# Patient Record
Sex: MALE | Race: WHITE | NOT HISPANIC OR LATINO | Employment: FULL TIME | URBAN - NONMETROPOLITAN AREA
[De-identification: names, ages, dates, MRNs, and addresses within clinical notes are randomized per-mention and may not be internally consistent; named-entity substitution may affect disease eponyms.]

---

## 2017-01-10 ENCOUNTER — OFFICE VISIT (OUTPATIENT)
Dept: FAMILY MEDICINE CLINIC | Facility: CLINIC | Age: 57
End: 2017-01-10

## 2017-01-10 VITALS
WEIGHT: 242.1 LBS | BODY MASS INDEX: 35.86 KG/M2 | HEART RATE: 93 BPM | OXYGEN SATURATION: 96 % | HEIGHT: 69 IN | SYSTOLIC BLOOD PRESSURE: 128 MMHG | DIASTOLIC BLOOD PRESSURE: 78 MMHG

## 2017-01-10 DIAGNOSIS — I10 ESSENTIAL HYPERTENSION: ICD-10-CM

## 2017-01-10 DIAGNOSIS — N18.30 CKD (CHRONIC KIDNEY DISEASE) STAGE 3, GFR 30-59 ML/MIN (HCC): ICD-10-CM

## 2017-01-10 DIAGNOSIS — K21.9 GASTROESOPHAGEAL REFLUX DISEASE, ESOPHAGITIS PRESENCE NOT SPECIFIED: Primary | ICD-10-CM

## 2017-01-10 DIAGNOSIS — E11.8 CONTROLLED TYPE 2 DIABETES MELLITUS WITH COMPLICATION, WITHOUT LONG-TERM CURRENT USE OF INSULIN (HCC): ICD-10-CM

## 2017-01-10 DIAGNOSIS — R11.2 NAUSEA AND VOMITING, INTRACTABILITY OF VOMITING NOT SPECIFIED, UNSPECIFIED VOMITING TYPE: ICD-10-CM

## 2017-01-10 PROCEDURE — 99213 OFFICE O/P EST LOW 20 MIN: CPT | Performed by: FAMILY MEDICINE

## 2017-01-10 RX ORDER — UBIDECARENONE 200 MG
1 CAPSULE ORAL NIGHTLY
Qty: 90 EACH | Refills: 2 | Status: SHIPPED | OUTPATIENT
Start: 2017-01-10 | End: 2019-04-18 | Stop reason: SDUPTHER

## 2017-01-10 RX ORDER — VALSARTAN AND HYDROCHLOROTHIAZIDE 80; 12.5 MG/1; MG/1
1 TABLET, FILM COATED ORAL DAILY
Qty: 90 TABLET | Refills: 2 | Status: SHIPPED | OUTPATIENT
Start: 2017-01-10 | End: 2017-10-17 | Stop reason: SDUPTHER

## 2017-01-10 RX ORDER — PAROXETINE HYDROCHLORIDE HEMIHYDRATE 25 MG/1
25 TABLET, FILM COATED, EXTENDED RELEASE ORAL EVERY MORNING
Qty: 90 TABLET | Refills: 2 | Status: SHIPPED | OUTPATIENT
Start: 2017-01-10 | End: 2017-10-17 | Stop reason: SDUPTHER

## 2017-01-10 RX ORDER — ESOMEPRAZOLE MAGNESIUM 20 MG/1
20 FOR SUSPENSION ORAL
Qty: 90 PACKET | Refills: 2 | Status: SHIPPED | OUTPATIENT
Start: 2017-01-10 | End: 2017-02-07 | Stop reason: ALTCHOICE

## 2017-01-10 RX ORDER — ONDANSETRON 4 MG/1
4 TABLET, FILM COATED ORAL EVERY 8 HOURS PRN
Qty: 30 TABLET | Refills: 0 | Status: SHIPPED | OUTPATIENT
Start: 2017-01-10 | End: 2017-05-15 | Stop reason: SDUPTHER

## 2017-01-10 RX ORDER — ATORVASTATIN CALCIUM 40 MG/1
40 TABLET, FILM COATED ORAL DAILY
Qty: 90 TABLET | Refills: 2 | Status: SHIPPED | OUTPATIENT
Start: 2017-01-10 | End: 2017-10-17 | Stop reason: SDUPTHER

## 2017-01-10 RX ORDER — DOXAZOSIN 8 MG/1
8 TABLET ORAL NIGHTLY
Qty: 90 TABLET | Refills: 2 | Status: SHIPPED | OUTPATIENT
Start: 2017-01-10 | End: 2017-10-17 | Stop reason: SDUPTHER

## 2017-01-12 DIAGNOSIS — K21.9 GASTROESOPHAGEAL REFLUX DISEASE, ESOPHAGITIS PRESENCE NOT SPECIFIED: Primary | ICD-10-CM

## 2017-01-12 LAB
ALBUMIN SERPL-MCNC: 4.2 GM/DL (ref 3.4–4.8)
ALP SERPL-CCNC: 52 U/L (ref 38–126)
ALT SERPL-CCNC: 42 U/L (ref 21–72)
ANION GAP SERPL CALCULATED.3IONS-SCNC: 16 MMOL/L (ref 5–15)
AST SERPL-CCNC: 43 U/L (ref 17–59)
BILIRUB SERPL-MCNC: 0.7 MG/DL (ref 0.2–1.3)
BUN SERPL-MCNC: 14 MG/DL (ref 7–21)
CALCIUM SERPL-MCNC: 8.8 MG/DL (ref 8.4–10.2)
CHLORIDE SERPL-SCNC: 100 MMOL/L (ref 95–110)
CO2 SERPL-SCNC: 29 MMOL/L (ref 22–31)
CREAT SERPL-MCNC: 1.1 MG/DL (ref 0.7–1.3)
GLUCOSE SERPL-MCNC: 124 MG/DL (ref 60–100)
POTASSIUM SERPL-SCNC: 3.7 MMOL/L (ref 3.5–5.1)
PROT SERPL-MCNC: 7.3 GM/DL (ref 6.3–8.6)
SODIUM SERPL-SCNC: 145 MMOL/L (ref 137–145)
TSH SERPL-ACNC: 0.68 UIU/ML (ref 0.46–4.68)

## 2017-01-13 RX ORDER — METOCLOPRAMIDE 5 MG/1
5 TABLET ORAL 2 TIMES DAILY PRN
Qty: 40 TABLET | Refills: 0 | Status: SHIPPED | OUTPATIENT
Start: 2017-01-13 | End: 2017-10-17

## 2017-01-13 RX ORDER — GLYBURIDE 2.5 MG/1
2.5 TABLET ORAL
Qty: 30 TABLET | Refills: 0 | Status: SHIPPED | OUTPATIENT
Start: 2017-01-13 | End: 2017-10-17 | Stop reason: SDUPTHER

## 2017-01-16 ENCOUNTER — OFFICE VISIT (OUTPATIENT)
Dept: PAIN MEDICINE | Facility: CLINIC | Age: 57
End: 2017-01-16

## 2017-01-16 VITALS
DIASTOLIC BLOOD PRESSURE: 70 MMHG | HEIGHT: 69 IN | WEIGHT: 244.1 LBS | SYSTOLIC BLOOD PRESSURE: 120 MMHG | BODY MASS INDEX: 36.15 KG/M2

## 2017-01-16 DIAGNOSIS — Z79.891 LONG TERM (CURRENT) USE OF OPIATE ANALGESIC: ICD-10-CM

## 2017-01-16 DIAGNOSIS — M12.9 ARTHRITIS, MULTIPLE JOINT INVOLVEMENT: ICD-10-CM

## 2017-01-16 DIAGNOSIS — M47.817 LUMBOSACRAL SPONDYLOSIS WITHOUT MYELOPATHY: Primary | ICD-10-CM

## 2017-01-16 PROCEDURE — 99213 OFFICE O/P EST LOW 20 MIN: CPT | Performed by: NURSE PRACTITIONER

## 2017-01-16 NOTE — PROGRESS NOTES
Zaheer Baron is a 56 y.o. male.   1960    HPI:   Location: lower back and Joints  Quality: stabbing and aching  Severity: 5/10  Timing: constant  Alleviating: pain medication, ice, heating pad and injection  Aggravating: sitting      Opiate medications providing enough relief for daily activities and work issues. No SE. 12/5 PROCEDURE: Lumbosacral RFTC (radiofrequency thermal coagulation) still providing enough relief at this point. Will obtain UDS next visit and schedule RFTC in may.  Patient states he has had chronic history of lower back problems and osteoarthritis involving multiple joints with stabbing aching pain on and off.      The following portions of the patient's history were reviewed by me and updated as appropriate: allergies, current medications, past family history, past medical history, past social history, past surgical history and problem list.    Past Medical History   Diagnosis Date   • Acute sinusitis    • Allergic rhinitis    • Arthropathy      of lumbar facet joint   • Artificial lens present      position - right   • Backache      chronic back problems   • Benign prostatic hyperplasia    • Bronchitis      perisistent   • Cataract    • Chronic obstructive lung disease    • Diabetes mellitus      no retinopathy   • Disorder of kidney      Disorder of kidney and/or ureter - Congenital solitary right kidney      • Dizziness      History of Meniere's like condition, left ear      • Drug therapy      long-term   • Dysfunction of eustachian tube    • Dyslipidemia    • Gout    • Headache      History of possible migraine/cluster      • Hearing loss    • Hypertension    • Hypokalemia    • Impacted cerumen    • Infestation by Sarcoptes scabiei alta hominis    • Inguinal hernia      History of, repaired      • Knee pain    • Osteoarthritis    • Pneumonia      in the past   • Posterior subcapsular polar senile cataract    • Sjogren's syndrome    • Type 2 diabetes mellitus        Social  History     Social History   • Marital status:      Spouse name: N/A   • Number of children: N/A   • Years of education: N/A     Occupational History   • Not on file.     Social History Main Topics   • Smoking status: Never Smoker   • Smokeless tobacco: Not on file   • Alcohol use Yes      Comment: SELDOM   • Drug use: No   • Sexual activity: Defer     Other Topics Concern   • Not on file     Social History Narrative       Family History   Problem Relation Age of Onset   • Cancer Other    • Diabetes Other    • Lung disease Other          Current Outpatient Prescriptions:   •  atorvastatin (LIPITOR) 40 MG tablet, Take 1 tablet by mouth Daily., Disp: 90 tablet, Rfl: 2  •  Coenzyme Q-10 200 MG capsule, Take 1 tablet by mouth Every Night., Disp: 90 each, Rfl: 2  •  doxazosin (CARDURA) 8 MG tablet, Take 1 tablet by mouth Every Night., Disp: 90 tablet, Rfl: 2  •  Ergocalciferol 2000 UNITS tablet, Take  by mouth., Disp: , Rfl:   •  esomeprazole (NEXIUM) 20 MG packet, Take 20 mg by mouth Every Morning Before Breakfast., Disp: 90 packet, Rfl: 2  •  FLUTICASONE FUROATE IN, Inhale., Disp: , Rfl:   •  glucose blood test strip, 1 each by Other route as needed. Use as instructed, Disp: , Rfl:   •  glyBURIDE (DIABETA) 2.5 MG tablet, Take 1 tablet by mouth Daily With Breakfast., Disp: 30 tablet, Rfl: 0  •  HYDROcodone-acetaminophen (NORCO)  MG per tablet, , Disp: , Rfl:   •  metoclopramide (REGLAN) 5 MG tablet, Take 1 tablet by mouth 2 (Two) Times a Day As Needed (as needed)., Disp: 40 tablet, Rfl: 0  •  ondansetron (ZOFRAN) 4 MG tablet, Take 1 tablet by mouth Every 8 (Eight) Hours As Needed for nausea or vomiting., Disp: 30 tablet, Rfl: 0  •  ONETOUCH DELICA LANCETS 33G misc, , Disp: , Rfl:   •  PARoxetine CR (PAXIL-CR) 25 MG 24 hr tablet, Take 1 tablet by mouth Every Morning., Disp: 90 tablet, Rfl: 2  •  SITagliptin (JANUVIA) 100 MG tablet, Take 1 tablet by mouth Daily., Disp: 90 tablet, Rfl: 2  •  tadalafil (CIALIS)  5 MG tablet, Take 5 mg by mouth daily as needed for erectile dysfunction., Disp: , Rfl:   •  urea (COLT LO 40) 40 % cream, Apply  topically., Disp: , Rfl:   •  valsartan-hydrochlorothiazide (DIOVAN-HCT) 80-12.5 MG per tablet, Take 1 tablet by mouth Daily., Disp: 90 tablet, Rfl: 2    Allergies   Allergen Reactions   • Nsaids      Solitary kidney; renal failure with NSAID use.         Review of Systems   Musculoskeletal: Positive for back pain.        Multiple joint pain       10 system review of systems was reviewed and negative except for above.    Physical Exam   Constitutional: He is oriented to person, place, and time. He appears well-developed and well-nourished. No distress.   Cardiovascular: Normal rate and regular rhythm.    Pulmonary/Chest: Effort normal.   Musculoskeletal:        Lumbar back: He exhibits decreased range of motion (flex 60 deg and ext 15 deg with min facet loading. ) and tenderness.   Neurological: He is alert and oriented to person, place, and time. Gait normal.   Reflex Scores:       Tricep reflexes are 2+ on the right side and 2+ on the left side.       Bicep reflexes are 2+ on the right side and 2+ on the left side.       Brachioradialis reflexes are 2+ on the right side and 2+ on the left side.       Patellar reflexes are 2+ on the right side and 2+ on the left side.       Achilles reflexes are 2+ on the right side and 2+ on the left side.  Psychiatric: He has a normal mood and affect. His behavior is normal. Judgment normal.       Zaheer was seen today for back pain and pain.    Diagnoses and all orders for this visit:    Lumbosacral spondylosis without myelopathy    Arthritis, multiple joint involvement    Long term (current) use of opiate analgesic        Medication: Patient reports no negative side effects, Patient reports appropriate usage and storage habits, Patient's opioid provides enough reflief to be more active and perform activities of daily living with less discomfort. and  Refill opioid medication as above. Discussed pt case with Dr. Shen, Opiate medication refilled x 2 hand written RX.     Interventional: 12/5 PROCEDURE: Lumbosacral RFTC (radiofrequency thermal coagulation) still providing enough relief at this point.      Rehab: Works 5 days a week.     Behavioral: No aberrant behavior noted. [facility] Report # [id number]  was reviewed and is consistent with stated history    Urine drug screen None at this time. Will get UDS next visit.           This document has been electronically signed by WILLA Lockett on January 16, 2017 4:42 PM

## 2017-01-19 NOTE — PROGRESS NOTES
Addendum:  I have reviewed this patient with WILLA Lewis.  I agree with the above findings and plan.  I have personally spoken with the patient today, and confirmed key findings.

## 2017-02-07 RX ORDER — OMEPRAZOLE 40 MG/1
40 CAPSULE, DELAYED RELEASE ORAL DAILY
Qty: 30 CAPSULE | Refills: 3 | Status: SHIPPED | OUTPATIENT
Start: 2017-02-07 | End: 2017-10-17 | Stop reason: SDUPTHER

## 2017-02-09 NOTE — PROGRESS NOTES
I have reviewed the notes, assessments, and/or procedures performed. I concur with her/his documentation of Zaheer Baron.     Baldemar Cotto, DO

## 2017-03-16 ENCOUNTER — HOSPITAL ENCOUNTER (OUTPATIENT)
Facility: HOSPITAL | Age: 57
Setting detail: HOSPITAL OUTPATIENT SURGERY
Discharge: HOME | End: 2017-03-16
Attending: INTERNAL MEDICINE | Admitting: INTERNAL MEDICINE

## 2017-03-16 ENCOUNTER — ANESTHESIA (OUTPATIENT)
Dept: GASTROENTEROLOGY | Facility: HOSPITAL | Age: 57
End: 2017-03-16

## 2017-03-16 ENCOUNTER — ANESTHESIA EVENT (OUTPATIENT)
Dept: GASTROENTEROLOGY | Facility: HOSPITAL | Age: 57
End: 2017-03-16

## 2017-03-16 ENCOUNTER — OFFICE VISIT (OUTPATIENT)
Dept: PAIN MEDICINE | Facility: CLINIC | Age: 57
End: 2017-03-16

## 2017-03-16 VITALS
HEIGHT: 69 IN | BODY MASS INDEX: 37.59 KG/M2 | DIASTOLIC BLOOD PRESSURE: 70 MMHG | WEIGHT: 253.8 LBS | SYSTOLIC BLOOD PRESSURE: 122 MMHG

## 2017-03-16 VITALS
DIASTOLIC BLOOD PRESSURE: 81 MMHG | HEART RATE: 67 BPM | BODY MASS INDEX: 37.16 KG/M2 | WEIGHT: 250.88 LBS | RESPIRATION RATE: 18 BRPM | OXYGEN SATURATION: 97 % | SYSTOLIC BLOOD PRESSURE: 122 MMHG | HEIGHT: 69 IN | TEMPERATURE: 97.9 F

## 2017-03-16 DIAGNOSIS — K21.9 ACID REFLUX DISEASE WITH ULCER: ICD-10-CM

## 2017-03-16 DIAGNOSIS — M47.817 LUMBOSACRAL SPONDYLOSIS WITHOUT MYELOPATHY: Primary | ICD-10-CM

## 2017-03-16 DIAGNOSIS — Z79.899 HIGH RISK MEDICATIONS (NOT ANTICOAGULANTS) LONG-TERM USE: ICD-10-CM

## 2017-03-16 DIAGNOSIS — M25.9 MULTIPLE JOINT COMPLAINTS: ICD-10-CM

## 2017-03-16 LAB — GLUCOSE BLDC GLUCOMTR-MCNC: 118 MG/DL (ref 70–130)

## 2017-03-16 PROCEDURE — 88305 TISSUE EXAM BY PATHOLOGIST: CPT | Performed by: PATHOLOGY

## 2017-03-16 PROCEDURE — 99214 OFFICE O/P EST MOD 30 MIN: CPT | Performed by: PAIN MEDICINE

## 2017-03-16 PROCEDURE — 82962 GLUCOSE BLOOD TEST: CPT

## 2017-03-16 PROCEDURE — G0481 DRUG TEST DEF 8-14 CLASSES: HCPCS | Performed by: PAIN MEDICINE

## 2017-03-16 PROCEDURE — 88342 IMHCHEM/IMCYTCHM 1ST ANTB: CPT | Performed by: INTERNAL MEDICINE

## 2017-03-16 PROCEDURE — 25010000002 PROPOFOL 10 MG/ML EMULSION: Performed by: NURSE ANESTHETIST, CERTIFIED REGISTERED

## 2017-03-16 PROCEDURE — 88342 IMHCHEM/IMCYTCHM 1ST ANTB: CPT | Performed by: PATHOLOGY

## 2017-03-16 PROCEDURE — 80307 DRUG TEST PRSMV CHEM ANLYZR: CPT | Performed by: PAIN MEDICINE

## 2017-03-16 PROCEDURE — 88305 TISSUE EXAM BY PATHOLOGIST: CPT | Performed by: INTERNAL MEDICINE

## 2017-03-16 RX ORDER — HYDROCODONE BITARTRATE AND ACETAMINOPHEN 10; 325 MG/1; MG/1
1 TABLET ORAL
Qty: 150 TABLET | Refills: 0 | Status: SHIPPED | OUTPATIENT
Start: 2017-03-16 | End: 2017-04-15

## 2017-03-16 RX ORDER — DEXTROSE AND SODIUM CHLORIDE 5; .45 G/100ML; G/100ML
30 INJECTION, SOLUTION INTRAVENOUS CONTINUOUS
Status: DISCONTINUED | OUTPATIENT
Start: 2017-03-16 | End: 2017-03-16 | Stop reason: HOSPADM

## 2017-03-16 RX ORDER — PROPOFOL 10 MG/ML
VIAL (ML) INTRAVENOUS AS NEEDED
Status: DISCONTINUED | OUTPATIENT
Start: 2017-03-16 | End: 2017-03-16 | Stop reason: SURG

## 2017-03-16 RX ADMIN — PROPOFOL 80 MG: 10 INJECTION, EMULSION INTRAVENOUS at 10:15

## 2017-03-16 RX ADMIN — PROPOFOL 100 MG: 10 INJECTION, EMULSION INTRAVENOUS at 10:14

## 2017-03-16 RX ADMIN — DEXTROSE AND SODIUM CHLORIDE 30 ML/HR: 5; 450 INJECTION, SOLUTION INTRAVENOUS at 10:06

## 2017-03-16 NOTE — H&P
Saji Reilly DO,[facility]  Gastroenterology  Hepatology  Endoscopy  Board Certified in Internal Medicine and gastroenterology  [address], [address]  [place]. [address]  - [phone]   F - [phone]     GASTROENTEROLOGY HISTORY AND PHYSICAL  NOTE   SAJI REILLY DO.         SUBJECTIVE:   3/16/2017    Name: Zaheer Baron  DOD: 1960        Chief Complaint:       Subjective : Acid reflux, belching of sour material    Patient is 56 y.o. male presents with desire for elective EGD due to persistent acid reflux for over 20 years.  Exclude Bolton's esophagus.      ROS/HISTORY/ CURRENT MEDICATIONS/OBJECTIVE/VS/PE:   Review of Systems:   Review of Systems    History:     Past Medical History   Diagnosis Date   • Acute sinusitis    • Allergic rhinitis    • Arthropathy      of lumbar facet joint   • Artificial lens present      position - right   • Backache      chronic back problems   • Benign prostatic hyperplasia    • Bronchitis      perisistent   • Cataract    • Chronic obstructive lung disease    • Diabetes mellitus      no retinopathy   • Disorder of kidney      Disorder of kidney and/or ureter - Congenital solitary right kidney      • Dizziness      History of Meniere's like condition, left ear      • Drug therapy      long-term   • Dysfunction of eustachian tube    • Dyslipidemia    • Gout    • Headache      History of possible migraine/cluster      • Hearing loss    • Hypertension    • Hypokalemia    • Impacted cerumen    • Infestation by Sarcoptes scabiei alta hominis    • Inguinal hernia      History of, repaired      • Knee pain    • Osteoarthritis    • Pneumonia      in the past   • Posterior subcapsular polar senile cataract    • Sjogren's syndrome    • Type 2 diabetes mellitus      Past Surgical History   Procedure Laterality Date   • Back surgery       1/1/02   • Carpal tunnel release       (Carpal tunnel release on the left)   09/10/2010 , Carpal tunnel release on the  right)   12/03/2010    • Inguinal hernia repair       (Left inguinal hernioplasty with mesh.)   06/07/2007    • Injection of medication       Injection for nerve block (Lumbar medial branch block.)   03/31/2016    • Knee surgery       (Lateral release, removal of loose bodies, excision of suprapatellar plica.)   04/29/1982    • Other surgical history       Lumbar surgery (Lumbosacral radio frequency thermal coagulation. Lumbosacral spondylosis.)   06/30/2016 ,Lumbar surgery (Lumbosacral radio frequency thermal coagulation.)   12/21/2015     • Other surgical history       Remove cataract, insert lens (Right eye.)   05/07/2015    • Other surgical history       Remove hip/pelvis lesion (Melanoma, right hip.)   2005    • Injection of medication  03/19/2015     solu-medrol    • Eye surgery Bilateral      cataract removed   • Hernia repair       Family History   Problem Relation Age of Onset   • Cancer Other    • Diabetes Other    • Lung disease Other      Social History   Substance Use Topics   • Smoking status: Never Smoker   • Smokeless tobacco: None   • Alcohol use Yes      Comment: SELDOM     Prescriptions Prior to Admission   Medication Sig Dispense Refill Last Dose   • atorvastatin (LIPITOR) 40 MG tablet Take 1 tablet by mouth Daily. 90 tablet 2 3/15/2017 at Unknown time   • Coenzyme Q-10 200 MG capsule Take 1 tablet by mouth Every Night. 90 each 2 3/15/2017 at Unknown time   • doxazosin (CARDURA) 8 MG tablet Take 1 tablet by mouth Every Night. 90 tablet 2 3/15/2017 at Unknown time   • Ergocalciferol 2000 UNITS tablet Take  by mouth.   3/15/2017 at Unknown time   • FLUTICASONE FUROATE IN Inhale.   3/15/2017 at Unknown time   • glucose blood test strip 1 each by Other route as needed. Use as instructed   3/15/2017 at Unknown time   • glyBURIDE (DIABETA) 2.5 MG tablet Take 1 tablet by mouth Daily With Breakfast. 30 tablet 0 3/15/2017 at Unknown time   • HYDROcodone-acetaminophen (NORCO)  MG per tablet Takes 5  tablets daily   3/16/2017 at Unknown time   • HYDROcodone-acetaminophen (NORCO)  MG per tablet Take 1 tablet by mouth 5 (Five) Times a Day for 30 days. 150 tablet 0 3/15/2017 at Unknown time   • HYDROcodone-acetaminophen (NORCO)  MG per tablet Take 1 tablet by mouth 5 (Five) Times a Day for 30 days. 150 tablet 0 3/15/2017 at Unknown time   • metoclopramide (REGLAN) 5 MG tablet Take 1 tablet by mouth 2 (Two) Times a Day As Needed (as needed). 40 tablet 0 3/15/2017 at Unknown time   • omeprazole (priLOSEC) 40 MG capsule Take 1 capsule by mouth Daily. 30 capsule 3 3/15/2017 at Unknown time   • ondansetron (ZOFRAN) 4 MG tablet Take 1 tablet by mouth Every 8 (Eight) Hours As Needed for nausea or vomiting. 30 tablet 0 3/15/2017 at Unknown time   • ONETOUCH DELICA LANCETS 33G misc    3/15/2017 at Unknown time   • PARoxetine CR (PAXIL-CR) 25 MG 24 hr tablet Take 1 tablet by mouth Every Morning. 90 tablet 2 3/15/2017 at Unknown time   • SITagliptin (JANUVIA) 100 MG tablet Take 1 tablet by mouth Daily. 90 tablet 2 3/14/2017   • tadalafil (CIALIS) 5 MG tablet Take 5 mg by mouth daily as needed for erectile dysfunction.   3/15/2017   • urea (COLT LO 40) 40 % cream Apply  topically.   3/15/2017   • valsartan-hydrochlorothiazide (DIOVAN-HCT) 80-12.5 MG per tablet Take 1 tablet by mouth Daily. 90 tablet 2 3/15/2017     Allergies:  Nsaids    I have reviewed the patients medical history, surgical history and family history in the available medical record system.     Current Medications:     Current Facility-Administered Medications   Medication Dose Route Frequency Provider Last Rate Last Dose   • dextrose 5 % and sodium chloride 0.45 % infusion  30 mL/hr Intravenous Continuous Alli Shook DO 30 mL/hr at 03/16/17 1006 30 mL/hr at 03/16/17 1006       Objective     Physical Exam:   Temp:  [98.2 °F (36.8 °C)] 98.2 °F (36.8 °C)  Heart Rate:  [76] 76  Resp:  [18] 18  BP: (122-149)/(70-93) 149/93    Physical  Exam:  General Appearance:    Alert, cooperative, in no acute distress   Head:    Normocephalic, without obvious abnormality, atraumatic   Eyes:            Lids and lashes normal, conjunctivae and sclerae normal, no   icterus, no pallor, corneas clear, PERRLA   Ears:    Ears appear intact with no abnormalities noted   Throat:   No oral lesions, no thrush, oral mucosa moist   Neck:   No adenopathy, supple, trachea midline, no thyromegaly, no     carotid bruit, no JVD   Back:     No kyphosis present, no scoliosis present, no skin lesions,       erythema or scars, no tenderness to percussion or                   palpation,   range of motion normal   Lungs:     Clear to auscultation,respirations regular, even and                   unlabored    Heart:    Regular rhythm and normal rate, normal S1 and S2, no            murmur, no gallop, no rub, no click   Breast Exam:    Deferred   Abdomen:     Normal bowel sounds, no masses, no organomegaly, soft        non-tender, non-distended, no guarding, no rebound                 tenderness   Genitalia:    Deferred   Extremities:   Moves all extremities well, no edema, no cyanosis, no              redness   Pulses:   Pulses palpable and equal bilaterally   Skin:   No bleeding, bruising or rash   Lymph nodes:   No palpable adenopathy   Neurologic:   Cranial nerves 2 - 12 grossly intact, sensation intact, DTR        present and equal bilaterally      Results Review:     Lab Results   Component Value Date    WBC 6.1 01/12/2017    WBC 5.5 01/28/2016    WBC 5.0 07/09/2015    HGB 12.9 (L) 01/12/2017    HGB 12.8 (L) 01/28/2016    HGB 13.3 (L) 07/09/2015    HCT 37.9 (L) 01/12/2017    HCT 37.9 (L) 01/28/2016    HCT 39.8 07/09/2015     01/12/2017     01/28/2016     07/09/2015             No results found for: LIPASE  No results found for: INR       Radiology Review:  Imaging Results (last 72 hours)     ** No results found for the last 72 hours. **           I reviewed  the patient's new clinical results.  I reviewed the patient's new imaging results and agree with the interpretation.     ASSESSMENT/PLAN:   ASSESSMENT:   1.  Acid reflux, exclude Bolton's esophagus or complications    PLAN:   1.  Esophagogastroduodenoscopy with biopsies    Risk and benefits associated with the procedure are reviewed with the patient.  He wishes to proceed      Alli Shook DO  03/16/17  10:19 AM

## 2017-03-16 NOTE — PLAN OF CARE
Problem: Patient Care Overview (Adult)  Goal: Plan of Care Review  Outcome: Ongoing (interventions implemented as appropriate)    03/16/17 1029   Coping/Psychosocial Response Interventions   Plan Of Care Reviewed With patient   Patient Care Overview   Progress no change   Outcome Evaluation   Outcome Summary/Follow up Plan vss         Problem: GI Endoscopy (Adult)  Goal: Signs and Symptoms of Listed Potential Problems Will be Absent or Manageable (GI Endoscopy)  Outcome: Ongoing (interventions implemented as appropriate)    03/16/17 1029   GI Endoscopy   Problems Assessed (GI Endoscopy) all   Problems Present (GI Endoscopy) none

## 2017-03-16 NOTE — ANESTHESIA PREPROCEDURE EVALUATION
Anesthesia Evaluation     NPO Status: > 8 hours   Airway   Mallampati: III  TM distance: >3 FB  possible difficult intubation  Dental - normal exam     Pulmonary - normal exam   Cardiovascular - negative cardio ROS and normal exam        Neuro/Psych  GI/Hepatic/Renal/Endo    (+)  chronic renal disease, diabetes mellitus type 1 well controlled,     Musculoskeletal     Abdominal  - normal exam   Substance History      OB/GYN          Other   (+) arthritis                                 Anesthesia Plan    ASA 3     MAC     Anesthetic plan and risks discussed with patient.

## 2017-03-16 NOTE — ANESTHESIA POSTPROCEDURE EVALUATION
Patient: Zaheer Baron    Procedure Summary     Date Anesthesia Start Anesthesia Stop Room / Location    03/16/17 1012 1028 [facility] ENDOSCOPY 2 / [facility] ENDOSCOPY       Procedure Diagnosis Surgeon Provider    ESOPHAGOGASTRODUODENOSCOPY (N/A Esophagus) Acid reflux disease with ulcer  (ACID REFLUX) Alli Shook, DO Alli Hernandez CRNA          Anesthesia Type: MAC  Last vitals  /93 (03/16/17 0959)    Temp 98.2 °F (36.8 °C) (03/16/17 0959)    Pulse 76 (03/16/17 0959)   Resp 18 (03/16/17 0959)    SpO2 95 % (03/16/17 0959)      Post Anesthesia Care and Evaluation    Patient location during evaluation: PACU  Patient participation: complete - patient participated  Level of consciousness: awake and alert  Pain management: adequate  Airway patency: patent  Anesthetic complications: No anesthetic complications    Cardiovascular status: acceptable  Respiratory status: acceptable  Hydration status: acceptable

## 2017-03-16 NOTE — PLAN OF CARE
Problem: Patient Care Overview (Adult)  Goal: Plan of Care Review  Outcome: Outcome(s) achieved Date Met:  03/16/17 03/16/17 1042   Coping/Psychosocial Response Interventions   Plan Of Care Reviewed With patient;spouse   Patient Care Overview   Progress no change   Outcome Evaluation   Outcome Summary/Follow up Plan vss, tolerating fluids         Problem: GI Endoscopy (Adult)  Goal: Signs and Symptoms of Listed Potential Problems Will be Absent or Manageable (GI Endoscopy)  Outcome: Outcome(s) achieved Date Met:  03/16/17 03/16/17 1042   GI Endoscopy   Problems Assessed (GI Endoscopy) all   Problems Present (GI Endoscopy) none

## 2017-03-16 NOTE — PROGRESS NOTES
Zaheer Baron is a 56 y.o. male.   1960    HPI:   Location: lower back and Joints  Quality: stabbing and aching  Severity: 4-5/10  Timing: constant  Alleviating: pain medication, ice, heating pad and injection  Aggravating: sitting     Back still improved after rftc in November.  Patient reports that the opioid medication still provides him good relief and allows increased activity than he would have without the opioid medication. He denies side effects.      The following portions of the patient's history were reviewed by me and updated as appropriate: allergies, current medications, past family history, past medical history, past social history, past surgical history and problem list.    Past Medical History   Diagnosis Date   • Acute sinusitis    • Allergic rhinitis    • Arthropathy      of lumbar facet joint   • Artificial lens present      position - right   • Backache      chronic back problems   • Benign prostatic hyperplasia    • Bronchitis      perisistent   • Cataract    • Chronic obstructive lung disease    • Diabetes mellitus      no retinopathy   • Disorder of kidney      Disorder of kidney and/or ureter - Congenital solitary right kidney      • Dizziness      History of Meniere's like condition, left ear      • Drug therapy      long-term   • Dysfunction of eustachian tube    • Dyslipidemia    • Gout    • Headache      History of possible migraine/cluster      • Hearing loss    • Hypertension    • Hypokalemia    • Impacted cerumen    • Infestation by Sarcoptes scabiei alta hominis    • Inguinal hernia      History of, repaired      • Knee pain    • Osteoarthritis    • Pneumonia      in the past   • Posterior subcapsular polar senile cataract    • Sjogren's syndrome    • Type 2 diabetes mellitus        Social History     Social History   • Marital status:      Spouse name: N/A   • Number of children: N/A   • Years of education: N/A     Occupational History   • Not on file.     Social  History Main Topics   • Smoking status: Never Smoker   • Smokeless tobacco: Not on file   • Alcohol use Yes      Comment: SELDOM   • Drug use: No   • Sexual activity: Defer     Other Topics Concern   • Not on file     Social History Narrative       Family History   Problem Relation Age of Onset   • Cancer Other    • Diabetes Other    • Lung disease Other          Current Outpatient Prescriptions:   •  atorvastatin (LIPITOR) 40 MG tablet, Take 1 tablet by mouth Daily., Disp: 90 tablet, Rfl: 2  •  Coenzyme Q-10 200 MG capsule, Take 1 tablet by mouth Every Night., Disp: 90 each, Rfl: 2  •  doxazosin (CARDURA) 8 MG tablet, Take 1 tablet by mouth Every Night., Disp: 90 tablet, Rfl: 2  •  Ergocalciferol 2000 UNITS tablet, Take  by mouth., Disp: , Rfl:   •  FLUTICASONE FUROATE IN, Inhale., Disp: , Rfl:   •  glucose blood test strip, 1 each by Other route as needed. Use as instructed, Disp: , Rfl:   •  glyBURIDE (DIABETA) 2.5 MG tablet, Take 1 tablet by mouth Daily With Breakfast., Disp: 30 tablet, Rfl: 0  •  HYDROcodone-acetaminophen (NORCO)  MG per tablet, Takes 5 tablets daily, Disp: , Rfl:   •  metoclopramide (REGLAN) 5 MG tablet, Take 1 tablet by mouth 2 (Two) Times a Day As Needed (as needed)., Disp: 40 tablet, Rfl: 0  •  omeprazole (priLOSEC) 40 MG capsule, Take 1 capsule by mouth Daily., Disp: 30 capsule, Rfl: 3  •  ondansetron (ZOFRAN) 4 MG tablet, Take 1 tablet by mouth Every 8 (Eight) Hours As Needed for nausea or vomiting., Disp: 30 tablet, Rfl: 0  •  ONETOUCH DELICA LANCETS 33G misc, , Disp: , Rfl:   •  PARoxetine CR (PAXIL-CR) 25 MG 24 hr tablet, Take 1 tablet by mouth Every Morning., Disp: 90 tablet, Rfl: 2  •  SITagliptin (JANUVIA) 100 MG tablet, Take 1 tablet by mouth Daily., Disp: 90 tablet, Rfl: 2  •  tadalafil (CIALIS) 5 MG tablet, Take 5 mg by mouth daily as needed for erectile dysfunction., Disp: , Rfl:   •  urea (COLT LO 40) 40 % cream, Apply  topically., Disp: , Rfl:   •   valsartan-hydrochlorothiazide (DIOVAN-HCT) 80-12.5 MG per tablet, Take 1 tablet by mouth Daily., Disp: 90 tablet, Rfl: 2  •  HYDROcodone-acetaminophen (NORCO)  MG per tablet, Take 1 tablet by mouth 5 (Five) Times a Day for 30 days., Disp: 150 tablet, Rfl: 0  •  HYDROcodone-acetaminophen (NORCO)  MG per tablet, Take 1 tablet by mouth 5 (Five) Times a Day for 30 days., Disp: 150 tablet, Rfl: 0    Allergies   Allergen Reactions   • Nsaids      Solitary kidney; renal failure with NSAID use.         Review of Systems   Musculoskeletal: Positive for back pain (lower).        And joint pain     10 system review of systems was reviewed and negative except for above.    Physical Exam   Constitutional: He appears well-developed and well-nourished. No distress.   Musculoskeletal:        Lumbar back: He exhibits decreased range of motion (ext 10- 15 deg with min facet loading.).   Painful ambulation b knees.   Neurological: He is alert.   Psychiatric: He has a normal mood and affect. His behavior is normal. Judgment normal.       Zaheer was seen today for back pain and joint pain.    Diagnoses and all orders for this visit:    Lumbosacral spondylosis without myelopathy  -     ToxASSURE Select 13 (MW)    Multiple joint complaints  -     ToxASSURE Select 13 (MW)    High risk medications (not anticoagulants) long-term use  -     ToxASSURE Select 13 (MW)    Other orders  -     HYDROcodone-acetaminophen (NORCO)  MG per tablet; Take 1 tablet by mouth 5 (Five) Times a Day for 30 days.  -     HYDROcodone-acetaminophen (NORCO)  MG per tablet; Take 1 tablet by mouth 5 (Five) Times a Day for 30 days.        Medication: Patient reports no negative side effects, Patient reports appropriate usage and storage habits, Patient's opioid provides enough reflief to be more active and perform activities of daily living with less discomfort. and Refill opioid medication as above    Interventional: none at this time.  Good  results from rftc.  May repeat in future.  Continue opioid for multiple joint pains.    Rehab: none at this time    Behavioral: No aberrant behavior noted. [facility] Report #[id number]  was reviewed and is consistent with stated history    Urine drug screen Ordered today to test for drugs of abuse and prescribed medications          This document has been electronically signed by Everton Shen MD on March 16, 2017 8:04 AM

## 2017-03-17 LAB
LAB AP CASE REPORT: NORMAL
LAB AP DIAGNOSIS COMMENT: NORMAL
Lab: NORMAL
PATH REPORT.FINAL DX SPEC: NORMAL
PATH REPORT.GROSS SPEC: NORMAL

## 2017-03-23 LAB
CONV REPORT SUMMARY: NORMAL
Lab: NORMAL

## 2017-05-03 ENCOUNTER — OFFICE VISIT (OUTPATIENT)
Dept: PAIN MEDICINE | Facility: CLINIC | Age: 57
End: 2017-05-03

## 2017-05-03 VITALS
SYSTOLIC BLOOD PRESSURE: 142 MMHG | BODY MASS INDEX: 37.61 KG/M2 | DIASTOLIC BLOOD PRESSURE: 96 MMHG | WEIGHT: 253.9 LBS | HEIGHT: 69 IN

## 2017-05-03 DIAGNOSIS — Z79.899 HIGH RISK MEDICATIONS (NOT ANTICOAGULANTS) LONG-TERM USE: ICD-10-CM

## 2017-05-03 DIAGNOSIS — M25.9 MULTIPLE JOINT COMPLAINTS: ICD-10-CM

## 2017-05-03 DIAGNOSIS — M47.817 LUMBOSACRAL SPONDYLOSIS WITHOUT MYELOPATHY: Primary | ICD-10-CM

## 2017-05-03 PROCEDURE — 99214 OFFICE O/P EST MOD 30 MIN: CPT | Performed by: PAIN MEDICINE

## 2017-05-03 RX ORDER — HYDROCODONE BITARTRATE AND ACETAMINOPHEN 10; 325 MG/1; MG/1
1 TABLET ORAL
Qty: 150 TABLET | Refills: 0 | Status: SHIPPED | OUTPATIENT
Start: 2017-05-03 | End: 2017-06-02

## 2017-05-15 ENCOUNTER — TELEPHONE (OUTPATIENT)
Dept: FAMILY MEDICINE CLINIC | Facility: CLINIC | Age: 57
End: 2017-05-15

## 2017-05-15 DIAGNOSIS — R10.11 RUQ PAIN: Primary | ICD-10-CM

## 2017-05-15 DIAGNOSIS — R11.2 NAUSEA AND VOMITING, INTRACTABILITY OF VOMITING NOT SPECIFIED, UNSPECIFIED VOMITING TYPE: ICD-10-CM

## 2017-05-15 DIAGNOSIS — M47.817 LUMBOSACRAL SPONDYLOSIS WITHOUT MYELOPATHY: Primary | ICD-10-CM

## 2017-05-15 RX ORDER — ONDANSETRON 4 MG/1
4 TABLET, FILM COATED ORAL EVERY 8 HOURS PRN
Qty: 30 TABLET | Refills: 0 | Status: SHIPPED | OUTPATIENT
Start: 2017-05-15 | End: 2017-10-17 | Stop reason: SDUPTHER

## 2017-05-18 ENCOUNTER — HOSPITAL ENCOUNTER (OUTPATIENT)
Dept: ULTRASOUND IMAGING | Facility: HOSPITAL | Age: 57
Discharge: HOME | End: 2017-05-18
Attending: FAMILY MEDICINE | Admitting: FAMILY MEDICINE

## 2017-05-18 DIAGNOSIS — R10.11 RUQ PAIN: ICD-10-CM

## 2017-05-18 PROCEDURE — 76705 ECHO EXAM OF ABDOMEN: CPT

## 2017-05-25 ENCOUNTER — HOSPITAL ENCOUNTER (OUTPATIENT)
Dept: INTERVENTIONAL RADIOLOGY/VASCULAR | Facility: HOSPITAL | Age: 57
Discharge: HOME | End: 2017-05-25
Attending: PAIN MEDICINE | Admitting: PAIN MEDICINE

## 2017-05-25 ENCOUNTER — HOSPITAL ENCOUNTER (OUTPATIENT)
Dept: INTERVENTIONAL RADIOLOGY/VASCULAR | Facility: HOSPITAL | Age: 57
Discharge: HOME | End: 2017-05-25
Attending: PAIN MEDICINE

## 2017-05-25 VITALS
HEART RATE: 82 BPM | DIASTOLIC BLOOD PRESSURE: 86 MMHG | SYSTOLIC BLOOD PRESSURE: 151 MMHG | RESPIRATION RATE: 20 BRPM | OXYGEN SATURATION: 96 %

## 2017-05-25 DIAGNOSIS — M47.817 LUMBOSACRAL SPONDYLOSIS WITHOUT MYELOPATHY: ICD-10-CM

## 2017-05-25 PROCEDURE — 25010000002 METHYLPREDNISOLONE PER 40 MG: Performed by: PAIN MEDICINE

## 2017-05-25 PROCEDURE — 64635 DESTROY LUMB/SAC FACET JNT: CPT | Performed by: PAIN MEDICINE

## 2017-05-25 PROCEDURE — 64636 DESTROY L/S FACET JNT ADDL: CPT | Performed by: PAIN MEDICINE

## 2017-05-25 RX ORDER — METHYLPREDNISOLONE ACETATE 40 MG/ML
INJECTION, SUSPENSION INTRA-ARTICULAR; INTRALESIONAL; INTRAMUSCULAR; SOFT TISSUE
Status: COMPLETED | OUTPATIENT
Start: 2017-05-25 | End: 2017-05-25

## 2017-05-25 RX ORDER — LIDOCAINE HYDROCHLORIDE 10 MG/ML
INJECTION, SOLUTION EPIDURAL; INFILTRATION; INTRACAUDAL; PERINEURAL
Status: COMPLETED | OUTPATIENT
Start: 2017-05-25 | End: 2017-05-25

## 2017-05-25 RX ORDER — BUPIVACAINE HYDROCHLORIDE 5 MG/ML
INJECTION, SOLUTION EPIDURAL; INTRACAUDAL
Status: COMPLETED | OUTPATIENT
Start: 2017-05-25 | End: 2017-05-25

## 2017-05-25 RX ADMIN — METHYLPREDNISOLONE ACETATE 12 MG: 40 INJECTION, SUSPENSION INTRA-ARTICULAR; INTRALESIONAL; INTRAMUSCULAR; SOFT TISSUE at 14:37

## 2017-05-25 RX ADMIN — LIDOCAINE HYDROCHLORIDE 6 ML: 10 INJECTION, SOLUTION EPIDURAL; INFILTRATION; INTRACAUDAL; PERINEURAL at 14:37

## 2017-05-25 RX ADMIN — BUPIVACAINE HYDROCHLORIDE 3 ML: 5 INJECTION, SOLUTION EPIDURAL; INTRACAUDAL; PERINEURAL at 14:37

## 2017-07-12 ENCOUNTER — OFFICE VISIT (OUTPATIENT)
Dept: PAIN MEDICINE | Facility: CLINIC | Age: 57
End: 2017-07-12

## 2017-07-12 VITALS
SYSTOLIC BLOOD PRESSURE: 134 MMHG | HEIGHT: 69 IN | DIASTOLIC BLOOD PRESSURE: 78 MMHG | BODY MASS INDEX: 38.18 KG/M2 | WEIGHT: 257.8 LBS

## 2017-07-12 DIAGNOSIS — M25.9 MULTIPLE JOINT COMPLAINTS: ICD-10-CM

## 2017-07-12 DIAGNOSIS — M47.817 LUMBOSACRAL SPONDYLOSIS WITHOUT MYELOPATHY: Primary | ICD-10-CM

## 2017-07-12 DIAGNOSIS — Z79.899 HIGH RISK MEDICATIONS (NOT ANTICOAGULANTS) LONG-TERM USE: ICD-10-CM

## 2017-07-12 PROCEDURE — 99213 OFFICE O/P EST LOW 20 MIN: CPT | Performed by: PAIN MEDICINE

## 2017-07-12 RX ORDER — HYDROCODONE BITARTRATE AND ACETAMINOPHEN 10; 325 MG/1; MG/1
1 TABLET ORAL
Qty: 150 TABLET | Refills: 0 | Status: SHIPPED | OUTPATIENT
Start: 2017-07-12 | End: 2017-08-11

## 2017-07-12 NOTE — PROGRESS NOTES
Zaheer Baron is a 56 y.o. male.   1960    HPI:   Location: lower back and Joints  Quality: stabbing and aching  Severity: 5/10  Timing: constant  Alleviating: pain medication,ice,heating pad injections  Aggravating: sitting     Low back still doing well after rftc on left on 5/25/17.  Patient denies side effects, he reports that his opioid medication still provides significant relief and allows him to be more active.      The following portions of the patient's history were reviewed by me and updated as appropriate: allergies, current medications, past family history, past medical history, past social history, past surgical history and problem list.    Past Medical History:   Diagnosis Date   • Acute sinusitis    • Allergic rhinitis    • Arthropathy     of lumbar facet joint   • Artificial lens present     position - right   • Backache     chronic back problems   • Benign prostatic hyperplasia    • Bronchitis     perisistent   • Cataract    • Chronic obstructive lung disease    • Diabetes mellitus     no retinopathy   • Disorder of kidney     Disorder of kidney and/or ureter - Congenital solitary right kidney      • Dizziness     History of Meniere's like condition, left ear      • Drug therapy     long-term   • Dysfunction of eustachian tube    • Dyslipidemia    • Gout    • Headache     History of possible migraine/cluster      • Hearing loss    • Hypertension    • Hypokalemia    • Impacted cerumen    • Infestation by Sarcoptes scabiei alta hominis    • Inguinal hernia     History of, repaired      • Knee pain    • Osteoarthritis    • Pneumonia     in the past   • Posterior subcapsular polar senile cataract    • Sjogren's syndrome    • Type 2 diabetes mellitus        Social History     Social History   • Marital status:      Spouse name: N/A   • Number of children: N/A   • Years of education: N/A     Occupational History   • Not on file.     Social History Main Topics   • Smoking status: Never  Smoker   • Smokeless tobacco: Never Used   • Alcohol use Yes      Comment: SELDOM   • Drug use: No   • Sexual activity: Defer     Other Topics Concern   • Not on file     Social History Narrative       Family History   Problem Relation Age of Onset   • Cancer Other    • Diabetes Other    • Lung disease Other          Current Outpatient Prescriptions:   •  atorvastatin (LIPITOR) 40 MG tablet, Take 1 tablet by mouth Daily., Disp: 90 tablet, Rfl: 2  •  Coenzyme Q-10 200 MG capsule, Take 1 tablet by mouth Every Night., Disp: 90 each, Rfl: 2  •  doxazosin (CARDURA) 8 MG tablet, Take 1 tablet by mouth Every Night., Disp: 90 tablet, Rfl: 2  •  Ergocalciferol 2000 UNITS tablet, Take  by mouth., Disp: , Rfl:   •  FLUTICASONE FUROATE IN, Inhale., Disp: , Rfl:   •  glucose blood test strip, 1 each by Other route as needed. Use as instructed, Disp: , Rfl:   •  glyBURIDE (DIABETA) 2.5 MG tablet, Take 1 tablet by mouth Daily With Breakfast., Disp: 30 tablet, Rfl: 0  •  HYDROcodone-acetaminophen (NORCO)  MG per tablet, Takes 5 tablets daily, Disp: , Rfl:   •  metoclopramide (REGLAN) 5 MG tablet, Take 1 tablet by mouth 2 (Two) Times a Day As Needed (as needed)., Disp: 40 tablet, Rfl: 0  •  omeprazole (priLOSEC) 40 MG capsule, Take 1 capsule by mouth Daily., Disp: 30 capsule, Rfl: 3  •  ondansetron (ZOFRAN) 4 MG tablet, Take 1 tablet by mouth Every 8 (Eight) Hours As Needed for Nausea or Vomiting., Disp: 30 tablet, Rfl: 0  •  ONETOUCH DELICA LANCETS 33G misc, , Disp: , Rfl:   •  PARoxetine CR (PAXIL-CR) 25 MG 24 hr tablet, Take 1 tablet by mouth Every Morning., Disp: 90 tablet, Rfl: 2  •  SITagliptin (JANUVIA) 100 MG tablet, Take 1 tablet by mouth Daily., Disp: 90 tablet, Rfl: 2  •  tadalafil (CIALIS) 5 MG tablet, Take 5 mg by mouth daily as needed for erectile dysfunction., Disp: , Rfl:   •  urea (COLT LO 40) 40 % cream, Apply  topically., Disp: , Rfl:   •  valsartan-hydrochlorothiazide (DIOVAN-HCT) 80-12.5 MG per tablet, Take  1 tablet by mouth Daily., Disp: 90 tablet, Rfl: 2  •  HYDROcodone-acetaminophen (NORCO)  MG per tablet, Take 1 tablet by mouth 5 (Five) Times a Day for 30 days., Disp: 150 tablet, Rfl: 0  •  HYDROcodone-acetaminophen (NORCO)  MG per tablet, Take 1 tablet by mouth 5 (Five) Times a Day for 30 days., Disp: 150 tablet, Rfl: 0    Allergies   Allergen Reactions   • Nsaids      Solitary kidney; renal failure with NSAID use.       Review of Systems   Musculoskeletal: Positive for back pain.        Joints       All systems reviewed and negative except for above.    Physical Exam   Constitutional: He appears well-developed and well-nourished. No distress.   Musculoskeletal:        Lumbar back: He exhibits decreased range of motion (min facet loading with ext on left).   Painful ambulation b knees.   Neurological: He is alert.   Psychiatric: He has a normal mood and affect. His behavior is normal. Judgment normal.       Zaheer was seen today for back pain and pain.    Diagnoses and all orders for this visit:    Lumbosacral spondylosis without myelopathy    Multiple joint complaints    High risk medications (not anticoagulants) long-term use    Other orders  -     HYDROcodone-acetaminophen (NORCO)  MG per tablet; Take 1 tablet by mouth 5 (Five) Times a Day for 30 days.  -     HYDROcodone-acetaminophen (NORCO)  MG per tablet; Take 1 tablet by mouth 5 (Five) Times a Day for 30 days.        Medication: Patient reports no negative side effects, Patient reports appropriate usage and storage habits, Patient's opioid provides enough reflief to be more active and perform activities of daily living with less discomfort. and Refill opioid medication as above.    Interventional: none at this time    Rehab: none at this time    Behavioral: No aberrant behavior noted. [facility] Report #[id number]  was reviewed and is consistent with stated history    Urine drug screen None at this time          This document has been  electronically signed by Everton Shen MD on July 12, 2017 12:29 PM

## 2017-09-07 ENCOUNTER — OFFICE VISIT (OUTPATIENT)
Dept: PAIN MEDICINE | Facility: CLINIC | Age: 57
End: 2017-09-07

## 2017-09-07 VITALS
HEIGHT: 69 IN | SYSTOLIC BLOOD PRESSURE: 130 MMHG | WEIGHT: 252.4 LBS | DIASTOLIC BLOOD PRESSURE: 78 MMHG | BODY MASS INDEX: 37.38 KG/M2

## 2017-09-07 DIAGNOSIS — M47.817 LUMBOSACRAL SPONDYLOSIS WITHOUT MYELOPATHY: Primary | ICD-10-CM

## 2017-09-07 DIAGNOSIS — Z79.899 HIGH RISK MEDICATIONS (NOT ANTICOAGULANTS) LONG-TERM USE: ICD-10-CM

## 2017-09-07 DIAGNOSIS — M12.9 ARTHROPATHY: ICD-10-CM

## 2017-09-07 DIAGNOSIS — M25.9 MULTIPLE JOINT COMPLAINTS: ICD-10-CM

## 2017-09-07 PROCEDURE — 99214 OFFICE O/P EST MOD 30 MIN: CPT | Performed by: NURSE PRACTITIONER

## 2017-09-07 NOTE — PROGRESS NOTES
"Zaheer Baron is a 56 y.o. male.   1960    HPI:   Location: lower back and Joints  Quality: stabbing and aching  Severity: 5/10  Timing: constant  Alleviating: pain medication, ice, heating pad and injection  Aggravating: sitting     Pt remains \"effective on pain control left side lumbar\". Pt reports previous right sided RFTC was 80% effective for 5 months, and would like to repeat this procedure. This will supplement opiate medications pain efficacy. Opiate medications provides enough relief for daily activity and ambulation. NO SE. Pt happy with pain management visit and regimen.         The following portions of the patient's history were reviewed by me and updated as appropriate: allergies, current medications, past family history, past medical history, past social history, past surgical history and problem list.    Past Medical History:   Diagnosis Date   • Acute sinusitis    • Allergic rhinitis    • Arthropathy     of lumbar facet joint   • Artificial lens present     position - right   • Backache     chronic back problems   • Benign prostatic hyperplasia    • Bronchitis     perisistent   • Cataract    • Chronic obstructive lung disease    • Diabetes mellitus     no retinopathy   • Disorder of kidney     Disorder of kidney and/or ureter - Congenital solitary right kidney      • Dizziness     History of Meniere's like condition, left ear      • Drug therapy     long-term   • Dysfunction of eustachian tube    • Dyslipidemia    • Gout    • Headache     History of possible migraine/cluster      • Hearing loss    • Hypertension    • Hypokalemia    • Impacted cerumen    • Infestation by Sarcoptes scabiei alta hominis    • Inguinal hernia     History of, repaired      • Knee pain    • Osteoarthritis    • Pneumonia     in the past   • Posterior subcapsular polar senile cataract    • Sjogren's syndrome    • Type 2 diabetes mellitus        Social History     Social History   • Marital status:      " Spouse name: N/A   • Number of children: N/A   • Years of education: N/A     Occupational History   • Not on file.     Social History Main Topics   • Smoking status: Never Smoker   • Smokeless tobacco: Never Used   • Alcohol use Yes      Comment: SELDOM   • Drug use: No   • Sexual activity: Defer     Other Topics Concern   • Not on file     Social History Narrative       Family History   Problem Relation Age of Onset   • Cancer Other    • Diabetes Other    • Lung disease Other          Current Outpatient Prescriptions:   •  atorvastatin (LIPITOR) 40 MG tablet, Take 1 tablet by mouth Daily., Disp: 90 tablet, Rfl: 2  •  Coenzyme Q-10 200 MG capsule, Take 1 tablet by mouth Every Night., Disp: 90 each, Rfl: 2  •  doxazosin (CARDURA) 8 MG tablet, Take 1 tablet by mouth Every Night., Disp: 90 tablet, Rfl: 2  •  Ergocalciferol 2000 UNITS tablet, Take  by mouth., Disp: , Rfl:   •  FLUTICASONE FUROATE IN, Inhale., Disp: , Rfl:   •  glucose blood test strip, 1 each by Other route as needed. Use as instructed, Disp: , Rfl:   •  glyBURIDE (DIABETA) 2.5 MG tablet, Take 1 tablet by mouth Daily With Breakfast., Disp: 30 tablet, Rfl: 0  •  HYDROcodone-acetaminophen (NORCO)  MG per tablet, Takes 5 tablets daily, Disp: , Rfl:   •  metoclopramide (REGLAN) 5 MG tablet, Take 1 tablet by mouth 2 (Two) Times a Day As Needed (as needed)., Disp: 40 tablet, Rfl: 0  •  omeprazole (priLOSEC) 40 MG capsule, Take 1 capsule by mouth Daily., Disp: 30 capsule, Rfl: 3  •  ondansetron (ZOFRAN) 4 MG tablet, Take 1 tablet by mouth Every 8 (Eight) Hours As Needed for Nausea or Vomiting., Disp: 30 tablet, Rfl: 0  •  ONETOUCH DELICA LANCETS 33G misc, , Disp: , Rfl:   •  PARoxetine CR (PAXIL-CR) 25 MG 24 hr tablet, Take 1 tablet by mouth Every Morning., Disp: 90 tablet, Rfl: 2  •  SITagliptin (JANUVIA) 100 MG tablet, Take 1 tablet by mouth Daily., Disp: 90 tablet, Rfl: 2  •  tadalafil (CIALIS) 5 MG tablet, Take 5 mg by mouth daily as needed for  erectile dysfunction., Disp: , Rfl:   •  urea (COLT LO 40) 40 % cream, Apply  topically., Disp: , Rfl:   •  valsartan-hydrochlorothiazide (DIOVAN-HCT) 80-12.5 MG per tablet, Take 1 tablet by mouth Daily., Disp: 90 tablet, Rfl: 2    Allergies   Allergen Reactions   • Nsaids      Solitary kidney; renal failure with NSAID use.       Review of Systems   Musculoskeletal: Positive for back pain (lower) and joint swelling (all over joint pain).   All other systems reviewed and are negative.    All systems reviewed and negative except for above.    Physical Exam   Constitutional: He appears well-developed and well-nourished. No distress.   Cardiovascular: Normal rate and regular rhythm.    Pulmonary/Chest: Effort normal. No respiratory distress.   Musculoskeletal:        Lumbar back: He exhibits decreased range of motion (flex <60 deg and ext 15 deg with min erma facet loading R>L) and tenderness ( midline TTP R>L).   Neurological: He is alert. He displays no tremor. He displays no seizure activity. Coordination and gait normal.   Reflex Scores:       Tricep reflexes are 2+ on the right side and 2+ on the left side.       Bicep reflexes are 2+ on the right side and 2+ on the left side.       Brachioradialis reflexes are 2+ on the right side and 2+ on the left side.       Patellar reflexes are 2+ on the right side and 2+ on the left side.       Achilles reflexes are 2+ on the right side and 2+ on the left side.  Skin: Skin is warm and dry. No rash noted. No pallor.   Psychiatric: He has a normal mood and affect. His behavior is normal. Judgment normal. He expresses no homicidal and no suicidal ideation. He expresses no suicidal plans and no homicidal plans.   Nursing note and vitals reviewed.      Zaheer was seen today for back pain and joint pain.    Diagnoses and all orders for this visit:    Lumbosacral spondylosis without myelopathy    Arthropathy    Multiple joint complaints    High risk medications (not anticoagulants)  long-term use        Medication: Patient reports no negative side effects, Patient reports appropriate usage and storage habits and Patient's opioid provides enough reflief to be more active and perform activities of daily living with less discomfort. Norco 10mg 5 x day. Discussed case with Matt Shen, opiate medication refilled ×2 hand written scripts.      Interventional: I have discussed and ordered right side RFTC.Right L4-L5, L5-S1   I have discussed the risks and benefits of the procedure with the patient.  - previous RFTC right sided 80% effective for 5 months. FERMÍN and any neurological impairments discussed with patient that may need of emergency evaluation.    Rehab: Works 5 days week.     Behavioral: No aberrant behavior noted. [facility] Report # [id number]  was reviewed and is consistent with stated history    Urine drug screen None at this time. Next time UDS.           This document has been electronically signed by WILLA Lockett on September 7, 2017 8:40 AM

## 2017-09-21 ENCOUNTER — APPOINTMENT (OUTPATIENT)
Dept: INTERVENTIONAL RADIOLOGY/VASCULAR | Facility: HOSPITAL | Age: 57
End: 2017-09-21

## 2017-09-21 ENCOUNTER — HOSPITAL ENCOUNTER (OUTPATIENT)
Dept: INTERVENTIONAL RADIOLOGY/VASCULAR | Facility: HOSPITAL | Age: 57
Discharge: HOME | End: 2017-09-21
Admitting: NURSE PRACTITIONER

## 2017-09-21 VITALS — HEART RATE: 110 BPM | DIASTOLIC BLOOD PRESSURE: 76 MMHG | SYSTOLIC BLOOD PRESSURE: 147 MMHG | OXYGEN SATURATION: 97 %

## 2017-09-21 DIAGNOSIS — M47.817 LUMBOSACRAL SPONDYLOSIS WITHOUT MYELOPATHY: ICD-10-CM

## 2017-09-21 PROCEDURE — 64635 DESTROY LUMB/SAC FACET JNT: CPT | Performed by: PAIN MEDICINE

## 2017-09-21 PROCEDURE — 25010000002 METHYLPREDNISOLONE PER 40 MG: Performed by: PAIN MEDICINE

## 2017-09-21 PROCEDURE — 64636 DESTROY L/S FACET JNT ADDL: CPT | Performed by: PAIN MEDICINE

## 2017-09-21 RX ORDER — LIDOCAINE HYDROCHLORIDE 10 MG/ML
INJECTION, SOLUTION EPIDURAL; INFILTRATION; INTRACAUDAL; PERINEURAL
Status: COMPLETED | OUTPATIENT
Start: 2017-09-21 | End: 2017-09-21

## 2017-09-21 RX ORDER — METHYLPREDNISOLONE ACETATE 40 MG/ML
INJECTION, SUSPENSION INTRA-ARTICULAR; INTRALESIONAL; INTRAMUSCULAR; SOFT TISSUE
Status: COMPLETED | OUTPATIENT
Start: 2017-09-21 | End: 2017-09-21

## 2017-09-21 RX ORDER — BUPIVACAINE HYDROCHLORIDE 5 MG/ML
INJECTION, SOLUTION EPIDURAL; INTRACAUDAL
Status: COMPLETED | OUTPATIENT
Start: 2017-09-21 | End: 2017-09-21

## 2017-09-21 RX ADMIN — LIDOCAINE HYDROCHLORIDE 6 ML: 10 INJECTION, SOLUTION EPIDURAL; INFILTRATION; INTRACAUDAL; PERINEURAL at 13:06

## 2017-09-21 RX ADMIN — BUPIVACAINE HYDROCHLORIDE 3 ML: 5 INJECTION, SOLUTION EPIDURAL; INTRACAUDAL; PERINEURAL at 13:05

## 2017-09-21 RX ADMIN — METHYLPREDNISOLONE ACETATE 12 MG: 40 INJECTION, SUSPENSION INTRA-ARTICULAR; INTRALESIONAL; INTRAMUSCULAR; SOFT TISSUE at 13:06

## 2017-10-17 ENCOUNTER — APPOINTMENT (OUTPATIENT)
Dept: LAB | Facility: HOSPITAL | Age: 57
End: 2017-10-17

## 2017-10-17 ENCOUNTER — OFFICE VISIT (OUTPATIENT)
Dept: FAMILY MEDICINE CLINIC | Facility: CLINIC | Age: 57
End: 2017-10-17

## 2017-10-17 VITALS
HEART RATE: 90 BPM | SYSTOLIC BLOOD PRESSURE: 135 MMHG | OXYGEN SATURATION: 96 % | WEIGHT: 260.6 LBS | BODY MASS INDEX: 38.6 KG/M2 | HEIGHT: 69 IN | DIASTOLIC BLOOD PRESSURE: 80 MMHG

## 2017-10-17 DIAGNOSIS — Z79.899 HIGH RISK MEDICATION USE: ICD-10-CM

## 2017-10-17 DIAGNOSIS — Z11.59 NEED FOR HEPATITIS C SCREENING TEST: ICD-10-CM

## 2017-10-17 DIAGNOSIS — M25.50 CHRONIC JOINT PAIN: ICD-10-CM

## 2017-10-17 DIAGNOSIS — E11.8 CONTROLLED TYPE 2 DIABETES MELLITUS WITH COMPLICATION, WITHOUT LONG-TERM CURRENT USE OF INSULIN (HCC): Primary | ICD-10-CM

## 2017-10-17 DIAGNOSIS — I10 ESSENTIAL HYPERTENSION: ICD-10-CM

## 2017-10-17 DIAGNOSIS — Z23 NEED FOR IMMUNIZATION AGAINST INFLUENZA: ICD-10-CM

## 2017-10-17 DIAGNOSIS — R11.2 NAUSEA AND VOMITING, INTRACTABILITY OF VOMITING NOT SPECIFIED, UNSPECIFIED VOMITING TYPE: ICD-10-CM

## 2017-10-17 DIAGNOSIS — G89.29 CHRONIC JOINT PAIN: ICD-10-CM

## 2017-10-17 LAB
ALBUMIN SERPL-MCNC: 4.6 G/DL (ref 3.4–4.8)
ALBUMIN UR-MCNC: 3.5 MG/L
ALBUMIN/GLOB SERPL: 1.4 G/DL (ref 1.1–1.8)
ALP SERPL-CCNC: 51 U/L (ref 38–126)
ALT SERPL W P-5'-P-CCNC: 54 U/L (ref 21–72)
ANION GAP SERPL CALCULATED.3IONS-SCNC: 12 MMOL/L (ref 5–15)
ARTICHOKE IGE QN: 69 MG/DL (ref 1–129)
AST SERPL-CCNC: 55 U/L (ref 17–59)
BILIRUB SERPL-MCNC: 0.7 MG/DL (ref 0.2–1.3)
BUN BLD-MCNC: 15 MG/DL (ref 7–21)
BUN/CREAT SERPL: 13.8 (ref 7–25)
CALCIUM SPEC-SCNC: 9.3 MG/DL (ref 8.4–10.2)
CHLORIDE SERPL-SCNC: 98 MMOL/L (ref 95–110)
CHOLEST SERPL-MCNC: 142 MG/DL (ref 0–199)
CO2 SERPL-SCNC: 28 MMOL/L (ref 22–31)
CREAT BLD-MCNC: 1.09 MG/DL (ref 0.7–1.3)
CRP SERPL-MCNC: 0.8 MG/DL (ref 0–1)
DEPRECATED RDW RBC AUTO: 43.2 FL (ref 35.1–43.9)
ERYTHROCYTE [DISTWIDTH] IN BLOOD BY AUTOMATED COUNT: 13.5 % (ref 11.5–14.5)
ERYTHROCYTE [SEDIMENTATION RATE] IN BLOOD: 19 MM/HR (ref 0–15)
GFR SERPL CREATININE-BSD FRML MDRD: 70 ML/MIN/1.73 (ref 56–130)
GLOBULIN UR ELPH-MCNC: 3.3 GM/DL (ref 2.3–3.5)
GLUCOSE BLD-MCNC: 149 MG/DL (ref 60–100)
HBA1C MFR BLD: 8.1 % (ref 4–5.6)
HCT VFR BLD AUTO: 38.4 % (ref 39–49)
HCV AB SER DONR QL: NEGATIVE
HDLC SERPL-MCNC: 31 MG/DL (ref 60–200)
HGB BLD-MCNC: 13 G/DL (ref 13.7–17.3)
LDLC/HDLC SERPL: 1.95 {RATIO} (ref 0–3.55)
MCH RBC QN AUTO: 29.5 PG (ref 26.5–34)
MCHC RBC AUTO-ENTMCNC: 33.9 G/DL (ref 31.5–36.3)
MCV RBC AUTO: 87.1 FL (ref 80–98)
PLATELET # BLD AUTO: 225 10*3/MM3 (ref 150–450)
PMV BLD AUTO: 11 FL (ref 8–12)
POTASSIUM BLD-SCNC: 3.7 MMOL/L (ref 3.5–5.1)
PROT SERPL-MCNC: 7.9 G/DL (ref 6.3–8.6)
RBC # BLD AUTO: 4.41 10*6/MM3 (ref 4.37–5.74)
SODIUM BLD-SCNC: 138 MMOL/L (ref 137–145)
TRIGL SERPL-MCNC: 252 MG/DL (ref 20–199)
WBC NRBC COR # BLD: 5.37 10*3/MM3 (ref 3.2–9.8)

## 2017-10-17 PROCEDURE — 86235 NUCLEAR ANTIGEN ANTIBODY: CPT | Performed by: FAMILY MEDICINE

## 2017-10-17 PROCEDURE — 83036 HEMOGLOBIN GLYCOSYLATED A1C: CPT | Performed by: FAMILY MEDICINE

## 2017-10-17 PROCEDURE — 80053 COMPREHEN METABOLIC PANEL: CPT | Performed by: FAMILY MEDICINE

## 2017-10-17 PROCEDURE — 85651 RBC SED RATE NONAUTOMATED: CPT | Performed by: FAMILY MEDICINE

## 2017-10-17 PROCEDURE — 86803 HEPATITIS C AB TEST: CPT | Performed by: FAMILY MEDICINE

## 2017-10-17 PROCEDURE — 86140 C-REACTIVE PROTEIN: CPT | Performed by: FAMILY MEDICINE

## 2017-10-17 PROCEDURE — 86225 DNA ANTIBODY NATIVE: CPT | Performed by: FAMILY MEDICINE

## 2017-10-17 PROCEDURE — 86038 ANTINUCLEAR ANTIBODIES: CPT | Performed by: FAMILY MEDICINE

## 2017-10-17 PROCEDURE — 80061 LIPID PANEL: CPT | Performed by: FAMILY MEDICINE

## 2017-10-17 PROCEDURE — 90686 IIV4 VACC NO PRSV 0.5 ML IM: CPT | Performed by: FAMILY MEDICINE

## 2017-10-17 PROCEDURE — 85027 COMPLETE CBC AUTOMATED: CPT | Performed by: FAMILY MEDICINE

## 2017-10-17 PROCEDURE — 90471 IMMUNIZATION ADMIN: CPT | Performed by: FAMILY MEDICINE

## 2017-10-17 PROCEDURE — 99214 OFFICE O/P EST MOD 30 MIN: CPT | Performed by: FAMILY MEDICINE

## 2017-10-17 PROCEDURE — 36415 COLL VENOUS BLD VENIPUNCTURE: CPT | Performed by: FAMILY MEDICINE

## 2017-10-17 PROCEDURE — 82043 UR ALBUMIN QUANTITATIVE: CPT | Performed by: FAMILY MEDICINE

## 2017-10-17 PROCEDURE — 86431 RHEUMATOID FACTOR QUANT: CPT | Performed by: FAMILY MEDICINE

## 2017-10-17 RX ORDER — VALSARTAN AND HYDROCHLOROTHIAZIDE 80; 12.5 MG/1; MG/1
1 TABLET, FILM COATED ORAL DAILY
Qty: 90 TABLET | Refills: 2 | Status: SHIPPED | OUTPATIENT
Start: 2017-10-17 | End: 2018-08-09 | Stop reason: RX

## 2017-10-17 RX ORDER — ATORVASTATIN CALCIUM 40 MG/1
40 TABLET, FILM COATED ORAL DAILY
Qty: 90 TABLET | Refills: 2 | Status: SHIPPED | OUTPATIENT
Start: 2017-10-17 | End: 2017-10-17 | Stop reason: SDUPTHER

## 2017-10-17 RX ORDER — ONDANSETRON 4 MG/1
4 TABLET, FILM COATED ORAL EVERY 8 HOURS PRN
Qty: 30 TABLET | Refills: 0 | Status: SHIPPED | OUTPATIENT
Start: 2017-10-17 | End: 2018-08-09 | Stop reason: SDUPTHER

## 2017-10-17 RX ORDER — ATORVASTATIN CALCIUM 40 MG/1
40 TABLET, FILM COATED ORAL DAILY
Qty: 90 TABLET | Refills: 2 | Status: SHIPPED | OUTPATIENT
Start: 2017-10-17 | End: 2017-10-19 | Stop reason: SDUPTHER

## 2017-10-17 RX ORDER — TADALAFIL 5 MG/1
5 TABLET ORAL DAILY
Qty: 90 TABLET | Refills: 2 | Status: SHIPPED | OUTPATIENT
Start: 2017-10-17 | End: 2017-10-17 | Stop reason: SDUPTHER

## 2017-10-17 RX ORDER — GLYBURIDE 2.5 MG/1
2.5 TABLET ORAL
Qty: 90 TABLET | Refills: 2 | Status: SHIPPED | OUTPATIENT
Start: 2017-10-17 | End: 2017-10-19 | Stop reason: DRUGHIGH

## 2017-10-17 RX ORDER — ONDANSETRON 4 MG/1
4 TABLET, FILM COATED ORAL EVERY 8 HOURS PRN
Qty: 30 TABLET | Refills: 0 | Status: SHIPPED | OUTPATIENT
Start: 2017-10-17 | End: 2017-10-17 | Stop reason: SDUPTHER

## 2017-10-17 RX ORDER — OMEPRAZOLE 40 MG/1
40 CAPSULE, DELAYED RELEASE ORAL DAILY
Qty: 90 CAPSULE | Refills: 2 | Status: SHIPPED | OUTPATIENT
Start: 2017-10-17 | End: 2018-08-09 | Stop reason: SDUPTHER

## 2017-10-17 RX ORDER — VALSARTAN AND HYDROCHLOROTHIAZIDE 80; 12.5 MG/1; MG/1
1 TABLET, FILM COATED ORAL DAILY
Qty: 90 TABLET | Refills: 2 | Status: SHIPPED | OUTPATIENT
Start: 2017-10-17 | End: 2017-10-17 | Stop reason: SDUPTHER

## 2017-10-17 RX ORDER — DOXAZOSIN 8 MG/1
8 TABLET ORAL NIGHTLY
Qty: 90 TABLET | Refills: 2 | Status: SHIPPED | OUTPATIENT
Start: 2017-10-17 | End: 2017-10-17 | Stop reason: SDUPTHER

## 2017-10-17 RX ORDER — PAROXETINE HYDROCHLORIDE HEMIHYDRATE 25 MG/1
25 TABLET, FILM COATED, EXTENDED RELEASE ORAL EVERY MORNING
Qty: 90 TABLET | Refills: 2 | Status: SHIPPED | OUTPATIENT
Start: 2017-10-17 | End: 2017-10-17 | Stop reason: SDUPTHER

## 2017-10-17 RX ORDER — GLYBURIDE 2.5 MG/1
2.5 TABLET ORAL
Qty: 90 TABLET | Refills: 2 | Status: SHIPPED | OUTPATIENT
Start: 2017-10-17 | End: 2017-10-17 | Stop reason: SDUPTHER

## 2017-10-17 RX ORDER — DOXAZOSIN 8 MG/1
8 TABLET ORAL NIGHTLY
Qty: 90 TABLET | Refills: 2 | Status: SHIPPED | OUTPATIENT
Start: 2017-10-17 | End: 2018-08-09 | Stop reason: SDUPTHER

## 2017-10-17 RX ORDER — PAROXETINE HYDROCHLORIDE HEMIHYDRATE 25 MG/1
25 TABLET, FILM COATED, EXTENDED RELEASE ORAL EVERY MORNING
Qty: 90 TABLET | Refills: 2 | Status: SHIPPED | OUTPATIENT
Start: 2017-10-17 | End: 2018-08-09 | Stop reason: SDUPTHER

## 2017-10-17 RX ORDER — OMEPRAZOLE 40 MG/1
40 CAPSULE, DELAYED RELEASE ORAL DAILY
Qty: 90 CAPSULE | Refills: 2 | Status: SHIPPED | OUTPATIENT
Start: 2017-10-17 | End: 2017-10-17 | Stop reason: SDUPTHER

## 2017-10-17 RX ORDER — METOCLOPRAMIDE 5 MG/1
5 TABLET ORAL 2 TIMES DAILY PRN
Qty: 40 TABLET | Refills: 0 | Status: SHIPPED | OUTPATIENT
Start: 2017-10-17 | End: 2017-12-14 | Stop reason: SDUPTHER

## 2017-10-17 RX ORDER — TADALAFIL 5 MG/1
5 TABLET ORAL DAILY
Qty: 90 TABLET | Refills: 2 | Status: SHIPPED | OUTPATIENT
Start: 2017-10-17 | End: 2018-01-18 | Stop reason: SDUPTHER

## 2017-10-17 NOTE — PROGRESS NOTES
Subjective   Zaheer Baron is a 56 y.o. male.     Diabetes   He presents for his follow-up diabetic visit. He has type 2 diabetes mellitus. His disease course has been stable. Hypoglycemia symptoms include headaches. Pertinent negatives for hypoglycemia include no dizziness, nervousness/anxiousness, pallor or sweats. Associated symptoms include fatigue. Pertinent negatives for diabetes include no blurred vision, no chest pain, no visual change and no weakness. There are no hypoglycemic complications. Symptoms are stable. Diabetic complications include nephropathy. Pertinent negatives for diabetic complications include no CVA, PVD or retinopathy. Risk factors for coronary artery disease include diabetes mellitus, hypertension, male sex, obesity, sedentary lifestyle and dyslipidemia. Current diabetic treatment includes oral agent (dual therapy). He is compliant with treatment all of the time. His weight is increasing steadily. He is following a generally healthy diet. When asked about meal planning, he reported none. He has not had a previous visit with a dietitian. He rarely participates in exercise. Home blood sugar record trend: His glucometer broke. An ACE inhibitor/angiotensin II receptor blocker is being taken. He does not see a podiatrist.Eye exam is not current (Will call for appointment).   Hypertension   This is a chronic problem. The current episode started more than 1 year ago. The problem is unchanged. The problem is controlled. Associated symptoms include headaches. Pertinent negatives include no anxiety, blurred vision, chest pain, malaise/fatigue, neck pain, orthopnea, palpitations, peripheral edema, PND, shortness of breath or sweats. There are no associated agents to hypertension. Past treatments include angiotensin blockers and diuretics. The current treatment provides moderate improvement. There are no compliance problems.  Hypertensive end-organ damage includes kidney disease. There is no  history of angina, CAD/MI, CVA, heart failure, left ventricular hypertrophy, PVD or retinopathy.   Nausea   This is a new problem. The current episode started more than 1 month ago. The problem occurs intermittently. The problem has been unchanged. Associated symptoms include arthralgias, fatigue, headaches, joint swelling, myalgias, nausea and vomiting. Pertinent negatives include no abdominal pain, anorexia, change in bowel habit, chest pain, chills, congestion, coughing, diaphoresis, fever, neck pain, numbness, rash, sore throat, swollen glands, urinary symptoms, vertigo, visual change or weakness. Nothing aggravates the symptoms. He has tried nothing for the symptoms. The treatment provided no relief.   He has had chronic issues with joint pain. He had been on NSAIDS in the past and can't take those anymore.  He has been taking pain medication, but still hurting.  He has a lot of stiffness after rest.  Swelling in his hands and other joints also. Says that he was screened for autoimmune disorders in the past, but that was several years ago.  Was negative.  Has a strong autoimmune FH.      Current Outpatient Prescriptions:   •  atorvastatin (LIPITOR) 40 MG tablet, Take 1 tablet by mouth Daily., Disp: 90 tablet, Rfl: 2  •  Coenzyme Q-10 200 MG capsule, Take 1 tablet by mouth Every Night., Disp: 90 each, Rfl: 2  •  doxazosin (CARDURA) 8 MG tablet, Take 1 tablet by mouth Every Night., Disp: 90 tablet, Rfl: 2  •  Ergocalciferol 2000 UNITS tablet, Take  by mouth., Disp: , Rfl:   •  FLUTICASONE FUROATE IN, Inhale., Disp: , Rfl:   •  glucose blood test strip, 1 each by Other route as needed. Use as instructed, Disp: , Rfl:   •  glyBURIDE (DIABETA) 2.5 MG tablet, Take 1 tablet by mouth Daily With Breakfast., Disp: 30 tablet, Rfl: 0  •  HYDROcodone-acetaminophen (NORCO)  MG per tablet, Takes 5 tablets daily, Disp: , Rfl:   •  omeprazole (priLOSEC) 40 MG capsule, Take 1 capsule by mouth Daily., Disp: 30 capsule, Rfl:  3  •  ondansetron (ZOFRAN) 4 MG tablet, Take 1 tablet by mouth Every 8 (Eight) Hours As Needed for Nausea or Vomiting., Disp: 30 tablet, Rfl: 0  •  ONETOUCH DELICA LANCETS 33G misc, , Disp: , Rfl:   •  PARoxetine CR (PAXIL-CR) 25 MG 24 hr tablet, Take 1 tablet by mouth Every Morning., Disp: 90 tablet, Rfl: 2  •  SITagliptin (JANUVIA) 100 MG tablet, Take 1 tablet by mouth Daily., Disp: 90 tablet, Rfl: 2  •  tadalafil (CIALIS) 5 MG tablet, Take 5 mg by mouth daily as needed for erectile dysfunction., Disp: , Rfl:   •  urea (COLT LO 40) 40 % cream, Apply  topically., Disp: , Rfl:   •  valsartan-hydrochlorothiazide (DIOVAN-HCT) 80-12.5 MG per tablet, Take 1 tablet by mouth Daily., Disp: 90 tablet, Rfl: 2    The following portions of the patient's history were reviewed and updated as appropriate: allergies, current medications, past family history, past medical history, past social history, past surgical history and problem list.    Review of Systems   Constitutional: Positive for fatigue. Negative for activity change, appetite change, chills, diaphoresis, fever, malaise/fatigue and unexpected weight change.   HENT: Negative for congestion and sore throat.    Eyes: Negative for blurred vision and visual disturbance.   Respiratory: Negative for cough and shortness of breath.    Cardiovascular: Negative for chest pain, palpitations, orthopnea and PND.   Gastrointestinal: Positive for nausea and vomiting. Negative for abdominal distention, abdominal pain, anorexia, blood in stool, change in bowel habit, constipation and diarrhea.   Musculoskeletal: Positive for arthralgias, back pain, joint swelling and myalgias. Negative for neck pain and neck stiffness.   Skin: Negative for pallor, rash and wound.   Neurological: Positive for headaches. Negative for dizziness, vertigo, weakness and numbness.   Psychiatric/Behavioral: Negative for agitation, decreased concentration, dysphoric mood and sleep disturbance. The patient is not  "nervous/anxious.        Objective    Vitals:    10/17/17 1450   BP: 135/80   Pulse: 90   SpO2: 96%   Weight: 260 lb 9.6 oz (118 kg)   Height: 69\" (175.3 cm)       Physical Exam   Constitutional: He is oriented to person, place, and time. He appears well-developed and well-nourished. No distress.   HENT:   Head: Normocephalic and atraumatic.   Neck: Normal range of motion. Neck supple.   Cardiovascular: Normal rate and regular rhythm.    No murmur heard.  Pulmonary/Chest: Effort normal and breath sounds normal. No respiratory distress. He has no wheezes.   Abdominal: Soft. Bowel sounds are normal. He exhibits no distension. There is no tenderness.    Zaheer had a diabetic foot exam performed today.   During the foot exam he had a monofilament test performed.    Neurological Sensory Findings - Unaltered hot/cold right ankle/foot discrimination and unaltered hot/cold left ankle/foot discrimination. Unaltered sharp/dull right ankle/foot discrimination and unaltered sharp/dull left ankle/foot discrimination. No right ankle/foot altered proprioception and no left ankle/foot altered proprioception    Vascular Status -  His exam exhibits right foot vasculature normal. His exam exhibits no right foot edema. His exam exhibits left foot vasculature normal. His exam exhibits no left foot edema.   Skin Integrity  -  His right foot skin is intact.   He has no right foot onychomycosis, no right foot ulcer, non-callous right foot,  no ingrown toenail on right foot, right heel is not dry and cracked and no right foot warmth.    Zaheer 's left foot skin is intact.  He has no left foot onychomycosis, no left foot ulcer, non-callous left foot, no left ingrown toenail, no left heel dry and cracked and no left foot warmth..  Neurological: He is alert and oriented to person, place, and time.   Skin: Skin is warm and dry. No rash noted. He is not diaphoretic.   Psychiatric: He has a normal mood and affect. His behavior is normal. Judgment " and thought content normal.   Nursing note and vitals reviewed.      Assessment/Plan   Problems Addressed this Visit        Cardiovascular and Mediastinum    Hypertension    Relevant Medications    valsartan-hydrochlorothiazide (DIOVAN-HCT) 80-12.5 MG per tablet    doxazosin (CARDURA) 8 MG tablet    Other Relevant Orders    Comprehensive Metabolic Panel (Completed)    Lipid Panel (Completed)       Endocrine    Diabetes type 2, controlled - Primary    Relevant Medications    SITagliptin (JANUVIA) 100 MG tablet    glyBURIDE (DIABETA) 2.5 MG tablet    Other Relevant Orders    MicroAlbumin, Urine, Random - Urine, Clean Catch (Completed)      Other Visit Diagnoses     Chronic joint pain        Relevant Orders    Antinuclear Antibody With Reflex Chemult    Sedimentation Rate (Completed)    C-reactive Protein (Completed)    Rheumatoid factor    Nausea and vomiting, intractability of vomiting not specified, unspecified vomiting type        Relevant Medications    ondansetron (ZOFRAN) 4 MG tablet    Need for immunization against influenza        Relevant Orders    Flu Vaccine Quad PF >18YR (Completed)    High risk medication use        Relevant Orders    Comprehensive Metabolic Panel (Completed)    Hemoglobin A1c (Completed)    CBC (No Diff) (Completed)    Lipid Panel (Completed)    Need for hepatitis C screening test        Relevant Orders    Hepatitis C antibody (Completed)        1.) DM-  Will recheck labs. He has been on januvia and glyburide.  Takes those all the time.  He will make an appointment for his annual eye exam. Typically has that in October.  Foot exam done today and that was normal.  Will check microalbumin and CMP as well.    He says that he didn't think that he was a diabetic and that he was borderline. Explained that he is a diabetic.  Discussed risks associated with that not being controlled.  2.) HTN-  Well controlled refilled his medication today.  Recheck CMP and lipids.  3.) Chronic joint pain-  Will  recheck for autoimmune d/o. Continue to see pain management. Needs to continue to avoid NSAIDS on regular basis with renal disease.  4.) Nausea- I think that this is mixture of taking his medications at night on empty stomach and gastritis.  Will try to eat a little snack and/or a glass of milk with medications and see if that helps.  May need a HIDA scan if that doesn't help.  5.) Hep C screening today.  6.) Flu vaccine given today.  RTC in 3 months or sooner PRN

## 2017-10-18 LAB — RHEUMATOID FACT SERPL-ACNC: NEGATIVE [IU]/ML

## 2017-10-19 ENCOUNTER — TELEPHONE (OUTPATIENT)
Dept: FAMILY MEDICINE CLINIC | Facility: CLINIC | Age: 57
End: 2017-10-19

## 2017-10-19 LAB
ANA SER QL: POSITIVE
CHROMATIN AB SERPL-ACNC: <0.2 AI (ref 0–0.9)
DSDNA AB SER-ACNC: <1 IU/ML (ref 0–9)
ENA RNP AB SER-ACNC: 0.6 AI (ref 0–0.9)
ENA SM AB SER-ACNC: <0.2 AI (ref 0–0.9)
Lab: ABNORMAL
RIBOSOMAL P AB SER-ACNC: <0.2 AI (ref 0–0.9)
SJOGREN'S ANTI-SS-A: 1.8 AI (ref 0–0.9)

## 2017-10-19 RX ORDER — GLYBURIDE 5 MG/1
5 TABLET ORAL 2 TIMES DAILY WITH MEALS
Qty: 60 TABLET | Refills: 2 | Status: SHIPPED | OUTPATIENT
Start: 2017-10-19 | End: 2018-01-23 | Stop reason: SDUPTHER

## 2017-10-19 RX ORDER — ATORVASTATIN CALCIUM 40 MG/1
40 TABLET, FILM COATED ORAL DAILY
Qty: 90 TABLET | Refills: 2 | Status: SHIPPED | OUTPATIENT
Start: 2017-10-19 | End: 2018-08-09 | Stop reason: SINTOL

## 2017-11-02 ENCOUNTER — APPOINTMENT (OUTPATIENT)
Dept: LAB | Facility: HOSPITAL | Age: 57
End: 2017-11-02

## 2017-11-02 ENCOUNTER — OFFICE VISIT (OUTPATIENT)
Dept: PAIN MEDICINE | Facility: CLINIC | Age: 57
End: 2017-11-02

## 2017-11-02 VITALS
WEIGHT: 258.1 LBS | BODY MASS INDEX: 38.23 KG/M2 | HEIGHT: 69 IN | DIASTOLIC BLOOD PRESSURE: 80 MMHG | SYSTOLIC BLOOD PRESSURE: 140 MMHG

## 2017-11-02 DIAGNOSIS — M47.817 LUMBOSACRAL SPONDYLOSIS WITHOUT MYELOPATHY: Primary | ICD-10-CM

## 2017-11-02 DIAGNOSIS — M25.9 MULTIPLE JOINT COMPLAINTS: ICD-10-CM

## 2017-11-02 DIAGNOSIS — G62.9 NEUROPATHY: ICD-10-CM

## 2017-11-02 DIAGNOSIS — Z79.891 LONG TERM (CURRENT) USE OF OPIATE ANALGESIC: ICD-10-CM

## 2017-11-02 DIAGNOSIS — Z79.899 HIGH RISK MEDICATIONS (NOT ANTICOAGULANTS) LONG-TERM USE: ICD-10-CM

## 2017-11-02 PROCEDURE — G0481 DRUG TEST DEF 8-14 CLASSES: HCPCS | Performed by: NURSE PRACTITIONER

## 2017-11-02 PROCEDURE — 99214 OFFICE O/P EST MOD 30 MIN: CPT | Performed by: NURSE PRACTITIONER

## 2017-11-02 PROCEDURE — 80307 DRUG TEST PRSMV CHEM ANLYZR: CPT | Performed by: NURSE PRACTITIONER

## 2017-11-02 NOTE — PROGRESS NOTES
Zaheer Baron is a 56 y.o. male.   1960    HPI:   Location: lower back and Joints  Quality: stabbing and aching  Severity: 5/10  Timing: constant  Alleviating: pain medication, ice, heating pad and injection  Aggravating: sitting     Pt with 80% per effectiveness with recently RFTC- Pt with chronic lower back and joints. Opiate medications providing enough relief for daily activities and ambulation. NO SE noted. Pt happy with pain management regimen and treatment.         The following portions of the patient's history were reviewed by me and updated as appropriate: allergies, current medications, past family history, past medical history, past social history, past surgical history and problem list.    Past Medical History:   Diagnosis Date   • Acute sinusitis    • Allergic rhinitis    • Arthropathy     of lumbar facet joint   • Artificial lens present     position - right   • Backache     chronic back problems   • Benign prostatic hyperplasia    • Bronchitis     perisistent   • Cataract    • Chronic obstructive lung disease    • Diabetes mellitus     no retinopathy   • Disorder of kidney     Disorder of kidney and/or ureter - Congenital solitary right kidney      • Dizziness     History of Meniere's like condition, left ear      • Drug therapy     long-term   • Dysfunction of eustachian tube    • Dyslipidemia    • Gout    • Headache     History of possible migraine/cluster      • Hearing loss    • Hypertension    • Hypokalemia    • Impacted cerumen    • Infestation by Sarcoptes scabiei alta hominis    • Inguinal hernia     History of, repaired      • Knee pain    • Osteoarthritis    • Pneumonia     in the past   • Posterior subcapsular polar senile cataract    • Sjogren's syndrome    • Type 2 diabetes mellitus        Social History     Social History   • Marital status:      Spouse name: N/A   • Number of children: N/A   • Years of education: N/A     Occupational History   • Not on file.      Social History Main Topics   • Smoking status: Never Smoker   • Smokeless tobacco: Never Used   • Alcohol use No   • Drug use: No   • Sexual activity: Defer     Other Topics Concern   • Not on file     Social History Narrative       Family History   Problem Relation Age of Onset   • Lung disease Mother      autoimmune   • Heart failure Father    • Stomach cancer Sister 43     autoimmune   • Diabetes Paternal Grandmother          Current Outpatient Prescriptions:   •  atorvastatin (LIPITOR) 40 MG tablet, Take 1 tablet by mouth Daily., Disp: 90 tablet, Rfl: 2  •  Coenzyme Q-10 200 MG capsule, Take 1 tablet by mouth Every Night., Disp: 90 each, Rfl: 2  •  doxazosin (CARDURA) 8 MG tablet, Take 1 tablet by mouth Every Night., Disp: 90 tablet, Rfl: 2  •  Ergocalciferol 2000 UNITS tablet, Take  by mouth., Disp: , Rfl:   •  FLUTICASONE FUROATE IN, Inhale., Disp: , Rfl:   •  glucose blood test strip, 1 each by Other route as needed. Use as instructed, Disp: , Rfl:   •  glyBURIDE (DIAbeta) 5 MG tablet, Take 1 tablet by mouth 2 (Two) Times a Day With Meals., Disp: 60 tablet, Rfl: 2  •  HYDROcodone-acetaminophen (NORCO)  MG per tablet, Takes 5 tablets daily, Disp: , Rfl:   •  metoclopramide (REGLAN) 5 MG tablet, Take 1 tablet by mouth 2 (Two) Times a Day As Needed (as needed)., Disp: 40 tablet, Rfl: 0  •  omeprazole (priLOSEC) 40 MG capsule, Take 1 capsule by mouth Daily., Disp: 90 capsule, Rfl: 2  •  ondansetron (ZOFRAN) 4 MG tablet, Take 1 tablet by mouth Every 8 (Eight) Hours As Needed for Nausea or Vomiting., Disp: 30 tablet, Rfl: 0  •  ONETOUCH DELICA LANCETS 33G misc, , Disp: , Rfl:   •  PARoxetine CR (PAXIL-CR) 25 MG 24 hr tablet, Take 1 tablet by mouth Every Morning., Disp: 90 tablet, Rfl: 2  •  SITagliptin (JANUVIA) 100 MG tablet, Take 1 tablet by mouth Daily., Disp: 90 tablet, Rfl: 2  •  tadalafil (CIALIS) 5 MG tablet, Take 1 tablet by mouth Daily., Disp: 90 tablet, Rfl: 2  •  urea (COLT LO 40) 40 % cream,  Apply  topically., Disp: , Rfl:   •  valsartan-hydrochlorothiazide (DIOVAN-HCT) 80-12.5 MG per tablet, Take 1 tablet by mouth Daily., Disp: 90 tablet, Rfl: 2    Allergies   Allergen Reactions   • Nsaids      Solitary kidney; renal failure with NSAID use.       Review of Systems   Musculoskeletal: Positive for back pain.        Joint pain     All other systems reviewed and are negative.    All systems reviewed and negative except for above.    Physical Exam   Constitutional: He appears well-developed and well-nourished. No distress.   Cardiovascular: Normal rate and regular rhythm.    Pulmonary/Chest: Effort normal. No respiratory distress.   Musculoskeletal:        Lumbar back: He exhibits decreased range of motion (flex <75 deg and ext 15 deg with min erma facet loading) and tenderness ( midline TTP ).   Neurological: He is alert. He displays no tremor. He displays no seizure activity. Coordination and gait normal.   Reflex Scores:       Tricep reflexes are 2+ on the right side and 2+ on the left side.       Bicep reflexes are 2+ on the right side and 2+ on the left side.       Brachioradialis reflexes are 2+ on the right side and 2+ on the left side.       Patellar reflexes are 2+ on the right side and 2+ on the left side.       Achilles reflexes are 2+ on the right side and 2+ on the left side.  Skin: Skin is warm and dry. No rash noted. No pallor.   Psychiatric: He has a normal mood and affect. His behavior is normal. Judgment normal. He expresses no homicidal and no suicidal ideation. He expresses no suicidal plans and no homicidal plans.   Nursing note and vitals reviewed.      Zaheer was seen today for back pain and pain.    Diagnoses and all orders for this visit:    Lumbosacral spondylosis without myelopathy  -     IR RFA lumbar sacral 1st level left; Future  -     IR RFA lumbar sacral 2nd level left; Future    Neuropathy    Long term (current) use of opiate analgesic    Multiple joint complaints    High risk  medications (not anticoagulants) long-term use  -     ToxASSURE Select 13 (MW) - Urine, Clean Catch        Medication: Patient reports no negative side effects, Patient reports appropriate usage and storage habits and Patient's opioid provides enough relief to be more active and perform activities of daily living with less discomfort. Norco 10mg 5 x day. Discussed case with Matt Shen, opiate medication refilled ×2 hand written scripts.      Interventional: Left lumbar rftc at 4-5 and 5-1.  Discussed risks and benefits of this procedure.  This has provided very good relief in the past and he would like a repeat.  Recent right sided procedure helped significantly.     Rehab: Works 5 days a week.     Behavioral: No aberrant behavior noted. [facility] Report # [id number]  was reviewed and is consistent with stated history    Urine drug screen Ordered today to test for drugs of abuse and prescribed medications          This document has been electronically signed by Everton Shen MD on November 2, 2017 8:34 AM          This document has been electronically signed by Everton Shen MD on November 2, 2017 8:34 AM

## 2017-11-09 LAB — CONV REPORT SUMMARY: NORMAL

## 2017-11-16 ENCOUNTER — APPOINTMENT (OUTPATIENT)
Dept: INTERVENTIONAL RADIOLOGY/VASCULAR | Facility: HOSPITAL | Age: 57
End: 2017-11-16
Attending: PAIN MEDICINE

## 2017-12-07 ENCOUNTER — APPOINTMENT (OUTPATIENT)
Dept: INTERVENTIONAL RADIOLOGY/VASCULAR | Facility: HOSPITAL | Age: 57
End: 2017-12-07
Attending: PAIN MEDICINE

## 2017-12-07 ENCOUNTER — HOSPITAL ENCOUNTER (OUTPATIENT)
Dept: INTERVENTIONAL RADIOLOGY/VASCULAR | Facility: HOSPITAL | Age: 57
Discharge: HOME | End: 2017-12-07
Attending: PAIN MEDICINE | Admitting: PAIN MEDICINE

## 2017-12-07 VITALS
RESPIRATION RATE: 18 BRPM | HEART RATE: 104 BPM | SYSTOLIC BLOOD PRESSURE: 127 MMHG | OXYGEN SATURATION: 96 % | DIASTOLIC BLOOD PRESSURE: 82 MMHG

## 2017-12-07 DIAGNOSIS — M47.817 LUMBOSACRAL SPONDYLOSIS WITHOUT MYELOPATHY: ICD-10-CM

## 2017-12-07 PROCEDURE — 25010000002 METHYLPREDNISOLONE PER 40 MG: Performed by: PAIN MEDICINE

## 2017-12-07 PROCEDURE — 64636 DESTROY L/S FACET JNT ADDL: CPT | Performed by: PAIN MEDICINE

## 2017-12-07 PROCEDURE — 64635 DESTROY LUMB/SAC FACET JNT: CPT | Performed by: PAIN MEDICINE

## 2017-12-07 RX ORDER — BUPIVACAINE HYDROCHLORIDE 5 MG/ML
INJECTION, SOLUTION EPIDURAL; INTRACAUDAL
Status: COMPLETED | OUTPATIENT
Start: 2017-12-07 | End: 2017-12-07

## 2017-12-07 RX ORDER — LIDOCAINE HYDROCHLORIDE 10 MG/ML
INJECTION, SOLUTION EPIDURAL; INFILTRATION; INTRACAUDAL; PERINEURAL
Status: COMPLETED | OUTPATIENT
Start: 2017-12-07 | End: 2017-12-07

## 2017-12-07 RX ORDER — METHYLPREDNISOLONE ACETATE 40 MG/ML
INJECTION, SUSPENSION INTRA-ARTICULAR; INTRALESIONAL; INTRAMUSCULAR; SOFT TISSUE
Status: COMPLETED | OUTPATIENT
Start: 2017-12-07 | End: 2017-12-07

## 2017-12-07 RX ADMIN — LIDOCAINE HYDROCHLORIDE 6 ML: 10 INJECTION, SOLUTION EPIDURAL; INFILTRATION; INTRACAUDAL; PERINEURAL at 13:10

## 2017-12-07 RX ADMIN — BUPIVACAINE HYDROCHLORIDE 3 ML: 5 INJECTION, SOLUTION EPIDURAL; INTRACAUDAL; PERINEURAL at 13:11

## 2017-12-07 RX ADMIN — METHYLPREDNISOLONE ACETATE 40 MG: 40 INJECTION, SUSPENSION INTRA-ARTICULAR; INTRALESIONAL; INTRAMUSCULAR; SOFT TISSUE at 13:11

## 2017-12-14 ENCOUNTER — OFFICE VISIT (OUTPATIENT)
Dept: PAIN MEDICINE | Facility: CLINIC | Age: 57
End: 2017-12-14

## 2017-12-14 VITALS
SYSTOLIC BLOOD PRESSURE: 126 MMHG | HEIGHT: 69 IN | WEIGHT: 261.6 LBS | BODY MASS INDEX: 38.75 KG/M2 | DIASTOLIC BLOOD PRESSURE: 82 MMHG

## 2017-12-14 DIAGNOSIS — M47.817 LUMBOSACRAL SPONDYLOSIS WITHOUT MYELOPATHY: Primary | ICD-10-CM

## 2017-12-14 DIAGNOSIS — M25.9 MULTIPLE JOINT COMPLAINTS: ICD-10-CM

## 2017-12-14 DIAGNOSIS — Z79.899 HIGH RISK MEDICATIONS (NOT ANTICOAGULANTS) LONG-TERM USE: ICD-10-CM

## 2017-12-14 DIAGNOSIS — M79.2 NEUROPATHIC PAIN: ICD-10-CM

## 2017-12-14 PROCEDURE — 99024 POSTOP FOLLOW-UP VISIT: CPT | Performed by: NURSE PRACTITIONER

## 2017-12-14 RX ORDER — HYDROCODONE BITARTRATE AND ACETAMINOPHEN 10; 325 MG/1; MG/1
1 TABLET ORAL
Qty: 150 TABLET | Refills: 0 | Status: SHIPPED | OUTPATIENT
Start: 2017-12-14 | End: 2018-01-13

## 2017-12-14 RX ORDER — METOCLOPRAMIDE 5 MG/1
TABLET ORAL
Qty: 40 TABLET | Refills: 0 | Status: SHIPPED | OUTPATIENT
Start: 2017-12-14 | End: 2018-11-08

## 2017-12-14 NOTE — PROGRESS NOTES
Zaheer Baron is a 57 y.o. male.   1960    HPI:   Location: lower back and Joints  Quality: stabbing and aching  Severity: 5/10  Timing: constant  Alleviating: pain medication, ice, heating pad and injection  Aggravating: sitting      Patient reports that the opioid medication still provides him good relief and allows more activity than he would have without the opioid medication. He denies side effects.  Had rftc of left lumbar 7 days ago.  Doing well with this.  Is much better already.       The following portions of the patient's history were reviewed by me and updated as appropriate: allergies, current medications, past family history, past medical history, past social history, past surgical history and problem list.    Past Medical History:   Diagnosis Date   • Acute sinusitis    • Allergic rhinitis    • Arthropathy     of lumbar facet joint   • Artificial lens present     position - right   • Backache     chronic back problems   • Benign prostatic hyperplasia    • Bronchitis     perisistent   • Cataract    • Chronic obstructive lung disease    • Diabetes mellitus     no retinopathy   • Disorder of kidney     Disorder of kidney and/or ureter - Congenital solitary right kidney      • Dizziness     History of Meniere's like condition, left ear      • Drug therapy     long-term   • Dysfunction of eustachian tube    • Dyslipidemia    • Gout    • Headache     History of possible migraine/cluster      • Hearing loss    • Hypertension    • Hypokalemia    • Impacted cerumen    • Infestation by Sarcoptes scabiei alta hominis    • Inguinal hernia     History of, repaired      • Knee pain    • Osteoarthritis    • Pneumonia     in the past   • Posterior subcapsular polar senile cataract    • Sjogren's syndrome    • Type 2 diabetes mellitus        Social History     Social History   • Marital status:      Spouse name: N/A   • Number of children: N/A   • Years of education: N/A     Occupational History   •  Not on file.     Social History Main Topics   • Smoking status: Never Smoker   • Smokeless tobacco: Never Used   • Alcohol use No   • Drug use: No   • Sexual activity: Defer     Other Topics Concern   • Not on file     Social History Narrative       Family History   Problem Relation Age of Onset   • Lung disease Mother      autoimmune   • Heart failure Father    • Stomach cancer Sister 43     autoimmune   • Diabetes Paternal Grandmother          Current Outpatient Prescriptions:   •  atorvastatin (LIPITOR) 40 MG tablet, Take 1 tablet by mouth Daily., Disp: 90 tablet, Rfl: 2  •  Coenzyme Q-10 200 MG capsule, Take 1 tablet by mouth Every Night., Disp: 90 each, Rfl: 2  •  doxazosin (CARDURA) 8 MG tablet, Take 1 tablet by mouth Every Night., Disp: 90 tablet, Rfl: 2  •  Ergocalciferol 2000 UNITS tablet, Take  by mouth., Disp: , Rfl:   •  FLUTICASONE FUROATE IN, Inhale., Disp: , Rfl:   •  glucose blood test strip, 1 each by Other route as needed. Use as instructed, Disp: , Rfl:   •  glyBURIDE (DIAbeta) 5 MG tablet, Take 1 tablet by mouth 2 (Two) Times a Day With Meals., Disp: 60 tablet, Rfl: 2  •  HYDROcodone-acetaminophen (NORCO)  MG per tablet, Takes 5 tablets daily, Disp: , Rfl:   •  metoclopramide (REGLAN) 5 MG tablet, Take 1 tablet by mouth 2 (Two) Times a Day As Needed (as needed)., Disp: 40 tablet, Rfl: 0  •  omeprazole (priLOSEC) 40 MG capsule, Take 1 capsule by mouth Daily., Disp: 90 capsule, Rfl: 2  •  ondansetron (ZOFRAN) 4 MG tablet, Take 1 tablet by mouth Every 8 (Eight) Hours As Needed for Nausea or Vomiting., Disp: 30 tablet, Rfl: 0  •  ONETOUCH DELICA LANCETS 33G misc, , Disp: , Rfl:   •  PARoxetine CR (PAXIL-CR) 25 MG 24 hr tablet, Take 1 tablet by mouth Every Morning., Disp: 90 tablet, Rfl: 2  •  SITagliptin (JANUVIA) 100 MG tablet, Take 1 tablet by mouth Daily., Disp: 90 tablet, Rfl: 2  •  tadalafil (CIALIS) 5 MG tablet, Take 1 tablet by mouth Daily., Disp: 90 tablet, Rfl: 2  •  urea (COLT LO 40)  40 % cream, Apply  topically., Disp: , Rfl:   •  valsartan-hydrochlorothiazide (DIOVAN-HCT) 80-12.5 MG per tablet, Take 1 tablet by mouth Daily., Disp: 90 tablet, Rfl: 2  •  HYDROcodone-acetaminophen (NORCO)  MG per tablet, Take 1 tablet by mouth 5 (Five) Times a Day for 30 days., Disp: 150 tablet, Rfl: 0  •  HYDROcodone-acetaminophen (NORCO)  MG per tablet, Take 1 tablet by mouth 5 (Five) Times a Day for 30 days., Disp: 150 tablet, Rfl: 0    Allergies   Allergen Reactions   • Nsaids      Solitary kidney; renal failure with NSAID use.       Review of Systems   Musculoskeletal: Positive for back pain (lower) and joint swelling (alll over joint pain).   All other systems reviewed and are negative.    All systems reviewed and negative except for above.    Physical Exam   Constitutional: He appears well-developed and well-nourished. No distress.   Musculoskeletal:        Lumbar back: He exhibits decreased range of motion (ext to 15 deg with min facet loading.  flexion limited to aboiut 45 deg).   Painful ambulation b knees.    Well healed puncture sites.    Neurological: He is alert.   Psychiatric: He has a normal mood and affect. His behavior is normal. Judgment normal.       Zaheer was seen today for back pain and joint pain.    Diagnoses and all orders for this visit:    Lumbosacral spondylosis without myelopathy    Neuropathic pain    Multiple joint complaints    High risk medications (not anticoagulants) long-term use    Other orders  -     HYDROcodone-acetaminophen (NORCO)  MG per tablet; Take 1 tablet by mouth 5 (Five) Times a Day for 30 days.  -     HYDROcodone-acetaminophen (NORCO)  MG per tablet; Take 1 tablet by mouth 5 (Five) Times a Day for 30 days.        Medication: Patient reports no negative side effects, Patient reports appropriate usage and storage habits, Patient's opioid provides enough relief to be more active and perform activities of daily living with less discomfort. and  Refill opioid medication as above.   I explained that the pain clinic will be closing after the first of the year.  I let the pt know where I will be located.     Interventional: none at this time.  Doing well with periodic rftc's.  May be repeated in near future.     Rehab: none at this time    Behavioral: No aberrant behavior noted. [facility] Report #[id number] was reviewed and is consistent with stated history.    Urine drug screen None at this time          This document has been electronically signed by Everton Shen MD on December 14, 2017 8:36 AM

## 2017-12-19 ENCOUNTER — PRIOR AUTHORIZATION (OUTPATIENT)
Dept: FAMILY MEDICINE CLINIC | Facility: CLINIC | Age: 57
End: 2017-12-19

## 2018-01-18 ENCOUNTER — OFFICE VISIT (OUTPATIENT)
Dept: FAMILY MEDICINE CLINIC | Facility: CLINIC | Age: 58
End: 2018-01-18

## 2018-01-18 ENCOUNTER — APPOINTMENT (OUTPATIENT)
Dept: LAB | Facility: HOSPITAL | Age: 58
End: 2018-01-18

## 2018-01-18 VITALS
DIASTOLIC BLOOD PRESSURE: 90 MMHG | SYSTOLIC BLOOD PRESSURE: 144 MMHG | OXYGEN SATURATION: 97 % | BODY MASS INDEX: 38.63 KG/M2 | WEIGHT: 260.8 LBS | HEIGHT: 69 IN | HEART RATE: 97 BPM

## 2018-01-18 DIAGNOSIS — IMO0002 UNCONTROLLED TYPE 2 DIABETES MELLITUS WITH COMPLICATION, WITHOUT LONG-TERM CURRENT USE OF INSULIN: Primary | ICD-10-CM

## 2018-01-18 DIAGNOSIS — N40.1 BENIGN PROSTATIC HYPERPLASIA WITH URINARY FREQUENCY: ICD-10-CM

## 2018-01-18 DIAGNOSIS — N18.30 STAGE 3 CHRONIC KIDNEY DISEASE (HCC): ICD-10-CM

## 2018-01-18 DIAGNOSIS — M12.9 ARTHROPATHY: ICD-10-CM

## 2018-01-18 DIAGNOSIS — R35.0 BENIGN PROSTATIC HYPERPLASIA WITH URINARY FREQUENCY: ICD-10-CM

## 2018-01-18 LAB
ANION GAP SERPL CALCULATED.3IONS-SCNC: 15 MMOL/L (ref 5–15)
BUN BLD-MCNC: 18 MG/DL (ref 7–21)
BUN/CREAT SERPL: 14.6 (ref 7–25)
CALCIUM SPEC-SCNC: 9.8 MG/DL (ref 8.4–10.2)
CHLORIDE SERPL-SCNC: 97 MMOL/L (ref 95–110)
CO2 SERPL-SCNC: 28 MMOL/L (ref 22–31)
CREAT BLD-MCNC: 1.23 MG/DL (ref 0.7–1.3)
GFR SERPL CREATININE-BSD FRML MDRD: 60 ML/MIN/1.73 (ref 56–130)
GLUCOSE BLD-MCNC: 172 MG/DL (ref 60–100)
HBA1C MFR BLD: 7.9 % (ref 4–5.6)
POTASSIUM BLD-SCNC: 3.5 MMOL/L (ref 3.5–5.1)
SODIUM BLD-SCNC: 140 MMOL/L (ref 137–145)

## 2018-01-18 PROCEDURE — 80048 BASIC METABOLIC PNL TOTAL CA: CPT | Performed by: FAMILY MEDICINE

## 2018-01-18 PROCEDURE — 36415 COLL VENOUS BLD VENIPUNCTURE: CPT | Performed by: FAMILY MEDICINE

## 2018-01-18 PROCEDURE — 99214 OFFICE O/P EST MOD 30 MIN: CPT | Performed by: FAMILY MEDICINE

## 2018-01-18 PROCEDURE — 83036 HEMOGLOBIN GLYCOSYLATED A1C: CPT | Performed by: FAMILY MEDICINE

## 2018-01-18 RX ORDER — TADALAFIL 5 MG/1
5 TABLET ORAL DAILY
Qty: 90 TABLET | Refills: 2 | Status: SHIPPED | OUTPATIENT
Start: 2018-01-18 | End: 2018-08-02

## 2018-01-18 RX ORDER — MELOXICAM 7.5 MG/1
7.5 TABLET ORAL DAILY
Qty: 30 TABLET | Refills: 2 | Status: SHIPPED | OUTPATIENT
Start: 2018-01-18 | End: 2018-01-18 | Stop reason: SINTOL

## 2018-01-18 NOTE — PROGRESS NOTES
Subjective   Zaheer Baron is a 57 y.o. male.     Diabetes   He presents for his follow-up diabetic visit. He has type 2 diabetes mellitus. His disease course has been improving. There are no hypoglycemic associated symptoms. Pertinent negatives for hypoglycemia include no dizziness, nervousness/anxiousness or pallor. There are no diabetic associated symptoms. Pertinent negatives for diabetes include no chest pain and no weakness. There are no hypoglycemic complications. Symptoms are stable. Diabetic complications include nephropathy. Risk factors for coronary artery disease include diabetes mellitus, hypertension, dyslipidemia, male sex and obesity. Current diabetic treatment includes oral agent (dual therapy). He is compliant with treatment all of the time. His weight is decreasing steadily. He is following a generally healthy and diabetic diet. When asked about meal planning, he reported none. He participates in exercise intermittently. His home blood glucose trend is decreasing steadily. An ACE inhibitor/angiotensin II receptor blocker is being taken. He does not see a podiatrist.Eye exam is current.     He has had chronic joint pain. He has seen rheumatology in the past.  He had a positive DOMINGUEZ when he was here last time.  He had been on NSAIDS and his PCP told him that he needed to stop those medications. It was working well for him. He had seen Dr. Harris in [place].  That was about three years ago.  He has been seeing Pain Management and takes pain medication for that.    He had been on celebrex. He has one kidney and there was concern that he shouldn't take the NSAIDS.  He had seen renal and he recommended that he doesn't take NSAIDS as well because he has one kidney.      He has had issues with BPH in the past and was taking cialis daily, but insurance didn't cover medication anymore and he has been having issues with urination at night.      Current Outpatient Prescriptions:   •  atorvastatin  (LIPITOR) 40 MG tablet, Take 1 tablet by mouth Daily., Disp: 90 tablet, Rfl: 2  •  Coenzyme Q-10 200 MG capsule, Take 1 tablet by mouth Every Night., Disp: 90 each, Rfl: 2  •  doxazosin (CARDURA) 8 MG tablet, Take 1 tablet by mouth Every Night., Disp: 90 tablet, Rfl: 2  •  Ergocalciferol 2000 UNITS tablet, Take  by mouth., Disp: , Rfl:   •  FLUTICASONE FUROATE IN, Inhale., Disp: , Rfl:   •  glucose blood test strip, 1 each by Other route as needed. Use as instructed, Disp: , Rfl:   •  glyBURIDE (DIAbeta) 5 MG tablet, Take 1 tablet by mouth 2 (Two) Times a Day With Meals., Disp: 60 tablet, Rfl: 2  •  HYDROcodone-acetaminophen (NORCO)  MG per tablet, Takes 5 tablets daily, Disp: , Rfl:   •  metoclopramide (REGLAN) 5 MG tablet, TAKE 1 TABLET BY MOUTH TWICE DAILY AS NEEDED, Disp: 40 tablet, Rfl: 0  •  omeprazole (priLOSEC) 40 MG capsule, Take 1 capsule by mouth Daily., Disp: 90 capsule, Rfl: 2  •  ondansetron (ZOFRAN) 4 MG tablet, Take 1 tablet by mouth Every 8 (Eight) Hours As Needed for Nausea or Vomiting., Disp: 30 tablet, Rfl: 0  •  ONETOUCH DELICA LANCETS 33G misc, , Disp: , Rfl:   •  PARoxetine CR (PAXIL-CR) 25 MG 24 hr tablet, Take 1 tablet by mouth Every Morning., Disp: 90 tablet, Rfl: 2  •  SITagliptin (JANUVIA) 100 MG tablet, Take 1 tablet by mouth Daily., Disp: 90 tablet, Rfl: 2  •  tadalafil (CIALIS) 5 MG tablet, Take 1 tablet by mouth Daily., Disp: 90 tablet, Rfl: 2  •  urea (COLT LO 40) 40 % cream, Apply  topically., Disp: , Rfl:   •  valsartan-hydrochlorothiazide (DIOVAN-HCT) 80-12.5 MG per tablet, Take 1 tablet by mouth Daily., Disp: 90 tablet, Rfl: 2    The following portions of the patient's history were reviewed and updated as appropriate: allergies, current medications, past family history, past medical history, past social history, past surgical history and problem list.    Review of Systems   Constitutional: Negative for activity change, appetite change, chills, diaphoresis, fever and  "unexpected weight change.   HENT: Negative for congestion and sore throat.    Eyes: Negative for visual disturbance.   Respiratory: Negative for cough and shortness of breath.    Cardiovascular: Negative for chest pain and palpitations.   Gastrointestinal: Negative for abdominal distention, abdominal pain, blood in stool, constipation, diarrhea, nausea and vomiting.   Musculoskeletal: Positive for arthralgias, back pain, joint swelling and myalgias. Negative for neck pain and neck stiffness.   Skin: Negative for pallor, rash and wound.   Neurological: Negative for dizziness, weakness and numbness.   Psychiatric/Behavioral: Negative for agitation, decreased concentration, dysphoric mood and sleep disturbance. The patient is not nervous/anxious.        Objective    Vitals:    01/18/18 0803   BP: 144/90   BP Location: Left arm   Patient Position: Sitting   Cuff Size: Large Adult   Pulse: 97   SpO2: 97%   Weight: 118 kg (260 lb 12.8 oz)   Height: 175.3 cm (69\")       Physical Exam   Constitutional: He is oriented to person, place, and time. He appears well-developed and well-nourished. No distress.   Cardiovascular: Normal rate, regular rhythm and normal heart sounds.    No murmur heard.  No LE edema.   Pulmonary/Chest: Effort normal and breath sounds normal. No respiratory distress.   Abdominal: Soft. Bowel sounds are normal. He exhibits no distension. There is no tenderness.   Neurological: He is alert and oriented to person, place, and time.   Psychiatric: He has a normal mood and affect. His behavior is normal. Judgment and thought content normal.   Nursing note and vitals reviewed.      Assessment/Plan   Problems Addressed this Visit        Musculoskeletal and Integument    Arthropathy       Genitourinary    Benign prostatic hyperplasia    Relevant Medications    tadalafil (CIALIS) 5 MG tablet      Other Visit Diagnoses     Uncontrolled type 2 diabetes mellitus with complication, without long-term current use of " insulin    -  Primary    Relevant Orders    Basic metabolic panel (Completed)    Hemoglobin A1c (Completed)    Stage 3 chronic kidney disease        Relevant Orders    Ambulatory Referral to Nephrology        1.) DM-  Sugars were elevated last time that he was here. Says that he is compliant with his medications. Will recheck A1C today and adjust medication if needed. He has had eye and foot exams this year.   2.) Chronic joint pain-  Hasn't been able to take NSAIDS because his last PCP was concerned about the decrease in renal function. He was only born with one kidney. Had been taking Celebrex before and that did effect his renal function. Renal function looks better. Will try topical NSAID for now and then after he sees renal may start on NSAID.    3.) Renal disease-  Seems to be improved. Stressed to him today that his BP and diabetes needs to be controlled to protect his kidneys. Will get him back in with renal.  4.) BPH-  Will call in Cialis and try to get approved for BPH.  RTC in 3 months or sooner PRN

## 2018-01-23 RX ORDER — GLYBURIDE 5 MG/1
TABLET ORAL
Qty: 180 TABLET | Refills: 0 | Status: SHIPPED | OUTPATIENT
Start: 2018-01-23 | End: 2018-08-09 | Stop reason: SDUPTHER

## 2018-04-17 RX ORDER — MELOXICAM 7.5 MG/1
7.5 TABLET ORAL DAILY
Qty: 30 TABLET | Refills: 0 | OUTPATIENT
Start: 2018-04-17

## 2018-05-23 RX ORDER — SITAGLIPTIN AND METFORMIN HYDROCHLORIDE 500; 50 MG/1; MG/1
TABLET, FILM COATED ORAL
Qty: 60 TABLET | Refills: 0 | Status: SHIPPED | OUTPATIENT
Start: 2018-05-23 | End: 2018-06-25 | Stop reason: SDUPTHER

## 2018-06-25 ENCOUNTER — APPOINTMENT (OUTPATIENT)
Dept: LAB | Facility: HOSPITAL | Age: 58
End: 2018-06-25

## 2018-06-25 ENCOUNTER — OFFICE VISIT (OUTPATIENT)
Dept: FAMILY MEDICINE CLINIC | Facility: CLINIC | Age: 58
End: 2018-06-25

## 2018-06-25 VITALS
HEIGHT: 69 IN | DIASTOLIC BLOOD PRESSURE: 92 MMHG | WEIGHT: 262.5 LBS | BODY MASS INDEX: 38.88 KG/M2 | SYSTOLIC BLOOD PRESSURE: 134 MMHG

## 2018-06-25 DIAGNOSIS — R06.02 SHORTNESS OF BREATH: ICD-10-CM

## 2018-06-25 DIAGNOSIS — I10 ESSENTIAL HYPERTENSION: ICD-10-CM

## 2018-06-25 DIAGNOSIS — IMO0002 UNCONTROLLED TYPE 2 DIABETES MELLITUS WITH DIABETIC NEPHROPATHY, WITHOUT LONG-TERM CURRENT USE OF INSULIN: Primary | ICD-10-CM

## 2018-06-25 DIAGNOSIS — Z82.49 FAMILY HISTORY OF HEART DISEASE: ICD-10-CM

## 2018-06-25 LAB
ALBUMIN SERPL-MCNC: 4.5 G/DL (ref 3.4–4.8)
ALBUMIN/GLOB SERPL: 1.3 G/DL (ref 1.1–1.8)
ALP SERPL-CCNC: 41 U/L (ref 38–126)
ALT SERPL W P-5'-P-CCNC: 55 U/L (ref 21–72)
ANION GAP SERPL CALCULATED.3IONS-SCNC: 10 MMOL/L (ref 5–15)
AST SERPL-CCNC: 51 U/L (ref 17–59)
BILIRUB SERPL-MCNC: 0.6 MG/DL (ref 0.2–1.3)
BUN BLD-MCNC: 22 MG/DL (ref 7–21)
BUN/CREAT SERPL: 17.2 (ref 7–25)
CALCIUM SPEC-SCNC: 9.4 MG/DL (ref 8.4–10.2)
CHLORIDE SERPL-SCNC: 99 MMOL/L (ref 95–110)
CO2 SERPL-SCNC: 31 MMOL/L (ref 22–31)
CREAT BLD-MCNC: 1.28 MG/DL (ref 0.7–1.3)
DEPRECATED RDW RBC AUTO: 45.4 FL (ref 35.1–43.9)
ERYTHROCYTE [DISTWIDTH] IN BLOOD BY AUTOMATED COUNT: 14.2 % (ref 11.5–14.5)
GFR SERPL CREATININE-BSD FRML MDRD: 58 ML/MIN/1.73 (ref 56–130)
GLOBULIN UR ELPH-MCNC: 3.4 GM/DL (ref 2.3–3.5)
GLUCOSE BLD-MCNC: 101 MG/DL (ref 60–100)
HBA1C MFR BLD: 7.4 % (ref 4–5.6)
HCT VFR BLD AUTO: 40.1 % (ref 39–49)
HGB BLD-MCNC: 13.4 G/DL (ref 13.7–17.3)
MCH RBC QN AUTO: 29.4 PG (ref 26.5–34)
MCHC RBC AUTO-ENTMCNC: 33.4 G/DL (ref 31.5–36.3)
MCV RBC AUTO: 87.9 FL (ref 80–98)
PLATELET # BLD AUTO: 271 10*3/MM3 (ref 150–450)
PMV BLD AUTO: 10.2 FL (ref 8–12)
POTASSIUM BLD-SCNC: 4.2 MMOL/L (ref 3.5–5.1)
PROT SERPL-MCNC: 7.9 G/DL (ref 6.3–8.6)
RBC # BLD AUTO: 4.56 10*6/MM3 (ref 4.37–5.74)
SODIUM BLD-SCNC: 140 MMOL/L (ref 137–145)
WBC NRBC COR # BLD: 8.65 10*3/MM3 (ref 3.2–9.8)

## 2018-06-25 PROCEDURE — 99214 OFFICE O/P EST MOD 30 MIN: CPT | Performed by: FAMILY MEDICINE

## 2018-06-25 PROCEDURE — 83036 HEMOGLOBIN GLYCOSYLATED A1C: CPT | Performed by: FAMILY MEDICINE

## 2018-06-25 PROCEDURE — 85027 COMPLETE CBC AUTOMATED: CPT | Performed by: FAMILY MEDICINE

## 2018-06-25 PROCEDURE — 80053 COMPREHEN METABOLIC PANEL: CPT | Performed by: FAMILY MEDICINE

## 2018-06-25 PROCEDURE — 36415 COLL VENOUS BLD VENIPUNCTURE: CPT | Performed by: FAMILY MEDICINE

## 2018-06-25 RX ORDER — SITAGLIPTIN AND METFORMIN HYDROCHLORIDE 500; 50 MG/1; MG/1
TABLET, FILM COATED ORAL
Qty: 60 TABLET | Refills: 0 | Status: SHIPPED | OUTPATIENT
Start: 2018-06-25 | End: 2018-07-02 | Stop reason: DRUGHIGH

## 2018-06-25 NOTE — PROGRESS NOTES
Subjective   Zaheer Baron is a 57 y.o. male.     Hypertension   This is a chronic problem. The current episode started more than 1 year ago. The problem is unchanged. The problem is controlled. Associated symptoms include sweats. Pertinent negatives include no anxiety, blurred vision, chest pain, headaches, malaise/fatigue, neck pain, orthopnea, palpitations, peripheral edema, PND or shortness of breath. There are no associated agents to hypertension. Current antihypertension treatment includes alpha 1 blockers, angiotensin blockers and diuretics. The current treatment provides moderate improvement. There are no compliance problems.  Hypertensive end-organ damage includes kidney disease. There is no history of angina, CAD/MI, CVA, heart failure, left ventricular hypertrophy, PVD or retinopathy.   Diabetes   He presents for his follow-up diabetic visit. He has type 2 diabetes mellitus. His disease course has been stable. Hypoglycemia symptoms include sweats. Pertinent negatives for hypoglycemia include no dizziness, headaches, nervousness/anxiousness or pallor. Associated symptoms include fatigue and weakness. Pertinent negatives for diabetes include no blurred vision and no chest pain. There are no hypoglycemic complications. Symptoms are stable. Diabetic complications include peripheral neuropathy. Pertinent negatives for diabetic complications include no CVA, PVD or retinopathy. Risk factors for coronary artery disease include diabetes mellitus, dyslipidemia, family history, male sex, obesity and hypertension. Current diabetic treatment includes oral agent (triple therapy). He is compliant with treatment all of the time. His weight is stable. He is following a generally healthy and diabetic diet. When asked about meal planning, he reported none. He has not had a previous visit with a dietitian. He rarely participates in exercise. There is no change in his home blood glucose trend. An ACE  inhibitor/angiotensin II receptor blocker is being taken. He does not see a podiatrist.Eye exam is current.   He is concerned that he had a couple episodes that he felt lethargic and sweaty.  He hasn't had anything like that in the past. He didn't check his sugars when that happened. He says that he didn't have any CP, dizziness, or SOA.  He tried to drink juice and that didn't help at all.  Lasted about 10-15 minutes.  Resolved on its own.  He was resting during that time of these spells.  He hasn't had spells like that before.        Current Outpatient Prescriptions:   •  atorvastatin (LIPITOR) 40 MG tablet, Take 1 tablet by mouth Daily., Disp: 90 tablet, Rfl: 2  •  Coenzyme Q-10 200 MG capsule, Take 1 tablet by mouth Every Night., Disp: 90 each, Rfl: 2  •  doxazosin (CARDURA) 8 MG tablet, Take 1 tablet by mouth Every Night., Disp: 90 tablet, Rfl: 2  •  Ergocalciferol 2000 UNITS tablet, Take  by mouth., Disp: , Rfl:   •  FLUTICASONE FUROATE IN, Inhale., Disp: , Rfl:   •  glucose blood test strip, 1 each by Other route as needed. Use as instructed, Disp: , Rfl:   •  glyBURIDE (DIAbeta) 5 MG tablet, TAKE 1 TABLET BY MOUTH TWICE DAILY WITH MEALS, Disp: 180 tablet, Rfl: 0  •  JANUMET  MG per tablet, TAKE 1 TABLET BY MOUTH TWICE DAILY WITH MEALS, Disp: 60 tablet, Rfl: 0  •  metoclopramide (REGLAN) 5 MG tablet, TAKE 1 TABLET BY MOUTH TWICE DAILY AS NEEDED, Disp: 40 tablet, Rfl: 0  •  omeprazole (priLOSEC) 40 MG capsule, Take 1 capsule by mouth Daily., Disp: 90 capsule, Rfl: 2  •  ondansetron (ZOFRAN) 4 MG tablet, Take 1 tablet by mouth Every 8 (Eight) Hours As Needed for Nausea or Vomiting., Disp: 30 tablet, Rfl: 0  •  ONETOUCH DELICA LANCETS 33G misc, , Disp: , Rfl:   •  oxyCODONE-acetaminophen (PERCOCET) 7.5-325 MG per tablet, Take 1 tablet by mouth Every 4 (Four) Hours As Needed., Disp: , Rfl:   •  PARoxetine CR (PAXIL-CR) 25 MG 24 hr tablet, Take 1 tablet by mouth Every Morning., Disp: 90 tablet, Rfl: 2  •   "tadalafil (CIALIS) 5 MG tablet, Take 1 tablet by mouth Daily., Disp: 90 tablet, Rfl: 2  •  urea (COLT LO 40) 40 % cream, Apply  topically., Disp: , Rfl:   •  valsartan-hydrochlorothiazide (DIOVAN-HCT) 80-12.5 MG per tablet, Take 1 tablet by mouth Daily., Disp: 90 tablet, Rfl: 2    The following portions of the patient's history were reviewed and updated as appropriate: allergies, current medications, past family history, past medical history, past social history, past surgical history and problem list.    Review of Systems   Constitutional: Positive for fatigue. Negative for activity change, appetite change, chills, diaphoresis, fever, malaise/fatigue and unexpected weight change.   HENT: Negative for congestion and sore throat.    Eyes: Negative for blurred vision and visual disturbance.   Respiratory: Negative for cough, shortness of breath and wheezing.    Cardiovascular: Negative for chest pain, palpitations, orthopnea, leg swelling and PND.   Gastrointestinal: Negative for abdominal distention, abdominal pain, blood in stool, constipation, diarrhea, nausea and vomiting.   Musculoskeletal: Positive for arthralgias, back pain, joint swelling and myalgias. Negative for neck pain and neck stiffness.   Skin: Negative for pallor, rash and wound.   Neurological: Positive for weakness. Negative for dizziness, numbness and headaches.   Psychiatric/Behavioral: Negative for agitation, decreased concentration, dysphoric mood and sleep disturbance. The patient is not nervous/anxious.        Objective    Vitals:    06/25/18 0806   BP: 134/92   Weight: 119 kg (262 lb 8 oz)   Height: 175.3 cm (69\")       Physical Exam   Constitutional: He is oriented to person, place, and time. He appears well-developed and well-nourished. No distress.   Cardiovascular: Normal rate, regular rhythm and normal heart sounds.    No murmur heard.  No LE edema.   Pulmonary/Chest: Effort normal and breath sounds normal. No respiratory distress. "   Abdominal: Soft. Bowel sounds are normal. He exhibits no distension. There is no tenderness.   Neurological: He is alert and oriented to person, place, and time.   Psychiatric: He has a normal mood and affect. His behavior is normal. Judgment and thought content normal.   Nursing note and vitals reviewed.      Assessment/Plan   Problems Addressed this Visit        Cardiovascular and Mediastinum    Hypertension      Other Visit Diagnoses     Uncontrolled type 2 diabetes mellitus with diabetic nephropathy, without long-term current use of insulin    -  Primary    Relevant Orders    CBC (No Diff) (Completed)    Comprehensive Metabolic Panel (Completed)    Hemoglobin A1c (Completed)    Shortness of breath        Relevant Orders    Ambulatory Referral to Cardiology    Family history of heart disease          1.) DM-  Readings from home sounds good.  He is compliant with his medications. I am thinking that his sweating and fatigue episode are hypoglycemic events.  He didn't check his sugars. Stressed that he needs to.  He has had eye and foot exams this year. He sees renal.  Will recheck A1C and adjust medication PRN.  2.)HTN-  Will continue current medications for now. Avoid NSAIDS per renal.  3.) Shortness of breath/sweating episode- I think that this is likely diabetic issues.  He has significant FH of heart disease and his father  at 39. He has had a stress test in the past at [facility], but I couldn't see that in our records.  Will refer to Cardiology for further evaluation since he has high CV risk.  RTC in 3 months or sooner PRN

## 2018-07-02 ENCOUNTER — TELEPHONE (OUTPATIENT)
Dept: FAMILY MEDICINE CLINIC | Facility: CLINIC | Age: 58
End: 2018-07-02

## 2018-07-02 NOTE — TELEPHONE ENCOUNTER
-Pr Dr. ANASTASIA Monge, Mr. Baron has been called with his recent lab results & recommendations.  Continue his current medications and follow-up as planned or sooner if any problems.    Patient Response:  Yes, he said if you feel he needs to be on the additional Metformin then call it in.  I told him it may help him reach his goal number of 7 on his A1c.  Just send to his local Pharmacy please    ---- Message from Ester Monge MD sent at 6/29/2018 12:59 PM CDT -----  Please let him know that his sugars are looking a little better.  He still isn't at goal of 7 A1C.  Would he be okay with taking an additional metformin with his current medications?  I can call that in for him if he would like?  Could take at the same time with the Janumet.

## 2018-08-09 ENCOUNTER — OFFICE VISIT (OUTPATIENT)
Dept: FAMILY MEDICINE CLINIC | Facility: CLINIC | Age: 58
End: 2018-08-09

## 2018-08-09 VITALS
HEART RATE: 103 BPM | BODY MASS INDEX: 38.44 KG/M2 | WEIGHT: 259.5 LBS | DIASTOLIC BLOOD PRESSURE: 72 MMHG | SYSTOLIC BLOOD PRESSURE: 124 MMHG | OXYGEN SATURATION: 96 % | HEIGHT: 69 IN

## 2018-08-09 DIAGNOSIS — R11.2 NAUSEA AND VOMITING, INTRACTABILITY OF VOMITING NOT SPECIFIED, UNSPECIFIED VOMITING TYPE: ICD-10-CM

## 2018-08-09 DIAGNOSIS — I10 ESSENTIAL HYPERTENSION: Primary | ICD-10-CM

## 2018-08-09 DIAGNOSIS — E78.2 MIXED HYPERLIPIDEMIA: ICD-10-CM

## 2018-08-09 DIAGNOSIS — E11.8 CONTROLLED TYPE 2 DIABETES MELLITUS WITH COMPLICATION, WITHOUT LONG-TERM CURRENT USE OF INSULIN (HCC): ICD-10-CM

## 2018-08-09 PROCEDURE — 99214 OFFICE O/P EST MOD 30 MIN: CPT | Performed by: FAMILY MEDICINE

## 2018-08-09 RX ORDER — ONDANSETRON 4 MG/1
4 TABLET, FILM COATED ORAL EVERY 8 HOURS PRN
Qty: 30 TABLET | Refills: 0 | Status: SHIPPED | OUTPATIENT
Start: 2018-08-09 | End: 2020-06-29

## 2018-08-09 RX ORDER — DOXAZOSIN 8 MG/1
8 TABLET ORAL NIGHTLY
Qty: 90 TABLET | Refills: 2 | Status: SHIPPED | OUTPATIENT
Start: 2018-08-09 | End: 2018-11-08 | Stop reason: DRUGHIGH

## 2018-08-09 RX ORDER — OMEPRAZOLE 40 MG/1
40 CAPSULE, DELAYED RELEASE ORAL DAILY
Qty: 90 CAPSULE | Refills: 2 | Status: SHIPPED | OUTPATIENT
Start: 2018-08-09 | End: 2019-04-18 | Stop reason: SDUPTHER

## 2018-08-09 RX ORDER — LISINOPRIL AND HYDROCHLOROTHIAZIDE 12.5; 1 MG/1; MG/1
1 TABLET ORAL DAILY
Qty: 90 TABLET | Refills: 1 | Status: SHIPPED | OUTPATIENT
Start: 2018-08-09 | End: 2018-09-24 | Stop reason: ALTCHOICE

## 2018-08-09 RX ORDER — ROSUVASTATIN CALCIUM 5 MG/1
5 TABLET, COATED ORAL DAILY
Qty: 30 TABLET | Refills: 5 | Status: SHIPPED | OUTPATIENT
Start: 2018-08-09 | End: 2019-01-27 | Stop reason: SDUPTHER

## 2018-08-09 RX ORDER — GLYBURIDE 5 MG/1
5 TABLET ORAL 2 TIMES DAILY WITH MEALS
Qty: 180 TABLET | Refills: 2 | Status: SHIPPED | OUTPATIENT
Start: 2018-08-09 | End: 2018-09-18 | Stop reason: HOSPADM

## 2018-08-09 RX ORDER — PAROXETINE HYDROCHLORIDE HEMIHYDRATE 25 MG/1
25 TABLET, FILM COATED, EXTENDED RELEASE ORAL EVERY MORNING
Qty: 90 TABLET | Refills: 2 | Status: SHIPPED | OUTPATIENT
Start: 2018-08-09 | End: 2019-04-18 | Stop reason: SDUPTHER

## 2018-08-09 NOTE — PROGRESS NOTES
Subjective   Zaheer Baron is a 57 y.o. male.    History of Present Illness The patient comes in for check of their chronic medical issues which include Hypertension,Diabetes Mellitis and Hypercholesterolemia.He is doing well. He needs refills. He has stopped taking his Atorvastatin due to muscle cramps.  .       The following portions of the patient's history were reviewed and updated as appropriate: allergies, current medications, past family history, past medical history, past social history, past surgical history and problem list.    Review of Systems   Constitutional: Negative for fatigue and fever.   Respiratory: Negative for cough, chest tightness and stridor.    Cardiovascular: Negative for chest pain, palpitations and leg swelling.       Objective   Physical Exam   Constitutional: He appears well-developed and well-nourished.   HENT:   Head: Normocephalic and atraumatic.   Right Ear: External ear normal.   Left Ear: External ear normal.   Nose: Nose normal.   Mouth/Throat: Oropharynx is clear and moist.   Eyes: Pupils are equal, round, and reactive to light.   Neck: Normal range of motion.   Cardiovascular: Normal rate, regular rhythm and normal heart sounds.  Exam reveals no gallop and no friction rub.    No murmur heard.  Pulmonary/Chest: Effort normal and breath sounds normal. No respiratory distress. He has no wheezes. He has no rales.   Abdominal: Soft. Bowel sounds are normal. He exhibits no distension. There is no tenderness. There is no guarding.   Skin: Skin is warm and dry.   Vitals reviewed.        Assessment/Plan   Zaheer was seen today for establish care and diabetes.    Diagnoses and all orders for this visit:    Essential hypertension    Nausea and vomiting, intractability of vomiting not specified, unspecified vomiting type  -     ondansetron (ZOFRAN) 4 MG tablet; Take 1 tablet by mouth Every 8 (Eight) Hours As Needed for Nausea or Vomiting.    Mixed hyperlipidemia    Controlled type 2  diabetes mellitus with complication, without long-term current use of insulin (CMS/[facility])    Other orders  -     doxazosin (CARDURA) 8 MG tablet; Take 1 tablet by mouth Every Night.  -     glyBURIDE (DIAbeta) 5 MG tablet; Take 1 tablet by mouth 2 (Two) Times a Day With Meals.  -     omeprazole (priLOSEC) 40 MG capsule; Take 1 capsule by mouth Daily.  -     PARoxetine CR (PAXIL-CR) 25 MG 24 hr tablet; Take 1 tablet by mouth Every Morning.  -     rosuvastatin (CRESTOR) 5 MG tablet; Take 1 tablet by mouth Daily.  -     lisinopril-hydrochlorothiazide (ZESTORETIC) 10-12.5 MG per tablet; Take 1 tablet by mouth Daily.          Return to the clinic in 3 months.

## 2018-08-21 RX ORDER — SITAGLIPTIN AND METFORMIN HYDROCHLORIDE 500; 50 MG/1; MG/1
TABLET, FILM COATED ORAL
Qty: 60 TABLET | Refills: 3 | Status: SHIPPED | OUTPATIENT
Start: 2018-08-21 | End: 2019-01-04 | Stop reason: SDUPTHER

## 2018-09-17 ENCOUNTER — APPOINTMENT (OUTPATIENT)
Dept: GENERAL RADIOLOGY | Facility: HOSPITAL | Age: 58
End: 2018-09-17

## 2018-09-17 ENCOUNTER — HOSPITAL ENCOUNTER (OUTPATIENT)
Facility: HOSPITAL | Age: 58
Setting detail: OBSERVATION
Discharge: HOME | End: 2018-09-18
Attending: EMERGENCY MEDICINE | Admitting: EMERGENCY MEDICINE

## 2018-09-17 DIAGNOSIS — R55 POSTURAL DIZZINESS WITH NEAR SYNCOPE: ICD-10-CM

## 2018-09-17 DIAGNOSIS — R42 POSTURAL DIZZINESS WITH NEAR SYNCOPE: ICD-10-CM

## 2018-09-17 DIAGNOSIS — E16.2 HYPOGLYCEMIA: Primary | ICD-10-CM

## 2018-09-17 LAB
ALBUMIN SERPL-MCNC: 4.1 G/DL (ref 3.4–4.8)
ALBUMIN/GLOB SERPL: 1.2 G/DL (ref 1.1–1.8)
ALP SERPL-CCNC: 41 U/L (ref 38–126)
ALT SERPL W P-5'-P-CCNC: 41 U/L (ref 21–72)
ANION GAP SERPL CALCULATED.3IONS-SCNC: 11 MMOL/L (ref 5–15)
AST SERPL-CCNC: 36 U/L (ref 17–59)
BASOPHILS # BLD AUTO: 0.05 10*3/MM3 (ref 0–0.2)
BASOPHILS NFR BLD AUTO: 0.6 % (ref 0–2)
BILIRUB SERPL-MCNC: 0.5 MG/DL (ref 0.2–1.3)
BILIRUB UR QL STRIP: NEGATIVE
BUN BLD-MCNC: 14 MG/DL (ref 7–21)
BUN/CREAT SERPL: 11.9 (ref 7–25)
CALCIUM SPEC-SCNC: 8.9 MG/DL (ref 8.4–10.2)
CHLORIDE SERPL-SCNC: 101 MMOL/L (ref 95–110)
CLARITY UR: CLEAR
CO2 SERPL-SCNC: 27 MMOL/L (ref 22–31)
COLOR UR: YELLOW
CREAT BLD-MCNC: 1.18 MG/DL (ref 0.7–1.3)
DEPRECATED RDW RBC AUTO: 44.3 FL (ref 35.1–43.9)
EOSINOPHIL # BLD AUTO: 0.15 10*3/MM3 (ref 0–0.7)
EOSINOPHIL NFR BLD AUTO: 1.9 % (ref 0–7)
ERYTHROCYTE [DISTWIDTH] IN BLOOD BY AUTOMATED COUNT: 13.6 % (ref 11.5–14.5)
GFR SERPL CREATININE-BSD FRML MDRD: 63 ML/MIN/1.73 (ref 56–130)
GLOBULIN UR ELPH-MCNC: 3.4 GM/DL (ref 2.3–3.5)
GLUCOSE BLD-MCNC: 130 MG/DL (ref 60–100)
GLUCOSE BLDC GLUCOMTR-MCNC: 127 MG/DL (ref 70–130)
GLUCOSE BLDC GLUCOMTR-MCNC: 138 MG/DL (ref 70–130)
GLUCOSE BLDC GLUCOMTR-MCNC: 76 MG/DL (ref 70–130)
GLUCOSE BLDC GLUCOMTR-MCNC: 83 MG/DL (ref 70–130)
GLUCOSE UR STRIP-MCNC: NEGATIVE MG/DL
HCT VFR BLD AUTO: 37.2 % (ref 39–49)
HGB BLD-MCNC: 12.6 G/DL (ref 13.7–17.3)
HGB UR QL STRIP.AUTO: NEGATIVE
HOLD SPECIMEN: NORMAL
HOLD SPECIMEN: NORMAL
IMM GRANULOCYTES # BLD: 0.03 10*3/MM3 (ref 0–0.02)
IMM GRANULOCYTES NFR BLD: 0.4 % (ref 0–0.5)
INR PPP: 1.04 (ref 0.8–1.2)
KETONES UR QL STRIP: NEGATIVE
LEUKOCYTE ESTERASE UR QL STRIP.AUTO: NEGATIVE
LYMPHOCYTES # BLD AUTO: 1.92 10*3/MM3 (ref 0.6–4.2)
LYMPHOCYTES NFR BLD AUTO: 24.2 % (ref 10–50)
MCH RBC QN AUTO: 30.4 PG (ref 26.5–34)
MCHC RBC AUTO-ENTMCNC: 33.9 G/DL (ref 31.5–36.3)
MCV RBC AUTO: 89.6 FL (ref 80–98)
MONOCYTES # BLD AUTO: 0.33 10*3/MM3 (ref 0–0.9)
MONOCYTES NFR BLD AUTO: 4.2 % (ref 0–12)
NEUTROPHILS # BLD AUTO: 5.46 10*3/MM3 (ref 2–8.6)
NEUTROPHILS NFR BLD AUTO: 68.7 % (ref 37–80)
NITRITE UR QL STRIP: NEGATIVE
NT-PROBNP SERPL-MCNC: 522 PG/ML (ref 0–900)
PH UR STRIP.AUTO: 5.5 [PH] (ref 5–9)
PLATELET # BLD AUTO: 221 10*3/MM3 (ref 150–450)
PMV BLD AUTO: 10.2 FL (ref 8–12)
POTASSIUM BLD-SCNC: 3.7 MMOL/L (ref 3.5–5.1)
PROT SERPL-MCNC: 7.5 G/DL (ref 6.3–8.6)
PROT UR QL STRIP: ABNORMAL
PROTHROMBIN TIME: 13.4 SECONDS (ref 11.1–15.3)
RBC # BLD AUTO: 4.15 10*6/MM3 (ref 4.37–5.74)
SODIUM BLD-SCNC: 139 MMOL/L (ref 137–145)
SP GR UR STRIP: 1.02 (ref 1–1.03)
TROPONIN I SERPL-MCNC: <0.012 NG/ML
TROPONIN I SERPL-MCNC: <0.012 NG/ML
UROBILINOGEN UR QL STRIP: ABNORMAL
WBC NRBC COR # BLD: 7.94 10*3/MM3 (ref 3.2–9.8)
WHOLE BLOOD HOLD SPECIMEN: NORMAL
WHOLE BLOOD HOLD SPECIMEN: NORMAL

## 2018-09-17 PROCEDURE — 85610 PROTHROMBIN TIME: CPT | Performed by: EMERGENCY MEDICINE

## 2018-09-17 PROCEDURE — 80053 COMPREHEN METABOLIC PANEL: CPT | Performed by: EMERGENCY MEDICINE

## 2018-09-17 PROCEDURE — 82962 GLUCOSE BLOOD TEST: CPT

## 2018-09-17 PROCEDURE — G0378 HOSPITAL OBSERVATION PER HR: HCPCS

## 2018-09-17 PROCEDURE — 71045 X-RAY EXAM CHEST 1 VIEW: CPT

## 2018-09-17 PROCEDURE — 99285 EMERGENCY DEPT VISIT HI MDM: CPT

## 2018-09-17 PROCEDURE — 36415 COLL VENOUS BLD VENIPUNCTURE: CPT | Performed by: EMERGENCY MEDICINE

## 2018-09-17 PROCEDURE — 93005 ELECTROCARDIOGRAM TRACING: CPT | Performed by: EMERGENCY MEDICINE

## 2018-09-17 PROCEDURE — 84484 ASSAY OF TROPONIN QUANT: CPT | Performed by: EMERGENCY MEDICINE

## 2018-09-17 PROCEDURE — 81003 URINALYSIS AUTO W/O SCOPE: CPT | Performed by: EMERGENCY MEDICINE

## 2018-09-17 PROCEDURE — 83880 ASSAY OF NATRIURETIC PEPTIDE: CPT | Performed by: EMERGENCY MEDICINE

## 2018-09-17 PROCEDURE — 93010 ELECTROCARDIOGRAM REPORT: CPT | Performed by: INTERNAL MEDICINE

## 2018-09-17 PROCEDURE — 85025 COMPLETE CBC W/AUTO DIFF WBC: CPT | Performed by: EMERGENCY MEDICINE

## 2018-09-17 RX ORDER — DEXTROSE MONOHYDRATE 25 G/50ML
25 INJECTION, SOLUTION INTRAVENOUS
Status: DISCONTINUED | OUTPATIENT
Start: 2018-09-17 | End: 2018-09-18 | Stop reason: HOSPADM

## 2018-09-17 RX ORDER — PANTOPRAZOLE SODIUM 40 MG/1
40 TABLET, DELAYED RELEASE ORAL EVERY MORNING
Status: DISCONTINUED | OUTPATIENT
Start: 2018-09-18 | End: 2018-09-18 | Stop reason: HOSPADM

## 2018-09-17 RX ORDER — DEXTROSE MONOHYDRATE 25 G/50ML
50 INJECTION, SOLUTION INTRAVENOUS
Status: DISCONTINUED | OUTPATIENT
Start: 2018-09-17 | End: 2018-09-18 | Stop reason: HOSPADM

## 2018-09-17 RX ORDER — TERAZOSIN 5 MG/1
10 CAPSULE ORAL NIGHTLY
Status: DISCONTINUED | OUTPATIENT
Start: 2018-09-17 | End: 2018-09-18 | Stop reason: HOSPADM

## 2018-09-17 RX ORDER — SODIUM CHLORIDE 0.9 % (FLUSH) 0.9 %
1-10 SYRINGE (ML) INJECTION AS NEEDED
Status: DISCONTINUED | OUTPATIENT
Start: 2018-09-17 | End: 2018-09-18 | Stop reason: HOSPADM

## 2018-09-17 RX ORDER — METOCLOPRAMIDE 5 MG/1
5 TABLET ORAL
Status: DISCONTINUED | OUTPATIENT
Start: 2018-09-17 | End: 2018-09-18 | Stop reason: HOSPADM

## 2018-09-17 RX ORDER — ROSUVASTATIN CALCIUM 5 MG/1
5 TABLET, COATED ORAL DAILY
Status: DISCONTINUED | OUTPATIENT
Start: 2018-09-18 | End: 2018-09-18 | Stop reason: HOSPADM

## 2018-09-17 RX ORDER — DEXTROSE AND SODIUM CHLORIDE 5; .45 G/100ML; G/100ML
50 INJECTION, SOLUTION INTRAVENOUS CONTINUOUS
Status: DISCONTINUED | OUTPATIENT
Start: 2018-09-17 | End: 2018-09-18

## 2018-09-17 RX ORDER — MELATONIN
2000 DAILY
Status: DISCONTINUED | OUTPATIENT
Start: 2018-09-18 | End: 2018-09-18 | Stop reason: HOSPADM

## 2018-09-17 RX ORDER — ONDANSETRON 4 MG/1
4 TABLET, FILM COATED ORAL EVERY 8 HOURS PRN
Status: DISCONTINUED | OUTPATIENT
Start: 2018-09-17 | End: 2018-09-18 | Stop reason: HOSPADM

## 2018-09-17 RX ORDER — NICOTINE POLACRILEX 4 MG
15 LOZENGE BUCCAL
Status: DISCONTINUED | OUTPATIENT
Start: 2018-09-17 | End: 2018-09-18 | Stop reason: HOSPADM

## 2018-09-17 RX ORDER — OXYCODONE AND ACETAMINOPHEN 7.5; 325 MG/1; MG/1
1 TABLET ORAL EVERY 4 HOURS PRN
Status: DISCONTINUED | OUTPATIENT
Start: 2018-09-17 | End: 2018-09-18 | Stop reason: HOSPADM

## 2018-09-17 RX ORDER — PAROXETINE HYDROCHLORIDE HEMIHYDRATE 25 MG/1
25 TABLET, FILM COATED, EXTENDED RELEASE ORAL EVERY MORNING
Status: DISCONTINUED | OUTPATIENT
Start: 2018-09-18 | End: 2018-09-18 | Stop reason: HOSPADM

## 2018-09-17 RX ORDER — SODIUM CHLORIDE 0.9 % (FLUSH) 0.9 %
10 SYRINGE (ML) INJECTION AS NEEDED
Status: DISCONTINUED | OUTPATIENT
Start: 2018-09-17 | End: 2018-09-18 | Stop reason: HOSPADM

## 2018-09-17 RX ADMIN — METOCLOPRAMIDE 5 MG: 5 TABLET ORAL at 20:14

## 2018-09-17 RX ADMIN — Medication 10 ML: at 09:31

## 2018-09-17 RX ADMIN — Medication 10 ML: at 15:29

## 2018-09-17 RX ADMIN — OXYCODONE HYDROCHLORIDE AND ACETAMINOPHEN 1 TABLET: 7.5; 325 TABLET ORAL at 20:14

## 2018-09-17 RX ADMIN — TERAZOSIN HYDROCHLORIDE ANHYDROUS 10 MG: 5 CAPSULE ORAL at 20:14

## 2018-09-17 RX ADMIN — DEXTROSE AND SODIUM CHLORIDE 50 ML/HR: 5; 450 INJECTION, SOLUTION INTRAVENOUS at 15:27

## 2018-09-17 RX ADMIN — METOCLOPRAMIDE 5 MG: 5 TABLET ORAL at 16:59

## 2018-09-17 RX ADMIN — OXYCODONE HYDROCHLORIDE AND ACETAMINOPHEN 1 TABLET: 7.5; 325 TABLET ORAL at 15:35

## 2018-09-17 NOTE — ED NOTES
"Called to room, pt c/o dizziness, diaphoresis, states \"I'm having one of those spells\", Blood glucose 53mg/dL , pt is diaphoretic, arms, beads of sweat on head, VSS.  Orange juice x2 given.       Smita Merida, RN  09/17/18 1274    "

## 2018-09-17 NOTE — ED NOTES
Pt states already feeling better, drank 2 organge juice boxes.       Smita Merida, RN  09/17/18 7917

## 2018-09-17 NOTE — H&P
[facility] Medicine Admission      Date of Admission: 9/17/2018      Primary Care Physician: Nasir Garcia MD      Chief Complaint:  Dizziness and lightheadedness    HPI:  Patient states that he got hypoglycemic at home today.  He states that over the past month or so he has been getting hypoglycemic more frequently.  He states that initially it was once a week, and now it is 2-3 times a day.  He states that if he catches it and is able to eat something, these episodes only last about 15 minutes.  Approximately 3 months ago he was started on glyburide.  At the time of my exam he was feeling much better.  He states that when he gets hypoglycemic he is lightheaded, dizzy, diaphoretic, and begins to lose his vision.  He also complains of significant nausea.  He otherwise complains of chronic back and joint pain for which he is being treated by Dr. Monge of pain management.    Concurrent Medical History:  has a past medical history of Acute sinusitis; Allergic rhinitis; Arthropathy; Artificial lens present; Backache; Benign prostatic hyperplasia; Bronchitis; Cataract; Chronic obstructive lung disease (CMS/HCC); Diabetes mellitus (CMS/HCC); Disorder of kidney; Dizziness; Drug therapy; Dysfunction of eustachian tube; Dyslipidemia; Gout; Headache; Hearing loss; Hypertension; Hypokalemia; Impacted cerumen; Infestation by Sarcoptes scabiei alta hominis; Inguinal hernia; Knee pain; Osteoarthritis; Pneumonia; Posterior subcapsular polar senile cataract; Sjogren's syndrome (CMS/HCC); and Type 2 diabetes mellitus (CMS/HCC).    Past Surgical History:  has a past surgical history that includes Back surgery; Carpal tunnel release; Inguinal Hernia Repair; Injection of Medication; Knee surgery; Other surgical history; Other surgical history; Other surgical history; Injection of Medication (03/19/2015); Eye surgery (Bilateral); Hernia repair; Esophagogastroduodenoscopy (N/A,  3/16/2017); Cataract extraction, bilateral; and Vasectomy.    Family History: family history includes Diabetes in his paternal grandmother; Heart failure (age of onset: 35) in his father; Lung disease in his mother; Stomach cancer (age of onset: 43) in his sister.     Social History:  reports that he has never smoked. He has never used smokeless tobacco. He reports that he does not drink alcohol or use drugs.    Allergies:   Allergies   Allergen Reactions   • Nsaids Other (See Comments)     Solitary kidney; renal failure with NSAID use.  Solitary kidney; renal failure with NSAID use.       Medications:   Prescriptions Prior to Admission   Medication Sig Dispense Refill Last Dose   • Coenzyme Q-10 200 MG capsule Take 1 tablet by mouth Every Night. 90 each 2 9/16/2018 at Unknown time   • doxazosin (CARDURA) 8 MG tablet Take 1 tablet by mouth Every Night. 90 tablet 2 9/16/2018 at Unknown time   • Ergocalciferol 2000 UNITS tablet Take  by mouth.   9/17/2018 at Unknown time   • FLUTICASONE FUROATE IN Inhale.   9/17/2018 at Unknown time   • glyBURIDE (DIAbeta) 5 MG tablet Take 1 tablet by mouth 2 (Two) Times a Day With Meals. 180 tablet 2 9/17/2018 at Unknown time   • JANUMET  MG per tablet TAKE 1 TABLET BY MOUTH TWICE DAILY WITH MEALS 60 tablet 3 9/17/2018 at Unknown time   • lisinopril-hydrochlorothiazide (ZESTORETIC) 10-12.5 MG per tablet Take 1 tablet by mouth Daily. 90 tablet 1 9/17/2018 at Unknown time   • omeprazole (priLOSEC) 40 MG capsule Take 1 capsule by mouth Daily. 90 capsule 2 9/17/2018 at Unknown time   • oxyCODONE-acetaminophen (PERCOCET) 7.5-325 MG per tablet Take 1 tablet by mouth Every 4 (Four) Hours As Needed.   9/17/2018 at Unknown time   • PARoxetine CR (PAXIL-CR) 25 MG 24 hr tablet Take 1 tablet by mouth Every Morning. 90 tablet 2 9/17/2018 at Unknown time   • rosuvastatin (CRESTOR) 5 MG tablet Take 1 tablet by mouth Daily. 30 tablet 5 9/17/2018 at Unknown time   • sitaGLIPtin-metFORMIN  (JANUMET)  MG per tablet Take 1 tablet by mouth 2 (Two) Times a Day With Meals. 60 tablet 5 9/17/2018 at Unknown time   • glucose blood test strip 1 each by Other route as needed. Use as instructed   Taking   • metoclopramide (REGLAN) 5 MG tablet TAKE 1 TABLET BY MOUTH TWICE DAILY AS NEEDED 40 tablet 0 Taking   • ondansetron (ZOFRAN) 4 MG tablet Take 1 tablet by mouth Every 8 (Eight) Hours As Needed for Nausea or Vomiting. 30 tablet 0    • ONETOUCH DELICA LANCETS 33G misc    Taking   • urea (COLT LO 40) 40 % cream Apply  topically.   Taking       Review of Systems:  Review of Systems   Constitutional: Negative for appetite change, chills, fatigue, fever and unexpected weight change.   HENT: Negative for congestion, facial swelling, hearing loss, nosebleeds, rhinorrhea, sneezing, trouble swallowing and voice change.    Eyes: Positive for photophobia and visual disturbance.   Respiratory: Negative for apnea, cough, choking, chest tightness, shortness of breath, wheezing and stridor.    Cardiovascular: Negative for chest pain, palpitations and leg swelling.   Gastrointestinal: Positive for nausea. Negative for abdominal pain, blood in stool, constipation, diarrhea and vomiting.   Endocrine: Negative for cold intolerance, heat intolerance, polydipsia, polyphagia and polyuria.        Hypoglycemia   Genitourinary: Negative for dysuria, flank pain and hematuria.   Musculoskeletal: Negative for arthralgias, back pain, myalgias and neck pain.   Skin: Negative for rash and wound.   Allergic/Immunologic: Negative for immunocompromised state.   Neurological: Positive for dizziness and light-headedness. Negative for seizures, syncope, speech difficulty, weakness, numbness and headaches.   Hematological: Does not bruise/bleed easily.   Psychiatric/Behavioral: Negative for agitation, behavioral problems, confusion, decreased concentration, hallucinations, self-injury and suicidal ideas. The patient is not nervous/anxious.        Otherwise complete ROS is negative except as mentioned above.    Physical Exam:   Temp:  [98.3 °F (36.8 °C)] 98.3 °F (36.8 °C)  Heart Rate:  [59-83] 76  Resp:  [18-20] 18  BP: (136-147)/(84-96) 136/96     Physical Exam   Constitutional: He is oriented to person, place, and time. He appears well-developed and well-nourished.   HENT:   Head: Normocephalic and atraumatic.   Nose: Nose normal.   Mouth/Throat: Oropharynx is clear and moist.   Eyes: Pupils are equal, round, and reactive to light. Conjunctivae, EOM and lids are normal. No scleral icterus.   Neck: Normal range of motion. Neck supple. No JVD present. No tracheal tenderness and no spinous process tenderness present. No neck rigidity. No tracheal deviation, no edema and normal range of motion present.   Cardiovascular: Normal rate, regular rhythm, S1 normal, S2 normal, normal heart sounds and normal pulses.  Exam reveals no gallop and no friction rub.    No murmur heard.  Pulmonary/Chest: Effort normal and breath sounds normal. No accessory muscle usage. No respiratory distress. He has no decreased breath sounds. He has no wheezes. He has no rales. He exhibits no tenderness.   Abdominal: Soft. Bowel sounds are normal. He exhibits no distension and no mass. There is no tenderness. There is no rebound and no guarding.   Musculoskeletal: He exhibits no edema or tenderness.        Right shoulder: He exhibits normal range of motion, no tenderness and no pain.   Neurological: He is alert and oriented to person, place, and time. He has normal reflexes. He displays no atrophy and normal reflexes. No cranial nerve deficit or sensory deficit. He exhibits normal muscle tone. He displays no seizure activity. Coordination normal.   Skin: Skin is warm. No rash noted. No pallor.   Psychiatric: He has a normal mood and affect. His behavior is normal. Judgment and thought content normal.         Results Reviewed:  I have personally reviewed current lab, radiology, and  data and agree with results.  Lab Results (last 24 hours)     Procedure Component Value Units Date/Time    Troponin [[id number]]  (Normal) Collected:  09/17/18 1250    Specimen:  Blood Updated:  09/17/18 1328     Troponin I <0.012 ng/mL     POC Glucose Once [[id number]]  (Normal) Collected:  09/17/18 1202    Specimen:  Blood Updated:  09/17/18 1309     Glucose 83 mg/dL      Comment: : [id number] DIANELYS BELINDAMeter ID: [id number]       [place] [facility] [[id number]] Collected:  09/17/18 0928    Specimen:  Blood Updated:  09/17/18 1030    Narrative:       The following orders were created for panel order [place] Draw.  Procedure                               Abnormality         Status                     ---------                               -----------         ------                     Light Blue Top[[id number]]                                   Final result               Green Top (Gel)[[id number]]                                  Final result               Lavender Top[[id number]]                                     Final result               Gold Top - SST[[id number]]                                   Final result                 Please view results for these tests on the individual orders.    Light Blue Top [[id number]] Collected:  09/17/18 0928    Specimen:  Blood Updated:  09/17/18 1030     Extra Tube hold for add-on     Comment: Auto resulted       Green Top (Gel) [[id number]] Collected:  09/17/18 0928    Specimen:  Blood Updated:  09/17/18 1030     Extra Tube Hold for add-ons.     Comment: Auto resulted.       Lavender Top [[id number]] Collected:  09/17/18 0928    Specimen:  Blood Updated:  09/17/18 1030     Extra Tube hold for add-on     Comment: Auto resulted       Gold Top - SST [[id number]] Collected:  09/17/18 0928    Specimen:  Blood Updated:  09/17/18 1030     Extra Tube Hold for add-ons.     Comment: Auto resulted.       Urinalysis With Microscopic If Indicated (No Culture) - Urine, Clean Catch  [[id number]]  (Abnormal) Collected:  09/17/18 1013    Specimen:  Urine from Urine, Clean Catch Updated:  09/17/18 1026     Color, UA Yellow     Appearance, UA Clear     pH, UA 5.5     Specific Gravity, UA 1.020     Glucose, UA Negative     Ketones, UA Negative     Bilirubin, UA Negative     Blood, UA Negative     Protein, UA Trace (A)     Leuk Esterase, UA Negative     Nitrite, UA Negative     Urobilinogen, UA 1.0 E.U./dL    Narrative:       Urine microscopic not indicated.    BNP [[id number]]  (Normal) Collected:  09/17/18 0928    Specimen:  Blood Updated:  09/17/18 1006     proBNP 522.0 pg/mL     Troponin [[id number]]  (Normal) Collected:  09/17/18 0928    Specimen:  Blood Updated:  09/17/18 1006     Troponin I <0.012 ng/mL     Protime-INR [[id number]]  (Normal) Collected:  09/17/18 0928    Specimen:  Blood Updated:  09/17/18 1001     Protime 13.4 Seconds      INR 1.04    Narrative:       Therapeutic range for most indications is 2.0-3.0 INR,  or 2.5-3.5 for mechanical heart valves.    Comprehensive Metabolic Panel [[id number]]  (Abnormal) Collected:  09/17/18 0928    Specimen:  Blood Updated:  09/17/18 0955     Glucose 130 (H) mg/dL      BUN 14 mg/dL      Creatinine 1.18 mg/dL      Sodium 139 mmol/L      Potassium 3.7 mmol/L      Chloride 101 mmol/L      CO2 27.0 mmol/L      Calcium 8.9 mg/dL      Total Protein 7.5 g/dL      Albumin 4.10 g/dL      ALT (SGPT) 41 U/L      AST (SGOT) 36 U/L      Alkaline Phosphatase 41 U/L      Total Bilirubin 0.5 mg/dL      eGFR Non African Amer 63 mL/min/1.73      Globulin 3.4 gm/dL      A/G Ratio 1.2 g/dL      BUN/Creatinine Ratio 11.9     Anion Gap 11.0 mmol/L     CBC & Differential [[id number]] Collected:  09/17/18 0928    Specimen:  Blood Updated:  09/17/18 0942    Narrative:       The following orders were created for panel order CBC & Differential.  Procedure                               Abnormality         Status                     ---------                                -----------         ------                     CBC Auto Differential[[id number]]        Abnormal            Final result                 Please view results for these tests on the individual orders.    CBC Auto Differential [[id number]]  (Abnormal) Collected:  09/17/18 0928    Specimen:  Blood Updated:  09/17/18 0942     WBC 7.94 10*3/mm3      RBC 4.15 (L) 10*6/mm3      Hemoglobin 12.6 (L) g/dL      Hematocrit 37.2 (L) %      MCV 89.6 fL      MCH 30.4 pg      MCHC 33.9 g/dL      RDW 13.6 %      RDW-SD 44.3 (H) fl      MPV 10.2 fL      Platelets 221 10*3/mm3      Neutrophil % 68.7 %      Lymphocyte % 24.2 %      Monocyte % 4.2 %      Eosinophil % 1.9 %      Basophil % 0.6 %      Immature Grans % 0.4 %      Neutrophils, Absolute 5.46 10*3/mm3      Lymphocytes, Absolute 1.92 10*3/mm3      Monocytes, Absolute 0.33 10*3/mm3      Eosinophils, Absolute 0.15 10*3/mm3      Basophils, Absolute 0.05 10*3/mm3      Immature Grans, Absolute 0.03 (H) 10*3/mm3     POC Glucose Once [[id number]]  (Abnormal) Collected:  09/17/18 0911    Specimen:  Blood Updated:  09/17/18 0926     Glucose 138 (H) mg/dL      Comment: RN NotifiedOperator: [id number] [place] HEATHERMeter ID: [id number]           Imaging Results (last 24 hours)     Procedure Component Value Units Date/Time    XR Chest 1 View [[id number]] Collected:  09/17/18 0947     Updated:  09/17/18 1002    Narrative:       Chest single view     CLINICAL INDICATION: Chest pain protocol.        COMPARISON: Chest October 19, 2013.    FINDINGS: Cardiac silhouette is normal in size. Pulmonary  vascularity is unremarkable.     No focal infiltrate or consolidation.  No pleural effusion.  No  pneumothorax.      Impression:       CONCLUSION: No evidence of active disease. Normal chest.       Electronically signed by:  Thang Riley MD  9/17/2018 10:01 AM CDT  Workstation: [id number]            Assessment:    Hospital Problem List     Hypoglycemia                Plan:  1.  Hypoglycemia: I'll surely due  to glyburide.  Glyburide has been held.  Patient has been started on a D5 half normal saline drip.  We will do every 4 hours fingerstick blood sugars.  He will get D50 as he needs it.  At this point and Janumet will also be held.  If he becomes hyperglycemic, he will get sliding scale insulin.    2.  Chronic back and joint pain: We'll continue patient on his home dose of Percocet.  He follows with Dr. Monge, pain management, as an outpatient.  3.  Diabetes  4.  DVT prophylaxis: SCDs.      I discussed the patient's findings and my recommendations with: patient  EKasper:  [id number]   Cristobal consistent with reported usage.  Follows with Dr. Monge          This document has been electronically signed by Mu Martinez MD on September 17, 2018 2:58 PM

## 2018-09-17 NOTE — ED PROVIDER NOTES
Subjective   Patient presents with history of dizziness and hypoglycemic episodes over the past month.  Patient hasn't been getting more frequent in nature.  Patient notes over the last 3 days he's had several episodes probably 5 or 6.  Patient notes that his blood sugars in these episodes are usually about the 60s, patient also notes blood sugar 40 today when he was having similar symptoms.  Patient does note the symptoms improve after about 15 minutes with eating.  Patient notes that he gets dizzy and cloudy headed.  Patient does have nausea with this.  Has sweating with this.  Patient does note that he gets some sensitivity to light and some vision changes were things become blurry as well.  There's been no chest pain shortness of breath no dysuria no recent medication changes.            Review of Systems   Constitutional: Negative for appetite change, chills and fever.   HENT: Negative.  Negative for congestion.    Eyes: Negative.  Negative for photophobia and visual disturbance.   Respiratory: Negative.  Negative for cough, chest tightness and shortness of breath.    Cardiovascular: Negative.  Negative for chest pain and palpitations.   Gastrointestinal: Negative.  Negative for abdominal pain, constipation, diarrhea, nausea and vomiting.   Endocrine: Negative.    Genitourinary: Negative.  Negative for decreased urine volume, dysuria, flank pain and hematuria.   Musculoskeletal: Negative.  Negative for arthralgias, back pain, myalgias, neck pain and neck stiffness.   Skin: Negative.  Negative for pallor.   Neurological: Positive for dizziness, weakness and light-headedness. Negative for syncope, numbness and headaches.   Psychiatric/Behavioral: Negative.  Negative for confusion and suicidal ideas. The patient is not nervous/anxious.    All other systems reviewed and are negative.      Past Medical History:   Diagnosis Date   • Acute sinusitis    • Allergic rhinitis    • Arthropathy     of lumbar facet joint   •  Artificial lens present     position - right   • Backache     chronic back problems   • Benign prostatic hyperplasia    • Bronchitis     perisistent   • Cataract    • Chronic obstructive lung disease (CMS/HCC)    • Diabetes mellitus (CMS/HCC)     no retinopathy   • Disorder of kidney     Disorder of kidney and/or ureter - Congenital solitary right kidney      • Dizziness     History of Meniere's like condition, left ear      • Drug therapy     long-term   • Dysfunction of eustachian tube    • Dyslipidemia    • Gout    • Headache     History of possible migraine/cluster      • Hearing loss    • Hypertension    • Hypokalemia    • Impacted cerumen    • Infestation by Sarcoptes scabiei alta hominis    • Inguinal hernia     History of, repaired      • Knee pain    • Osteoarthritis    • Pneumonia     in the past   • Posterior subcapsular polar senile cataract    • Sjogren's syndrome (CMS/HCC)    • Type 2 diabetes mellitus (CMS/HCC)        Allergies   Allergen Reactions   • Nsaids Other (See Comments)     Solitary kidney; renal failure with NSAID use.  Solitary kidney; renal failure with NSAID use.       Past Surgical History:   Procedure Laterality Date   • BACK SURGERY      1/1/02   • CARPAL TUNNEL RELEASE      (Carpal tunnel release on the left)   09/10/2010 , Carpal tunnel release on the right)   12/03/2010    • CATARACT EXTRACTION, BILATERAL     • ENDOSCOPY N/A 3/16/2017    Procedure: ESOPHAGOGASTRODUODENOSCOPY;  Surgeon: Alli Shoko DO;  Location[facility] ENDOSCOPY;  Service:    • EYE SURGERY Bilateral     cataract removed   • HERNIA REPAIR     • INGUINAL HERNIA REPAIR      (Left inguinal hernioplasty with mesh.)   06/07/2007    • INJECTION OF MEDICATION      Injection for nerve block (Lumbar medial branch block.)   03/31/2016    • INJECTION OF MEDICATION  03/19/2015    solu-medrol    • KNEE SURGERY      (Lateral release, removal of loose bodies, excision of suprapatellar plica.)   04/29/1982    • OTHER SURGICAL  HISTORY      Lumbar surgery (Lumbosacral radio frequency thermal coagulation. Lumbosacral spondylosis.)   06/30/2016 ,Lumbar surgery (Lumbosacral radio frequency thermal coagulation.)   12/21/2015     • OTHER SURGICAL HISTORY      Remove cataract, insert lens (Right eye.)   05/07/2015    • OTHER SURGICAL HISTORY      Remove hip/pelvis lesion (Melanoma, right hip.)   2005    • VASECTOMY         Family History   Problem Relation Age of Onset   • Lung disease Mother         autoimmune   • Heart failure Father 35   • Stomach cancer Sister 43        autoimmune   • Diabetes Paternal Grandmother        Social History     Social History   • Marital status:      Social History Main Topics   • Smoking status: Never Smoker   • Smokeless tobacco: Never Used   • Alcohol use No   • Drug use: No   • Sexual activity: Defer     Other Topics Concern   • Not on file           Objective   Physical Exam   Constitutional: He is oriented to person, place, and time. He appears well-developed and well-nourished. No distress.   HENT:   Head: Normocephalic and atraumatic.   Eyes: Conjunctivae and EOM are normal.   Neck: Normal range of motion. Neck supple. No JVD present.   Cardiovascular: Normal rate, regular rhythm, normal heart sounds and intact distal pulses.  Exam reveals no gallop and no friction rub.    No murmur heard.  Pulmonary/Chest: Effort normal. No respiratory distress. He has no wheezes. He has no rales. He exhibits no tenderness.   Abdominal: Soft. Bowel sounds are normal. He exhibits no distension and no mass. There is no tenderness. There is no rebound and no guarding.   Musculoskeletal: Normal range of motion.   Neurological: He is alert and oriented to person, place, and time.   Skin: Skin is warm and dry. Capillary refill takes less than 2 seconds.   Psychiatric: He has a normal mood and affect. His behavior is normal. Judgment and thought content normal.   Nursing note and vitals reviewed.      ECG 12  Lead    Date/Time: 9/17/2018 9:08 AM  Performed by: VENUS MARIE  Authorized by: VENUS MARIE   Interpreted by physician  Rhythm: sinus rhythm  Rate: normal  QRS axis: normal  Conduction: conduction normal  ST Segments: ST segments normal  T Waves: T waves normal  Clinical impression: normal ECG                 ED Course      Labs Reviewed   COMPREHENSIVE METABOLIC PANEL - Abnormal; Notable for the following:        Result Value    Glucose 130 (*)     All other components within normal limits   URINALYSIS W/ MICROSCOPIC IF INDICATED (NO CULTURE) - Abnormal; Notable for the following:     Protein, UA Trace (*)     All other components within normal limits    Narrative:     Urine microscopic not indicated.   CBC WITH AUTO DIFFERENTIAL - Abnormal; Notable for the following:     RBC 4.15 (*)     Hemoglobin 12.6 (*)     Hematocrit 37.2 (*)     RDW-SD 44.3 (*)     Immature Grans, Absolute 0.03 (*)     All other components within normal limits   POCT GLUCOSE FINGERSTICK - Abnormal; Notable for the following:     Glucose 138 (*)     All other components within normal limits   TROPONIN (IN-HOUSE) - Normal   BNP (IN-HOUSE) - Normal   PROTIME-INR - Normal    Narrative:     Therapeutic range for most indications is 2.0-3.0 INR,  or 2.5-3.5 for mechanical heart valves.   RAINBOW DRAW    Narrative:     The following orders were created for panel order [place] Draw.  Procedure                               Abnormality         Status                     ---------                               -----------         ------                     Light Blue Top[[id number]]                                   Final result               Green Top (Gel)[[id number]]                                  Final result               Lavender Top[[id number]]                                     Final result               Gold Top - [facility][[id number]]                                   Final result                 Please view results for these tests on the  individual orders.   TROPONIN (IN-HOUSE)   POCT GLUCOSE FINGERSTICK   CBC AND DIFFERENTIAL    Narrative:     The following orders were created for panel order CBC & Differential.  Procedure                               Abnormality         Status                     ---------                               -----------         ------                     CBC Auto Differential[[id number]]        Abnormal            Final result                 Please view results for these tests on the individual orders.   LIGHT BLUE TOP   GREEN TOP   LAVENDER TOP   GOLD TOP - SST       XR Chest 1 View   Final Result   CONCLUSION: No evidence of active disease. Normal chest.          Electronically signed by:  Thang Riley MD  9/17/2018 10:01 AM CDT   Workstation: [id number]        Patient with recurrent hypoglycemia on a long-term antihypoglycemic.  Glyburide.  Patient symptomatic in the ED with blood sugar dropping below 60.  Patient will be admitted for serial glucose checks and medication titration.            MDM      Final diagnoses:   Hypoglycemia   Postural dizziness with near syncope            Deo Rebolledo MD  09/17/18 [id number]

## 2018-09-17 NOTE — ED NOTES
Blood sugar 63 and trending up, pt is eating peanut butter and summer crackers at this time stating he can really feel it coming up.      Smita Merida, DIOR  09/17/18 2231

## 2018-09-18 VITALS
SYSTOLIC BLOOD PRESSURE: 140 MMHG | BODY MASS INDEX: 39.4 KG/M2 | OXYGEN SATURATION: 96 % | WEIGHT: 266 LBS | HEART RATE: 62 BPM | DIASTOLIC BLOOD PRESSURE: 87 MMHG | HEIGHT: 69 IN | RESPIRATION RATE: 18 BRPM | TEMPERATURE: 98 F

## 2018-09-18 LAB
ANION GAP SERPL CALCULATED.3IONS-SCNC: 9 MMOL/L (ref 5–15)
BASOPHILS # BLD AUTO: 0.04 10*3/MM3 (ref 0–0.2)
BASOPHILS NFR BLD AUTO: 0.5 % (ref 0–2)
BUN BLD-MCNC: 14 MG/DL (ref 7–21)
BUN/CREAT SERPL: 12 (ref 7–25)
CALCIUM SPEC-SCNC: 8.9 MG/DL (ref 8.4–10.2)
CHLORIDE SERPL-SCNC: 100 MMOL/L (ref 95–110)
CO2 SERPL-SCNC: 31 MMOL/L (ref 22–31)
CREAT BLD-MCNC: 1.17 MG/DL (ref 0.7–1.3)
DEPRECATED RDW RBC AUTO: 44.7 FL (ref 35.1–43.9)
EOSINOPHIL # BLD AUTO: 0.17 10*3/MM3 (ref 0–0.7)
EOSINOPHIL NFR BLD AUTO: 2.2 % (ref 0–7)
ERYTHROCYTE [DISTWIDTH] IN BLOOD BY AUTOMATED COUNT: 13.7 % (ref 11.5–14.5)
GFR SERPL CREATININE-BSD FRML MDRD: 64 ML/MIN/1.73 (ref 56–130)
GLUCOSE BLD-MCNC: 121 MG/DL (ref 60–100)
GLUCOSE BLDC GLUCOMTR-MCNC: 107 MG/DL (ref 70–130)
GLUCOSE BLDC GLUCOMTR-MCNC: 122 MG/DL (ref 70–130)
GLUCOSE BLDC GLUCOMTR-MCNC: 79 MG/DL (ref 70–130)
GLUCOSE BLDC GLUCOMTR-MCNC: 96 MG/DL (ref 70–130)
HCT VFR BLD AUTO: 37.4 % (ref 39–49)
HGB BLD-MCNC: 12.7 G/DL (ref 13.7–17.3)
IMM GRANULOCYTES # BLD: 0.03 10*3/MM3 (ref 0–0.02)
IMM GRANULOCYTES NFR BLD: 0.4 % (ref 0–0.5)
LYMPHOCYTES # BLD AUTO: 2.5 10*3/MM3 (ref 0.6–4.2)
LYMPHOCYTES NFR BLD AUTO: 32.2 % (ref 10–50)
MCH RBC QN AUTO: 30.5 PG (ref 26.5–34)
MCHC RBC AUTO-ENTMCNC: 34 G/DL (ref 31.5–36.3)
MCV RBC AUTO: 89.9 FL (ref 80–98)
MONOCYTES # BLD AUTO: 0.5 10*3/MM3 (ref 0–0.9)
MONOCYTES NFR BLD AUTO: 6.4 % (ref 0–12)
NEUTROPHILS # BLD AUTO: 4.53 10*3/MM3 (ref 2–8.6)
NEUTROPHILS NFR BLD AUTO: 58.3 % (ref 37–80)
PLATELET # BLD AUTO: 231 10*3/MM3 (ref 150–450)
PMV BLD AUTO: 10 FL (ref 8–12)
POTASSIUM BLD-SCNC: 3.8 MMOL/L (ref 3.5–5.1)
RBC # BLD AUTO: 4.16 10*6/MM3 (ref 4.37–5.74)
SODIUM BLD-SCNC: 140 MMOL/L (ref 137–145)
WBC NRBC COR # BLD: 7.77 10*3/MM3 (ref 3.2–9.8)

## 2018-09-18 PROCEDURE — G0378 HOSPITAL OBSERVATION PER HR: HCPCS

## 2018-09-18 PROCEDURE — 85025 COMPLETE CBC W/AUTO DIFF WBC: CPT | Performed by: HOSPITALIST

## 2018-09-18 PROCEDURE — 82962 GLUCOSE BLOOD TEST: CPT

## 2018-09-18 PROCEDURE — 80048 BASIC METABOLIC PNL TOTAL CA: CPT | Performed by: HOSPITALIST

## 2018-09-18 RX ADMIN — METOCLOPRAMIDE 5 MG: 5 TABLET ORAL at 10:46

## 2018-09-18 RX ADMIN — OXYCODONE HYDROCHLORIDE AND ACETAMINOPHEN 1 TABLET: 7.5; 325 TABLET ORAL at 12:29

## 2018-09-18 RX ADMIN — PANTOPRAZOLE SODIUM 40 MG: 40 TABLET, DELAYED RELEASE ORAL at 06:04

## 2018-09-18 RX ADMIN — OXYCODONE HYDROCHLORIDE AND ACETAMINOPHEN 1 TABLET: 7.5; 325 TABLET ORAL at 02:47

## 2018-09-18 RX ADMIN — OXYCODONE HYDROCHLORIDE AND ACETAMINOPHEN 1 TABLET: 7.5; 325 TABLET ORAL at 07:07

## 2018-09-18 RX ADMIN — METOCLOPRAMIDE 5 MG: 5 TABLET ORAL at 09:31

## 2018-09-18 RX ADMIN — ROSUVASTATIN CALCIUM 5 MG: 5 TABLET, FILM COATED ORAL at 09:30

## 2018-09-18 RX ADMIN — HYDROCHLOROTHIAZIDE: 25 TABLET ORAL at 09:31

## 2018-09-18 RX ADMIN — VITAMIN D, TAB 1000IU (100/BT) 2000 UNITS: 25 TAB at 09:30

## 2018-09-18 RX ADMIN — PAROXETINE HYDROCHLORIDE 25 MG: 25 TABLET, FILM COATED, EXTENDED RELEASE ORAL at 06:04

## 2018-09-18 NOTE — PROGRESS NOTES
MEDICINE DAILY PROGRESS NOTE  NAME: Zaheer Baron  : 1960  MRN: [id number]     LOS: 0 days     PROVIDER OF SERVICE: Renaldo Hendrickson MD    Chief Complaint: Hypoglycemia    Subjective:   HPI: Patient admitted with dizziness secondary to hypoglycemia.    Interval History:  History taken from: patient chart RN  . Patient at baseline today.     Review of Systems:   Review of Systems   Constitutional: Negative for activity change, appetite change, fatigue and fever.   HENT: Negative for ear pain and sore throat.    Eyes: Negative for pain and visual disturbance.   Respiratory: Negative for cough and shortness of breath.    Cardiovascular: Negative for chest pain and palpitations.   Gastrointestinal: Negative for abdominal pain and nausea.   Endocrine: Negative for cold intolerance and heat intolerance.   Genitourinary: Negative for difficulty urinating and dysuria.   Musculoskeletal: Negative for arthralgias and gait problem.   Skin: Negative for color change and rash.   Neurological: Negative for dizziness, weakness and headaches.   Hematological: Negative for adenopathy. Does not bruise/bleed easily.   Psychiatric/Behavioral: Negative for agitation, confusion and sleep disturbance.       Objective:     Vital Signs  Vitals:    18 2359 18 0325 18 0804 18 1128   BP: 144/78 150/97 155/85 140/87   BP Location: Left arm Left arm  Left arm   Patient Position: Lying Lying  Lying   Pulse: 57 52 61 62   Resp:    Temp: 97.8 °F (36.6 °C) 96.5 °F (35.8 °C) 98 °F (36.7 °C) 98 °F (36.7 °C)   TempSrc: Oral Oral  Oral   SpO2: 97% 97% 96% 96%   Weight:       Height:           Physical Exam  Physical Exam   Constitutional: He is oriented to person, place, and time. He appears well-developed and well-nourished. No distress.   HENT:   Head: Normocephalic and atraumatic.   Right Ear: External ear normal.   Left Ear: External ear normal.   Nose: Nose normal.   Eyes: Pupils are equal, round,  and reactive to light. Conjunctivae and EOM are normal.   Neck: Normal range of motion. Neck supple.   Cardiovascular: Normal rate, regular rhythm, normal heart sounds and intact distal pulses.    Pulmonary/Chest: Effort normal and breath sounds normal. No respiratory distress. He has no wheezes. He has no rales. He exhibits no tenderness.   Abdominal: Soft. Bowel sounds are normal. He exhibits no distension and no mass. There is no tenderness. There is no rebound and no guarding.   Musculoskeletal: Normal range of motion. He exhibits no edema.   Neurological: He is alert and oriented to person, place, and time.   Skin: Skin is warm and dry. No rash noted. He is not diaphoretic. No erythema. No pallor.   Psychiatric: He has a normal mood and affect. His behavior is normal.   Nursing note and vitals reviewed.      Medication Review    Current Facility-Administered Medications:   •  cholecalciferol (VITAMIN D3) tablet 2,000 Units, 2,000 Units, Oral, Daily, Mu Martinez MD, 2,000 Units at 09/18/18 0930  •  dextrose (D50W) 25 g/ 50mL Intravenous Solution 25 g, 25 g, Intravenous, Q15 Min PRN, Mu Martinez MD  •  dextrose (D50W) 25 g/ 50mL Intravenous Solution 50 mL, 50 mL, Intravenous, Q1H PRN, Deo Rebolledo MD  •  dextrose (GLUTOSE) oral gel 15 g, 15 g, Oral, Q15 Min PRN, Mu Martinez MD  •  glucagon (human recombinant) (GLUCAGEN DIAGNOSTIC) injection 1 mg, 1 mg, Subcutaneous, PRN, Mu Martinez MD  •  lisinopril (PRINIVIL,ZESTRIL) 10 mg, hydrochlorothiazide (HYDRODIURIL) 12.5 mg for ZESTORTIC 10-12.5, , Oral, Q24H, Mu Martinez MD  •  metoclopramide (REGLAN) tablet 5 mg, 5 mg, Oral, 4x Daily AC & at Bedtime, Mu Martinez MD, 5 mg at 09/18/18 1046  •  ondansetron (ZOFRAN) tablet 4 mg, 4 mg, Oral, Q8H PRN, Mu Martinez MD  •  oxyCODONE-acetaminophen (PERCOCET) 7.5-325 MG per tablet 1 tablet, 1 tablet, Oral, Q4H PRN, Mu Martinez MD, 1 tablet at 09/18/18 0707  •   pantoprazole (PROTONIX) EC tablet 40 mg, 40 mg, Oral, Juan TRIPLETT Kevin L, MD, 40 mg at 09/18/18 0604  •  PARoxetine CR (PAXIL-CR) 24 hr tablet 25 mg, 25 mg, Oral, Juan TRIPLETT Kevin L, MD, 25 mg at 09/18/18 0604  •  rosuvastatin (CRESTOR) tablet 5 mg, 5 mg, Oral, Daily, Mu Martinez MD, 5 mg at 09/18/18 0930  •  sodium chloride 0.9 % flush 1-10 mL, 1-10 mL, Intravenous, PRN, Mu Martinez MD, 10 mL at 09/17/18 1529  •  sodium chloride 0.9 % flush 10 mL, 10 mL, Intravenous, PRN, Deo Rebolledo MD, 10 mL at 09/17/18 0931  •  terazosin (HYTRIN) capsule 10 mg, 10 mg, Oral, Nightly, Mu Martinez MD, 10 mg at 09/17/18 2014     Diagnostic Data    Lab Results (last 24 hours)     Procedure Component Value Units Date/Time    POC Glucose Once [[id number]]  (Normal) Collected:  09/18/18 0811    Specimen:  Blood Updated:  09/18/18 0834     Glucose 107 mg/dL      Comment: RN NotifiedOperator: [id number] TALIA SOSAYMeter ID: [id number]       Basic Metabolic Panel [[id number]]  (Abnormal) Collected:  09/18/18 0600    Specimen:  Blood Updated:  09/18/18 0646     Glucose 121 (H) mg/dL      BUN 14 mg/dL      Creatinine 1.17 mg/dL      Sodium 140 mmol/L      Potassium 3.8 mmol/L      Chloride 100 mmol/L      CO2 31.0 mmol/L      Calcium 8.9 mg/dL      eGFR Non African Amer 64 mL/min/1.73      BUN/Creatinine Ratio 12.0     Anion Gap 9.0 mmol/L     CBC & Differential [[id number]] Collected:  09/18/18 0600    Specimen:  Blood Updated:  09/18/18 0639    Narrative:       The following orders were created for panel order CBC & Differential.  Procedure                               Abnormality         Status                     ---------                               -----------         ------                     CBC Auto Differential[[id number]]        Abnormal            Final result                 Please view results for these tests on the individual orders.    CBC Auto Differential [[id number]]  (Abnormal)  Collected:  09/18/18 0600    Specimen:  Blood Updated:  09/18/18 0639     WBC 7.77 10*3/mm3      RBC 4.16 (L) 10*6/mm3      Hemoglobin 12.7 (L) g/dL      Hematocrit 37.4 (L) %      MCV 89.9 fL      MCH 30.5 pg      MCHC 34.0 g/dL      RDW 13.7 %      RDW-SD 44.7 (H) fl      MPV 10.0 fL      Platelets 231 10*3/mm3      Neutrophil % 58.3 %      Lymphocyte % 32.2 %      Monocyte % 6.4 %      Eosinophil % 2.2 %      Basophil % 0.5 %      Immature Grans % 0.4 %      Neutrophils, Absolute 4.53 10*3/mm3      Lymphocytes, Absolute 2.50 10*3/mm3      Monocytes, Absolute 0.50 10*3/mm3      Eosinophils, Absolute 0.17 10*3/mm3      Basophils, Absolute 0.04 10*3/mm3      Immature Grans, Absolute 0.03 (H) 10*3/mm3     POC Glucose Once [[id number]]  (Normal) Collected:  09/18/18 0326    Specimen:  Blood Updated:  09/18/18 0605     Glucose 79 mg/dL      Comment: : [id number] [employer] HEATHERMeter ID: [id number]       POC Glucose Once [[id number]]  (Normal) Collected:  09/18/18 0001    Specimen:  Blood Updated:  09/18/18 0022     Glucose 96 mg/dL      Comment: : [id number] [employer] HEATHERMeter ID: [id number]       POC Glucose Once [[id number]]  (Normal) Collected:  09/17/18 2054    Specimen:  Blood Updated:  09/17/18 2107     Glucose 76 mg/dL      Comment: : [id number] [employer] HEATHERMeter ID: [id number]       POC Glucose Once [[id number]]  (Normal) Collected:  09/17/18 1606    Specimen:  Blood Updated:  09/17/18 1638     Glucose 127 mg/dL      Comment: : [id number] [facility] ANGELITAMeter ID: [id number]       Troponin [[id number]]  (Normal) Collected:  09/17/18 1250    Specimen:  Blood Updated:  09/17/18 1328     Troponin I <0.012 ng/mL     POC Glucose Once [[id number]]  (Normal) Collected:  09/17/18 1202    Specimen:  Blood Updated:  09/17/18 1309     Glucose 83 mg/dL      Comment: : [phone] DIANELYS TROTTERSoutheastern [facility] Behavioral Health Servicester ID: [id number]             I reviewed the patient's new clinical  results.    Assessment/Plan:     Active Hospital Problems    Diagnosis Date Noted   • **Hypoglycemia [E16.2] 09/17/2018   • Diabetes type 2, controlled (CMS/[facility]) [E11.9] 09/29/2016   • CKD (chronic kidney disease) stage 3, GFR 30-59 ml/min [N18.3] 09/29/2016   • Hypertension [I10]        #. Dizziness. Resolved. Secondary to recurrent hypoglycemia. Sugar at goal today. Glyburide discontinued. Close PCP follow up.  #. Hypoglycemia. Secondary to glyburide. A1c's reviewed. Last 3 very near of below 8. Glyburide discontinued.  #. HTN. Lisinopril/HCTZ.  #. HLD. Statin  #. Depression. Paxil.    Will monitor patient's hospital course and adjust treatment as hospital course dictates.    DVT prophylaxis: SCDs  Code status is   Code Status and Medical Interventions:   Ordered at: 09/17/18 1456     Code Status:    CPR     Medical Interventions (Level of Support Prior to Arrest):    Full       Plan for disposition:Where: home and When:  0-1 days      Time:           This document has been electronically signed by Renaldo Hendrickson MD on September 18, 2018 11:29 AM

## 2018-09-18 NOTE — PLAN OF CARE
Problem: Patient Care Overview  Goal: Plan of Care Review  Outcome: Ongoing (interventions implemented as appropriate)   09/18/18 8522   Coping/Psychosocial   Plan of Care Reviewed With patient   Plan of Care Review   Progress improving   OTHER   Outcome Summary vss. blood sugar within normal range. pain controlled with PO percocet. continue to monitor.       Problem: Pain, Acute (Adult)  Goal: Identify Related Risk Factors and Signs and Symptoms  Outcome: Ongoing (interventions implemented as appropriate)    Goal: Acceptable Pain Control/Comfort Level  Outcome: Ongoing (interventions implemented as appropriate)      Problem: Diabetes, Type 2 (Adult)  Goal: Signs and Symptoms of Listed Potential Problems Will be Absent, Minimized or Managed (Diabetes, Type 2)  Outcome: Ongoing (interventions implemented as appropriate)

## 2018-09-19 LAB — GLUCOSE BLDC GLUCOMTR-MCNC: 64 MG/DL (ref 70–130)

## 2018-09-24 ENCOUNTER — OFFICE VISIT (OUTPATIENT)
Dept: FAMILY MEDICINE CLINIC | Facility: CLINIC | Age: 58
End: 2018-09-24

## 2018-09-24 VITALS
WEIGHT: 262 LBS | BODY MASS INDEX: 41.12 KG/M2 | OXYGEN SATURATION: 97 % | HEIGHT: 67 IN | DIASTOLIC BLOOD PRESSURE: 80 MMHG | SYSTOLIC BLOOD PRESSURE: 144 MMHG | HEART RATE: 104 BPM

## 2018-09-24 DIAGNOSIS — E11.8 CONTROLLED TYPE 2 DIABETES MELLITUS WITH COMPLICATION, WITHOUT LONG-TERM CURRENT USE OF INSULIN (HCC): Primary | ICD-10-CM

## 2018-09-24 DIAGNOSIS — K21.9 GASTROESOPHAGEAL REFLUX DISEASE WITHOUT ESOPHAGITIS: ICD-10-CM

## 2018-09-24 PROCEDURE — 99214 OFFICE O/P EST MOD 30 MIN: CPT | Performed by: FAMILY MEDICINE

## 2018-09-24 RX ORDER — SUCRALFATE 1 G/1
TABLET ORAL
Qty: 60 TABLET | Refills: 2 | Status: SHIPPED | OUTPATIENT
Start: 2018-09-24 | End: 2018-12-20 | Stop reason: SDUPTHER

## 2018-09-24 RX ORDER — LISINOPRIL 10 MG/1
10 TABLET ORAL DAILY
Qty: 30 TABLET | Refills: 2 | Status: SHIPPED | OUTPATIENT
Start: 2018-09-24 | End: 2018-11-08 | Stop reason: DRUGHIGH

## 2018-09-24 NOTE — PROGRESS NOTES
Subjective   Zaheer Baron is a 57 y.o. male.   Cc:follow up  History of Present Illness The patient comes in for Hospital Follow Up. He was having Hypoglycemia.His Glyburide was discontinued and he was started on Janumet.He says that he just feels off. He is nauseated.    The following portions of the patient's history were reviewed and updated as appropriate: allergies, current medications, past family history, past medical history, past social history, past surgical history and problem list.    Review of Systems   Constitutional: Positive for fatigue. Negative for fever.   Respiratory: Negative for cough, chest tightness and stridor.    Cardiovascular: Negative for chest pain, palpitations and leg swelling.   Gastrointestinal: Positive for nausea. Negative for abdominal pain, diarrhea and vomiting.       Objective   Physical Exam   Constitutional: He is oriented to person, place, and time. He appears well-developed and well-nourished.   HENT:   Head: Normocephalic and atraumatic.   Right Ear: External ear normal.   Left Ear: External ear normal.   Nose: Nose normal.   Mouth/Throat: Oropharynx is clear and moist.   Eyes: Pupils are equal, round, and reactive to light.   Neck: Normal range of motion.   Cardiovascular: Normal rate, regular rhythm and normal heart sounds.  Exam reveals no gallop and no friction rub.    No murmur heard.  Pulmonary/Chest: Effort normal and breath sounds normal. No respiratory distress. He has no wheezes. He has no rales.   Abdominal: Soft. Bowel sounds are normal.   Neurological: He is alert and oriented to person, place, and time.   Rhomberg is negative.   Vitals reviewed.      Assessment/Plan   Zaheer was seen today for follow-up and hospital follow up.    Diagnoses and all orders for this visit:    Controlled type 2 diabetes mellitus with complication, without long-term current use of insulin (CMS/[facility])    Gastroesophageal reflux disease without esophagitis    Other orders  -      lisinopril (PRINIVIL,ZESTRIL) 10 MG tablet; Take 1 tablet by mouth Daily.  -     sucralfate (CARAFATE) 1 g tablet; One tab dissolved in three ounces of water taken in the AM 30 minnutes after his AM meds and at noon.      Return in 2 weeks.

## 2018-11-08 ENCOUNTER — OFFICE VISIT (OUTPATIENT)
Dept: FAMILY MEDICINE CLINIC | Facility: CLINIC | Age: 58
End: 2018-11-08

## 2018-11-08 ENCOUNTER — HOSPITAL ENCOUNTER (OUTPATIENT)
Facility: HOSPITAL | Age: 58
Setting detail: OBSERVATION
Discharge: HOME | End: 2018-11-09
Attending: INTERNAL MEDICINE | Admitting: INTERNAL MEDICINE

## 2018-11-08 ENCOUNTER — APPOINTMENT (OUTPATIENT)
Dept: CT IMAGING | Facility: HOSPITAL | Age: 58
End: 2018-11-08

## 2018-11-08 VITALS
SYSTOLIC BLOOD PRESSURE: 132 MMHG | HEART RATE: 115 BPM | BODY MASS INDEX: 39.91 KG/M2 | OXYGEN SATURATION: 97 % | DIASTOLIC BLOOD PRESSURE: 64 MMHG | HEIGHT: 67 IN | WEIGHT: 254.25 LBS

## 2018-11-08 DIAGNOSIS — R51.9 ACUTE NONINTRACTABLE HEADACHE, UNSPECIFIED HEADACHE TYPE: Primary | ICD-10-CM

## 2018-11-08 DIAGNOSIS — E11.8 TYPE 2 DIABETES MELLITUS WITH COMPLICATION, UNSPECIFIED WHETHER LONG TERM INSULIN USE: ICD-10-CM

## 2018-11-08 DIAGNOSIS — R29.90 ABNORMAL NEUROLOGICAL EXAM: ICD-10-CM

## 2018-11-08 LAB
ALBUMIN SERPL-MCNC: 4.3 G/DL (ref 3.4–4.8)
ALBUMIN/GLOB SERPL: 1.3 G/DL (ref 1.1–1.8)
ALP SERPL-CCNC: 43 U/L (ref 38–126)
ALT SERPL W P-5'-P-CCNC: 35 U/L (ref 21–72)
ANION GAP SERPL CALCULATED.3IONS-SCNC: 13 MMOL/L (ref 5–15)
AST SERPL-CCNC: 31 U/L (ref 17–59)
BASOPHILS # BLD AUTO: 0.03 10*3/MM3 (ref 0–0.2)
BASOPHILS NFR BLD AUTO: 0.4 % (ref 0–2)
BILIRUB SERPL-MCNC: 0.4 MG/DL (ref 0.2–1.3)
BUN BLD-MCNC: 18 MG/DL (ref 7–21)
BUN/CREAT SERPL: 15.7 (ref 7–25)
CALCIUM SPEC-SCNC: 9.4 MG/DL (ref 8.4–10.2)
CHLORIDE SERPL-SCNC: 98 MMOL/L (ref 95–110)
CO2 SERPL-SCNC: 27 MMOL/L (ref 22–31)
CREAT BLD-MCNC: 1.15 MG/DL (ref 0.7–1.3)
DEPRECATED RDW RBC AUTO: 42.5 FL (ref 35.1–43.9)
EOSINOPHIL # BLD AUTO: 0.13 10*3/MM3 (ref 0–0.7)
EOSINOPHIL NFR BLD AUTO: 1.6 % (ref 0–7)
ERYTHROCYTE [DISTWIDTH] IN BLOOD BY AUTOMATED COUNT: 13.3 % (ref 11.5–14.5)
GFR SERPL CREATININE-BSD FRML MDRD: 65 ML/MIN/1.73 (ref 56–130)
GLOBULIN UR ELPH-MCNC: 3.4 GM/DL (ref 2.3–3.5)
GLUCOSE BLD-MCNC: 103 MG/DL (ref 60–100)
GLUCOSE BLDC GLUCOMTR-MCNC: 102 MG/DL (ref 70–130)
GLUCOSE BLDC GLUCOMTR-MCNC: 123 MG/DL (ref 70–130)
GLUCOSE BLDC GLUCOMTR-MCNC: 160 MG/DL (ref 70–130)
GLUCOSE BLDC GLUCOMTR-MCNC: 224 MG/DL (ref 70–130)
HCT VFR BLD AUTO: 39.5 % (ref 39–49)
HGB BLD-MCNC: 13.7 G/DL (ref 13.7–17.3)
IMM GRANULOCYTES # BLD: 0.02 10*3/MM3 (ref 0–0.02)
IMM GRANULOCYTES NFR BLD: 0.2 % (ref 0–0.5)
LYMPHOCYTES # BLD AUTO: 1.99 10*3/MM3 (ref 0.6–4.2)
LYMPHOCYTES NFR BLD AUTO: 23.9 % (ref 10–50)
MCH RBC QN AUTO: 30.2 PG (ref 26.5–34)
MCHC RBC AUTO-ENTMCNC: 34.7 G/DL (ref 31.5–36.3)
MCV RBC AUTO: 87 FL (ref 80–98)
MONOCYTES # BLD AUTO: 0.59 10*3/MM3 (ref 0–0.9)
MONOCYTES NFR BLD AUTO: 7.1 % (ref 0–12)
NEUTROPHILS # BLD AUTO: 5.57 10*3/MM3 (ref 2–8.6)
NEUTROPHILS NFR BLD AUTO: 66.8 % (ref 37–80)
PLATELET # BLD AUTO: 244 10*3/MM3 (ref 150–450)
PMV BLD AUTO: 10.4 FL (ref 8–12)
POTASSIUM BLD-SCNC: 3.9 MMOL/L (ref 3.5–5.1)
PROT SERPL-MCNC: 7.7 G/DL (ref 6.3–8.6)
RBC # BLD AUTO: 4.54 10*6/MM3 (ref 4.37–5.74)
SODIUM BLD-SCNC: 138 MMOL/L (ref 137–145)
T4 FREE SERPL-MCNC: 1.41 NG/DL (ref 0.78–2.19)
TSH SERPL DL<=0.05 MIU/L-ACNC: 0.34 MIU/ML (ref 0.46–4.68)
WBC NRBC COR # BLD: 8.33 10*3/MM3 (ref 3.2–9.8)

## 2018-11-08 PROCEDURE — G0378 HOSPITAL OBSERVATION PER HR: HCPCS

## 2018-11-08 PROCEDURE — 84443 ASSAY THYROID STIM HORMONE: CPT | Performed by: INTERNAL MEDICINE

## 2018-11-08 PROCEDURE — 82962 GLUCOSE BLOOD TEST: CPT | Performed by: FAMILY MEDICINE

## 2018-11-08 PROCEDURE — 85025 COMPLETE CBC W/AUTO DIFF WBC: CPT | Performed by: INTERNAL MEDICINE

## 2018-11-08 PROCEDURE — 25010000002 METOCLOPRAMIDE PER 10 MG: Performed by: INTERNAL MEDICINE

## 2018-11-08 PROCEDURE — 80053 COMPREHEN METABOLIC PANEL: CPT | Performed by: INTERNAL MEDICINE

## 2018-11-08 PROCEDURE — 84439 ASSAY OF FREE THYROXINE: CPT | Performed by: INTERNAL MEDICINE

## 2018-11-08 PROCEDURE — 96375 TX/PRO/DX INJ NEW DRUG ADDON: CPT

## 2018-11-08 PROCEDURE — 82962 GLUCOSE BLOOD TEST: CPT

## 2018-11-08 PROCEDURE — G0379 DIRECT REFER HOSPITAL OBSERV: HCPCS

## 2018-11-08 PROCEDURE — 25010000002 DIHYDROERGOTAMINE MESYLATE PER 1 MG: Performed by: INTERNAL MEDICINE

## 2018-11-08 PROCEDURE — 96374 THER/PROPH/DIAG INJ IV PUSH: CPT

## 2018-11-08 PROCEDURE — 99214 OFFICE O/P EST MOD 30 MIN: CPT | Performed by: FAMILY MEDICINE

## 2018-11-08 PROCEDURE — 70450 CT HEAD/BRAIN W/O DYE: CPT

## 2018-11-08 RX ORDER — SODIUM CHLORIDE 0.9 % (FLUSH) 0.9 %
3-10 SYRINGE (ML) INJECTION AS NEEDED
Status: DISCONTINUED | OUTPATIENT
Start: 2018-11-08 | End: 2018-11-09 | Stop reason: HOSPADM

## 2018-11-08 RX ORDER — NALOXONE HCL 0.4 MG/ML
0.4 VIAL (ML) INJECTION
Status: DISCONTINUED | OUTPATIENT
Start: 2018-11-08 | End: 2018-11-09 | Stop reason: HOSPADM

## 2018-11-08 RX ORDER — TRAMADOL HYDROCHLORIDE 50 MG/1
50 TABLET ORAL EVERY 6 HOURS PRN
Status: DISCONTINUED | OUTPATIENT
Start: 2018-11-08 | End: 2018-11-09 | Stop reason: HOSPADM

## 2018-11-08 RX ORDER — NICOTINE POLACRILEX 4 MG
15 LOZENGE BUCCAL
Status: DISCONTINUED | OUTPATIENT
Start: 2018-11-08 | End: 2018-11-09 | Stop reason: HOSPADM

## 2018-11-08 RX ORDER — SUCRALFATE 1 G/1
1 TABLET ORAL
Status: DISCONTINUED | OUTPATIENT
Start: 2018-11-08 | End: 2018-11-09 | Stop reason: HOSPADM

## 2018-11-08 RX ORDER — TERAZOSIN 5 MG/1
5 CAPSULE ORAL EVERY 12 HOURS SCHEDULED
Status: DISCONTINUED | OUTPATIENT
Start: 2018-11-08 | End: 2018-11-09 | Stop reason: HOSPADM

## 2018-11-08 RX ORDER — MORPHINE SULFATE 2 MG/ML
2 INJECTION, SOLUTION INTRAMUSCULAR; INTRAVENOUS EVERY 4 HOURS PRN
Status: DISCONTINUED | OUTPATIENT
Start: 2018-11-08 | End: 2018-11-09 | Stop reason: HOSPADM

## 2018-11-08 RX ORDER — DIHYDROERGOTAMINE MESYLATE 1 MG/ML
0.3 INJECTION, SOLUTION INTRAMUSCULAR; INTRAVENOUS; SUBCUTANEOUS EVERY 8 HOURS
Status: DISCONTINUED | OUTPATIENT
Start: 2018-11-08 | End: 2018-11-09 | Stop reason: HOSPADM

## 2018-11-08 RX ORDER — PAROXETINE HYDROCHLORIDE HEMIHYDRATE 25 MG/1
25 TABLET, FILM COATED, EXTENDED RELEASE ORAL EVERY MORNING
Status: DISCONTINUED | OUTPATIENT
Start: 2018-11-09 | End: 2018-11-09 | Stop reason: HOSPADM

## 2018-11-08 RX ORDER — BUTALBITAL, ACETAMINOPHEN AND CAFFEINE 50; 325; 40 MG/1; MG/1; MG/1
1 TABLET ORAL EVERY 4 HOURS PRN
Status: DISCONTINUED | OUTPATIENT
Start: 2018-11-08 | End: 2018-11-09 | Stop reason: HOSPADM

## 2018-11-08 RX ORDER — SODIUM CHLORIDE 0.9 % (FLUSH) 0.9 %
3 SYRINGE (ML) INJECTION EVERY 12 HOURS SCHEDULED
Status: DISCONTINUED | OUTPATIENT
Start: 2018-11-08 | End: 2018-11-09 | Stop reason: HOSPADM

## 2018-11-08 RX ORDER — DOXAZOSIN MESYLATE 4 MG/1
1 TABLET ORAL DAILY
COMMUNITY
Start: 2018-10-10 | End: 2019-04-18 | Stop reason: SDUPTHER

## 2018-11-08 RX ORDER — OXYCODONE AND ACETAMINOPHEN 7.5; 325 MG/1; MG/1
1 TABLET ORAL EVERY 4 HOURS PRN
Status: DISCONTINUED | OUTPATIENT
Start: 2018-11-08 | End: 2018-11-09 | Stop reason: HOSPADM

## 2018-11-08 RX ORDER — DEXTROSE MONOHYDRATE 25 G/50ML
25 INJECTION, SOLUTION INTRAVENOUS
Status: DISCONTINUED | OUTPATIENT
Start: 2018-11-08 | End: 2018-11-09 | Stop reason: HOSPADM

## 2018-11-08 RX ORDER — PANTOPRAZOLE SODIUM 40 MG/1
40 TABLET, DELAYED RELEASE ORAL EVERY MORNING
Status: DISCONTINUED | OUTPATIENT
Start: 2018-11-09 | End: 2018-11-09 | Stop reason: HOSPADM

## 2018-11-08 RX ORDER — TRAMADOL HYDROCHLORIDE 50 MG/1
50 TABLET ORAL ONCE
Status: COMPLETED | OUTPATIENT
Start: 2018-11-08 | End: 2018-11-08

## 2018-11-08 RX ORDER — DIHYDROERGOTAMINE MESYLATE 1 MG/ML
0.3 INJECTION, SOLUTION INTRAMUSCULAR; INTRAVENOUS; SUBCUTANEOUS EVERY 8 HOURS
Status: DISCONTINUED | OUTPATIENT
Start: 2018-11-08 | End: 2018-11-08

## 2018-11-08 RX ORDER — METOCLOPRAMIDE HYDROCHLORIDE 5 MG/ML
10 INJECTION INTRAMUSCULAR; INTRAVENOUS EVERY 8 HOURS
Status: DISCONTINUED | OUTPATIENT
Start: 2018-11-08 | End: 2018-11-08

## 2018-11-08 RX ORDER — LISINOPRIL AND HYDROCHLOROTHIAZIDE 12.5; 1 MG/1; MG/1
1 TABLET ORAL DAILY
COMMUNITY
Start: 2018-11-04 | End: 2019-04-18 | Stop reason: SDUPTHER

## 2018-11-08 RX ORDER — METOCLOPRAMIDE HYDROCHLORIDE 5 MG/ML
10 INJECTION INTRAMUSCULAR; INTRAVENOUS EVERY 8 HOURS
Status: DISCONTINUED | OUTPATIENT
Start: 2018-11-08 | End: 2018-11-09 | Stop reason: HOSPADM

## 2018-11-08 RX ORDER — ROSUVASTATIN CALCIUM 5 MG/1
5 TABLET, COATED ORAL DAILY
Status: DISCONTINUED | OUTPATIENT
Start: 2018-11-09 | End: 2018-11-09 | Stop reason: HOSPADM

## 2018-11-08 RX ADMIN — Medication 3 ML: at 20:52

## 2018-11-08 RX ADMIN — DIHYDROERGOTAMINE MESYLATE 0.3 MG: 1 INJECTION, SOLUTION INTRAMUSCULAR; INTRAVENOUS; SUBCUTANEOUS at 15:34

## 2018-11-08 RX ADMIN — METOCLOPRAMIDE 10 MG: 5 INJECTION, SOLUTION INTRAMUSCULAR; INTRAVENOUS at 15:23

## 2018-11-08 RX ADMIN — TERAZOSIN HYDROCHLORIDE ANHYDROUS 5 MG: 5 CAPSULE ORAL at 17:20

## 2018-11-08 RX ADMIN — OXYCODONE HYDROCHLORIDE AND ACETAMINOPHEN 1 TABLET: 7.5; 325 TABLET ORAL at 19:45

## 2018-11-08 RX ADMIN — SUCRALFATE 1 G: 1 TABLET ORAL at 17:20

## 2018-11-08 RX ADMIN — TRAMADOL HYDROCHLORIDE 50 MG: 50 TABLET, FILM COATED ORAL at 13:29

## 2018-11-08 RX ADMIN — BUTALBITAL, ACETAMINOPHEN, AND CAFFEINE 1 TABLET: 50; 325; 40 TABLET ORAL at 17:20

## 2018-11-08 NOTE — H&P
[facility]  INPATIENT HISTORY AND PHYSICAL       Patient Care Team:  Nasir Garcia MD as PCP - General (Family Medicine)  Angy Worley MD as Resident (Family Medicine)  Beto Mcclure MD as Resident (Family Medicine)  Cleo Lang MD (Family Medicine)    Chief complaint: Intractable headache, nausea    Subjective     Patient is a 57 y.o. 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  He presents with a two-week history of headache associated with nausea and unsteady gait.      The patient developed headache which was localized on the right side of his head associated with visual disturbance and bright lights in his visual field about 2 weeks ago. He denied diplopia subsequently developed sensation of lightheadedness and gait instability but denied focal numbness or weakness, tremors or seizures.  He had 2 episodes of vomiting intermittently.  He denies abdominal pains, loss of appetite, diarrhea or change in bowel habits.  He denied fever, chills, neck stiffness or neck pain.  He denied  Dysuria or hematuria.  He presented to his primary care physician's office today with above complaints and was subsequently sent in for evaluation.      Of note patient had a prior admission 2 months ago for dizziness that was associated with low blood sugar.  Patient's blood sugar was 228 mg/dl at his physician's office.    Review of Systems   Constitutional: Negative for appetite change, chills, fatigue and fever.   HENT: Negative for congestion and sore throat.    Eyes: Negative for pain, discharge and redness.   Respiratory: Negative for cough, chest tightness, shortness of breath and wheezing.    Cardiovascular:  Negative for chest pain, palpitations and leg swelling.   Gastrointestinal: Positive for nausea and vomiting. Negative for abdominal pain, constipation and diarrhea.   Genitourinary: Negative for dysuria and hematuria.   Musculoskeletal: Negative for arthralgias, joint swelling and neck pain.   Skin: Negative for color change and rash.   Neurological: Positive for dizziness, light-headedness and headaches. Negative for seizures, syncope, speech difficulty and numbness.   Hematological: Negative for adenopathy.   Psychiatric/Behavioral: Negative for agitation and confusion. The patient is not nervous/anxious.      History  Past Medical History:   Diagnosis Date   • Acute sinusitis    • Allergic rhinitis    • Arthropathy     of lumbar facet joint   • Artificial lens present     position - right   • Backache     chronic back problems   • Benign prostatic hyperplasia    • Bronchitis     perisistent   • Cataract    • Chronic obstructive lung disease (CMS/HCC)    • Diabetes mellitus (CMS/HCC)     no retinopathy   • Disorder of kidney     Disorder of kidney and/or ureter - Congenital solitary right kidney      • Dizziness     History of Meniere's like condition, left ear      • Drug therapy     long-term   • Dysfunction of eustachian tube    • Dyslipidemia    • Gout    • Headache     History of possible migraine/cluster      • Hearing loss    • Hypertension    • Hypokalemia    • Impacted cerumen    • Infestation by Sarcoptes scabiei alta hominis    • Inguinal hernia     History of, repaired      • Knee pain    • Osteoarthritis    • Pneumonia     in the past   • Posterior subcapsular polar senile cataract    • Sjogren's syndrome (CMS/HCC)    • Type 2 diabetes mellitus (CMS/HCC)      Past Surgical History:   Procedure Laterality Date   • BACK SURGERY      1/1/02   • CARPAL TUNNEL RELEASE      (Carpal tunnel release on the left)   09/10/2010 , Carpal tunnel release on the right)   12/03/2010    • CATARACT EXTRACTION, BILATERAL      • ENDOSCOPY N/A 3/16/2017    Procedure: ESOPHAGOGASTRODUODENOSCOPY;  Surgeon: Alli Shook DO;  Location: [facility] ENDOSCOPY;  Service:    • EYE SURGERY Bilateral     cataract removed   • HERNIA REPAIR     • INGUINAL HERNIA REPAIR      (Left inguinal hernioplasty with mesh.)   06/07/2007    • INJECTION OF MEDICATION      Injection for nerve block (Lumbar medial branch block.)   03/31/2016    • INJECTION OF MEDICATION  03/19/2015    solu-medrol    • KNEE SURGERY      (Lateral release, removal of loose bodies, excision of suprapatellar plica.)   04/29/1982    • OTHER SURGICAL HISTORY      Lumbar surgery (Lumbosacral radio frequency thermal coagulation. Lumbosacral spondylosis.)   06/30/2016 ,Lumbar surgery (Lumbosacral radio frequency thermal coagulation.)   12/21/2015     • OTHER SURGICAL HISTORY      Remove cataract, insert lens (Right eye.)   05/07/2015    • OTHER SURGICAL HISTORY      Remove hip/pelvis lesion (Melanoma, right hip.)   2005    • VASECTOMY       Family History   Problem Relation Age of Onset   • Lung disease Mother         autoimmune   • Heart failure Father 35   • Stomach cancer Sister 43        autoimmune   • Diabetes Paternal Grandmother      Social History   Substance Use Topics   • Smoking status: Never Smoker   • Smokeless tobacco: Never Used   • Alcohol use No     Prescriptions Prior to Admission   Medication Sig Dispense Refill Last Dose   • Coenzyme Q-10 200 MG capsule Take 1 tablet by mouth Every Night. 90 each 2 Taking   • doxazosin (CARDURA) 4 MG tablet Take 1 tablet by mouth Daily.   Taking   • Ergocalciferol 2000 UNITS tablet Take  by mouth.   Taking   • FLUTICASONE FUROATE IN Inhale.   Taking   • glucose blood test strip 1 each by Other route as needed. Use as instructed   Taking   • JANUMET  MG per tablet TAKE 1 TABLET BY MOUTH TWICE DAILY WITH MEALS 60 tablet 3 Taking   • lisinopril-hydrochlorothiazide (PRINZIDE,ZESTORETIC) 10-12.5 MG per tablet Take 1 tablet by mouth  Daily.   Taking   • omeprazole (priLOSEC) 40 MG capsule Take 1 capsule by mouth Daily. 90 capsule 2 Taking   • ondansetron (ZOFRAN) 4 MG tablet Take 1 tablet by mouth Every 8 (Eight) Hours As Needed for Nausea or Vomiting. 30 tablet 0 Taking   • ONETOUCH DELICA LANCETS 33G misc    Taking   • oxyCODONE-acetaminophen (PERCOCET) 7.5-325 MG per tablet Take 1 tablet by mouth Every 4 (Four) Hours As Needed.   Taking   • PARoxetine CR (PAXIL-CR) 25 MG 24 hr tablet Take 1 tablet by mouth Every Morning. 90 tablet 2 Taking   • rosuvastatin (CRESTOR) 5 MG tablet Take 1 tablet by mouth Daily. 30 tablet 5 Taking   • sucralfate (CARAFATE) 1 g tablet One tab dissolved in three ounces of water taken in the AM 30 minnutes after his AM meds and at noon. 60 tablet 2 Taking   • urea (COLT LO 40) 40 % cream Apply  topically.   Taking     Allergies:  Nsaids  Prior to Admission medications    Medication Sig Start Date End Date Taking? Authorizing Provider   Coenzyme Q-10 200 MG capsule Take 1 tablet by mouth Every Night. 1/10/17   Denis Sexton MD   doxazosin (CARDURA) 4 MG tablet Take 1 tablet by mouth Daily. 10/10/18   Soto De La Rosa MD   Ergocalciferol 2000 UNITS tablet Take  by mouth.    Soto De La Rosa MD   FLUTICASONE FUROATE IN Inhale.    Soto De La Rosa MD   glucose blood test strip 1 each by Other route as needed. Use as instructed    Soto De La Rosa MD   JANUMET  MG per tablet TAKE 1 TABLET BY MOUTH TWICE DAILY WITH MEALS 8/21/18   Nasir Garcia MD   lisinopril-hydrochlorothiazide (PRINZIDE,ZESTORETIC) 10-12.5 MG per tablet Take 1 tablet by mouth Daily. 11/4/18   Soto De La Rosa MD   omeprazole (priLOSEC) 40 MG capsule Take 1 capsule by mouth Daily. 8/9/18   Nasir Garcia MD   ondansetron (ZOFRAN) 4 MG tablet Take 1 tablet by mouth Every 8 (Eight) Hours As Needed for Nausea or Vomiting. 8/9/18   Nasir Garcia MD   ONETOUCH DELICA LANCETS 33G misc  6/17/16   Estrella  MD Soto   oxyCODONE-acetaminophen (PERCOCET) 7.5-325 MG per tablet Take 1 tablet by mouth Every 4 (Four) Hours As Needed.    Emergency, Nurse Epic, RN   PARoxetine CR (PAXIL-CR) 25 MG 24 hr tablet Take 1 tablet by mouth Every Morning. 8/9/18   Nasir Garcia MD   rosuvastatin (CRESTOR) 5 MG tablet Take 1 tablet by mouth Daily. 8/9/18   Nasir Garcia MD   sucralfate (CARAFATE) 1 g tablet One tab dissolved in three ounces of water taken in the AM 30 minnutes after his AM meds and at noon. 9/24/18   Nasir Garcia MD   urea (COLT LO 40) 40 % cream Apply  topically.    Provider, MD Soto   doxazosin (CARDURA) 8 MG tablet Take 1 tablet by mouth Every Night. 8/9/18 11/8/18  Nasir Garcia MD   lisinopril (PRINIVIL,ZESTRIL) 10 MG tablet Take 1 tablet by mouth Daily. 9/24/18 11/8/18  Nasir Garcia MD   metoclopramide (REGLAN) 5 MG tablet TAKE 1 TABLET BY MOUTH TWICE DAILY AS NEEDED 12/14/17 11/8/18  Ester Monge MD       Objective        Vital Signs  Temp:  [97.6 °F (36.4 °C)-97.8 °F (36.6 °C)] 97.6 °F (36.4 °C)  Heart Rate:  [] 75  Resp:  [16-18] 18  BP: (114-132)/(64-76) 132/73      Physical Exam   Constitutional: He is oriented to person, place, and time. He appears well-developed and well-nourished. No distress.   HENT:   Head: Normocephalic.   Mouth/Throat: Oropharynx is clear and moist.   Eyes: Pupils are equal, round, and reactive to light. Conjunctivae and EOM are normal.   Neck: Normal range of motion. Neck supple. No JVD present. No tracheal deviation present. No thyromegaly present.   Cardiovascular: Normal rate, regular rhythm, normal heart sounds and intact distal pulses.  Exam reveals no gallop and no friction rub.    No murmur heard.  Pulmonary/Chest: Effort normal and breath sounds normal. No stridor. No respiratory distress. He has no wheezes. He has no rales. He exhibits no tenderness.   Abdominal: Soft. Bowel sounds are normal. He exhibits no  distension and no mass. There is no tenderness. There is no rebound and no guarding. No hernia.   Musculoskeletal: Normal range of motion. He exhibits no edema, tenderness or deformity.   Lymphadenopathy:     He has no cervical adenopathy.   Neurological: He is alert and oriented to person, place, and time. He displays normal reflexes. No cranial nerve deficit or sensory deficit. He exhibits normal muscle tone. Coordination normal.   Negative Romberg's test.  Brisk reflexes in upper and lower limbs.  Normal sensation.  Brisk nystagmus lasting less than 2 seconds towards the right.   Skin: Skin is warm and dry. No rash noted. He is not diaphoretic. No erythema. No pallor.   Psychiatric: He has a normal mood and affect. His behavior is normal. Judgment and thought content normal.       Results Review:       Results from last 7 days  Lab Units 11/08/18  1343   SODIUM mmol/L 138   POTASSIUM mmol/L 3.9   CHLORIDE mmol/L 98   CO2 mmol/L 27.0   BUN mg/dL 18   CREATININE mg/dL 1.15   GLUCOSE mg/dL 103*   CALCIUM mg/dL 9.4   BILIRUBIN mg/dL 0.4   ALK PHOS U/L 43   ALT (SGPT) U/L 35   AST (SGOT) U/L 31               Results from last 7 days  Lab Units 11/08/18  1343   WBC 10*3/mm3 8.33   HEMOGLOBIN g/dL 13.7   HEMATOCRIT % 39.5   PLATELETS 10*3/mm3 244           Imaging Results (last 7 days)     Procedure Component Value Units Date/Time    CT Head Without Contrast [[id number]] Collected:  11/08/18 1306     Updated:  11/08/18 1325    Narrative:         PROCEDURE: CT head without contrast    REASON FOR EXAM: Dizziness  Headache, acute, severe, thunderclap, worst HA of life    This exam was performed according to our departmental  dose-optimization program, which includes automated exposure  control, adjustment of the mA and/or kV according to patient size  and/or use of iterative reconstruction technique.    FINDINGS: No comparison. Axial computer tomography sequential  imaging of the head was performed from the vertex to the  base of  the skull. .Sagittal and coronal reformation was performed .    The skull vault is intact. Paranasal sinuses and bilateral  mastoid air cells are well aerated. Cerebral and cerebellar  parenchymal are normal. Ventricular system and subarachnoid  spaces are normal.      Impression:       Normal CT of the head.    Electronically signed by:  Nicholas Corbett MD  11/8/2018 1:23 PM [facility]  Workstation: [id number]          Assessment / Plan       Hospital Problem List:  Active Problems:    Intractable headache    Plan  1.  Intractable headache probable migraine  - Patient has predominantly right-sided headache with visual disturbance and nausea and also has a remote history of migraine headaches  - Physical exam shows no focal lesion  - CT scan of his head showed no acute lesion or structural abnormality  - Will place patient in the hospital and managed with a dihydroergotamine subcutaneous injections  - Fioricet tablets Q 4 hrs prn for nausea  - IV metoclopramide when necessary for nausea or vomiting  - If no improvement will start sumatriptan   - Monitor for resolution of symptoms in the morning  - Send TSH and free T4    2.  Diabetes mellitus  - Accu-Cheks 3 times a day before meals and start NovoLog sliding scale  - Diabetic diet    3.  Hypertension  - Continue lisinopril 10 mg and hydrochlorothiazide 12.5 mg daily    4.  Depression  - Continue paroxetine    5.  Congenital solitary kidney with CKD stage III  - Monitor BEP and renal function closely    6.  Long-term opiate use for chronic back pain.  - Continue home medication of Percocet when necessary  DVT prophylaxis    CODE STATUS full code    I discussed the patient's findings and my recommendations with patient and his wife.    Olvin Arambula MD  11/08/18  7:33 PM        EMR Dragon/Transcription disclaimer:   Much of this encounter note is an electronic transcription/translation of spoken language to printed text. The electronic translation of spoken language  may permit erroneous, or at times, nonsensical words or phrases to be inadvertently transcribed; Although I have reviewed the note for such errors, some may still exist.

## 2018-11-08 NOTE — PLAN OF CARE
Problem: Patient Care Overview  Goal: Plan of Care Review  Outcome: Ongoing (interventions implemented as appropriate)   11/08/18 1046   Coping/Psychosocial   Plan of Care Reviewed With patient   Plan of Care Review   Progress no change   OTHER   Outcome Summary Patient just arrived to unit as a direct admit from Dr. Garcia's office. Will continue to monitor.       Problem: Diabetes, Type 2 (Adult)  Goal: Signs and Symptoms of Listed Potential Problems Will be Absent, Minimized or Managed (Diabetes, Type 2)  Outcome: Ongoing (interventions implemented as appropriate)      Problem: Fall Risk (Adult)  Goal: Identify Related Risk Factors and Signs and Symptoms  Outcome: Outcome(s) achieved Date Met: 11/08/18    Goal: Absence of Fall  Outcome: Ongoing (interventions implemented as appropriate)

## 2018-11-08 NOTE — PROGRESS NOTES
Subjective   Zaheer Baron is a 57 y.o. male.   Cc: headaches  History of Present Illness The patient comes in with c/o headaches. He also is having dizziness and lightheadedness.He also is nauseated.    The following portions of the patient's history were reviewed and updated as appropriate: allergies, current medications, past family history, past medical history, past social history, past surgical history and problem list.    Review of Systems   Constitutional: Negative for fatigue and fever.   Respiratory: Negative for cough, chest tightness and stridor.    Cardiovascular: Negative for chest pain, palpitations and leg swelling.   Gastrointestinal: Positive for nausea and vomiting.   Neurological: Positive for dizziness, light-headedness and headache.       Objective   Physical Exam   Constitutional: He appears well-developed and well-nourished.   HENT:   Head: Normocephalic and atraumatic.   Right Ear: External ear normal.   Left Ear: External ear normal.   Nose: Nose normal.   Mouth/Throat: Oropharynx is clear and moist.   Eyes: Pupils are equal, round, and reactive to light. EOM are normal.   There is Nystagmus present.   Cardiovascular: Normal rate, regular rhythm and normal heart sounds.  Exam reveals no gallop and no friction rub.    No murmur heard.  Pulmonary/Chest: Effort normal and breath sounds normal. No respiratory distress. He has no wheezes. He has no rales.   Abdominal: Soft. Bowel sounds are normal. He exhibits no distension. There is no tenderness.   Neurological: He is alert. No cranial nerve deficit.   Positive Rhomberg and there is some endpoint Nystagmus with extra Ocular movements.   Skin: Skin is warm and dry.   Vitals reviewed.        Assessment/Plan   Zaheer was seen today for follow-up, diabetes and hypertension.    Diagnoses and all orders for this visit:    Acute nonintractable headache, unspecified headache type    Abnormal neurological exam    Type 2 diabetes mellitus with  complication, unspecified whether long term insulin use (CMS/[facility])      FSBS was 224 mg%.  The patient will be placed in Observation for evaluation of the Nystagmus and positive Rhomberg.  Follos up after discharge as outlined by the Hospitalist Team.

## 2018-11-09 VITALS
HEART RATE: 104 BPM | TEMPERATURE: 96.2 F | HEIGHT: 69 IN | DIASTOLIC BLOOD PRESSURE: 91 MMHG | RESPIRATION RATE: 20 BRPM | SYSTOLIC BLOOD PRESSURE: 135 MMHG | WEIGHT: 254.1 LBS | BODY MASS INDEX: 37.64 KG/M2 | OXYGEN SATURATION: 96 %

## 2018-11-09 LAB
ANION GAP SERPL CALCULATED.3IONS-SCNC: 13 MMOL/L (ref 5–15)
BUN BLD-MCNC: 15 MG/DL (ref 7–21)
BUN/CREAT SERPL: 13.5 (ref 7–25)
CALCIUM SPEC-SCNC: 9.7 MG/DL (ref 8.4–10.2)
CHLORIDE SERPL-SCNC: 98 MMOL/L (ref 95–110)
CO2 SERPL-SCNC: 28 MMOL/L (ref 22–31)
CREAT BLD-MCNC: 1.11 MG/DL (ref 0.7–1.3)
DEPRECATED RDW RBC AUTO: 42.7 FL (ref 35.1–43.9)
EOSINOPHIL # BLD MANUAL: 0.17 10*3/MM3 (ref 0–0.7)
EOSINOPHIL NFR BLD MANUAL: 2 % (ref 0–7)
ERYTHROCYTE [DISTWIDTH] IN BLOOD BY AUTOMATED COUNT: 13.4 % (ref 11.5–14.5)
GFR SERPL CREATININE-BSD FRML MDRD: 68 ML/MIN/1.73 (ref 56–130)
GLUCOSE BLD-MCNC: 116 MG/DL (ref 60–100)
GLUCOSE BLDC GLUCOMTR-MCNC: 125 MG/DL (ref 70–130)
GLUCOSE BLDC GLUCOMTR-MCNC: 139 MG/DL (ref 70–130)
HCT VFR BLD AUTO: 43.3 % (ref 39–49)
HGB BLD-MCNC: 14.7 G/DL (ref 13.7–17.3)
LYMPHOCYTES # BLD MANUAL: 2.09 10*3/MM3 (ref 0.6–4.2)
LYMPHOCYTES NFR BLD MANUAL: 24 % (ref 10–50)
MCH RBC QN AUTO: 29.7 PG (ref 26.5–34)
MCHC RBC AUTO-ENTMCNC: 33.9 G/DL (ref 31.5–36.3)
MCV RBC AUTO: 87.5 FL (ref 80–98)
NEUTROPHILS # BLD AUTO: 6.19 10*3/MM3 (ref 2–8.6)
NEUTROPHILS NFR BLD MANUAL: 71 % (ref 37–80)
PLAT MORPH BLD: NORMAL
PLATELET # BLD AUTO: 256 10*3/MM3 (ref 150–450)
PMV BLD AUTO: 10.7 FL (ref 8–12)
POTASSIUM BLD-SCNC: 4 MMOL/L (ref 3.5–5.1)
RBC # BLD AUTO: 4.95 10*6/MM3 (ref 4.37–5.74)
RBC MORPH BLD: NORMAL
SODIUM BLD-SCNC: 139 MMOL/L (ref 137–145)
VARIANT LYMPHS NFR BLD MANUAL: 3 % (ref 0–5)
WBC MORPH BLD: NORMAL
WBC NRBC COR # BLD: 8.72 10*3/MM3 (ref 3.2–9.8)

## 2018-11-09 PROCEDURE — 82962 GLUCOSE BLOOD TEST: CPT

## 2018-11-09 PROCEDURE — 80048 BASIC METABOLIC PNL TOTAL CA: CPT | Performed by: INTERNAL MEDICINE

## 2018-11-09 PROCEDURE — 25010000002 DIHYDROERGOTAMINE MESYLATE PER 1 MG: Performed by: INTERNAL MEDICINE

## 2018-11-09 PROCEDURE — 85007 BL SMEAR W/DIFF WBC COUNT: CPT | Performed by: INTERNAL MEDICINE

## 2018-11-09 PROCEDURE — 85025 COMPLETE CBC W/AUTO DIFF WBC: CPT | Performed by: INTERNAL MEDICINE

## 2018-11-09 PROCEDURE — 96376 TX/PRO/DX INJ SAME DRUG ADON: CPT

## 2018-11-09 PROCEDURE — 25010000002 METOCLOPRAMIDE PER 10 MG: Performed by: INTERNAL MEDICINE

## 2018-11-09 PROCEDURE — G0378 HOSPITAL OBSERVATION PER HR: HCPCS

## 2018-11-09 RX ORDER — SUMATRIPTAN 50 MG/1
TABLET, FILM COATED ORAL
Qty: 10 TABLET | Refills: 0 | Status: SHIPPED | OUTPATIENT
Start: 2018-11-09 | End: 2018-11-16 | Stop reason: SDUPTHER

## 2018-11-09 RX ADMIN — PAROXETINE HYDROCHLORIDE 25 MG: 25 TABLET, FILM COATED, EXTENDED RELEASE ORAL at 06:09

## 2018-11-09 RX ADMIN — LISINOPRIL: 10 TABLET ORAL at 09:24

## 2018-11-09 RX ADMIN — ROSUVASTATIN CALCIUM 5 MG: 5 TABLET, FILM COATED ORAL at 09:24

## 2018-11-09 RX ADMIN — PANTOPRAZOLE SODIUM 40 MG: 40 TABLET, DELAYED RELEASE ORAL at 06:09

## 2018-11-09 RX ADMIN — SUCRALFATE 1 G: 1 TABLET ORAL at 09:24

## 2018-11-09 RX ADMIN — TERAZOSIN HYDROCHLORIDE ANHYDROUS 5 MG: 5 CAPSULE ORAL at 09:24

## 2018-11-09 RX ADMIN — METOCLOPRAMIDE 10 MG: 5 INJECTION, SOLUTION INTRAMUSCULAR; INTRAVENOUS at 03:52

## 2018-11-09 RX ADMIN — DIHYDROERGOTAMINE MESYLATE 0.3 MG: 1 INJECTION, SOLUTION INTRAMUSCULAR; INTRAVENOUS; SUBCUTANEOUS at 04:10

## 2018-11-09 NOTE — DISCHARGE SUMMARY
[address] [address]  T - [phone]     DISCHARGE SUMMARY         PATIENT  DEMOGRAPHICS   PATIENT NAME: Zaheer Baron                      PHYSICIAN: Denis Sexton MD  : 1960  MRN: [id number]    ADMISSION/DISCHARGE INFO   ADMISSION DATE: 2018   DISCHARGE DATE: 18    ADMISSION DIAGNOSES: Intractable headache [R51]  DISCHARGE DIAGNOSES:       Intractable headache      SERVICE:  Medicine       CONSULTS   Consult Orders (all)     None          PROCEDURES     Imaging Results (last 24 hours)     Procedure Component Value Units Date/Time    CT Head Without Contrast [[id number]] Collected:  18 1306     Updated:  18 1325    Narrative:         PROCEDURE: CT head without contrast    REASON FOR EXAM: Dizziness  Headache, acute, severe, thunderclap, worst HA of life    This exam was performed according to our departmental  dose-optimization program, which includes automated exposure  control, adjustment of the mA and/or kV according to patient size  and/or use of iterative reconstruction technique.    FINDINGS: No comparison. Axial computer tomography sequential  imaging of the head was performed from the vertex to the base of  the skull. .Sagittal and coronal reformation was performed .    The skull vault is intact. Paranasal sinuses and bilateral  mastoid air cells are well aerated. Cerebral and cerebellar  parenchymal are normal. Ventricular system and subarachnoid  spaces are normal.      Impression:       Normal CT of the head.    Electronically signed by:  Nicholas Corbett MD  2018 1:23 PM CST  Workstation: [id number]          Ct Head Without Contrast    Result Date: 2018  Narrative: PROCEDURE: CT head without contrast REASON FOR EXAM: Dizziness Headache, acute, severe, thunderclap, worst HA of life This exam was performed according to our departmental dose-optimization program, which includes automated exposure control,  adjustment of the mA and/or kV according to patient size and/or use of iterative reconstruction technique. FINDINGS: No comparison. Axial computer tomography sequential imaging of the head was performed from the vertex to the base of the skull. .Sagittal and coronal reformation was performed . The skull vault is intact. Paranasal sinuses and bilateral mastoid air cells are well aerated. Cerebral and cerebellar parenchymal are normal. Ventricular system and subarachnoid spaces are normal.     Impression: Normal CT of the head. Electronically signed by:  Nicholas Corbett MD  11/8/2018 1:23 PM [facility] Workstation: [id number]      HISTORY OF PRESENT ILLNESS   From H and P     Patient is a 57 y.o. 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  He presents with a two-week history of headache associated with nausea and unsteady gait.       The patient developed headache which was localized on the right side of his head associated with visual disturbance and bright lights in his visual field about 2 weeks ago. He denied diplopia subsequently developed sensation of lightheadedness and gait instability but denied focal numbness or weakness, tremors or seizures.  He had 2 episodes of vomiting intermittently.  He denies abdominal pains, loss of appetite, diarrhea or change in bowel habits.  He denied fever, chills, neck stiffness or neck pain.  He denied  Dysuria or hematuria.  He presented to his primary care physician's office today with above complaints and was subsequently sent in for evaluation.       Of note patient had a prior admission 2 months ago for dizziness that was associated with low blood sugar.  Patient's blood sugar was 228 mg/dl at his physician's office.    DIAGNOSTIC DATA   Labs   Images      HOSPITAL COURSE   Patient was admitted to the medical floor, he had a CT scan of the head which was negative.  He did not appear to have any focal neurological deficit.  Patient was treated for migraine headache which improved the next day.  Patient state that he is headache free.  State that he has had history of migraine 20-25 years ago was given Imitrex at that time.  At this time patient is stable denies any complain, his vital signs are stable his neurological exam is completely normal.  Patient will go home follow-up with PCP.  I will give him some Imitrex for abortive migraine treatment.  He will need to follow-up with his PCP for long-term treatment for migraine. VSS    Physical Exam   Constitutional: He is oriented to person, place, and time. He appears well-developed and well-nourished.   HENT:   Head: Normocephalic and atraumatic.   Eyes: Pupils are equal, round, and reactive to light. EOM are normal.   Neck: Normal range of motion.   Cardiovascular: Normal rate, regular rhythm and normal heart sounds.    Pulmonary/Chest: Effort normal and breath sounds normal.   Abdominal: Soft. Bowel sounds are normal.   Musculoskeletal: Normal range of motion.   Neurological: He is alert and oriented to person, place, and time.   Skin: Skin is warm. Capillary refill takes less than 2 seconds.   Psychiatric: He has a normal mood and affect. His behavior is normal.   Vitals reviewed.         DISCHARGE CONDITION   Stable    DISPOSITION   To Home      DISCHARGE MEDICATIONS        Discharge Medications      Continue These Medications      Instructions Start Date   Coenzyme Q-10 200 MG capsule   1 tablet, Oral, Nightly      doxazosin 4 MG tablet  Commonly known as:  CARDURA   1 tablet, Oral, Daily      Ergocalciferol 2000 units tablet   Oral      FLUTICASONE FUROATE IN   Inhalation      glucose blood test strip   1 each, Other, As Needed, Use as instructed       JANUMET  MG per tablet  Generic drug:   sitaGLIPtin-metFORMIN   TAKE 1 TABLET BY MOUTH TWICE DAILY WITH MEALS      lisinopril-hydrochlorothiazide 10-12.5 MG per tablet  Commonly known as:  PRINZIDE,ZESTORETIC   1 tablet, Oral, Daily      omeprazole 40 MG capsule  Commonly known as:  priLOSEC   40 mg, Oral, Daily      ondansetron 4 MG tablet  Commonly known as:  ZOFRAN   4 mg, Oral, Every 8 Hours PRN      ONETOUCH DELICA LANCETS 33G misc   No dose, route, or frequency recorded.      oxyCODONE-acetaminophen 7.5-325 MG per tablet  Commonly known as:  PERCOCET   1 tablet, Oral, Every 4 Hours PRN      PARoxetine CR 25 MG 24 hr tablet  Commonly known as:  PAXIL-CR   25 mg, Oral, Every Morning      COLT LO 40 40 % cream  Generic drug:  urea   Topical      rosuvastatin 5 MG tablet  Commonly known as:  CRESTOR   5 mg, Oral, Daily      sucralfate 1 g tablet  Commonly known as:  CARAFATE   One tab dissolved in three ounces of water taken in the AM 30 minnutes after his AM meds and at noon.               INSTRUCTIONS   Activity:   Activity Instructions     Discharge Activity       As tolerated          Diet:   Diet Instructions     Diet: Cardiac, Renal       Discharge Diet:   Cardiac  Renal             Special Instructions: Patient instructed to call M.D. or return to ED with worsening shortness of breath, chest pain, fever greater than 100.4°F or any other medical concerns.    FOLLOW UP   Follow-up Information     Nasir Garcia MD .    Specialties:  Family Medicine, Emergency Medicine  Contact information:  [address] [place] [address] [phone]                  PENDING TEST RESULTS AT DISCHARGE      TIME   Time: 15 minutes were spent planning this discharge.                      This document has been electronically signed by Denis Sexton MD on November 9, 2018 9:47 AM

## 2018-11-10 ENCOUNTER — READMISSION MANAGEMENT (OUTPATIENT)
Dept: CALL CENTER | Facility: HOSPITAL | Age: 58
End: 2018-11-10

## 2018-11-10 NOTE — OUTREACH NOTE
Prep Survey      Responses   Facility patient discharged from?  [facility]   Is patient eligible?  Yes   Discharge diagnosis  intractable headache   Does the patient have one of the following disease processes/diagnoses(primary or secondary)?  Other   Does the patient have Home health ordered?  No   Is there a DME ordered?  No   Prep survey completed?  Yes          Maria Fernanda Kelly RN

## 2018-11-12 ENCOUNTER — READMISSION MANAGEMENT (OUTPATIENT)
Dept: CALL CENTER | Facility: HOSPITAL | Age: 58
End: 2018-11-12

## 2018-11-12 NOTE — OUTREACH NOTE
Medical Week 1 Survey      Responses   Facility patient discharged from?  [facility]   Does the patient have one of the following disease processes/diagnoses(primary or secondary)?  Other   Is there a successful TCM telephone encounter documented?  No   Week 1 attempt successful?  Yes   Call start time  1522   Call end time  1525   Discharge diagnosis  intractable headache   Is patient permission given to speak with other caregiver?  Yes   List who call center can speak with  wife, Denise Lares reviewed with patient/caregiver?  Yes   Is the patient having any side effects they believe may be caused by any medication additions or changes?  No   Does the patient have all medications ordered at discharge?  Yes   Is the patient taking all medications as directed (includes completed medication regime)?  Yes   Does the patient have a primary care provider?   Yes   Does the patient have an appointment with their PCP within 7 days of discharge?  Yes   Has the patient kept scheduled appointments due by today?  Yes   Psychosocial issues?  No   Did the patient receive a copy of their discharge instructions?  Yes   Nursing interventions  Reviewed instructions with patient, Educated on MyChart   What is the patient's perception of their health status since discharge?  Improving   Is the patient/caregiver able to teach back signs and symptoms related to disease process for when to call PCP?  Yes   Is the patient/caregiver able to teach back signs and symptoms related to disease process for when to call 911?  Yes   Is the patient/caregiver able to teach back the hierarchy of who to call/visit for symptoms/problems? PCP, Specialist, Home health nurse, Urgent Care, ED, 911  Yes   Week 1 call completed?  Yes          Batool Gonsalez RN

## 2018-11-16 ENCOUNTER — OFFICE VISIT (OUTPATIENT)
Dept: FAMILY MEDICINE CLINIC | Facility: CLINIC | Age: 58
End: 2018-11-16

## 2018-11-16 VITALS
DIASTOLIC BLOOD PRESSURE: 80 MMHG | BODY MASS INDEX: 38.23 KG/M2 | OXYGEN SATURATION: 94 % | WEIGHT: 258.13 LBS | HEIGHT: 69 IN | HEART RATE: 111 BPM | SYSTOLIC BLOOD PRESSURE: 140 MMHG

## 2018-11-16 DIAGNOSIS — R51.9 NONINTRACTABLE HEADACHE, UNSPECIFIED CHRONICITY PATTERN, UNSPECIFIED HEADACHE TYPE: ICD-10-CM

## 2018-11-16 DIAGNOSIS — M10.9 ACUTE GOUT INVOLVING TOE OF LEFT FOOT, UNSPECIFIED CAUSE: Primary | ICD-10-CM

## 2018-11-16 DIAGNOSIS — I10 ESSENTIAL HYPERTENSION: ICD-10-CM

## 2018-11-16 PROCEDURE — 99214 OFFICE O/P EST MOD 30 MIN: CPT | Performed by: FAMILY MEDICINE

## 2018-11-16 PROCEDURE — 96372 THER/PROPH/DIAG INJ SC/IM: CPT | Performed by: FAMILY MEDICINE

## 2018-11-16 RX ORDER — TRIAMCINOLONE ACETONIDE 40 MG/ML
80 INJECTION, SUSPENSION INTRA-ARTICULAR; INTRAMUSCULAR ONCE
Status: COMPLETED | OUTPATIENT
Start: 2018-11-16 | End: 2018-11-16

## 2018-11-16 RX ORDER — SUMATRIPTAN 50 MG/1
TABLET, FILM COATED ORAL
Qty: 10 TABLET | Refills: 2 | Status: SHIPPED | OUTPATIENT
Start: 2018-11-16 | End: 2019-04-18 | Stop reason: SDUPTHER

## 2018-11-16 RX ADMIN — TRIAMCINOLONE ACETONIDE 80 MG: 40 INJECTION, SUSPENSION INTRA-ARTICULAR; INTRAMUSCULAR at 12:04

## 2018-11-16 NOTE — PROGRESS NOTES
Subjective   Zaheer Baron is a 58 y.o. male.   Cc: headache follow up  History of Present Illness The patient comes in for follow up of his headaches. He has been in the hospital and was treated for a severe headache. He also a sore left great toe. He says he feels it is a flare up of his gout.    The following portions of the patient's history were reviewed and updated as appropriate: allergies, current medications, past family history, past medical history, past social history, past surgical history and problem list.    Review of Systems   Constitutional: Negative for fatigue and fever.   Respiratory: Negative for cough, chest tightness and stridor.    Cardiovascular: Negative for chest pain, palpitations and leg swelling.       Objective   Physical Exam   Constitutional: He appears well-developed and well-nourished.   HENT:   Head: Normocephalic and atraumatic.   Right Ear: External ear normal.   Left Ear: External ear normal.   Nose: Nose normal.   Mouth/Throat: Oropharynx is clear and moist.   Eyes: Pupils are equal, round, and reactive to light.   Neck: Normal range of motion.   Cardiovascular: Normal rate, regular rhythm and normal heart sounds. Exam reveals no gallop and no friction rub.   No murmur heard.  Pulmonary/Chest: Effort normal and breath sounds normal. No stridor. No respiratory distress. He has no wheezes.   Abdominal: Soft. Bowel sounds are normal. He exhibits no distension. There is no tenderness.   Musculoskeletal:   The left freat toe at the MP joint is tender.   Skin: Skin is warm and dry.   Vitals reviewed.        Assessment/Plan   Zaheer was seen today for follow-up.    Diagnoses and all orders for this visit:    Acute gout involving toe of left foot, unspecified cause  -     triamcinolone acetonide (KENALOG-40) injection 80 mg; Inject 2 mL into the appropriate muscle as directed by prescriber 1 (One) Time.    Nonintractable headache, unspecified chronicity pattern, unspecified  headache type    Essential hypertension    Other orders  -     SUMAtriptan (IMITREX) 50 MG tablet; Take one tablet at onset of headache. May repeat dose one time in 2 hours if headache not relieved.      Return to the clinic in 1 month/s.  Will contact with results as needed.      Follow up in one month.  Will see if he needs preventive medication for his headaches.

## 2018-11-19 ENCOUNTER — READMISSION MANAGEMENT (OUTPATIENT)
Dept: CALL CENTER | Facility: HOSPITAL | Age: 58
End: 2018-11-19

## 2018-11-19 NOTE — OUTREACH NOTE
Medical Week 2 Survey      Responses   Facility patient discharged from?  [facility]   Does the patient have one of the following disease processes/diagnoses(primary or secondary)?  Other   Week 2 attempt successful?  Yes   Call start time  1427   Discharge diagnosis  intractable headache   Call end time  1430   Is patient permission given to speak with other caregiver?  Yes   List who call center can speak with  wife, Denise   Person spoke with today (if not patient) and relationship  Denise   Meds reviewed with patient/caregiver?  Yes   Is the patient having any side effects they believe may be caused by any medication additions or changes?  No   Does the patient have all medications ordered at discharge?  Yes   Is the patient taking all medications as directed (includes completed medication regime)?  Yes   Does the patient have a primary care provider?   Yes   Does the patient have an appointment with their PCP within 7 days of discharge?  Yes   Comments regarding PCP  Dr. Garcia   Has the patient kept scheduled appointments due by today?  Yes   Has home health visited the patient within 72 hours of discharge?  N/A   Psychosocial issues?  No   Did the patient receive a copy of their discharge instructions?  Yes   Nursing interventions  Reviewed instructions with patient, Educated on MyChart   What is the patient's perception of their health status since discharge?  Improving   Is the patient/caregiver able to teach back signs and symptoms related to disease process for when to call PCP?  Yes   Is the patient/caregiver able to teach back signs and symptoms related to disease process for when to call 911?  Yes   Is the patient/caregiver able to teach back the hierarchy of who to call/visit for symptoms/problems? PCP, Specialist, Home health nurse, Urgent Care, ED, 911  Yes   If the patient is a current smoker, are they able to teach back resources for cessation?  -- [non-smoker]   Week 2 Call Completed?  Yes    Graduated  Yes   Did the patient feel the follow up calls were helpful during their recovery period?  Yes   Was the number of calls appropriate?  Yes          Mayte Crawley RN

## 2018-12-20 RX ORDER — LISINOPRIL 10 MG/1
10 TABLET ORAL DAILY
Qty: 30 TABLET | Refills: 0 | OUTPATIENT
Start: 2018-12-20

## 2018-12-20 RX ORDER — SUCRALFATE 1 G/1
TABLET ORAL
Qty: 60 TABLET | Refills: 2 | Status: SHIPPED | OUTPATIENT
Start: 2018-12-20 | End: 2019-03-11 | Stop reason: SDUPTHER

## 2019-01-28 RX ORDER — LISINOPRIL AND HYDROCHLOROTHIAZIDE 12.5; 1 MG/1; MG/1
1 TABLET ORAL DAILY
Qty: 90 TABLET | Refills: 1 | Status: SHIPPED | OUTPATIENT
Start: 2019-01-28 | End: 2019-07-12 | Stop reason: SDUPTHER

## 2019-01-28 RX ORDER — ROSUVASTATIN CALCIUM 5 MG/1
5 TABLET, COATED ORAL DAILY
Qty: 30 TABLET | Refills: 5 | Status: SHIPPED | OUTPATIENT
Start: 2019-01-28 | End: 2019-04-18

## 2019-02-04 RX ORDER — SITAGLIPTIN AND METFORMIN HYDROCHLORIDE 500; 50 MG/1; MG/1
TABLET, FILM COATED ORAL
Qty: 60 TABLET | Refills: 0 | Status: SHIPPED | OUTPATIENT
Start: 2019-02-04 | End: 2019-04-18 | Stop reason: SDUPTHER

## 2019-02-04 NOTE — TELEPHONE ENCOUNTER
Dr. Garcia,    Bill Baron is requesting a refill on his Glyburide 5 mg Take 1 tab BID.      His Med List indicated this was DC 09/18/18.  But his Pharmacy refill indicates that he just got it refilled on 1/27/19.  It looks like he is also on Janumet 50/500 mg that was prescribed 01/04/19.    Please Advise.    Thank you

## 2019-02-05 RX ORDER — DOXAZOSIN 8 MG/1
4 TABLET ORAL NIGHTLY
Qty: 45 TABLET | Refills: 1 | Status: SHIPPED | OUTPATIENT
Start: 2019-02-05 | End: 2019-04-18 | Stop reason: DRUGHIGH

## 2019-02-05 RX ORDER — GLYBURIDE 5 MG/1
TABLET ORAL
Qty: 180 TABLET | Refills: 0 | Status: SHIPPED | OUTPATIENT
Start: 2019-02-05 | End: 2019-04-18

## 2019-03-04 RX ORDER — SITAGLIPTIN AND METFORMIN HYDROCHLORIDE 500; 50 MG/1; MG/1
TABLET, FILM COATED ORAL
Qty: 60 TABLET | Refills: 0 | OUTPATIENT
Start: 2019-03-04

## 2019-03-11 RX ORDER — SUCRALFATE 1 G/1
TABLET ORAL
Qty: 60 TABLET | Refills: 0 | Status: SHIPPED | OUTPATIENT
Start: 2019-03-11 | End: 2019-03-11 | Stop reason: SDUPTHER

## 2019-03-11 RX ORDER — SUCRALFATE 1 G/1
TABLET ORAL
Qty: 180 TABLET | Refills: 0 | Status: SHIPPED | OUTPATIENT
Start: 2019-03-11 | End: 2019-07-04 | Stop reason: SDUPTHER

## 2019-04-08 RX ORDER — SUCRALFATE 1 G/1
TABLET ORAL
Qty: 60 TABLET | Refills: 0 | Status: SHIPPED | OUTPATIENT
Start: 2019-04-08 | End: 2019-04-18 | Stop reason: SDUPTHER

## 2019-04-16 ENCOUNTER — APPOINTMENT (OUTPATIENT)
Dept: LAB | Facility: HOSPITAL | Age: 59
End: 2019-04-16

## 2019-04-16 ENCOUNTER — TRANSCRIBE ORDERS (OUTPATIENT)
Dept: LAB | Facility: HOSPITAL | Age: 59
End: 2019-04-16

## 2019-04-16 DIAGNOSIS — N18.30 CHRONIC KIDNEY DISEASE, STAGE III (MODERATE) (HCC): ICD-10-CM

## 2019-04-16 DIAGNOSIS — I10 ESSENTIAL (PRIMARY) HYPERTENSION: ICD-10-CM

## 2019-04-16 DIAGNOSIS — E87.6 HYPOKALEMIA: ICD-10-CM

## 2019-04-16 DIAGNOSIS — Z79.1 ENCOUNTER FOR LONG-TERM (CURRENT) USE OF NON-STEROIDAL ANTI-INFLAMMATORIES: Primary | ICD-10-CM

## 2019-04-16 DIAGNOSIS — N17.9 ACUTE RENAL FAILURE, UNSPECIFIED ACUTE RENAL FAILURE TYPE (HCC): ICD-10-CM

## 2019-04-16 PROCEDURE — 80069 RENAL FUNCTION PANEL: CPT | Performed by: INTERNAL MEDICINE

## 2019-04-16 PROCEDURE — 82570 ASSAY OF URINE CREATININE: CPT | Performed by: INTERNAL MEDICINE

## 2019-04-16 PROCEDURE — 36415 COLL VENOUS BLD VENIPUNCTURE: CPT | Performed by: INTERNAL MEDICINE

## 2019-04-16 PROCEDURE — 83970 ASSAY OF PARATHORMONE: CPT | Performed by: INTERNAL MEDICINE

## 2019-04-16 PROCEDURE — 82306 VITAMIN D 25 HYDROXY: CPT | Performed by: INTERNAL MEDICINE

## 2019-04-16 PROCEDURE — 84156 ASSAY OF PROTEIN URINE: CPT | Performed by: INTERNAL MEDICINE

## 2019-04-17 LAB
25(OH)D3 SERPL-MCNC: 39.4 NG/ML (ref 30–100)
ALBUMIN SERPL-MCNC: 4.6 G/DL (ref 3.5–5.2)
ANION GAP SERPL CALCULATED.3IONS-SCNC: 20.6 MMOL/L
BUN BLD-MCNC: 12 MG/DL (ref 6–20)
BUN/CREAT SERPL: 10.5 (ref 7–25)
CALCIUM SPEC-SCNC: 10.3 MG/DL (ref 8.6–10.5)
CHLORIDE SERPL-SCNC: 93 MMOL/L (ref 98–107)
CO2 SERPL-SCNC: 25.4 MMOL/L (ref 22–29)
CREAT BLD-MCNC: 1.14 MG/DL (ref 0.76–1.27)
CREAT UR-MCNC: 209.6 MG/DL
GFR SERPL CREATININE-BSD FRML MDRD: 66 ML/MIN/1.73
GLUCOSE BLD-MCNC: 221 MG/DL (ref 65–99)
PHOSPHATE SERPL-MCNC: 3 MG/DL (ref 2.5–4.5)
POTASSIUM BLD-SCNC: 3.6 MMOL/L (ref 3.5–5.2)
PROT UR-MCNC: 64 MG/DL
PROT/CREAT UR: 305.3 MG/G CREA (ref 0–200)
PTH-INTACT SERPL-MCNC: 36.3 PG/ML (ref 15–65)
SODIUM BLD-SCNC: 139 MMOL/L (ref 136–145)

## 2019-04-18 ENCOUNTER — OFFICE VISIT (OUTPATIENT)
Dept: FAMILY MEDICINE CLINIC | Facility: CLINIC | Age: 59
End: 2019-04-18

## 2019-04-18 VITALS
OXYGEN SATURATION: 98 % | HEART RATE: 117 BPM | BODY MASS INDEX: 37.09 KG/M2 | SYSTOLIC BLOOD PRESSURE: 128 MMHG | WEIGHT: 250.44 LBS | HEIGHT: 69 IN | DIASTOLIC BLOOD PRESSURE: 70 MMHG

## 2019-04-18 DIAGNOSIS — E11.8 CONTROLLED TYPE 2 DIABETES MELLITUS WITH COMPLICATION, WITHOUT LONG-TERM CURRENT USE OF INSULIN (HCC): Primary | ICD-10-CM

## 2019-04-18 DIAGNOSIS — E78.2 MIXED HYPERLIPIDEMIA: ICD-10-CM

## 2019-04-18 DIAGNOSIS — I10 ESSENTIAL HYPERTENSION: ICD-10-CM

## 2019-04-18 PROCEDURE — 99214 OFFICE O/P EST MOD 30 MIN: CPT | Performed by: FAMILY MEDICINE

## 2019-04-18 RX ORDER — LANCETS 33 GAUGE
1 EACH MISCELLANEOUS DAILY
Qty: 100 EACH | Refills: 8 | Status: SHIPPED | OUTPATIENT
Start: 2019-04-18 | End: 2020-07-15

## 2019-04-18 RX ORDER — DOXAZOSIN MESYLATE 4 MG/1
4 TABLET ORAL DAILY
Qty: 90 TABLET | Refills: 1 | Status: SHIPPED | OUTPATIENT
Start: 2019-04-18 | End: 2019-11-21 | Stop reason: SDUPTHER

## 2019-04-18 RX ORDER — OMEPRAZOLE 40 MG/1
40 CAPSULE, DELAYED RELEASE ORAL DAILY
Qty: 90 CAPSULE | Refills: 2 | Status: SHIPPED | OUTPATIENT
Start: 2019-04-18 | End: 2020-03-09 | Stop reason: HOSPADM

## 2019-04-18 RX ORDER — SUMATRIPTAN 50 MG/1
TABLET, FILM COATED ORAL
Qty: 10 TABLET | Refills: 2 | Status: SHIPPED | OUTPATIENT
Start: 2019-04-18 | End: 2019-08-19 | Stop reason: SDUPTHER

## 2019-04-18 RX ORDER — PAROXETINE HYDROCHLORIDE HEMIHYDRATE 25 MG/1
25 TABLET, FILM COATED, EXTENDED RELEASE ORAL EVERY MORNING
Qty: 90 TABLET | Refills: 2 | Status: SHIPPED | OUTPATIENT
Start: 2019-04-18 | End: 2019-12-23

## 2019-04-18 RX ORDER — UBIDECARENONE 200 MG
1 CAPSULE ORAL NIGHTLY
Qty: 90 EACH | Refills: 2 | Status: SHIPPED | OUTPATIENT
Start: 2019-04-18 | End: 2021-02-10 | Stop reason: SDUPTHER

## 2019-04-18 NOTE — PROGRESS NOTES
Subjective   Zaheer Baron is a 58 y.o. male.    cc:follow up  History of Present Illness The patient comes in for check of their chronic medical issues which include Diabetes Mellitus,Hypertension and Hyperlipidemia.He is at his base line.   .       The following portions of the patient's history were reviewed and updated as appropriate: allergies, current medications, past family history, past medical history, past social history, past surgical history and problem list.    Review of Systems   Constitutional: Negative for fatigue and fever.   Respiratory: Negative for cough, chest tightness and stridor.    Cardiovascular: Negative for chest pain, palpitations and leg swelling.       Objective   Physical Exam   Constitutional: He appears well-developed and well-nourished.   HENT:   Head: Normocephalic and atraumatic.   Right Ear: External ear normal.   Left Ear: External ear normal.   Nose: Nose normal.   Mouth/Throat: Oropharynx is clear and moist.   Eyes: Conjunctivae are normal.   Neck: Normal range of motion.   Cardiovascular: Normal rate, regular rhythm and normal heart sounds. Exam reveals no gallop and no friction rub.   No murmur heard.  Pulmonary/Chest: Effort normal and breath sounds normal. No stridor. No respiratory distress. He has no wheezes. He has no rales.   Abdominal: Soft. Bowel sounds are normal. He exhibits no distension. There is no tenderness. There is no guarding.   Musculoskeletal: Normal range of motion.   Neurological: He is alert.   Skin: Skin is warm and dry.   The foot is without calluses. Stereognosis is intact.Monofilament exam is normal.No lesions.     Vitals reviewed.        Assessment/Plan   Zaheer was seen today for follow-up.    Diagnoses and all orders for this visit:    Controlled type 2 diabetes mellitus with complication, without long-term current use of insulin (CMS/[facility])  -     CBC w AUTO Differential; Future  -     Hemoglobin A1c; Future  -     Cancel: Comprehensive  metabolic panel; Future    Essential hypertension    Mixed hyperlipidemia  -     Lipid panel; Future    Other orders  -     sitaGLIPtin-metFORMIN (JANUMET)  MG per tablet; Take 1 tablet by mouth 2 (Two) Times a Day With Meals.  -     doxazosin (CARDURA) 4 MG tablet; Take 1 tablet by mouth Daily.  -     Coenzyme Q-10 200 MG capsule; Take 1 tablet by mouth Every Night.  -     glucose blood test strip; 1 each by Other route Daily. Use as instructed  -     omeprazole (priLOSEC) 40 MG capsule; Take 1 capsule by mouth Daily.  -     ONETOUCH DELICA LANCETS 33G misc; 1 each by Subdermal route Daily.  -     PARoxetine CR (PAXIL-CR) 25 MG 24 hr tablet; Take 1 tablet by mouth Every Morning.  -     SUMAtriptan (IMITREX) 50 MG tablet; Take one tablet at onset of headache. May repeat dose one time in 2 hours if headache not relieved.      Return to the clinic in 4 month/s.  Will contact with results as needed.

## 2019-07-05 RX ORDER — SUCRALFATE 1 G/1
TABLET ORAL
Qty: 180 TABLET | Refills: 0 | Status: SHIPPED | OUTPATIENT
Start: 2019-07-05 | End: 2019-09-28 | Stop reason: SDUPTHER

## 2019-07-12 RX ORDER — ROSUVASTATIN CALCIUM 5 MG/1
5 TABLET, COATED ORAL DAILY
Qty: 30 TABLET | Refills: 0 | Status: SHIPPED | OUTPATIENT
Start: 2019-07-12 | End: 2019-08-19 | Stop reason: SINTOL

## 2019-07-12 RX ORDER — LISINOPRIL AND HYDROCHLOROTHIAZIDE 12.5; 1 MG/1; MG/1
1 TABLET ORAL DAILY
Qty: 90 TABLET | Refills: 0 | Status: SHIPPED | OUTPATIENT
Start: 2019-07-12 | End: 2019-10-10 | Stop reason: SDUPTHER

## 2019-08-19 ENCOUNTER — OFFICE VISIT (OUTPATIENT)
Dept: FAMILY MEDICINE CLINIC | Facility: CLINIC | Age: 59
End: 2019-08-19

## 2019-08-19 VITALS
SYSTOLIC BLOOD PRESSURE: 148 MMHG | HEART RATE: 111 BPM | HEIGHT: 69 IN | OXYGEN SATURATION: 100 % | DIASTOLIC BLOOD PRESSURE: 74 MMHG | WEIGHT: 243.25 LBS | BODY MASS INDEX: 36.03 KG/M2

## 2019-08-19 DIAGNOSIS — M54.2 NECK PAIN: ICD-10-CM

## 2019-08-19 DIAGNOSIS — I10 ESSENTIAL HYPERTENSION: Primary | ICD-10-CM

## 2019-08-19 DIAGNOSIS — E78.2 MIXED HYPERLIPIDEMIA: ICD-10-CM

## 2019-08-19 PROCEDURE — 99214 OFFICE O/P EST MOD 30 MIN: CPT | Performed by: FAMILY MEDICINE

## 2019-08-19 RX ORDER — SUMATRIPTAN 50 MG/1
TABLET, FILM COATED ORAL
Qty: 10 TABLET | Refills: 2 | Status: SHIPPED | OUTPATIENT
Start: 2019-08-19 | End: 2020-07-02 | Stop reason: SDUPTHER

## 2019-08-19 RX ORDER — METHOCARBAMOL 500 MG/1
500 TABLET, FILM COATED ORAL 4 TIMES DAILY
Qty: 56 TABLET | Refills: 1 | Status: SHIPPED | OUTPATIENT
Start: 2019-08-19 | End: 2019-11-21 | Stop reason: SDUPTHER

## 2019-08-19 RX ORDER — TRIAMCINOLONE ACETONIDE 40 MG/ML
80 INJECTION, SUSPENSION INTRA-ARTICULAR; INTRAMUSCULAR ONCE
Status: DISCONTINUED | OUTPATIENT
Start: 2019-08-19 | End: 2019-11-21

## 2019-08-19 NOTE — PROGRESS NOTES
Subjective   Zaheer Baron is a 58 y.o. male.    cc: follow up of chronic medical issues and neck pain  History of Present Illness The patient comes in for check of their chronic medical issues which include Hypertension and Hyperlipidemia. He also has neck pain that is behind the right ear and goes down to his right shoulder. This has been worsening for 2 months.  .       The following portions of the patient's history were reviewed and updated as appropriate: allergies, current medications, past family history, past medical history, past social history, past surgical history and problem list.    Review of Systems   Constitutional: Negative for fatigue and fever.   Respiratory: Negative for cough, chest tightness and stridor.    Cardiovascular: Negative for chest pain, palpitations and leg swelling.   Musculoskeletal: Positive for neck pain and neck stiffness.       Objective   Physical Exam   Constitutional: He is oriented to person, place, and time. He appears well-developed and well-nourished.   HENT:   Head: Normocephalic and atraumatic.   Right Ear: External ear normal.   Left Ear: External ear normal.   Nose: Nose normal.   Mouth/Throat: Oropharynx is clear and moist.   Eyes: Conjunctivae are normal.   Neck:   There is tenderness in the right posterior aspect of his neck. There is also some tenderness going down the right Trapezius muscle.    Cardiovascular: Normal rate, regular rhythm and normal heart sounds. Exam reveals no gallop and no friction rub.   No murmur heard.  Pulmonary/Chest: Effort normal and breath sounds normal. No stridor. No respiratory distress. He has no wheezes.   Abdominal: Soft. Bowel sounds are normal. He exhibits no distension. There is no tenderness. There is no guarding.   Neurological: He is alert and oriented to person, place, and time.   Skin: Skin is warm and dry.   Vitals reviewed.        Assessment/Plan   Zaheer was seen today for follow-up.    Diagnoses and all orders  for this visit:    Essential hypertension    Mixed hyperlipidemia    Neck pain  -     XR Spine Cervical Complete 4 or 5 View; Future  -     triamcinolone acetonide (KENALOG-40) injection 80 mg    Other orders  -     SUMAtriptan (IMITREX) 50 MG tablet; Take one tablet at onset of headache. May repeat dose one time in 2 hours if headache not relieved.  -     methocarbamol (ROBAXIN) 500 MG tablet; Take 1 tablet by mouth 4 (Four) Times a Day.      Return to the clinic in 3 month/s.  Will contact with results as needed.

## 2019-09-30 RX ORDER — SUCRALFATE 1 G/1
TABLET ORAL
Qty: 180 TABLET | Refills: 0 | Status: SHIPPED | OUTPATIENT
Start: 2019-09-30 | End: 2019-11-21 | Stop reason: SDUPTHER

## 2019-10-07 RX ORDER — SITAGLIPTIN AND METFORMIN HYDROCHLORIDE 500; 50 MG/1; MG/1
TABLET, FILM COATED ORAL
Qty: 60 TABLET | Refills: 5 | Status: SHIPPED | OUTPATIENT
Start: 2019-10-07 | End: 2020-02-03

## 2019-10-10 RX ORDER — ROSUVASTATIN CALCIUM 5 MG/1
5 TABLET, COATED ORAL DAILY
Qty: 30 TABLET | Refills: 0 | Status: SHIPPED | OUTPATIENT
Start: 2019-10-10 | End: 2019-11-21 | Stop reason: SDUPTHER

## 2019-10-10 RX ORDER — LISINOPRIL AND HYDROCHLOROTHIAZIDE 12.5; 1 MG/1; MG/1
1 TABLET ORAL DAILY
Qty: 90 TABLET | Refills: 0 | Status: SHIPPED | OUTPATIENT
Start: 2019-10-10 | End: 2019-11-21 | Stop reason: SDUPTHER

## 2019-11-21 ENCOUNTER — LAB (OUTPATIENT)
Dept: LAB | Facility: HOSPITAL | Age: 59
End: 2019-11-21

## 2019-11-21 ENCOUNTER — OFFICE VISIT (OUTPATIENT)
Dept: FAMILY MEDICINE CLINIC | Facility: CLINIC | Age: 59
End: 2019-11-21

## 2019-11-21 VITALS
DIASTOLIC BLOOD PRESSURE: 78 MMHG | BODY MASS INDEX: 36.93 KG/M2 | HEART RATE: 87 BPM | HEIGHT: 69 IN | WEIGHT: 249.31 LBS | SYSTOLIC BLOOD PRESSURE: 142 MMHG | OXYGEN SATURATION: 96 %

## 2019-11-21 DIAGNOSIS — E78.2 MIXED HYPERLIPIDEMIA: ICD-10-CM

## 2019-11-21 DIAGNOSIS — E11.8 CONTROLLED TYPE 2 DIABETES MELLITUS WITH COMPLICATION, WITHOUT LONG-TERM CURRENT USE OF INSULIN (HCC): ICD-10-CM

## 2019-11-21 DIAGNOSIS — E11.8 CONTROLLED TYPE 2 DIABETES MELLITUS WITH COMPLICATION, WITHOUT LONG-TERM CURRENT USE OF INSULIN (HCC): Primary | ICD-10-CM

## 2019-11-21 DIAGNOSIS — I10 ESSENTIAL HYPERTENSION: ICD-10-CM

## 2019-11-21 LAB
BASOPHILS # BLD AUTO: 0.1 10*3/MM3 (ref 0–0.2)
BASOPHILS NFR BLD AUTO: 1.5 % (ref 0–1.5)
CHOLEST SERPL-MCNC: 193 MG/DL (ref 0–200)
DEPRECATED RDW RBC AUTO: 43.7 FL (ref 37–54)
EOSINOPHIL # BLD AUTO: 0.2 10*3/MM3 (ref 0–0.4)
EOSINOPHIL NFR BLD AUTO: 3 % (ref 0.3–6.2)
ERYTHROCYTE [DISTWIDTH] IN BLOOD BY AUTOMATED COUNT: 13.7 % (ref 12.3–15.4)
HBA1C MFR BLD: 7.22 % (ref 4.8–5.6)
HCT VFR BLD AUTO: 38.4 % (ref 37.5–51)
HDLC SERPL-MCNC: 31 MG/DL (ref 40–60)
HGB BLD-MCNC: 13.5 G/DL (ref 13–17.7)
IMM GRANULOCYTES # BLD AUTO: 0.04 10*3/MM3 (ref 0–0.05)
IMM GRANULOCYTES NFR BLD AUTO: 0.6 % (ref 0–0.5)
LDLC SERPL CALC-MCNC: 107 MG/DL (ref 0–100)
LDLC/HDLC SERPL: 3.45 {RATIO}
LYMPHOCYTES # BLD AUTO: 2.07 10*3/MM3 (ref 0.7–3.1)
LYMPHOCYTES NFR BLD AUTO: 30.9 % (ref 19.6–45.3)
MCH RBC QN AUTO: 31 PG (ref 26.6–33)
MCHC RBC AUTO-ENTMCNC: 35.2 G/DL (ref 31.5–35.7)
MCV RBC AUTO: 88.3 FL (ref 79–97)
MONOCYTES # BLD AUTO: 0.44 10*3/MM3 (ref 0.1–0.9)
MONOCYTES NFR BLD AUTO: 6.6 % (ref 5–12)
NEUTROPHILS # BLD AUTO: 3.84 10*3/MM3 (ref 1.7–7)
NEUTROPHILS NFR BLD AUTO: 57.4 % (ref 42.7–76)
NRBC BLD AUTO-RTO: 0.1 /100 WBC (ref 0–0.2)
PLATELET # BLD AUTO: 278 10*3/MM3 (ref 140–450)
PMV BLD AUTO: 10.9 FL (ref 6–12)
RBC # BLD AUTO: 4.35 10*6/MM3 (ref 4.14–5.8)
TRIGL SERPL-MCNC: 276 MG/DL (ref 0–150)
VLDLC SERPL-MCNC: 55.2 MG/DL (ref 5–40)
WBC NRBC COR # BLD: 6.69 10*3/MM3 (ref 3.4–10.8)

## 2019-11-21 PROCEDURE — 80061 LIPID PANEL: CPT

## 2019-11-21 PROCEDURE — 85025 COMPLETE CBC W/AUTO DIFF WBC: CPT

## 2019-11-21 PROCEDURE — 99214 OFFICE O/P EST MOD 30 MIN: CPT | Performed by: FAMILY MEDICINE

## 2019-11-21 PROCEDURE — 36415 COLL VENOUS BLD VENIPUNCTURE: CPT

## 2019-11-21 PROCEDURE — 83036 HEMOGLOBIN GLYCOSYLATED A1C: CPT

## 2019-11-21 RX ORDER — ROSUVASTATIN CALCIUM 5 MG/1
5 TABLET, COATED ORAL DAILY
Qty: 90 TABLET | Refills: 2 | Status: SHIPPED | OUTPATIENT
Start: 2019-11-21 | End: 2020-10-28

## 2019-11-21 RX ORDER — SUCRALFATE 1 G/1
1 TABLET ORAL
Qty: 180 TABLET | Refills: 3 | Status: SHIPPED | OUTPATIENT
Start: 2019-11-21 | End: 2020-03-09 | Stop reason: HOSPADM

## 2019-11-21 RX ORDER — METHOCARBAMOL 500 MG/1
500 TABLET, FILM COATED ORAL 4 TIMES DAILY
Qty: 56 TABLET | Refills: 1 | Status: SHIPPED | OUTPATIENT
Start: 2019-11-21 | End: 2021-08-12

## 2019-11-21 RX ORDER — LISINOPRIL AND HYDROCHLOROTHIAZIDE 12.5; 1 MG/1; MG/1
1 TABLET ORAL DAILY
Qty: 90 TABLET | Refills: 3 | Status: SHIPPED | OUTPATIENT
Start: 2019-11-21 | End: 2021-01-26

## 2019-11-21 RX ORDER — DOXAZOSIN MESYLATE 4 MG/1
4 TABLET ORAL DAILY
Qty: 90 TABLET | Refills: 1 | Status: SHIPPED | OUTPATIENT
Start: 2019-11-21 | End: 2021-02-10 | Stop reason: SDUPTHER

## 2019-11-21 NOTE — PROGRESS NOTES
"Subjective   Zaheer Baron is a 59 y.o. male.   Cc: follow up of chronic medical issues  HPI The patient comes in for check of their chronic medical issues which include Diabetes Mellitus,Hypertension and Hyperlipidemia. He is at his base line with his medical conditions.   .       The following portions of the patient's history were reviewed and updated as appropriate: allergies, current medications, past family history, past medical history, past social history, past surgical history and problem list.    Review of Systems   Constitutional: Negative for fatigue and fever.   HENT: Positive for congestion.    Respiratory: Negative for cough, chest tightness and shortness of breath.    Cardiovascular: Negative for chest pain, palpitations and leg swelling.       Objective   Physical Exam   Constitutional: He is oriented to person, place, and time. He appears well-developed and well-nourished.   HENT:   Head: Normocephalic and atraumatic.   Right Ear: External ear normal.   Left Ear: External ear normal.   Nose: Nose normal.   Mouth/Throat: Oropharynx is clear and moist.   Eyes: Conjunctivae are normal.   Cardiovascular: Normal rate, regular rhythm and normal heart sounds. Exam reveals no gallop and no friction rub.   No murmur heard.  Pulmonary/Chest: Effort normal and breath sounds normal. No stridor. No respiratory distress. He has no wheezes. He has no rales.   Abdominal: Soft. Bowel sounds are normal.   Neurological: He is alert and oriented to person, place, and time.   Skin: Skin is warm and dry.   No calluses,No lesions. Stereognosis is intact. Monofilament exam is normal.     Vitals reviewed.        Visit Vitals  /78   Pulse 87   Ht 175.3 cm (69\")   Wt 113 kg (249 lb 5 oz)   SpO2 96%   BMI 36.82 kg/m²     Body mass index is 36.82 kg/m².      Assessment/Plan   Zaheer was seen today for follow-up and hypertension.    Diagnoses and all orders for this visit:    Controlled type 2 diabetes mellitus with " complication, without long-term current use of insulin (CMS/[facility])    Mixed hyperlipidemia    Essential hypertension    Other orders  -     doxazosin (CARDURA) 4 MG tablet; Take 1 tablet by mouth Daily.  -     glucose blood test strip; 1 each by Other route Daily. Use as instructed  -     lisinopril-hydrochlorothiazide (PRINZIDE,ZESTORETIC) 10-12.5 MG per tablet; Take 1 tablet by mouth Daily.  -     methocarbamol (ROBAXIN) 500 MG tablet; Take 1 tablet by mouth 4 (Four) Times a Day.  -     rosuvastatin (CRESTOR) 5 MG tablet; Take 1 tablet by mouth Daily.  -     sucralfate (CARAFATE) 1 g tablet; Take 1 tablet by mouth 2 (Two) Times a Day Before Meals.    Return to the clinic in 3 month/s.  Will contact with results as needed.  He has had his Flu shot on this past week.  Labs pending.

## 2019-12-23 RX ORDER — PAROXETINE HYDROCHLORIDE HEMIHYDRATE 25 MG/1
25 TABLET, FILM COATED, EXTENDED RELEASE ORAL EVERY MORNING
Qty: 90 TABLET | Refills: 2 | Status: SHIPPED | OUTPATIENT
Start: 2019-12-23 | End: 2020-07-30

## 2020-02-03 RX ORDER — SITAGLIPTIN AND METFORMIN HYDROCHLORIDE 500; 50 MG/1; MG/1
TABLET, FILM COATED ORAL
Qty: 60 TABLET | Refills: 5 | Status: SHIPPED | OUTPATIENT
Start: 2020-02-03 | End: 2020-07-30

## 2020-02-20 ENCOUNTER — OFFICE VISIT (OUTPATIENT)
Dept: FAMILY MEDICINE CLINIC | Facility: CLINIC | Age: 60
End: 2020-02-20

## 2020-02-20 ENCOUNTER — LAB (OUTPATIENT)
Dept: LAB | Facility: HOSPITAL | Age: 60
End: 2020-02-20

## 2020-02-20 VITALS
HEIGHT: 69 IN | BODY MASS INDEX: 35.75 KG/M2 | WEIGHT: 241.38 LBS | OXYGEN SATURATION: 98 % | DIASTOLIC BLOOD PRESSURE: 72 MMHG | SYSTOLIC BLOOD PRESSURE: 134 MMHG | HEART RATE: 125 BPM

## 2020-02-20 DIAGNOSIS — E11.8 CONTROLLED TYPE 2 DIABETES MELLITUS WITH COMPLICATION, WITHOUT LONG-TERM CURRENT USE OF INSULIN (HCC): ICD-10-CM

## 2020-02-20 DIAGNOSIS — I10 ESSENTIAL HYPERTENSION: Primary | ICD-10-CM

## 2020-02-20 DIAGNOSIS — E78.2 MIXED HYPERLIPIDEMIA: ICD-10-CM

## 2020-02-20 DIAGNOSIS — K21.9 GASTROESOPHAGEAL REFLUX DISEASE WITHOUT ESOPHAGITIS: ICD-10-CM

## 2020-02-20 DIAGNOSIS — I10 ESSENTIAL HYPERTENSION: ICD-10-CM

## 2020-02-20 PROCEDURE — 85025 COMPLETE CBC W/AUTO DIFF WBC: CPT

## 2020-02-20 PROCEDURE — 83036 HEMOGLOBIN GLYCOSYLATED A1C: CPT

## 2020-02-20 PROCEDURE — 80061 LIPID PANEL: CPT

## 2020-02-20 PROCEDURE — 99214 OFFICE O/P EST MOD 30 MIN: CPT | Performed by: FAMILY MEDICINE

## 2020-02-20 PROCEDURE — 80053 COMPREHEN METABOLIC PANEL: CPT

## 2020-02-20 PROCEDURE — 83721 ASSAY OF BLOOD LIPOPROTEIN: CPT

## 2020-02-20 PROCEDURE — 36415 COLL VENOUS BLD VENIPUNCTURE: CPT

## 2020-02-20 NOTE — PROGRESS NOTES
Subjective   Zaheer Baron is a 59 y.o. male.   .Cco  Diabetes   He presents for his follow-up diabetic visit. He has type 2 diabetes mellitus. The initial diagnosis of diabetes was made 10 years ago. His disease course has been stable. Pertinent negatives for hypoglycemia include no confusion, dizziness or pallor. Pertinent negatives for diabetes include no chest pain and no fatigue. Symptoms are stable.   Hypertension   This is a chronic problem. The current episode started more than 1 year ago. The problem has been gradually improving since onset. The problem is controlled. Pertinent negatives include no chest pain. There are no associated agents to hypertension. Past treatments include ACE inhibitors and direct vasodilators.   Hyperlipidemia   This is a chronic problem. The current episode started more than 1 year ago. Pertinent negatives include no chest pain. Current antihyperlipidemic treatment includes statins.     He is at his base line.  The following portions of the patient's history were reviewed and updated as appropriate: allergies, current medications, past family history, past medical history, past social history, past surgical history and problem list.    Review of Systems   Constitutional: Negative for fatigue and fever.   Respiratory: Negative for cough, chest tightness and wheezing.    Cardiovascular: Negative for chest pain and leg swelling.   Skin: Negative for pallor.   Neurological: Negative for dizziness.   Psychiatric/Behavioral: Negative for confusion.       Objective   Physical Exam   Constitutional: He is oriented to person, place, and time. He appears well-developed and well-nourished.   HENT:   Head: Normocephalic and atraumatic.   Right Ear: External ear normal.   Left Ear: External ear normal.   Nose: Nose normal.   Mouth/Throat: Oropharynx is clear and moist.   Eyes: Conjunctivae are normal.   Neck: Normal range of motion.   Cardiovascular: Normal rate, regular rhythm and  "normal heart sounds. Exam reveals no gallop and no friction rub.   No murmur heard.  Pulmonary/Chest: Effort normal and breath sounds normal. No stridor. No respiratory distress. He has no wheezes. He has no rales.   Abdominal: Soft. Bowel sounds are normal. He exhibits no distension. There is no tenderness.   Neurological: He is oriented to person, place, and time.   Skin: Skin is warm and dry.   No calluses,No lesions. Stereognosis is intact. Monofilament exam is normal.     Vitals reviewed.        Visit Vitals  /72   Pulse (!) 125   Ht 175.3 cm (69\")   Wt 109 kg (241 lb 6 oz)   SpO2 98%   BMI 35.64 kg/m²     Body mass index is 35.64 kg/m².      Assessment/Plan   Zaheer was seen today for follow-up and diabetes.    Diagnoses and all orders for this visit:    Essential hypertension  -     Comprehensive metabolic panel; Future  -     CBC w AUTO Differential; Future    Mixed hyperlipidemia  -     Lipid panel; Future    Gastroesophageal reflux disease without esophagitis    Controlled type 2 diabetes mellitus with complication, without long-term current use of insulin (CMS/[facility])  -     Hemoglobin A1c; Future    Return to the clinic in 3 month/s.  Will contact with results as needed.    "

## 2020-02-21 LAB
ALBUMIN SERPL-MCNC: 4.6 G/DL (ref 3.5–5.2)
ALBUMIN/GLOB SERPL: 1.3 G/DL
ALP SERPL-CCNC: 44 U/L (ref 39–117)
ALT SERPL W P-5'-P-CCNC: 23 U/L (ref 1–41)
ANION GAP SERPL CALCULATED.3IONS-SCNC: 16.3 MMOL/L (ref 5–15)
ARTICHOKE IGE QN: 133 MG/DL (ref 0–100)
AST SERPL-CCNC: 25 U/L (ref 1–40)
BASOPHILS # BLD AUTO: 0.07 10*3/MM3 (ref 0–0.2)
BASOPHILS NFR BLD AUTO: 0.7 % (ref 0–1.5)
BILIRUB SERPL-MCNC: 0.3 MG/DL (ref 0.2–1.2)
BUN BLD-MCNC: 15 MG/DL (ref 8–23)
BUN/CREAT SERPL: 12.3 (ref 7–25)
CALCIUM SPEC-SCNC: 9.7 MG/DL (ref 8.6–10.5)
CHLORIDE SERPL-SCNC: 96 MMOL/L (ref 98–107)
CHOLEST SERPL-MCNC: 226 MG/DL (ref 0–200)
CO2 SERPL-SCNC: 27.7 MMOL/L (ref 22–29)
CREAT BLD-MCNC: 1.22 MG/DL (ref 0.76–1.27)
DEPRECATED RDW RBC AUTO: 47.3 FL (ref 37–54)
EOSINOPHIL # BLD AUTO: 0.13 10*3/MM3 (ref 0–0.4)
EOSINOPHIL NFR BLD AUTO: 1.4 % (ref 0.3–6.2)
ERYTHROCYTE [DISTWIDTH] IN BLOOD BY AUTOMATED COUNT: 14.4 % (ref 12.3–15.4)
GFR SERPL CREATININE-BSD FRML MDRD: 61 ML/MIN/1.73
GLOBULIN UR ELPH-MCNC: 3.5 GM/DL
GLUCOSE BLD-MCNC: 118 MG/DL (ref 65–99)
HBA1C MFR BLD: 7.5 % (ref 4.8–5.6)
HCT VFR BLD AUTO: 41.9 % (ref 37.5–51)
HDLC SERPL-MCNC: 33 MG/DL (ref 40–60)
HGB BLD-MCNC: 14 G/DL (ref 13–17.7)
IMM GRANULOCYTES # BLD AUTO: 0.02 10*3/MM3 (ref 0–0.05)
IMM GRANULOCYTES NFR BLD AUTO: 0.2 % (ref 0–0.5)
LDLC SERPL CALC-MCNC: ABNORMAL MG/DL
LDLC/HDLC SERPL: ABNORMAL {RATIO}
LYMPHOCYTES # BLD AUTO: 2.76 10*3/MM3 (ref 0.7–3.1)
LYMPHOCYTES NFR BLD AUTO: 29.4 % (ref 19.6–45.3)
MCH RBC QN AUTO: 29.8 PG (ref 26.6–33)
MCHC RBC AUTO-ENTMCNC: 33.4 G/DL (ref 31.5–35.7)
MCV RBC AUTO: 89.1 FL (ref 79–97)
MONOCYTES # BLD AUTO: 0.55 10*3/MM3 (ref 0.1–0.9)
MONOCYTES NFR BLD AUTO: 5.9 % (ref 5–12)
NEUTROPHILS # BLD AUTO: 5.85 10*3/MM3 (ref 1.7–7)
NEUTROPHILS NFR BLD AUTO: 62.4 % (ref 42.7–76)
NRBC BLD AUTO-RTO: 0 /100 WBC (ref 0–0.2)
PLATELET # BLD AUTO: 334 10*3/MM3 (ref 140–450)
PMV BLD AUTO: 10.9 FL (ref 6–12)
POTASSIUM BLD-SCNC: 3.9 MMOL/L (ref 3.5–5.2)
PROT SERPL-MCNC: 8.1 G/DL (ref 6–8.5)
RBC # BLD AUTO: 4.7 10*6/MM3 (ref 4.14–5.8)
SODIUM BLD-SCNC: 140 MMOL/L (ref 136–145)
TRIGL SERPL-MCNC: 420 MG/DL (ref 0–150)
VLDLC SERPL-MCNC: ABNORMAL MG/DL
WBC NRBC COR # BLD: 9.38 10*3/MM3 (ref 3.4–10.8)

## 2020-03-07 ENCOUNTER — APPOINTMENT (OUTPATIENT)
Dept: CT IMAGING | Facility: HOSPITAL | Age: 60
End: 2020-03-07

## 2020-03-07 ENCOUNTER — HOSPITAL ENCOUNTER (INPATIENT)
Facility: HOSPITAL | Age: 60
LOS: 2 days | Discharge: HOME | End: 2020-03-09
Attending: EMERGENCY MEDICINE | Admitting: HOSPITALIST

## 2020-03-07 ENCOUNTER — APPOINTMENT (OUTPATIENT)
Dept: GENERAL RADIOLOGY | Facility: HOSPITAL | Age: 60
End: 2020-03-07

## 2020-03-07 DIAGNOSIS — E83.42 HYPOMAGNESEMIA: ICD-10-CM

## 2020-03-07 DIAGNOSIS — K52.9 GASTROENTERITIS: ICD-10-CM

## 2020-03-07 DIAGNOSIS — R55 SYNCOPE AND COLLAPSE: ICD-10-CM

## 2020-03-07 DIAGNOSIS — N17.9 ACUTE RENAL FAILURE, UNSPECIFIED ACUTE RENAL FAILURE TYPE (HCC): Primary | ICD-10-CM

## 2020-03-07 DIAGNOSIS — I48.91 ATRIAL FIBRILLATION WITH RVR (HCC): ICD-10-CM

## 2020-03-07 DIAGNOSIS — E86.0 DEHYDRATION: ICD-10-CM

## 2020-03-07 DIAGNOSIS — E87.6 HYPOKALEMIA: ICD-10-CM

## 2020-03-07 LAB
ALBUMIN SERPL-MCNC: 4.3 G/DL (ref 3.5–5.2)
ALBUMIN/GLOB SERPL: 1.1 G/DL
ALP SERPL-CCNC: 34 U/L (ref 39–117)
ALT SERPL W P-5'-P-CCNC: 17 U/L (ref 1–41)
ANION GAP SERPL CALCULATED.3IONS-SCNC: 21 MMOL/L (ref 5–15)
APTT PPP: 36.2 SECONDS (ref 20–40.3)
AST SERPL-CCNC: 19 U/L (ref 1–40)
BACTERIA UR QL AUTO: ABNORMAL /HPF
BASOPHILS # BLD AUTO: 0.06 10*3/MM3 (ref 0–0.2)
BASOPHILS NFR BLD AUTO: 0.8 % (ref 0–1.5)
BILIRUB SERPL-MCNC: 0.6 MG/DL (ref 0.2–1.2)
BILIRUB UR QL STRIP: ABNORMAL
BUN BLD-MCNC: 13 MG/DL (ref 8–23)
BUN/CREAT SERPL: 5.6 (ref 7–25)
CALCIUM SPEC-SCNC: 9.8 MG/DL (ref 8.6–10.5)
CHLORIDE SERPL-SCNC: 94 MMOL/L (ref 98–107)
CLARITY UR: ABNORMAL
CO2 SERPL-SCNC: 23 MMOL/L (ref 22–29)
COLOR UR: ABNORMAL
CREAT BLD-MCNC: 2.34 MG/DL (ref 0.76–1.27)
D-LACTATE SERPL-SCNC: 1.8 MMOL/L (ref 0.5–2)
D-LACTATE SERPL-SCNC: 2.7 MMOL/L (ref 0.5–2)
DEPRECATED RDW RBC AUTO: 41.9 FL (ref 37–54)
EOSINOPHIL # BLD AUTO: 0.02 10*3/MM3 (ref 0–0.4)
EOSINOPHIL NFR BLD AUTO: 0.3 % (ref 0.3–6.2)
ERYTHROCYTE [DISTWIDTH] IN BLOOD BY AUTOMATED COUNT: 13.6 % (ref 12.3–15.4)
FLUAV AG NPH QL: NEGATIVE
FLUBV AG NPH QL IA: NEGATIVE
GFR SERPL CREATININE-BSD FRML MDRD: 29 ML/MIN/1.73
GLOBULIN UR ELPH-MCNC: 3.8 GM/DL
GLUCOSE BLD-MCNC: 156 MG/DL (ref 65–99)
GLUCOSE BLDC GLUCOMTR-MCNC: 152 MG/DL (ref 70–130)
GLUCOSE UR STRIP-MCNC: NEGATIVE MG/DL
HCT VFR BLD AUTO: 37.5 % (ref 37.5–51)
HGB BLD-MCNC: 13.2 G/DL (ref 13–17.7)
HGB UR QL STRIP.AUTO: NEGATIVE
HOLD SPECIMEN: NORMAL
HOLD SPECIMEN: NORMAL
HYALINE CASTS UR QL AUTO: ABNORMAL /LPF
IMM GRANULOCYTES # BLD AUTO: 0.03 10*3/MM3 (ref 0–0.05)
IMM GRANULOCYTES NFR BLD AUTO: 0.4 % (ref 0–0.5)
INR PPP: 1.1 (ref 0.8–1.2)
KETONES UR QL STRIP: ABNORMAL
LACTATE HOLD SPECIMEN: NORMAL
LEUKOCYTE ESTERASE UR QL STRIP.AUTO: NEGATIVE
LIPASE SERPL-CCNC: 61 U/L (ref 13–60)
LYMPHOCYTES # BLD AUTO: 1.1 10*3/MM3 (ref 0.7–3.1)
LYMPHOCYTES NFR BLD AUTO: 15.4 % (ref 19.6–45.3)
MAGNESIUM SERPL-MCNC: 1.5 MG/DL (ref 1.6–2.4)
MCH RBC QN AUTO: 29.7 PG (ref 26.6–33)
MCHC RBC AUTO-ENTMCNC: 35.2 G/DL (ref 31.5–35.7)
MCV RBC AUTO: 84.5 FL (ref 79–97)
MONOCYTES # BLD AUTO: 0.71 10*3/MM3 (ref 0.1–0.9)
MONOCYTES NFR BLD AUTO: 9.9 % (ref 5–12)
MUCOUS THREADS URNS QL MICRO: ABNORMAL /HPF
NEUTROPHILS # BLD AUTO: 5.24 10*3/MM3 (ref 1.7–7)
NEUTROPHILS NFR BLD AUTO: 73.2 % (ref 42.7–76)
NITRITE UR QL STRIP: NEGATIVE
NRBC BLD AUTO-RTO: 0 /100 WBC (ref 0–0.2)
NT-PROBNP SERPL-MCNC: 169.4 PG/ML (ref 5–900)
PH UR STRIP.AUTO: 6 [PH] (ref 5–9)
PLATELET # BLD AUTO: 208 10*3/MM3 (ref 140–450)
PMV BLD AUTO: 11 FL (ref 6–12)
POTASSIUM BLD-SCNC: 2.8 MMOL/L (ref 3.5–5.2)
POTASSIUM BLD-SCNC: 2.9 MMOL/L (ref 3.5–5.2)
PROT SERPL-MCNC: 8.1 G/DL (ref 6–8.5)
PROT UR QL STRIP: ABNORMAL
PROTHROMBIN TIME: 14 SECONDS (ref 11.1–15.3)
RBC # BLD AUTO: 4.44 10*6/MM3 (ref 4.14–5.8)
RBC # UR: ABNORMAL /HPF
REF LAB TEST METHOD: ABNORMAL
SODIUM BLD-SCNC: 138 MMOL/L (ref 136–145)
SP GR UR STRIP: 1.02 (ref 1–1.03)
SQUAMOUS #/AREA URNS HPF: ABNORMAL /HPF
TROPONIN T SERPL-MCNC: 0.03 NG/ML (ref 0–0.03)
TROPONIN T SERPL-MCNC: 0.11 NG/ML (ref 0–0.03)
TROPONIN T SERPL-MCNC: 0.15 NG/ML (ref 0–0.03)
UROBILINOGEN UR QL STRIP: ABNORMAL
WBC NRBC COR # BLD: 7.16 10*3/MM3 (ref 3.4–10.8)
WBC UR QL AUTO: ABNORMAL /HPF
WHOLE BLOOD HOLD SPECIMEN: NORMAL
WHOLE BLOOD HOLD SPECIMEN: NORMAL

## 2020-03-07 PROCEDURE — 71045 X-RAY EXAM CHEST 1 VIEW: CPT

## 2020-03-07 PROCEDURE — 25010000002 AMIODARONE IN DEXTROSE 5% 360-4.14 MG/200ML-% SOLUTION: Performed by: EMERGENCY MEDICINE

## 2020-03-07 PROCEDURE — 82962 GLUCOSE BLOOD TEST: CPT

## 2020-03-07 PROCEDURE — 99284 EMERGENCY DEPT VISIT MOD MDM: CPT

## 2020-03-07 PROCEDURE — 93005 ELECTROCARDIOGRAM TRACING: CPT

## 2020-03-07 PROCEDURE — 93005 ELECTROCARDIOGRAM TRACING: CPT | Performed by: HOSPITALIST

## 2020-03-07 PROCEDURE — 25010000002 AMIODARONE IN DEXTROSE 5% 150-4.21 MG/100ML-% SOLUTION: Performed by: EMERGENCY MEDICINE

## 2020-03-07 PROCEDURE — 93010 ELECTROCARDIOGRAM REPORT: CPT | Performed by: INTERNAL MEDICINE

## 2020-03-07 PROCEDURE — 81001 URINALYSIS AUTO W/SCOPE: CPT | Performed by: HOSPITALIST

## 2020-03-07 PROCEDURE — 25010000003 POTASSIUM CHLORIDE 10 MEQ/100ML SOLUTION: Performed by: EMERGENCY MEDICINE

## 2020-03-07 PROCEDURE — 83690 ASSAY OF LIPASE: CPT | Performed by: EMERGENCY MEDICINE

## 2020-03-07 PROCEDURE — 70450 CT HEAD/BRAIN W/O DYE: CPT

## 2020-03-07 PROCEDURE — 80053 COMPREHEN METABOLIC PANEL: CPT | Performed by: EMERGENCY MEDICINE

## 2020-03-07 PROCEDURE — 84132 ASSAY OF SERUM POTASSIUM: CPT | Performed by: HOSPITALIST

## 2020-03-07 PROCEDURE — 83880 ASSAY OF NATRIURETIC PEPTIDE: CPT | Performed by: EMERGENCY MEDICINE

## 2020-03-07 PROCEDURE — 84484 ASSAY OF TROPONIN QUANT: CPT | Performed by: EMERGENCY MEDICINE

## 2020-03-07 PROCEDURE — 85610 PROTHROMBIN TIME: CPT | Performed by: HOSPITALIST

## 2020-03-07 PROCEDURE — 87040 BLOOD CULTURE FOR BACTERIA: CPT | Performed by: EMERGENCY MEDICINE

## 2020-03-07 PROCEDURE — 87086 URINE CULTURE/COLONY COUNT: CPT | Performed by: HOSPITALIST

## 2020-03-07 PROCEDURE — 36415 COLL VENOUS BLD VENIPUNCTURE: CPT | Performed by: EMERGENCY MEDICINE

## 2020-03-07 PROCEDURE — 72125 CT NECK SPINE W/O DYE: CPT

## 2020-03-07 PROCEDURE — 87040 BLOOD CULTURE FOR BACTERIA: CPT | Performed by: HOSPITALIST

## 2020-03-07 PROCEDURE — 85025 COMPLETE CBC W/AUTO DIFF WBC: CPT | Performed by: EMERGENCY MEDICINE

## 2020-03-07 PROCEDURE — 63710000001 INSULIN ASPART PER 5 UNITS: Performed by: HOSPITALIST

## 2020-03-07 PROCEDURE — 85730 THROMBOPLASTIN TIME PARTIAL: CPT | Performed by: HOSPITALIST

## 2020-03-07 PROCEDURE — 93005 ELECTROCARDIOGRAM TRACING: CPT | Performed by: EMERGENCY MEDICINE

## 2020-03-07 PROCEDURE — 83605 ASSAY OF LACTIC ACID: CPT | Performed by: EMERGENCY MEDICINE

## 2020-03-07 PROCEDURE — 84484 ASSAY OF TROPONIN QUANT: CPT | Performed by: HOSPITALIST

## 2020-03-07 PROCEDURE — 83735 ASSAY OF MAGNESIUM: CPT | Performed by: EMERGENCY MEDICINE

## 2020-03-07 PROCEDURE — 25010000002 ENOXAPARIN PER 10 MG: Performed by: HOSPITALIST

## 2020-03-07 PROCEDURE — 87804 INFLUENZA ASSAY W/OPTIC: CPT | Performed by: EMERGENCY MEDICINE

## 2020-03-07 PROCEDURE — 25010000002 AMIODARONE IN DEXTROSE 5% 360-4.14 MG/200ML-% SOLUTION: Performed by: HOSPITALIST

## 2020-03-07 PROCEDURE — 74176 CT ABD & PELVIS W/O CONTRAST: CPT

## 2020-03-07 RX ORDER — POTASSIUM CHLORIDE 750 MG/1
40 CAPSULE, EXTENDED RELEASE ORAL AS NEEDED
Status: DISCONTINUED | OUTPATIENT
Start: 2020-03-07 | End: 2020-03-09 | Stop reason: HOSPADM

## 2020-03-07 RX ORDER — PANTOPRAZOLE SODIUM 40 MG/1
40 TABLET, DELAYED RELEASE ORAL EVERY MORNING
Status: DISCONTINUED | OUTPATIENT
Start: 2020-03-08 | End: 2020-03-09 | Stop reason: HOSPADM

## 2020-03-07 RX ORDER — SODIUM CHLORIDE 9 MG/ML
100 INJECTION, SOLUTION INTRAVENOUS CONTINUOUS
Status: DISCONTINUED | OUTPATIENT
Start: 2020-03-07 | End: 2020-03-09 | Stop reason: HOSPADM

## 2020-03-07 RX ORDER — SODIUM CHLORIDE 9 MG/ML
INJECTION, SOLUTION INTRAVENOUS
Status: COMPLETED
Start: 2020-03-07 | End: 2020-03-07

## 2020-03-07 RX ORDER — POTASSIUM CHLORIDE 1.5 G/1.77G
40 POWDER, FOR SOLUTION ORAL AS NEEDED
Status: DISCONTINUED | OUTPATIENT
Start: 2020-03-07 | End: 2020-03-09 | Stop reason: HOSPADM

## 2020-03-07 RX ORDER — SODIUM CHLORIDE 0.9 % (FLUSH) 0.9 %
10 SYRINGE (ML) INJECTION AS NEEDED
Status: DISCONTINUED | OUTPATIENT
Start: 2020-03-07 | End: 2020-03-09 | Stop reason: HOSPADM

## 2020-03-07 RX ORDER — PAROXETINE HYDROCHLORIDE HEMIHYDRATE 25 MG/1
25 TABLET, FILM COATED, EXTENDED RELEASE ORAL EVERY MORNING
Status: DISCONTINUED | OUTPATIENT
Start: 2020-03-08 | End: 2020-03-09 | Stop reason: HOSPADM

## 2020-03-07 RX ORDER — SODIUM CHLORIDE 0.9 % (FLUSH) 0.9 %
10 SYRINGE (ML) INJECTION EVERY 12 HOURS SCHEDULED
Status: DISCONTINUED | OUTPATIENT
Start: 2020-03-07 | End: 2020-03-09 | Stop reason: HOSPADM

## 2020-03-07 RX ORDER — TERAZOSIN 5 MG/1
5 CAPSULE ORAL NIGHTLY
Status: DISCONTINUED | OUTPATIENT
Start: 2020-03-07 | End: 2020-03-09 | Stop reason: HOSPADM

## 2020-03-07 RX ORDER — DEXTROSE MONOHYDRATE 25 G/50ML
25 INJECTION, SOLUTION INTRAVENOUS
Status: DISCONTINUED | OUTPATIENT
Start: 2020-03-07 | End: 2020-03-09 | Stop reason: HOSPADM

## 2020-03-07 RX ORDER — HYDROXYCHLOROQUINE SULFATE 200 MG/1
200 TABLET, FILM COATED ORAL DAILY
COMMUNITY
End: 2021-02-10 | Stop reason: SDUPTHER

## 2020-03-07 RX ORDER — LANSOPRAZOLE 30 MG/1
30 CAPSULE, DELAYED RELEASE ORAL DAILY
COMMUNITY
End: 2021-02-10 | Stop reason: SDUPTHER

## 2020-03-07 RX ORDER — UBIDECARENONE 30 MG
CAPSULE ORAL
COMMUNITY
End: 2021-12-13

## 2020-03-07 RX ORDER — POTASSIUM CHLORIDE 7.45 MG/ML
10 INJECTION INTRAVENOUS
Status: COMPLETED | OUTPATIENT
Start: 2020-03-07 | End: 2020-03-07

## 2020-03-07 RX ORDER — ONDANSETRON 4 MG/1
4 TABLET, FILM COATED ORAL EVERY 8 HOURS PRN
Status: DISCONTINUED | OUTPATIENT
Start: 2020-03-07 | End: 2020-03-09 | Stop reason: HOSPADM

## 2020-03-07 RX ORDER — SUCRALFATE 1 G/1
1 TABLET ORAL
Status: DISCONTINUED | OUTPATIENT
Start: 2020-03-07 | End: 2020-03-09 | Stop reason: HOSPADM

## 2020-03-07 RX ORDER — OXYCODONE AND ACETAMINOPHEN 7.5; 325 MG/1; MG/1
1 TABLET ORAL EVERY 4 HOURS PRN
Status: DISCONTINUED | OUTPATIENT
Start: 2020-03-07 | End: 2020-03-09 | Stop reason: HOSPADM

## 2020-03-07 RX ORDER — PANTOPRAZOLE SODIUM 40 MG/1
40 TABLET, DELAYED RELEASE ORAL EVERY MORNING
Status: DISCONTINUED | OUTPATIENT
Start: 2020-03-08 | End: 2020-03-07 | Stop reason: SDUPTHER

## 2020-03-07 RX ORDER — MAGNESIUM SULFATE HEPTAHYDRATE 40 MG/ML
4 INJECTION, SOLUTION INTRAVENOUS AS NEEDED
Status: DISCONTINUED | OUTPATIENT
Start: 2020-03-07 | End: 2020-03-09 | Stop reason: HOSPADM

## 2020-03-07 RX ORDER — NICOTINE POLACRILEX 4 MG
15 LOZENGE BUCCAL
Status: DISCONTINUED | OUTPATIENT
Start: 2020-03-07 | End: 2020-03-09 | Stop reason: HOSPADM

## 2020-03-07 RX ORDER — MAGNESIUM SULFATE HEPTAHYDRATE 40 MG/ML
2 INJECTION, SOLUTION INTRAVENOUS AS NEEDED
Status: DISCONTINUED | OUTPATIENT
Start: 2020-03-07 | End: 2020-03-09 | Stop reason: HOSPADM

## 2020-03-07 RX ORDER — SODIUM CHLORIDE 9 MG/ML
125 INJECTION, SOLUTION INTRAVENOUS CONTINUOUS
Status: DISCONTINUED | OUTPATIENT
Start: 2020-03-07 | End: 2020-03-07 | Stop reason: SDUPTHER

## 2020-03-07 RX ORDER — HYDROXYCHLOROQUINE SULFATE 200 MG/1
200 TABLET, FILM COATED ORAL DAILY
Status: DISCONTINUED | OUTPATIENT
Start: 2020-03-07 | End: 2020-03-08

## 2020-03-07 RX ORDER — SUMATRIPTAN 50 MG/1
50 TABLET, FILM COATED ORAL ONCE AS NEEDED
Status: COMPLETED | OUTPATIENT
Start: 2020-03-07 | End: 2020-03-08

## 2020-03-07 RX ADMIN — AMIODARONE HYDROCHLORIDE 1 MG/MIN: 1.8 INJECTION, SOLUTION INTRAVENOUS at 12:25

## 2020-03-07 RX ADMIN — POTASSIUM CHLORIDE 10 MEQ: 10 INJECTION, SOLUTION INTRAVENOUS at 15:03

## 2020-03-07 RX ADMIN — SODIUM CHLORIDE 100 ML/HR: 900 INJECTION, SOLUTION INTRAVENOUS at 19:54

## 2020-03-07 RX ADMIN — SODIUM CHLORIDE 125 ML/HR: 900 INJECTION, SOLUTION INTRAVENOUS at 12:29

## 2020-03-07 RX ADMIN — OXYCODONE HYDROCHLORIDE AND ACETAMINOPHEN 1 TABLET: 7.5; 325 TABLET ORAL at 23:10

## 2020-03-07 RX ADMIN — POTASSIUM CHLORIDE 10 MEQ: 10 INJECTION, SOLUTION INTRAVENOUS at 16:21

## 2020-03-07 RX ADMIN — SODIUM CHLORIDE, PRESERVATIVE FREE 10 ML: 5 INJECTION INTRAVENOUS at 21:04

## 2020-03-07 RX ADMIN — ENOXAPARIN SODIUM 110 MG: 120 INJECTION SUBCUTANEOUS at 17:31

## 2020-03-07 RX ADMIN — OXYCODONE HYDROCHLORIDE AND ACETAMINOPHEN 1 TABLET: 7.5; 325 TABLET ORAL at 18:14

## 2020-03-07 RX ADMIN — INSULIN ASPART 2 UNITS: 100 INJECTION, SOLUTION INTRAVENOUS; SUBCUTANEOUS at 21:05

## 2020-03-07 RX ADMIN — SODIUM CHLORIDE 1000 ML: 900 INJECTION, SOLUTION INTRAVENOUS at 11:56

## 2020-03-07 RX ADMIN — AMIODARONE HYDROCHLORIDE 0.5 MG/MIN: 1.8 INJECTION, SOLUTION INTRAVENOUS at 18:57

## 2020-03-07 RX ADMIN — POTASSIUM CHLORIDE 10 MEQ: 10 INJECTION, SOLUTION INTRAVENOUS at 17:38

## 2020-03-07 RX ADMIN — POTASSIUM CHLORIDE 10 MEQ: 10 INJECTION, SOLUTION INTRAVENOUS at 13:59

## 2020-03-07 RX ADMIN — AMIODARONE HYDROCHLORIDE 150 MG: 1.5 INJECTION, SOLUTION INTRAVENOUS at 12:11

## 2020-03-07 RX ADMIN — TERAZOSIN HYDROCHLORIDE ANHYDROUS 5 MG: 5 CAPSULE ORAL at 21:04

## 2020-03-07 NOTE — ED PROVIDER NOTES
Subjective   59 years old male with history of hypertension, hyperlipidemia, diabetes mellitus, congenital absent 1 kidney presented in the ER with chief complaint of syncopal episode prior to arrival.  Patient report he has been having nausea vomiting with diarrhea for the last 3 to 4 days with multiple episodes of nonprojectile, nonbilious vomiting without hematemesis.  He is also having 6 or 7 episodes of loose watery diarrhea.  Patient reports he was recently having a head cold and has taken some antibiotics.  He was feeling weak and tired since yesterday and this morning he went to work where his generalized weakness got worse with some lightheadedness especially with ambulation and fell face forward with syncopal episode.  He woke up with no confusion.  He has a small abrasion left face  with some pain in the neck but no restricted movements of neck.  Denies any focal numbness tingling or weakness.      History provided by:  Patient  Weakness - Generalized   Severity:  Severe  Onset quality:  Gradual  Timing:  Constant  Progression:  Worsening  Chronicity:  New  Relieved by:  Nothing  Worsened by:  Activity  Ineffective treatments:  Drinking fluids  Associated symptoms: abdominal pain, diarrhea, nausea, syncope and vomiting    Associated symptoms: no chest pain, no dysphagia, no dysuria, no numbness in extremities, no frequency, no headaches, no melena, no seizures and no shortness of breath    Risk factors: diabetes        Review of Systems   HENT: Negative for congestion, postnasal drip, sinus pressure, sinus pain, sneezing and sore throat.    Respiratory: Negative for chest tightness and shortness of breath.    Cardiovascular: Positive for syncope. Negative for chest pain.   Gastrointestinal: Positive for abdominal pain, diarrhea, nausea and vomiting. Negative for dysphagia and melena.   Genitourinary: Negative for dysuria and frequency.   Musculoskeletal: Negative for joint swelling.   Skin: Positive for  wound. Negative for color change.   Neurological: Positive for light-headedness. Negative for seizures and headaches.   Psychiatric/Behavioral: Negative for agitation.       Past Medical History:   Diagnosis Date   • Acute sinusitis    • Allergic rhinitis    • Arthropathy     of lumbar facet joint   • Artificial lens present     position - right   • Backache     chronic back problems   • Benign prostatic hyperplasia    • Bronchitis     perisistent   • Cataract    • Chronic obstructive lung disease (CMS/HCC)    • Diabetes mellitus (CMS/HCC)     no retinopathy   • Disorder of kidney     Disorder of kidney and/or ureter - Congenital solitary right kidney      • Dizziness     History of Meniere's like condition, left ear      • Drug therapy     long-term   • Dysfunction of eustachian tube    • Dyslipidemia    • Gout    • Headache     History of possible migraine/cluster      • Hearing loss    • Hypertension    • Hypokalemia    • Impacted cerumen    • Infestation by Sarcoptes scabiei alta hominis    • Inguinal hernia     History of, repaired      • Knee pain    • Osteoarthritis    • Pneumonia     in the past   • Posterior subcapsular polar senile cataract    • Sjogren's syndrome (CMS/HCC)    • Type 2 diabetes mellitus (CMS/HCC)        Allergies   Allergen Reactions   • Nsaids Other (See Comments)     Solitary kidney; renal failure with NSAID use.  Solitary kidney; renal failure with NSAID use.       Past Surgical History:   Procedure Laterality Date   • BACK SURGERY      1/1/02   • CARPAL TUNNEL RELEASE      (Carpal tunnel release on the left)   09/10/2010 , Carpal tunnel release on the right)   12/03/2010    • CATARACT EXTRACTION, BILATERAL     • ENDOSCOPY N/A 3/16/2017    Procedure: ESOPHAGOGASTRODUODENOSCOPY;  Surgeon: Alli Shook DO;  Location: [facility] ENDOSCOPY;  Service:    • EYE SURGERY Bilateral     cataract removed   • HERNIA REPAIR     • INGUINAL HERNIA REPAIR      (Left inguinal hernioplasty with mesh.)    06/07/2007    • INJECTION OF MEDICATION      Injection for nerve block (Lumbar medial branch block.)   03/31/2016    • INJECTION OF MEDICATION  03/19/2015    solu-medrol    • KNEE SURGERY      (Lateral release, removal of loose bodies, excision of suprapatellar plica.)   04/29/1982    • OTHER SURGICAL HISTORY      Lumbar surgery (Lumbosacral radio frequency thermal coagulation. Lumbosacral spondylosis.)   06/30/2016 ,Lumbar surgery (Lumbosacral radio frequency thermal coagulation.)   12/21/2015     • OTHER SURGICAL HISTORY      Remove cataract, insert lens (Right eye.)   05/07/2015    • OTHER SURGICAL HISTORY      Remove hip/pelvis lesion (Melanoma, right hip.)   2005    • VASECTOMY         Family History   Problem Relation Age of Onset   • Lung disease Mother         autoimmune   • Heart failure Father 35   • Stomach cancer Sister 43        autoimmune   • Diabetes Paternal Grandmother        Social History     Socioeconomic History   • Marital status:      Spouse name: Not on file   • Number of children: Not on file   • Years of education: Not on file   • Highest education level: Not on file   Tobacco Use   • Smoking status: Never Smoker   • Smokeless tobacco: Never Used   Substance and Sexual Activity   • Alcohol use: No   • Drug use: No   • Sexual activity: Defer     Partners: Female           Objective   Physical Exam   Constitutional: He is oriented to person, place, and time. He appears well-developed and well-nourished.   HENT:   Head: Normocephalic and atraumatic.   Right Ear: External ear normal.   Left Ear: External ear normal.   Nose: Nose normal.   Mouth/Throat: Oropharynx is clear and moist.   Eyes: Pupils are equal, round, and reactive to light. Conjunctivae and EOM are normal.   Mild abrasion around lower lower lid area   Neck: Normal range of motion. Neck supple.   Cardiovascular: An irregularly irregular rhythm present. Tachycardia present.   Pulmonary/Chest: Effort normal and breath sounds  normal. No stridor. No respiratory distress. He has no wheezes.   Abdominal: Soft. Bowel sounds are increased. There is tenderness (Mild generalized).   Musculoskeletal: Normal range of motion. He exhibits no tenderness.   Neurological: He is alert and oriented to person, place, and time. He displays no tremor and normal reflexes. No cranial nerve deficit or sensory deficit. He exhibits normal muscle tone. Coordination normal. GCS eye subscore is 4. GCS verbal subscore is 5. GCS motor subscore is 6. He displays no Babinski's sign on the right side. He displays no Babinski's sign on the left side.   Reflex Scores:       Bicep reflexes are 2+ on the right side and 2+ on the left side.       Patellar reflexes are 2+ on the right side and 2+ on the left side.  Skin: Skin is warm. Capillary refill takes less than 2 seconds.   Psychiatric: He has a normal mood and affect.   Nursing note and vitals reviewed.      ECG 12 Lead    Date/Time: 3/7/2020 1:20 PM  Performed by: Al Senior MD  Authorized by: Al Senior MD   Interpreted by physician  Rhythm: atrial fibrillation  Rate: tachycardic  BPM: 132  QRS axis: normal  ST Segments: ST segments normal  Clinical impression: abnormal ECG and dysrhythmia - atrial      ECG 12 Lead    Date/Time: 3/7/2020 2:39 PM  Performed by: Al Senior MD  Authorized by: Al Senior MD   Interpreted by physician  Rhythm: sinus tachycardia  Rate: normal  BPM: 102  QRS axis: normal  ST Segments: ST segments normal  Clinical impression: non-specific ECG                 ED Course                                           MDM  Number of Diagnoses or Management Options  Acute renal failure, unspecified acute renal failure type (CMS/HCC):   Atrial fibrillation with RVR (CMS/HCC):   Dehydration:   Gastroenteritis:   Hypokalemia:   Hypomagnesemia:   Syncope and collapse:   Diagnosis management comments: 59 Years old male with gastroenteritis presented in the ER with OMID rocha with RVR heart rate in  150s and hypotensive 77/  55.  He is given fluid bolus and his blood pressure is improving but still in A. fib with RVR, started on amiodarone and heart rate is improving.  Patient has acute renal failure with creatinine of 2.34 along with hyperkalemia.  Patient has dehydration from gastroenteritis.  Has negative CT head neck and abdomen for any acute findings.  Discussed with hospitalist and patient is being admitted for further evaluation.  While in the ER repeat EKG showed patient is converted to sinus tach with heart rate in 102.  He also has elevated lactate of 2.7 which is probably secondary to dehydration and no obvious focus of infection found.  Do not think needs any antibiotic at present.       Amount and/or Complexity of Data Reviewed  Clinical lab tests: ordered and reviewed  Tests in the radiology section of CPT®: ordered and reviewed  Discuss the patient with other providers: yes      Labs Reviewed   COMPREHENSIVE METABOLIC PANEL - Abnormal; Notable for the following components:       Result Value    Glucose 156 (*)     Creatinine 2.34 (*)     Potassium 2.9 (*)     Chloride 94 (*)     Alkaline Phosphatase 34 (*)     eGFR Non African Amer 29 (*)     BUN/Creatinine Ratio 5.6 (*)     Anion Gap 21.0 (*)     All other components within normal limits    Narrative:     GFR Normal >60  Chronic Kidney Disease <60  Kidney Failure <15     LIPASE - Abnormal; Notable for the following components:    Lipase 61 (*)     All other components within normal limits   LACTIC ACID, PLASMA - Abnormal; Notable for the following components:    Lactate 2.7 (*)     All other components within normal limits   MAGNESIUM - Abnormal; Notable for the following components:    Magnesium 1.5 (*)     All other components within normal limits   CBC WITH AUTO DIFFERENTIAL - Abnormal; Notable for the following components:    Lymphocyte % 15.4 (*)     All other components within normal limits   INFLUENZA ANTIGEN, RAPID - Normal   BNP  (IN-HOUSE) - Normal    Narrative:     Among patients with dyspnea, NT-proBNP is highly sensitive for the detection of acute congestive heart failure. In addition NT-proBNP of <300 pg/ml effectively rules out acute congestive heart failure with 99% negative predictive value.    Results may be falsely decreased if patient taking Biotin.     TROPONIN (IN-HOUSE) - Normal    Narrative:     Troponin T Reference Range:  <= 0.03 ng/mL-   Negative for AMI  >0.03 ng/mL-     Abnormal for myocardial necrosis.  Clinicians would have to utilize clinical acumen, EKG, Troponin and serial changes to determine if it is an Acute Myocardial Infarction or myocardial injury due to an underlying chronic condition.       Results may be falsely decreased if patient taking Biotin.     BLOOD CULTURE   BLOOD CULTURE   GASTROINTESTINAL PANEL, PCR   RAINBOW DRAW    Narrative:     The following orders were created for panel order [facility] Draw.  Procedure                               Abnormality         Status                     ---------                               -----------         ------                     Light Blue Top[[id number]]                                   Final result               Green Top (Gel)[[id number]]                                  Final result               Lavender Top[[id number]]                                     Final result               Gold Top - SST[[id number]]                                   Final result                 Please view results for these tests on the individual orders.   APTT   PROTIME-INR   URINALYSIS W/ CULTURE IF INDICATED   LACTIC ACID REFLEX TIMER   CBC AND DIFFERENTIAL    Narrative:     The following orders were created for panel order CBC & Differential.  Procedure                               Abnormality         Status                     ---------                               -----------         ------                     CBC Auto Differential[[id number]]        Abnormal             Final result                 Please view results for these tests on the individual orders.   LIGHT BLUE TOP   GREEN TOP   LAVENDER TOP   GOLD TOP - SST       Ct Abdomen Pelvis Without Contrast    Result Date: 3/7/2020  Narrative: CT Abdomen and Pelvis Without Contrast History: Generalized abdominal pain. Syncope. Fell. Axials spiral scans of the abdomen and pelvis were obtained without contrast.  Coronal and sagital reconstructions were preformed.  This exam was performed according to our departmental dose-optimization program, which includes automated exposure control, adjustment of the mA and/or kV according to patient size and/or use of iterative reconstruction technique. DLP: 631.80 Comparison: None Findings: Scans of the lung bases demonstrate old granulomatous disease. Coronary artery calcifications. Small anterior pericardial effusion. No acute osseous abnormality. Degenerative changes thoracic spine. Degenerative disc disease and spondylotic change L4-5 and L5-S1. Some stenosis of the neural foramina for the L4 and L5 nerve roots bilaterally. Fatty infiltration of the liver. The gallbladder is unremarkable. The spleen is unremarkable. The pancreas is unremarkable. The adrenal glands are unremarkable. Absent left kidney. 5.4 cm right renal cyst. Punctate nonobstructive right renal calculi. No ureteral calculi. No hydronephrosis. Unremarkable bladder. No pelvic mass. No abdominal aortic aneurysm. No adenopathy. Diverticulosis of the colon. No bowel obstruction. Normal appendix. No free air. No free fluid. No hernia.     Impression: Conclusion: Fatty infiltration of the liver. Absent left kidney. 5.4 cm right renal cyst. Punctate nonobstructive right renal calculi. Diverticulosis of the colon. Coronary artery calcifications. Degenerative disc disease and spondylotic change L4-5 and L5-S1. Some stenosis of the neural foramina for the L4 and L5 nerve roots bilaterally. [id number] Electronically signed by:  Jayme  Jacinto MARSHALL  3/7/2020 1:28 PM CST Workstation: [id number]    Ct Head Without Contrast    Result Date: 3/7/2020  Narrative: CT Head Without Contrast History: Syncope. Dizziness. Axial scans of the brain were obtained without intravenous contrast.  Coronal and sagital reconstructions were preformed. This exam was performed according to our departmental dose-optimization program, which includes automated exposure control, adjustment of the mA and/or kV according to patient size and/or use of iterative reconstruction technique. DLP: 992.70 Comparison: November 8, 2018 Findings: Bone windows are unremarkable. The visualized paranasal sinuses are unremarkable. No acute process. Minimal cerebral atrophy. Minimal small vessel disease. No hemorrhage. No mass. No abnormal areas of increased attenuation. No midline shift. No abnormal extra-axial fluid collections.     Impression: CONCLUSION: No acute process. Minimal cerebral atrophy. Minimal small vessel disease. [id number] Electronically signed by:  Jayme Casey MD  3/7/2020 1:06 PM CST Workstation: [id number]    Ct Cervical Spine Without Contrast    Result Date: 3/7/2020  Narrative: CT cervical spine without contrast HISTORY: Fell. Syncope. Nonenhanced axial scans of the cervical spine were obtained. Sagittal and coronal reconstructions were performed. This exam was performed according to our departmental dose-optimization program, which includes automated exposure control, adjustment of the mA and/or kV according to patient size and/or use of iterative reconstruction technique. CT DLP: 343.30 Findings: Normal cervical lordosis. Fusion of the C2 and C3 vertebral bodies and posterior facets. Multilevel facet arthropathy, degenerative disease and spondylotic change with spinal stenosis and neural foraminal stenosis. Vertebral height and alignment maintained. No fracture identified. The paravertebral soft tissues are unremarkable.     Impression: CONCLUSION: No cervical fracture. Fusion  of the C2 and C3 vertebral bodies and posterior facets. Multilevel facet arthropathy, degenerative disease and spondylotic change with spinal stenosis and neural foraminal stenosis. [id number] Electronically signed by:  Jayme Casey MD  3/7/2020 1:10 PM CST Workstation: [id number]    Xr Chest 1 View    Result Date: 3/7/2020  Narrative: PORTABLE CHEST HISTORY: Syncope and dizziness. Portable AP upright film of the chest was obtained at 11:17 AM. COMPARISON: September 17, 2018 FINDINGS: EKG leads. The lungs are clear of an acute process. Old granulomatous disease is present. The heart is not enlarged. The pulmonary vasculature is not increased. No pleural effusion. No pneumothorax. No acute osseous abnormality. Degenerative changes are present in the thoracic spine.     Impression: CONCLUSION: No Acute Disease [id number] Electronically signed by:  Jayme Casey MD  3/7/2020 12:17 PM CST Workstation: [id number]          Final diagnoses:   Acute renal failure, unspecified acute renal failure type (CMS/HCC)   Atrial fibrillation with RVR (CMS/HCC)   Gastroenteritis   Syncope and collapse   Hypokalemia   Hypomagnesemia   Dehydration            Al Senior MD  03/07/20 2146

## 2020-03-07 NOTE — ED NOTES
Patients heart rhythm converted from A-Fib to Sinus Tach, confirmed by EKG.      Nora Betancourt, DIOR  03/07/20 6747

## 2020-03-07 NOTE — H&P
[facility] Medicine Admission      Date of Admission: 3/7/2020      Primary Care Physician: Nasir Garcia MD      Chief Complaint: Nausea, vomiting, lightheadedness and syncope    HPI: Patient is 59-year-old  male who presents with 3 to 4 days of multiple episodes of nausea and vomiting.  He thought this morning he did feel some better so he went to work.  He began to have episode nausea and vomiting at work so he decided to leave.  He was walking out the door he had a syncopal episode before.  He brought emergency department.  Neurology recommends department he was found to be in atrial fibrillation with rapid ventricular response.  He has no history of this.  He has had minimal oral intake over the last 48 hours.  He denies chest pain, shortness of breath, numbness, dysuria, headache, and rectal bleeding.    Concurrent Medical History:  has a past medical history of Acute sinusitis, Allergic rhinitis, Arthropathy, Artificial lens present, Backache, Benign prostatic hyperplasia, Bronchitis, Cataract, Chronic obstructive lung disease (CMS/[facility]), Diabetes mellitus (CMS/[facility]), Disorder of kidney, Dizziness, Drug therapy, Dysfunction of eustachian tube, Dyslipidemia, Gout, Headache, Hearing loss, Hypertension, Hypokalemia, Impacted cerumen, Infestation by Sarcoptes scabiei alta hominis, Inguinal hernia, Knee pain, Osteoarthritis, Pneumonia, Posterior subcapsular polar senile cataract, Sjogren's syndrome (CMS/HCC), and Type 2 diabetes mellitus (CMS/[facility]).    Past Surgical History:  has a past surgical history that includes Back surgery; Carpal tunnel release; Inguinal Hernia Repair; Injection of Medication; Knee surgery; Other surgical history; Other surgical history; Other surgical history; Injection of Medication (03/19/2015); Eye surgery (Bilateral); Hernia repair; Esophagogastroduodenoscopy (N/A, 3/16/2017); Cataract extraction, bilateral; and  Vasectomy.    Family History: family history includes Diabetes in his paternal grandmother; Heart failure (age of onset: 35) in his father; Lung disease in his mother; Stomach cancer (age of onset: 43) in his sister.     Social History:  reports that he has never smoked. He has never used smokeless tobacco. He reports that he does not drink alcohol or use drugs.    Allergies:   Allergies   Allergen Reactions   • Nsaids Other (See Comments)     Solitary kidney; renal failure with NSAID use.  Solitary kidney; renal failure with NSAID use.       Medications:   Prior to Admission medications    Medication Sig Start Date End Date Taking? Authorizing Provider   Coenzyme Q-10 200 MG capsule Take 1 tablet by mouth Every Night. 4/18/19  Yes Nasir Garcia MD   doxazosin (CARDURA) 4 MG tablet Take 1 tablet by mouth Daily. 11/21/19  Yes Nasir Garcia MD   FLUTICASONE FUROATE IN Inhale.   Yes ProviderSoto MD   glucose blood test strip 1 each by Other route Daily. Use as instructed 11/21/19  Yes Nasir Garcia MD   JANUMET  MG per tablet TAKE 1 TABLET BY MOUTH TWICE DAILY WITH MEALS 2/3/20  Yes Nasir Garcia MD   lisinopril-hydrochlorothiazide (PRINZIDE,ZESTORETIC) 10-12.5 MG per tablet Take 1 tablet by mouth Daily. 11/21/19  Yes Nasir Garcia MD   omeprazole (priLOSEC) 40 MG capsule Take 1 capsule by mouth Daily. 4/18/19  Yes Nasir Garcia MD   ondansetron (ZOFRAN) 4 MG tablet Take 1 tablet by mouth Every 8 (Eight) Hours As Needed for Nausea or Vomiting. 8/9/18  Yes Nasir Garcia MD   ONETOUCH DELICA LANCETS 33G misc 1 each by Subdermal route Daily. 4/18/19  Yes Nasir Garcia MD   oxyCODONE-acetaminophen (PERCOCET) 7.5-325 MG per tablet Take 1 tablet by mouth Every 4 (Four) Hours As Needed.   Yes Emergency, Nurse Norma, RN   PARoxetine CR (PAXIL-CR) 25 MG 24 hr tablet TAKE 1 TABLET BY MOUTH EVERY MORNING 12/23/19  Yes Nasir Garcia MD   sucralfate  (CARAFATE) 1 g tablet Take 1 tablet by mouth 2 (Two) Times a Day Before Meals. 11/21/19  Yes Nasir Garcia MD   SUMAtriptan (IMITREX) 50 MG tablet Take one tablet at onset of headache. May repeat dose one time in 2 hours if headache not relieved. 8/19/19  Yes Nasir Garcia MD   Ergocalciferol 2000 UNITS tablet Take  by mouth.    Provider, MD Soto   methocarbamol (ROBAXIN) 500 MG tablet Take 1 tablet by mouth 4 (Four) Times a Day. 11/21/19   Nasir Garcia MD   rosuvastatin (CRESTOR) 5 MG tablet Take 1 tablet by mouth Daily. 11/21/19   Nasir Garcia MD   urea (COLT LO 40) 40 % cream Apply  topically.    Provider, MD Soto       Review of Systems:  Review of Systems   Constitutional: Positive for activity change, appetite change and fatigue. Negative for chills, fever and unexpected weight change.   HENT: Negative for congestion, facial swelling, hearing loss, nosebleeds, rhinorrhea, sneezing, trouble swallowing and voice change.    Eyes: Negative for photophobia and visual disturbance.   Respiratory: Negative for apnea, cough, choking, chest tightness, shortness of breath, wheezing and stridor.    Cardiovascular: Positive for palpitations. Negative for chest pain and leg swelling.   Gastrointestinal: Positive for diarrhea, nausea and vomiting. Negative for abdominal pain, blood in stool and constipation.   Endocrine: Negative for cold intolerance, heat intolerance, polydipsia, polyphagia and polyuria.   Genitourinary: Negative for dysuria, flank pain and hematuria.   Musculoskeletal: Negative for arthralgias, back pain, myalgias and neck pain.   Skin: Negative for rash and wound.   Allergic/Immunologic: Negative for immunocompromised state.   Neurological: Positive for dizziness, syncope and light-headedness. Negative for seizures, speech difficulty, weakness, numbness and headaches.   Hematological: Does not bruise/bleed easily.   Psychiatric/Behavioral: Negative for agitation,  behavioral problems, confusion, decreased concentration, hallucinations, self-injury and suicidal ideas. The patient is not nervous/anxious.       Otherwise complete ROS is negative except as mentioned above.    Physical Exam:   Temp:  [97.4 °F (36.3 °C)] 97.4 °F (36.3 °C)  Heart Rate:  [112-162] 134  Resp:  [20] 20  BP: ()/(55-74) 107/68     Physical Exam   Constitutional: He is oriented to person, place, and time. He appears well-developed and well-nourished.   HENT:   Head: Normocephalic and atraumatic.   Nose: Nose normal.   Mouth/Throat: Oropharynx is clear and moist.   Eyes: Pupils are equal, round, and reactive to light. Conjunctivae, EOM and lids are normal. No scleral icterus.   Neck: Normal range of motion. Neck supple. No JVD present. No tracheal tenderness and no spinous process tenderness present. No neck rigidity. No tracheal deviation, no edema and normal range of motion present.   Cardiovascular: S1 normal, S2 normal, normal heart sounds and normal pulses. An irregularly irregular rhythm present. Tachycardia present. Exam reveals no gallop and no friction rub.   No murmur heard.  Pulmonary/Chest: Effort normal and breath sounds normal. No accessory muscle usage. No respiratory distress. He has no decreased breath sounds. He has no wheezes. He has no rales. He exhibits no tenderness.   Abdominal: Soft. Bowel sounds are normal. He exhibits no distension and no mass. There is no tenderness. There is no rebound and no guarding.   Musculoskeletal: He exhibits no edema or tenderness.   Neurological: He is alert and oriented to person, place, and time. He has normal reflexes. He displays no atrophy and normal reflexes. No cranial nerve deficit or sensory deficit. He exhibits normal muscle tone. He displays no seizure activity. Coordination normal.   Skin: Skin is warm. No rash noted. No pallor.   Psychiatric: He has a normal mood and affect. His behavior is normal. Judgment and thought content normal.          Results Reviewed:  I have personally reviewed current lab, radiology, and data and agree with results.  Lab Results (last 24 hours)     Procedure Component Value Units Date/Time    Lactic Acid, Plasma [[id number]]  (Abnormal) Collected:  03/07/20 1316    Specimen:  Blood from Arm, Left Updated:  03/07/20 1343     Lactate 2.7 mmol/L     Lactic Acid, Reflex Timer (This will reflex a repeat order 3-3:15 hours after ordered.) [[id number]] Collected:  03/07/20 1316    Specimen:  Blood from Arm, Left Updated:  03/07/20 1343    Influenza Antigen, Rapid - Swab, Nasopharynx [[id number]]  (Normal) Collected:  03/07/20 1321    Specimen:  Swab from Nasopharynx Updated:  03/07/20 1335     Influenza A Ag, EIA Negative     Influenza B Ag, EIA Negative    Protime-INR [[id number]] Updated:  03/07/20 1326    Specimen:  Blood     aPTT [[id number]] Updated:  03/07/20 1326    Specimen:  Blood     Blood Culture - Blood, Arm, Left [[id number]] Collected:  03/07/20 1316    Specimen:  Blood from Arm, Left Updated:  03/07/20 1321    [place] Draw [[id number]] Collected:  03/07/20 1158    Specimen:  Blood Updated:  03/07/20 1300    Narrative:       The following orders were created for panel order [place] Draw.  Procedure                               Abnormality         Status                     ---------                               -----------         ------                     Light Blue Top[[id number]]                                   Final result               Green Top (Gel)[[id number]]                                  Final result               Lavender Top[[id number]]                                     Final result               Gold Top - SST[[id number]]                                   Final result                 Please view results for these tests on the individual orders.    Light Blue Top [[id number]] Collected:  03/07/20 1158    Specimen:  Blood Updated:  03/07/20 1300     Extra Tube hold for add-on     Comment: Auto resulted        Green Top (Gel) [[id number]] Collected:  03/07/20 1158    Specimen:  Blood Updated:  03/07/20 1300     Extra Tube Hold for add-ons.     Comment: Auto resulted.       Lavender Top [[id number]] Collected:  03/07/20 1158    Specimen:  Blood Updated:  03/07/20 1300     Extra Tube hold for add-on     Comment: Auto resulted       Gold Top - SST [[id number]] Collected:  03/07/20 1158    Specimen:  Blood Updated:  03/07/20 1300     Extra Tube Hold for add-ons.     Comment: Auto resulted.       Comprehensive Metabolic Panel [[id number]]  (Abnormal) Collected:  03/07/20 1158    Specimen:  Blood Updated:  03/07/20 1223     Glucose 156 mg/dL      BUN 13 mg/dL      Creatinine 2.34 mg/dL      Sodium 138 mmol/L      Potassium 2.9 mmol/L      Chloride 94 mmol/L      CO2 23.0 mmol/L      Calcium 9.8 mg/dL      Total Protein 8.1 g/dL      Albumin 4.30 g/dL      ALT (SGPT) 17 U/L      AST (SGOT) 19 U/L      Alkaline Phosphatase 34 U/L      Total Bilirubin 0.6 mg/dL      eGFR Non African Amer 29 mL/min/1.73      Globulin 3.8 gm/dL      A/G Ratio 1.1 g/dL      BUN/Creatinine Ratio 5.6     Anion Gap 21.0 mmol/L     Narrative:       GFR Normal >60  Chronic Kidney Disease <60  Kidney Failure <15      Lipase [[id number]]  (Abnormal) Collected:  03/07/20 1158    Specimen:  Blood Updated:  03/07/20 1222     Lipase 61 U/L     Magnesium [[id number]]  (Abnormal) Collected:  03/07/20 1158    Specimen:  Blood Updated:  03/07/20 1222     Magnesium 1.5 mg/dL     Troponin [[id number]]  (Normal) Collected:  03/07/20 1158    Specimen:  Blood Updated:  03/07/20 1220     Troponin T 0.030 ng/mL     Narrative:       Troponin T Reference Range:  <= 0.03 ng/mL-   Negative for AMI  >0.03 ng/mL-     Abnormal for myocardial necrosis.  Clinicians would have to utilize clinical acumen, EKG, Troponin and serial changes to determine if it is an Acute Myocardial Infarction or myocardial injury due to an underlying chronic condition.       Results may be falsely  decreased if patient taking Biotin.      BNP [[id number]]  (Normal) Collected:  03/07/20 1158    Specimen:  Blood Updated:  03/07/20 1220     proBNP 169.4 pg/mL     Narrative:       Among patients with dyspnea, NT-proBNP is highly sensitive for the detection of acute congestive heart failure. In addition NT-proBNP of <300 pg/ml effectively rules out acute congestive heart failure with 99% negative predictive value.    Results may be falsely decreased if patient taking Biotin.      CBC & Differential [[id number]] Collected:  03/07/20 1158    Specimen:  Blood Updated:  03/07/20 1210    Narrative:       The following orders were created for panel order CBC & Differential.  Procedure                               Abnormality         Status                     ---------                               -----------         ------                     CBC Auto Differential[[id number]]        Abnormal            Final result                 Please view results for these tests on the individual orders.    CBC Auto Differential [[id number]]  (Abnormal) Collected:  03/07/20 1158    Specimen:  Blood Updated:  03/07/20 1210     WBC 7.16 10*3/mm3      RBC 4.44 10*6/mm3      Hemoglobin 13.2 g/dL      Hematocrit 37.5 %      MCV 84.5 fL      MCH 29.7 pg      MCHC 35.2 g/dL      RDW 13.6 %      RDW-SD 41.9 fl      MPV 11.0 fL      Platelets 208 10*3/mm3      Neutrophil % 73.2 %      Lymphocyte % 15.4 %      Monocyte % 9.9 %      Eosinophil % 0.3 %      Basophil % 0.8 %      Immature Grans % 0.4 %      Neutrophils, Absolute 5.24 10*3/mm3      Lymphocytes, Absolute 1.10 10*3/mm3      Monocytes, Absolute 0.71 10*3/mm3      Eosinophils, Absolute 0.02 10*3/mm3      Basophils, Absolute 0.06 10*3/mm3      Immature Grans, Absolute 0.03 10*3/mm3      nRBC 0.0 /100 WBC         Imaging Results (Last 24 Hours)     Procedure Component Value Units Date/Time    CT Abdomen Pelvis Without Contrast [[id number]] Collected:  03/07/20 1238     Updated:  03/07/20  1329    Narrative:         CT Abdomen and Pelvis Without Contrast    History: Generalized abdominal pain. Syncope. Fell.    Axials spiral scans of the abdomen and pelvis were obtained  without contrast.  Coronal and sagital reconstructions were  preformed.      This exam was performed according to our departmental  dose-optimization program, which includes automated exposure  control, adjustment of the mA and/or kV according to patient size  and/or use of iterative reconstruction technique.    DLP: 631.80    Comparison: None    Findings:   Scans of the lung bases demonstrate old granulomatous disease.  Coronary artery calcifications.  Small anterior pericardial effusion.    No acute osseous abnormality.  Degenerative changes thoracic spine.  Degenerative disc disease and spondylotic change L4-5 and L5-S1.  Some stenosis of the neural foramina for the L4 and L5 nerve  roots bilaterally.    Fatty infiltration of the liver.  The gallbladder is unremarkable.  The spleen is unremarkable.  The pancreas is unremarkable.  The adrenal glands are unremarkable.    Absent left kidney.  5.4 cm right renal cyst.  Punctate nonobstructive right renal calculi.  No ureteral calculi.  No hydronephrosis.    Unremarkable bladder.  No pelvic mass.    No abdominal aortic aneurysm.  No adenopathy.    Diverticulosis of the colon.  No bowel obstruction.  Normal appendix.  No free air.  No free fluid.    No hernia.      Impression:       Conclusion:  Fatty infiltration of the liver.  Absent left kidney.  5.4 cm right renal cyst.  Punctate nonobstructive right renal calculi.  Diverticulosis of the colon.  Coronary artery calcifications.  Degenerative disc disease and spondylotic change L4-5 and L5-S1.  Some stenosis of the neural foramina for the L4 and L5 nerve  roots bilaterally.    [id number]    Electronically signed by:  Jayme Casey MD  3/7/2020 1:28 PM CST  Workstation: [id number]    CT Cervical Spine Without Contrast [[id number]] Collected:   03/07/20 1238     Updated:  03/07/20 1311    Narrative:       CT cervical spine without contrast    HISTORY: Fell. Syncope.    Nonenhanced axial scans of the cervical spine were obtained.  Sagittal and coronal reconstructions were performed.    This exam was performed according to our departmental  dose-optimization program, which includes automated exposure  control, adjustment of the mA and/or kV according to patient size  and/or use of iterative reconstruction technique.    CT DLP: 343.30    Findings:  Normal cervical lordosis.   Fusion of the C2 and C3 vertebral bodies and posterior facets.  Multilevel facet arthropathy, degenerative disease and  spondylotic change with spinal stenosis and neural foraminal  stenosis.  Vertebral height and alignment maintained.   No fracture identified.     The paravertebral soft tissues are unremarkable.      Impression:       CONCLUSION:  No cervical fracture.  Fusion of the C2 and C3 vertebral bodies and posterior facets.  Multilevel facet arthropathy, degenerative disease and  spondylotic change with spinal stenosis and neural foraminal  stenosis.    [id number]    Electronically signed by:  Jayme Casey MD  3/7/2020 1:10 PM [facility]  Workstation: [id number]    CT Head Without Contrast [[id number]] Collected:  03/07/20 1238     Updated:  03/07/20 1308    Narrative:         CT Head Without Contrast    History: Syncope. Dizziness.    Axial scans of the brain were obtained without intravenous  contrast.  Coronal and sagital reconstructions were preformed.    This exam was performed according to our departmental  dose-optimization program, which includes automated exposure  control, adjustment of the mA and/or kV according to patient size  and/or use of iterative reconstruction technique.    DLP: 992.70    Comparison: November 8, 2018    Findings:  Bone windows are unremarkable.  The visualized paranasal sinuses are unremarkable.    No acute process.  Minimal cerebral atrophy.  Minimal small  vessel disease.  No hemorrhage.  No mass.  No abnormal areas of increased attenuation.  No midline shift.  No abnormal extra-axial fluid collections.      Impression:       CONCLUSION:  No acute process.  Minimal cerebral atrophy.  Minimal small vessel disease.    [id number]    Electronically signed by:  Jayme Casey MD  3/7/2020 1:06 PM CST  Workstation: [id number]    XR Chest 1 View [[id number]] Collected:  03/07/20 1159     Updated:  03/07/20 1219    Narrative:         PORTABLE CHEST    HISTORY: Syncope and dizziness.    Portable AP upright film of the chest was obtained at 11:17 AM.  COMPARISON: September 17, 2018    FINDINGS:   EKG leads.  The lungs are clear of an acute process.  Old granulomatous disease is present.  The heart is not enlarged.  The pulmonary vasculature is not increased.  No pleural effusion.  No pneumothorax.  No acute osseous abnormality.  Degenerative changes are present in the thoracic spine.      Impression:       CONCLUSION:  No Acute Disease    [id number]    Electronically signed by:  Jayme Casey MD  3/7/2020 12:17 PM CST  Workstation: [id number]            Assessment:    Active Hospital Problems    Diagnosis   • Acute renal failure (CMS/[facility])             Plan:  1.  Nausea/vomiting/diarrhea: Symptomatic treatment.  Will get GI PCR and C. difficile.  2.  Acute kidney injury: Secondary to volume depletion.  Aggressive fluid resuscitation.  3.  Syncope: Secondary to volume depletion.  Continue cardiac monitoring.  4.  Atrial fibrillation with rapid ventricular response: Patient was started on amiodarone drip and converted back into normal sinus rhythm.  Continue amiodarone drip for now.  Will anticoagulate for the time being.  5.  Lactic acidosis: Secondary to volume depletion.  Continue aggressive fluid resuscitation.  6.  COPD: No exacerbation.  7.  Chronic pain: Continue home medication.  8.  Diabetes: Hold oral medications.  Will start on sliding scale for now.  9.  DVT prophylaxis: Lovenox.    I  discussed the patient's findings and my recommendations with: Patient and family        This document has been electronically signed by Mu Martinez MD on March 7, 2020 2:00 PM

## 2020-03-08 ENCOUNTER — APPOINTMENT (OUTPATIENT)
Dept: CARDIOLOGY | Facility: HOSPITAL | Age: 60
End: 2020-03-08

## 2020-03-08 LAB
ANION GAP SERPL CALCULATED.3IONS-SCNC: 15 MMOL/L (ref 5–15)
BASOPHILS # BLD AUTO: 0.05 10*3/MM3 (ref 0–0.2)
BASOPHILS NFR BLD AUTO: 0.9 % (ref 0–1.5)
BH CV ECHO MEAS - ACS: 2.6 CM
BH CV ECHO MEAS - AI DEC SLOPE: 94.2 CM/SEC^2
BH CV ECHO MEAS - AI MAX PG: 31.4 MMHG
BH CV ECHO MEAS - AI MAX VEL: 280 CM/SEC
BH CV ECHO MEAS - AI P1/2T: 870.6 MSEC
BH CV ECHO MEAS - AO MAX PG (FULL): 0.62 MMHG
BH CV ECHO MEAS - AO MAX PG: 5.3 MMHG
BH CV ECHO MEAS - AO MEAN PG (FULL): 2 MMHG
BH CV ECHO MEAS - AO MEAN PG: 4 MMHG
BH CV ECHO MEAS - AO ROOT AREA (BSA CORRECTED): 1.7
BH CV ECHO MEAS - AO ROOT AREA: 10.8 CM^2
BH CV ECHO MEAS - AO ROOT DIAM: 3.7 CM
BH CV ECHO MEAS - AO V2 MAX: 115 CM/SEC
BH CV ECHO MEAS - AO V2 MEAN: 92.5 CM/SEC
BH CV ECHO MEAS - AO V2 VTI: 27.5 CM
BH CV ECHO MEAS - ASC AORTA: 3.9 CM
BH CV ECHO MEAS - AVA(I,A): 4.1 CM^2
BH CV ECHO MEAS - AVA(I,D): 4.1 CM^2
BH CV ECHO MEAS - AVA(V,A): 4.2 CM^2
BH CV ECHO MEAS - AVA(V,D): 4.2 CM^2
BH CV ECHO MEAS - BSA(HAYCOCK): 2.4 M^2
BH CV ECHO MEAS - BSA: 2.2 M^2
BH CV ECHO MEAS - BZI_BMI: 35.7 KILOGRAMS/M^2
BH CV ECHO MEAS - BZI_METRIC_HEIGHT: 175.3 CM
BH CV ECHO MEAS - BZI_METRIC_WEIGHT: 109.8 KG
BH CV ECHO MEAS - EDV(CUBED): 88.1 ML
BH CV ECHO MEAS - EDV(MOD-SP2): 49.4 ML
BH CV ECHO MEAS - EDV(MOD-SP4): 74.9 ML
BH CV ECHO MEAS - EDV(TEICH): 90.1 ML
BH CV ECHO MEAS - EF(CUBED): 70.9 %
BH CV ECHO MEAS - EF(MOD-SP2): 49 %
BH CV ECHO MEAS - EF(MOD-SP4): 56.7 %
BH CV ECHO MEAS - EF(TEICH): 62.7 %
BH CV ECHO MEAS - ESV(CUBED): 25.7 ML
BH CV ECHO MEAS - ESV(MOD-SP2): 25.2 ML
BH CV ECHO MEAS - ESV(MOD-SP4): 32.4 ML
BH CV ECHO MEAS - ESV(TEICH): 33.6 ML
BH CV ECHO MEAS - FS: 33.7 %
BH CV ECHO MEAS - IVS/LVPW: 0.91
BH CV ECHO MEAS - IVSD: 1 CM
BH CV ECHO MEAS - LA DIMENSION: 3.2 CM
BH CV ECHO MEAS - LA/AO: 0.86
BH CV ECHO MEAS - LV DIASTOLIC VOL/BSA (35-75): 33.4 ML/M^2
BH CV ECHO MEAS - LV MASS(C)D: 169.8 GRAMS
BH CV ECHO MEAS - LV MASS(C)DI: 75.8 GRAMS/M^2
BH CV ECHO MEAS - LV MAX PG: 4.7 MMHG
BH CV ECHO MEAS - LV MEAN PG: 2 MMHG
BH CV ECHO MEAS - LV SYSTOLIC VOL/BSA (12-30): 14.5 ML/M^2
BH CV ECHO MEAS - LV V1 MAX: 108 CM/SEC
BH CV ECHO MEAS - LV V1 MEAN: 71.3 CM/SEC
BH CV ECHO MEAS - LV V1 VTI: 24.7 CM
BH CV ECHO MEAS - LVIDD: 4.5 CM
BH CV ECHO MEAS - LVIDS: 3 CM
BH CV ECHO MEAS - LVLD AP2: 7.5 CM
BH CV ECHO MEAS - LVLD AP4: 7.6 CM
BH CV ECHO MEAS - LVLS AP2: 6.5 CM
BH CV ECHO MEAS - LVLS AP4: 7.1 CM
BH CV ECHO MEAS - LVOT AREA (M): 4.5 CM^2
BH CV ECHO MEAS - LVOT AREA: 4.5 CM^2
BH CV ECHO MEAS - LVOT DIAM: 2.4 CM
BH CV ECHO MEAS - LVPWD: 1.1 CM
BH CV ECHO MEAS - MR MAX PG: 35 MMHG
BH CV ECHO MEAS - MR MAX VEL: 296 CM/SEC
BH CV ECHO MEAS - MV A MAX VEL: 57.6 CM/SEC
BH CV ECHO MEAS - MV DEC SLOPE: 566 CM/SEC^2
BH CV ECHO MEAS - MV E MAX VEL: 98.6 CM/SEC
BH CV ECHO MEAS - MV E/A: 1.7
BH CV ECHO MEAS - MV P1/2T MAX VEL: 123 CM/SEC
BH CV ECHO MEAS - MV P1/2T: 63.6 MSEC
BH CV ECHO MEAS - MVA P1/2T LCG: 1.8 CM^2
BH CV ECHO MEAS - MVA(P1/2T): 3.5 CM^2
BH CV ECHO MEAS - PA MAX PG (FULL): 1.4 MMHG
BH CV ECHO MEAS - PA MAX PG: 2.8 MMHG
BH CV ECHO MEAS - PA V2 MAX: 84.4 CM/SEC
BH CV ECHO MEAS - PI END-D VEL: 152 CM/SEC
BH CV ECHO MEAS - RAP SYSTOLE: 10 MMHG
BH CV ECHO MEAS - RV MAX PG: 1.5 MMHG
BH CV ECHO MEAS - RV MEAN PG: 1 MMHG
BH CV ECHO MEAS - RV V1 MAX: 60.7 CM/SEC
BH CV ECHO MEAS - RV V1 MEAN: 42.6 CM/SEC
BH CV ECHO MEAS - RV V1 VTI: 14.8 CM
BH CV ECHO MEAS - RVDD: 3.1 CM
BH CV ECHO MEAS - RVSP: 32.3 MMHG
BH CV ECHO MEAS - SI(AO): 132 ML/M^2
BH CV ECHO MEAS - SI(CUBED): 27.9 ML/M^2
BH CV ECHO MEAS - SI(LVOT): 49.9 ML/M^2
BH CV ECHO MEAS - SI(MOD-SP2): 10.8 ML/M^2
BH CV ECHO MEAS - SI(MOD-SP4): 19 ML/M^2
BH CV ECHO MEAS - SI(TEICH): 25.2 ML/M^2
BH CV ECHO MEAS - SV(AO): 295.7 ML
BH CV ECHO MEAS - SV(CUBED): 62.4 ML
BH CV ECHO MEAS - SV(LVOT): 111.7 ML
BH CV ECHO MEAS - SV(MOD-SP2): 24.2 ML
BH CV ECHO MEAS - SV(MOD-SP4): 42.5 ML
BH CV ECHO MEAS - SV(TEICH): 56.5 ML
BH CV ECHO MEAS - TR MAX VEL: 236 CM/SEC
BUN BLD-MCNC: 11 MG/DL (ref 8–23)
BUN/CREAT SERPL: 6.9 (ref 7–25)
CALCIUM SPEC-SCNC: 8.5 MG/DL (ref 8.6–10.5)
CHLORIDE SERPL-SCNC: 98 MMOL/L (ref 98–107)
CO2 SERPL-SCNC: 23 MMOL/L (ref 22–29)
CREAT BLD-MCNC: 1.6 MG/DL (ref 0.76–1.27)
DEPRECATED RDW RBC AUTO: 42.6 FL (ref 37–54)
EOSINOPHIL # BLD AUTO: 0.1 10*3/MM3 (ref 0–0.4)
EOSINOPHIL NFR BLD AUTO: 1.8 % (ref 0.3–6.2)
ERYTHROCYTE [DISTWIDTH] IN BLOOD BY AUTOMATED COUNT: 13.7 % (ref 12.3–15.4)
GFR SERPL CREATININE-BSD FRML MDRD: 44 ML/MIN/1.73
GLUCOSE BLD-MCNC: 157 MG/DL (ref 65–99)
GLUCOSE BLDC GLUCOMTR-MCNC: 103 MG/DL (ref 70–130)
GLUCOSE BLDC GLUCOMTR-MCNC: 120 MG/DL (ref 70–130)
GLUCOSE BLDC GLUCOMTR-MCNC: 145 MG/DL (ref 70–130)
GLUCOSE BLDC GLUCOMTR-MCNC: 161 MG/DL (ref 70–130)
HCT VFR BLD AUTO: 31.7 % (ref 37.5–51)
HGB BLD-MCNC: 11.1 G/DL (ref 13–17.7)
IMM GRANULOCYTES # BLD AUTO: 0.02 10*3/MM3 (ref 0–0.05)
IMM GRANULOCYTES NFR BLD AUTO: 0.4 % (ref 0–0.5)
LYMPHOCYTES # BLD AUTO: 2 10*3/MM3 (ref 0.7–3.1)
LYMPHOCYTES NFR BLD AUTO: 36.5 % (ref 19.6–45.3)
MAGNESIUM SERPL-MCNC: 1.9 MG/DL (ref 1.6–2.4)
MAGNESIUM SERPL-MCNC: 2.5 MG/DL (ref 1.6–2.4)
MAXIMAL PREDICTED HEART RATE: 160 BPM
MCH RBC QN AUTO: 29.8 PG (ref 26.6–33)
MCHC RBC AUTO-ENTMCNC: 35 G/DL (ref 31.5–35.7)
MCV RBC AUTO: 85.2 FL (ref 79–97)
MONOCYTES # BLD AUTO: 0.54 10*3/MM3 (ref 0.1–0.9)
MONOCYTES NFR BLD AUTO: 9.9 % (ref 5–12)
NEUTROPHILS # BLD AUTO: 2.77 10*3/MM3 (ref 1.7–7)
NEUTROPHILS NFR BLD AUTO: 50.5 % (ref 42.7–76)
NRBC BLD AUTO-RTO: 0 /100 WBC (ref 0–0.2)
PLATELET # BLD AUTO: 207 10*3/MM3 (ref 140–450)
PMV BLD AUTO: 10.8 FL (ref 6–12)
POTASSIUM BLD-SCNC: 2.7 MMOL/L (ref 3.5–5.2)
POTASSIUM BLD-SCNC: 3.9 MMOL/L (ref 3.5–5.2)
RBC # BLD AUTO: 3.72 10*6/MM3 (ref 4.14–5.8)
SODIUM BLD-SCNC: 136 MMOL/L (ref 136–145)
STRESS TARGET HR: 136 BPM
WBC NRBC COR # BLD: 5.48 10*3/MM3 (ref 3.4–10.8)

## 2020-03-08 PROCEDURE — 80048 BASIC METABOLIC PNL TOTAL CA: CPT | Performed by: HOSPITALIST

## 2020-03-08 PROCEDURE — 25010000002 ENOXAPARIN PER 10 MG: Performed by: HOSPITALIST

## 2020-03-08 PROCEDURE — 93306 TTE W/DOPPLER COMPLETE: CPT | Performed by: INTERNAL MEDICINE

## 2020-03-08 PROCEDURE — 25010000002 LEVOFLOXACIN PER 250 MG: Performed by: INTERNAL MEDICINE

## 2020-03-08 PROCEDURE — 82962 GLUCOSE BLOOD TEST: CPT

## 2020-03-08 PROCEDURE — 25010000002 MAGNESIUM SULFATE 2 GM/50ML SOLUTION: Performed by: INTERNAL MEDICINE

## 2020-03-08 PROCEDURE — 0097U HC BIOFIRE FILMARRAY GI PANEL: CPT | Performed by: HOSPITALIST

## 2020-03-08 PROCEDURE — 84132 ASSAY OF SERUM POTASSIUM: CPT | Performed by: INTERNAL MEDICINE

## 2020-03-08 PROCEDURE — 93306 TTE W/DOPPLER COMPLETE: CPT

## 2020-03-08 PROCEDURE — 83735 ASSAY OF MAGNESIUM: CPT | Performed by: INTERNAL MEDICINE

## 2020-03-08 PROCEDURE — 93010 ELECTROCARDIOGRAM REPORT: CPT | Performed by: INTERNAL MEDICINE

## 2020-03-08 PROCEDURE — 85025 COMPLETE CBC W/AUTO DIFF WBC: CPT | Performed by: HOSPITALIST

## 2020-03-08 PROCEDURE — 63710000001 ONDANSETRON PER 8 MG: Performed by: HOSPITALIST

## 2020-03-08 RX ORDER — L. ACIDOPHILUS/L.BULGARICUS 1MM CELL
1 TABLET ORAL DAILY
Status: DISCONTINUED | OUTPATIENT
Start: 2020-03-08 | End: 2020-03-09 | Stop reason: HOSPADM

## 2020-03-08 RX ORDER — LEVOFLOXACIN 5 MG/ML
500 INJECTION, SOLUTION INTRAVENOUS EVERY 24 HOURS
Status: DISCONTINUED | OUTPATIENT
Start: 2020-03-08 | End: 2020-03-09 | Stop reason: HOSPADM

## 2020-03-08 RX ADMIN — LEVOFLOXACIN 500 MG: 5 INJECTION, SOLUTION INTRAVENOUS at 10:20

## 2020-03-08 RX ADMIN — LACTOBACILLUS TAB 1 TABLET: TAB at 10:17

## 2020-03-08 RX ADMIN — OXYCODONE HYDROCHLORIDE AND ACETAMINOPHEN 1 TABLET: 7.5; 325 TABLET ORAL at 16:31

## 2020-03-08 RX ADMIN — TERAZOSIN HYDROCHLORIDE ANHYDROUS 5 MG: 5 CAPSULE ORAL at 20:09

## 2020-03-08 RX ADMIN — MAGNESIUM SULFATE HEPTAHYDRATE 2 G: 40 INJECTION, SOLUTION INTRAVENOUS at 03:35

## 2020-03-08 RX ADMIN — ONDANSETRON 4 MG: 4 TABLET, FILM COATED ORAL at 03:34

## 2020-03-08 RX ADMIN — OXYCODONE HYDROCHLORIDE AND ACETAMINOPHEN 1 TABLET: 7.5; 325 TABLET ORAL at 20:11

## 2020-03-08 RX ADMIN — ENOXAPARIN SODIUM 110 MG: 120 INJECTION SUBCUTANEOUS at 06:18

## 2020-03-08 RX ADMIN — SUMATRIPTAN SUCCINATE 50 MG: 50 TABLET ORAL at 10:17

## 2020-03-08 RX ADMIN — PAROXETINE HYDROCHLORIDE 25 MG: 25 TABLET, FILM COATED, EXTENDED RELEASE ORAL at 06:17

## 2020-03-08 RX ADMIN — MAGNESIUM SULFATE HEPTAHYDRATE 2 G: 40 INJECTION, SOLUTION INTRAVENOUS at 06:17

## 2020-03-08 RX ADMIN — OXYCODONE HYDROCHLORIDE AND ACETAMINOPHEN 1 TABLET: 7.5; 325 TABLET ORAL at 12:08

## 2020-03-08 RX ADMIN — SODIUM CHLORIDE, PRESERVATIVE FREE 10 ML: 5 INJECTION INTRAVENOUS at 09:37

## 2020-03-08 RX ADMIN — PANTOPRAZOLE SODIUM 40 MG: 40 TABLET, DELAYED RELEASE ORAL at 06:18

## 2020-03-08 RX ADMIN — SUCRALFATE 1 G: 1 TABLET ORAL at 06:18

## 2020-03-08 RX ADMIN — POTASSIUM CHLORIDE 40 MEQ: 750 CAPSULE, EXTENDED RELEASE ORAL at 07:45

## 2020-03-08 RX ADMIN — SODIUM CHLORIDE 100 ML/HR: 900 INJECTION, SOLUTION INTRAVENOUS at 21:46

## 2020-03-08 RX ADMIN — ONDANSETRON 4 MG: 4 TABLET, FILM COATED ORAL at 12:07

## 2020-03-08 RX ADMIN — OXYCODONE HYDROCHLORIDE AND ACETAMINOPHEN 1 TABLET: 7.5; 325 TABLET ORAL at 07:45

## 2020-03-08 RX ADMIN — MAGNESIUM SULFATE HEPTAHYDRATE 2 G: 40 INJECTION, SOLUTION INTRAVENOUS at 00:22

## 2020-03-08 RX ADMIN — ENOXAPARIN SODIUM 110 MG: 120 INJECTION SUBCUTANEOUS at 17:05

## 2020-03-08 RX ADMIN — SODIUM CHLORIDE 100 ML/HR: 900 INJECTION, SOLUTION INTRAVENOUS at 05:07

## 2020-03-08 RX ADMIN — POTASSIUM CHLORIDE 40 MEQ: 750 CAPSULE, EXTENDED RELEASE ORAL at 12:08

## 2020-03-08 RX ADMIN — SODIUM CHLORIDE 100 ML/HR: 900 INJECTION, SOLUTION INTRAVENOUS at 13:30

## 2020-03-08 NOTE — PROGRESS NOTES
[facility] Medicine Services  INPATIENT PROGRESS NOTE    Length of Stay: 1  Date of Admission: 3/7/2020  Primary Care Physician: Nasir Garcia MD    Subjective   Chief Complaint: Nausea, vomiting, lightheadedness and syncope     HPI: Patient is 59-year-old  male who presents with 3 to 4 days of multiple episodes of nausea and vomiting.  He thought this morning he did feel some better so he went to work.  He began to have episode nausea and vomiting at work so he decided to leave.      Pt was found to have salmonella in gi    Review of Systems     All pertinent negatives and positives are as above. All other systems have been reviewed and are negative unless otherwise stated.     Pt awsa   Temp:  [97.5 °F (36.4 °C)-99 °F (37.2 °C)] 97.8 °F (36.6 °C)  Heart Rate:  [] 74  Resp:  [16] 16  BP: ()/(59-80) 107/59    Physical Exam  Constitutional: He is oriented to person, place, and time. He appears well-developed and well-nourished.   HENT:   Head: Normocephalic and atraumatic.   Nose: Nose normal.   Mouth/Throat: Oropharynx is clear and moist.   Eyes: Pupils are equal, round, and reactive to light. Conjunctivae, EOM and lids are normal. No scleral icterus.   Neck: Normal range of motion. Neck supple. No JVD present. No tracheal tenderness and no spinous process tenderness present. No neck rigidity. No tracheal deviation, no edema and normal range of motion present.   Cardiovascular: S1 normal, S2 normal, normal heart sounds and normal pulses. An irregularly irregular rhythm present. Tachycardia present. Exam reveals no gallop and no friction rub.   No murmur heard.  Pulmonary/Chest: Effort normal and breath sounds normal. No accessory muscle usage. No respiratory distress. He has no decreased breath sounds. He has no wheezes. He has no rales. He exhibits no tenderness.   Abdominal: Soft. Bowel sounds are normal. He exhibits no distension and no mass.  There is no tenderness. There is no rebound and no guarding.   Musculoskeletal: He exhibits no edema or tenderness.   Neurological: He is alert and oriented to person, place, and time. He has normal reflexes. He displays no atrophy and normal reflexes. No cranial nerve deficit or sensory deficit. He exhibits normal muscle tone. He displays no seizure activity. Coordination normal.   Skin: Skin is warm. No rash noted. No pallor.   Psychiatric: He has a normal mood and affect. His behavior is normal. Judgment and thought content normal.          Results Review:  I have reviewed the labs, radiology results, and diagnostic studies.    Laboratory Data:   Results from last 7 days   Lab Units 03/08/20  0352 03/07/20  2242 03/07/20  1158   SODIUM mmol/L 136  --  138   POTASSIUM mmol/L 2.7* 2.8* 2.9*   CHLORIDE mmol/L 98  --  94*   CO2 mmol/L 23.0  --  23.0   BUN mg/dL 11  --  13   CREATININE mg/dL 1.60*  --  2.34*   GLUCOSE mg/dL 157*  --  156*   CALCIUM mg/dL 8.5*  --  9.8   BILIRUBIN mg/dL  --   --  0.6   ALK PHOS U/L  --   --  34*   ALT (SGPT) U/L  --   --  17   AST (SGOT) U/L  --   --  19   ANION GAP mmol/L 15.0  --  21.0*     Estimated Creatinine Clearance: 60 mL/min (A) (by C-G formula based on SCr of 1.6 mg/dL (H)).  Results from last 7 days   Lab Units 03/08/20  0352 03/07/20  1158   MAGNESIUM mg/dL 1.9 1.5*         Results from last 7 days   Lab Units 03/08/20  0352 03/07/20  1158   WBC 10*3/mm3 5.48 7.16   HEMOGLOBIN g/dL 11.1* 13.2   HEMATOCRIT % 31.7* 37.5   PLATELETS 10*3/mm3 207 208     Results from last 7 days   Lab Units 03/07/20  1657   INR  1.10       Culture Data:   Blood Culture   Date Value Ref Range Status   03/07/2020 No growth at less than 24 hours  Preliminary   03/07/2020 No growth at less than 24 hours  Preliminary     Urine Culture   Date Value Ref Range Status   03/07/2020 No growth  Preliminary     No results found for: RESPCX  No results found for: WOUNDCX  No results found for: STOOLCX  No  components found for: BODYFLD    Radiology Data:   Imaging Results (Last 24 Hours)     Procedure Component Value Units Date/Time    CT Abdomen Pelvis Without Contrast [[id number]] Collected:  03/07/20 1238     Updated:  03/07/20 1329    Narrative:         CT Abdomen and Pelvis Without Contrast    History: Generalized abdominal pain. Syncope. Fell.    Axials spiral scans of the abdomen and pelvis were obtained  without contrast.  Coronal and sagital reconstructions were  preformed.      This exam was performed according to our departmental  dose-optimization program, which includes automated exposure  control, adjustment of the mA and/or kV according to patient size  and/or use of iterative reconstruction technique.    DLP: 631.80    Comparison: None    Findings:   Scans of the lung bases demonstrate old granulomatous disease.  Coronary artery calcifications.  Small anterior pericardial effusion.    No acute osseous abnormality.  Degenerative changes thoracic spine.  Degenerative disc disease and spondylotic change L4-5 and L5-S1.  Some stenosis of the neural foramina for the L4 and L5 nerve  roots bilaterally.    Fatty infiltration of the liver.  The gallbladder is unremarkable.  The spleen is unremarkable.  The pancreas is unremarkable.  The adrenal glands are unremarkable.    Absent left kidney.  5.4 cm right renal cyst.  Punctate nonobstructive right renal calculi.  No ureteral calculi.  No hydronephrosis.    Unremarkable bladder.  No pelvic mass.    No abdominal aortic aneurysm.  No adenopathy.    Diverticulosis of the colon.  No bowel obstruction.  Normal appendix.  No free air.  No free fluid.    No hernia.      Impression:       Conclusion:  Fatty infiltration of the liver.  Absent left kidney.  5.4 cm right renal cyst.  Punctate nonobstructive right renal calculi.  Diverticulosis of the colon.  Coronary artery calcifications.  Degenerative disc disease and spondylotic change L4-5 and L5-S1.  Some stenosis of  the neural foramina for the L4 and L5 nerve  roots bilaterally.    56176    Electronically signed by:  Jayme Casey MD  3/7/2020 1:28 PM CST  Workstation: [id number]    CT Cervical Spine Without Contrast [[id number]] Collected:  03/07/20 1238     Updated:  03/07/20 1311    Narrative:       CT cervical spine without contrast    HISTORY: Fell. Syncope.    Nonenhanced axial scans of the cervical spine were obtained.  Sagittal and coronal reconstructions were performed.    This exam was performed according to our departmental  dose-optimization program, which includes automated exposure  control, adjustment of the mA and/or kV according to patient size  and/or use of iterative reconstruction technique.    CT DLP: 343.30    Findings:  Normal cervical lordosis.   Fusion of the C2 and C3 vertebral bodies and posterior facets.  Multilevel facet arthropathy, degenerative disease and  spondylotic change with spinal stenosis and neural foraminal  stenosis.  Vertebral height and alignment maintained.   No fracture identified.     The paravertebral soft tissues are unremarkable.      Impression:       CONCLUSION:  No cervical fracture.  Fusion of the C2 and C3 vertebral bodies and posterior facets.  Multilevel facet arthropathy, degenerative disease and  spondylotic change with spinal stenosis and neural foraminal  stenosis.    30071    Electronically signed by:  Jayme Casey MD  3/7/2020 1:10 PM CST  Workstation: [id number]    CT Head Without Contrast [[id number]] Collected:  03/07/20 1238     Updated:  03/07/20 1308    Narrative:         CT Head Without Contrast    History: Syncope. Dizziness.    Axial scans of the brain were obtained without intravenous  contrast.  Coronal and sagital reconstructions were preformed.    This exam was performed according to our departmental  dose-optimization program, which includes automated exposure  control, adjustment of the mA and/or kV according to patient size  and/or use of iterative  reconstruction technique.    DLP: 992.70    Comparison: November 8, 2018    Findings:  Bone windows are unremarkable.  The visualized paranasal sinuses are unremarkable.    No acute process.  Minimal cerebral atrophy.  Minimal small vessel disease.  No hemorrhage.  No mass.  No abnormal areas of increased attenuation.  No midline shift.  No abnormal extra-axial fluid collections.      Impression:       CONCLUSION:  No acute process.  Minimal cerebral atrophy.  Minimal small vessel disease.    68080    Electronically signed by:  Jayme Casey MD  3/7/2020 1:06 PM CST  Workstation: [id number]    XR Chest 1 View [[id number]] Collected:  03/07/20 1159     Updated:  03/07/20 1219    Narrative:         PORTABLE CHEST    HISTORY: Syncope and dizziness.    Portable AP upright film of the chest was obtained at 11:17 AM.  COMPARISON: September 17, 2018    FINDINGS:   EKG leads.  The lungs are clear of an acute process.  Old granulomatous disease is present.  The heart is not enlarged.  The pulmonary vasculature is not increased.  No pleural effusion.  No pneumothorax.  No acute osseous abnormality.  Degenerative changes are present in the thoracic spine.      Impression:       CONCLUSION:  No Acute Disease    84013    Electronically signed by:  Jayme Casey MD  3/7/2020 12:17 PM CST  Workstation: [id number]          I have reviewed the patient's current medications.     Assessment/Plan     Active Hospital Problems    Diagnosis   • Acute renal failure (CMS/[facility])       Plan:      1.  Nausea/vomiting/diarrhea  - from salmonella gastroenteritis, cw Symptomatic treatment and start levaquin.  Will get GI PCR and C. difficile.    2.  Acute kidney injury  -Secondary to volume depletion.  Aggressive fluid resuscitation.    3.  Syncope  -Secondary to volume depletion.  Continue cardiac monitoring.    4.  Atrial fibrillation with rapid ventricular response  - Patient was started on amiodarone drip and converted back into normal sinus rhythm.   Continue amiodarone drip for now.  Will anticoagulate for the time being.    5.  Lactic acidosis  Secondary to volume depletion.  Continue aggressive fluid resuscitation.    6.  COPD  -No exacerbation.    7.  Chronic pain  -Continue home medication.    8.  Diabetes  -Hold oral medications.  Will start on sliding scale for now.    9.  DVT prophylaxis: Lovenox.

## 2020-03-08 NOTE — PLAN OF CARE
Problem: Patient Care Overview  Goal: Plan of Care Review  Outcome: Ongoing (interventions implemented as appropriate)  Flowsheets (Taken 3/8/2020 5130)  Progress: improving  Plan of Care Reviewed With: patient  Outcome Summary: VSS, Amio gtt contines, HR-NSR, pain controlled with narcotics     Problem: Patient Care Overview  Goal: Individualization and Mutuality  Outcome: Ongoing (interventions implemented as appropriate)     Problem: Patient Care Overview  Goal: Discharge Needs Assessment  Outcome: Ongoing (interventions implemented as appropriate)     Problem: Arrhythmia/Dysrhythmia (Symptomatic) (Adult)  Goal: Signs and Symptoms of Listed Potential Problems Will be Absent, Minimized or Managed (Arrhythmia/Dysrhythmia)  Outcome: Ongoing (interventions implemented as appropriate)     Problem: Renal Failure/Kidney Injury, Acute (Adult)  Goal: Signs and Symptoms of Listed Potential Problems Will be Absent, Minimized or Managed (Renal Failure/Kidney Injury, Acute)  Outcome: Ongoing (interventions implemented as appropriate)     Problem: Arrhythmia/Dysrhythmia (Symptomatic) (Adult)  Goal: Signs and Symptoms of Listed Potential Problems Will be Absent, Minimized or Managed (Arrhythmia/Dysrhythmia)  Outcome: Ongoing (interventions implemented as appropriate)     Problem: Pain, Chronic (Adult)  Goal: Identify Related Risk Factors and Signs and Symptoms  Outcome: Ongoing (interventions implemented as appropriate)     Problem: Pain, Chronic (Adult)  Goal: Acceptable Pain/Comfort Level and Functional Ability  Outcome: Ongoing (interventions implemented as appropriate)

## 2020-03-08 NOTE — NURSING NOTE
Results for ANG CODY (MRN [id number]) as of 3/7/2020 23:41   Ref. Range 3/7/2020 22:42   Potassium Latest Ref Range: 3.5 - 5.2 mmol/L 2.8 (L)   Pt K+ remains low after replacement, Notified Dr NINO, Ordered K+ and Mg replacement protocol

## 2020-03-09 VITALS
BODY MASS INDEX: 35.98 KG/M2 | DIASTOLIC BLOOD PRESSURE: 82 MMHG | WEIGHT: 242.9 LBS | HEIGHT: 69 IN | OXYGEN SATURATION: 96 % | HEART RATE: 79 BPM | RESPIRATION RATE: 16 BRPM | TEMPERATURE: 98.6 F | SYSTOLIC BLOOD PRESSURE: 140 MMHG

## 2020-03-09 LAB
ANION GAP SERPL CALCULATED.3IONS-SCNC: 11 MMOL/L (ref 5–15)
BACTERIA SPEC AEROBE CULT: NO GROWTH
BASOPHILS # BLD AUTO: 0.05 10*3/MM3 (ref 0–0.2)
BASOPHILS NFR BLD AUTO: 1 % (ref 0–1.5)
BUN BLD-MCNC: 6 MG/DL (ref 8–23)
BUN/CREAT SERPL: 5.7 (ref 7–25)
CALCIUM SPEC-SCNC: 8.1 MG/DL (ref 8.6–10.5)
CHLORIDE SERPL-SCNC: 103 MMOL/L (ref 98–107)
CO2 SERPL-SCNC: 26 MMOL/L (ref 22–29)
CREAT BLD-MCNC: 1.06 MG/DL (ref 0.76–1.27)
DEPRECATED RDW RBC AUTO: 43.9 FL (ref 37–54)
EOSINOPHIL # BLD AUTO: 0.16 10*3/MM3 (ref 0–0.4)
EOSINOPHIL NFR BLD AUTO: 3.3 % (ref 0.3–6.2)
ERYTHROCYTE [DISTWIDTH] IN BLOOD BY AUTOMATED COUNT: 14 % (ref 12.3–15.4)
GFR SERPL CREATININE-BSD FRML MDRD: 71 ML/MIN/1.73
GLUCOSE BLD-MCNC: 150 MG/DL (ref 65–99)
GLUCOSE BLDC GLUCOMTR-MCNC: 109 MG/DL (ref 70–130)
GLUCOSE BLDC GLUCOMTR-MCNC: 183 MG/DL (ref 70–130)
HCT VFR BLD AUTO: 30.4 % (ref 37.5–51)
HGB BLD-MCNC: 10.4 G/DL (ref 13–17.7)
IMM GRANULOCYTES # BLD AUTO: 0.01 10*3/MM3 (ref 0–0.05)
IMM GRANULOCYTES NFR BLD AUTO: 0.2 % (ref 0–0.5)
LYMPHOCYTES # BLD AUTO: 2.02 10*3/MM3 (ref 0.7–3.1)
LYMPHOCYTES NFR BLD AUTO: 41.9 % (ref 19.6–45.3)
MAGNESIUM SERPL-MCNC: 1.9 MG/DL (ref 1.6–2.4)
MAGNESIUM SERPL-MCNC: 2.7 MG/DL (ref 1.6–2.4)
MCH RBC QN AUTO: 29.6 PG (ref 26.6–33)
MCHC RBC AUTO-ENTMCNC: 34.2 G/DL (ref 31.5–35.7)
MCV RBC AUTO: 86.6 FL (ref 79–97)
MONOCYTES # BLD AUTO: 0.5 10*3/MM3 (ref 0.1–0.9)
MONOCYTES NFR BLD AUTO: 10.4 % (ref 5–12)
NEUTROPHILS # BLD AUTO: 2.08 10*3/MM3 (ref 1.7–7)
NEUTROPHILS NFR BLD AUTO: 43.2 % (ref 42.7–76)
NRBC BLD AUTO-RTO: 0 /100 WBC (ref 0–0.2)
PLATELET # BLD AUTO: 218 10*3/MM3 (ref 140–450)
PMV BLD AUTO: 10.2 FL (ref 6–12)
POTASSIUM BLD-SCNC: 3.3 MMOL/L (ref 3.5–5.2)
POTASSIUM BLD-SCNC: 3.7 MMOL/L (ref 3.5–5.2)
RBC # BLD AUTO: 3.51 10*6/MM3 (ref 4.14–5.8)
SODIUM BLD-SCNC: 140 MMOL/L (ref 136–145)
WBC NRBC COR # BLD: 4.82 10*3/MM3 (ref 3.4–10.8)

## 2020-03-09 PROCEDURE — 25010000002 LEVOFLOXACIN PER 250 MG: Performed by: INTERNAL MEDICINE

## 2020-03-09 PROCEDURE — 93010 ELECTROCARDIOGRAM REPORT: CPT | Performed by: INTERNAL MEDICINE

## 2020-03-09 PROCEDURE — 25010000003 MAGNESIUM SULFATE 4 GM/100ML SOLUTION: Performed by: INTERNAL MEDICINE

## 2020-03-09 PROCEDURE — 82962 GLUCOSE BLOOD TEST: CPT

## 2020-03-09 PROCEDURE — 93005 ELECTROCARDIOGRAM TRACING: CPT | Performed by: INTERNAL MEDICINE

## 2020-03-09 PROCEDURE — 83735 ASSAY OF MAGNESIUM: CPT | Performed by: INTERNAL MEDICINE

## 2020-03-09 PROCEDURE — 85025 COMPLETE CBC W/AUTO DIFF WBC: CPT | Performed by: HOSPITALIST

## 2020-03-09 PROCEDURE — 80048 BASIC METABOLIC PNL TOTAL CA: CPT | Performed by: HOSPITALIST

## 2020-03-09 PROCEDURE — 84132 ASSAY OF SERUM POTASSIUM: CPT | Performed by: HOSPITALIST

## 2020-03-09 RX ORDER — ASPIRIN 325 MG
325 TABLET, DELAYED RELEASE (ENTERIC COATED) ORAL DAILY
Qty: 30 TABLET | Refills: 0 | Status: SHIPPED | OUTPATIENT
Start: 2020-03-09 | End: 2021-08-12

## 2020-03-09 RX ORDER — LEVOFLOXACIN 500 MG/1
500 TABLET, FILM COATED ORAL DAILY
Qty: 4 TABLET | Refills: 0 | Status: SHIPPED | OUTPATIENT
Start: 2020-03-09 | End: 2020-03-12

## 2020-03-09 RX ORDER — L. ACIDOPHILUS/L.BULGARICUS 1MM CELL
1 TABLET ORAL DAILY
Qty: 7 TABLET | Refills: 0 | Status: SHIPPED | OUTPATIENT
Start: 2020-03-10 | End: 2020-03-17

## 2020-03-09 RX ADMIN — OXYCODONE HYDROCHLORIDE AND ACETAMINOPHEN 1 TABLET: 7.5; 325 TABLET ORAL at 03:20

## 2020-03-09 RX ADMIN — OXYCODONE HYDROCHLORIDE AND ACETAMINOPHEN 1 TABLET: 7.5; 325 TABLET ORAL at 09:23

## 2020-03-09 RX ADMIN — POTASSIUM CHLORIDE 40 MEQ: 750 CAPSULE, EXTENDED RELEASE ORAL at 04:47

## 2020-03-09 RX ADMIN — MAGNESIUM SULFATE HEPTAHYDRATE 4 G: 40 INJECTION, SOLUTION INTRAVENOUS at 10:15

## 2020-03-09 RX ADMIN — PANTOPRAZOLE SODIUM 40 MG: 40 TABLET, DELAYED RELEASE ORAL at 09:01

## 2020-03-09 RX ADMIN — LEVOFLOXACIN 500 MG: 5 INJECTION, SOLUTION INTRAVENOUS at 09:00

## 2020-03-09 RX ADMIN — SODIUM CHLORIDE, PRESERVATIVE FREE 10 ML: 5 INJECTION INTRAVENOUS at 09:02

## 2020-03-09 RX ADMIN — LACTOBACILLUS TAB 1 TABLET: TAB at 09:01

## 2020-03-09 RX ADMIN — PAROXETINE HYDROCHLORIDE 25 MG: 25 TABLET, FILM COATED, EXTENDED RELEASE ORAL at 09:01

## 2020-03-09 RX ADMIN — POTASSIUM CHLORIDE 40 MEQ: 750 CAPSULE, EXTENDED RELEASE ORAL at 09:00

## 2020-03-09 NOTE — PROGRESS NOTES
Discharge Planning Assessment  [facility]     Patient Name: Zaheer Baron  MRN: [id number]  Today's Date: 3/9/2020    Admit Date: 3/7/2020    Discharge Needs Assessment     Row Name 03/09/20 0915       Living Environment    Lives With  spouse    Current Living Arrangements  home/apartment/condo    Primary Care Provided by  self    Provides Primary Care For  no one    Family Caregiver if Needed  spouse    Quality of Family Relationships  helpful;involved;supportive    Able to Return to Prior Arrangements  yes    Living Arrangement Comments  Pt resides at home with spouse. Pt has good support system.        Resource/Environmental Concerns    Resource/Environmental Concerns  none    Transportation Concerns  car, none       Transition Planning    Patient/Family Anticipates Transition to  home    Patient/Family Anticipated Services at Transition  none    Transportation Anticipated  family or friend will provide       Discharge Needs Assessment    Concerns to be Addressed  no discharge needs identified    Equipment Currently Used at Home  glucometer    Anticipated Changes Related to Illness  none    Equipment Needed After Discharge  none        Discharge Plan     Row Name 03/09/20 0917       Plan    Plan Comments  LSW assessment complete. Pt resides at home with spouse. Pt has good support system. Pt was independent prior to hospitalization. Pt is employed full time. His plan is to return home at d/c. Pt did not anticipate any needs at this time. LSW/case mgt will follow up as consulted and complete arrangements as ordered. Quan Phan LSW        Destination      Coordination has not been started for this encounter.      Durable Medical Equipment      Coordination has not been started for this encounter.      Dialysis/Infusion      Coordination has not been started for this encounter.      Home Medical Care      Coordination has not been started for this encounter.      Therapy      Coordination has not been  started for this encounter.      Community Resources      Coordination has not been started for this encounter.          Demographic Summary     Row Name 03/09/20 0915       General Information    Admission Type  inpatient    Referral Source  high risk screening    Reason for Consult  discharge planning    Preferred Language  English     Used During This Interaction  no       Contact Information    Contact Information Obtained for          Functional Status     Row Name 03/09/20 0915       Functional Status    Usual Activity Tolerance  good    Current Activity Tolerance  fair       Functional Status, IADL    Medications  independent    Meal Preparation  independent    Housekeeping  independent    Laundry  independent    Shopping  independent       Mental Status Summary    Recent Changes in Mental Status/Cognitive Functioning  no changes       Employment/    Employment Status  employed full time        Psychosocial    No documentation.       Abuse/Neglect    No documentation.       Legal    No documentation.       Substance Abuse    No documentation.       Patient Forms    No documentation.           ADRIANE Maharaj

## 2020-03-09 NOTE — DISCHARGE SUMMARY
[facility]  DISCHARGE SUMMARY       Date of Admission: 3/7/2020  Date of Discharge:  3/9/2020  Primary Care Physician: Nasir Garcia MD    Presenting Problem/History of Present Illness:  Syncope and collapse [R55]  Dehydration [E86.0]  Hypokalemia [E87.6]  Hypomagnesemia [E83.42]  Gastroenteritis [K52.9]  Atrial fibrillation with RVR (CMS/[facility]) [I48.91]  Acute renal failure, unspecified acute renal failure type (CMS/[facility]) [N17.9]       Final Discharge Diagnoses:  Active Hospital Problems    Diagnosis   • Acute renal failure (CMS/[facility])       Consults:   Consults     No orders found from 2/7/2020 to 3/8/2020.          Procedures Performed:                 Pertinent Test Results:   Lab Results (most recent)     Procedure Component Value Units Date/Time    Potassium [[id number]] Collected:  03/09/20 1311    Specimen:  Blood Updated:  03/09/20 1313    Magnesium [[id number]] Collected:  03/09/20 1311    Specimen:  Blood Updated:  03/09/20 1313    POC Glucose Once [[id number]]  (Normal) Collected:  03/09/20 1101    Specimen:  Blood Updated:  03/09/20 1214     Glucose 109 mg/dL      Comment: : [id number] REDDEN AUSTINMeter ID: [id number]       POC Glucose Once [[id number]]  (Abnormal) Collected:  03/09/20 0851    Specimen:  Blood Updated:  03/09/20 0925     Glucose 183 mg/dL      Comment: RN NotifiedOperator: [id number] [id number] TV AUSTINMeter ID: [id number]       Magnesium [[id number]]  (Normal) Collected:  03/09/20 0403    Specimen:  Blood Updated:  03/09/20 0923     Magnesium 1.9 mg/dL     Basic Metabolic Panel [[id number]]  (Abnormal) Collected:  03/09/20 0403    Specimen:  Blood Updated:  03/09/20 0432     Glucose 150 mg/dL      BUN 6 mg/dL      Creatinine 1.06 mg/dL      Sodium 140 mmol/L      Potassium 3.3 mmol/L      Chloride 103 mmol/L      CO2 26.0 mmol/L      Calcium 8.1 mg/dL      eGFR Non African Amer 71 mL/min/1.73      BUN/Creatinine Ratio 5.7        Anion Gap 11.0 mmol/L     Narrative:       GFR Normal >60  Chronic Kidney Disease <60  Kidney Failure <15      CBC & Differential [[id number]] Collected:  03/09/20 0403    Specimen:  Blood Updated:  03/09/20 0412    Narrative:       The following orders were created for panel order CBC & Differential.  Procedure                               Abnormality         Status                     ---------                               -----------         ------                     CBC Auto Differential[[id number]]        Abnormal            Final result                 Please view results for these tests on the individual orders.    CBC Auto Differential [[id number]]  (Abnormal) Collected:  03/09/20 0403    Specimen:  Blood Updated:  03/09/20 0412     WBC 4.82 10*3/mm3      RBC 3.51 10*6/mm3      Hemoglobin 10.4 g/dL      Hematocrit 30.4 %      MCV 86.6 fL      MCH 29.6 pg      MCHC 34.2 g/dL      RDW 14.0 %      RDW-SD 43.9 fl      MPV 10.2 fL      Platelets 218 10*3/mm3      Neutrophil % 43.2 %      Lymphocyte % 41.9 %      Monocyte % 10.4 %      Eosinophil % 3.3 %      Basophil % 1.0 %      Immature Grans % 0.2 %      Neutrophils, Absolute 2.08 10*3/mm3      Lymphocytes, Absolute 2.02 10*3/mm3      Monocytes, Absolute 0.50 10*3/mm3      Eosinophils, Absolute 0.16 10*3/mm3      Basophils, Absolute 0.05 10*3/mm3      Immature Grans, Absolute 0.01 10*3/mm3      nRBC 0.0 /100 WBC     Urine Culture - Urine, Urine, Clean Catch [[id number]]  (Normal) Collected:  03/07/20 1700    Specimen:  Urine, Clean Catch Updated:  03/09/20 0253     Urine Culture No growth    Blood Culture - Blood, Hand, Right [[id number]] Collected:  03/07/20 1657    Specimen:  Blood from Hand, Right Updated:  03/08/20 1815     Blood Culture No growth at 24 hours    Potassium [[id number]]  (Normal) Collected:  03/08/20 1431    Specimen:  Blood Updated:  03/08/20 1459     Potassium 3.9 mmol/L     Blood Culture - Blood, Arm, Left [[id number]] Collected:   03/07/20 1316    Specimen:  Blood from Arm, Left Updated:  03/08/20 1430     Blood Culture No growth at 24 hours    Gastrointestinal Panel, PCR - Stool, Per Rectum [[id number]]  (Abnormal) Collected:  03/08/20 0527    Specimen:  Stool from Per Rectum Updated:  03/08/20 0817     Campylobacter Not Detected     Plesiomonas shigelloides Not Detected     Salmonella Detected     Vibrio Not Detected     Vibrio cholerae Not Detected     Yersinia enterocolitica Not Detected     Enteroaggregative E. coli (EAEC) Not Detected     Enteropathogenic E. coli (EPEC) Not Detected     Enterotoxigenic E. coli (ETEC) lt/st Not Detected     Shiga-like toxin-producing E. coli (STEC) stx1/stx2 Not Detected     E. coli O157 Not Detected     Shigella/Enteroinvasive E. coli (EIEC) Not Detected     Cryptosporidium Not Detected     Cyclospora cayetanensis Not Detected     Entamoeba histolytica Not Detected     Giardia lamblia Not Detected     Adenovirus F40/41 Not Detected     Astrovirus Not Detected     Norovirus GI/GII Not Detected     Rotavirus A Not Detected     Sapovirus (I, II, IV or V) Not Detected    Narrative:       Testing performed by multiplex PCR system.    Basic Metabolic Panel [[id number]]  (Abnormal) Collected:  03/08/20 0352    Specimen:  Blood Updated:  03/08/20 0447     Glucose 157 mg/dL      BUN 11 mg/dL      Creatinine 1.60 mg/dL      Sodium 136 mmol/L      Potassium 2.7 mmol/L      Chloride 98 mmol/L      CO2 23.0 mmol/L      Calcium 8.5 mg/dL      eGFR Non African Amer 44 mL/min/1.73      BUN/Creatinine Ratio 6.9     Anion Gap 15.0 mmol/L     Narrative:       GFR Normal >60  Chronic Kidney Disease <60  Kidney Failure <15      CBC & Differential [[id number]] Collected:  03/08/20 0352    Specimen:  Blood Updated:  03/08/20 0424    Narrative:       The following orders were created for panel order CBC & Differential.  Procedure                               Abnormality         Status                     ---------                                -----------         ------                     CBC Auto Differential[[id number]]        Abnormal            Final result                 Please view results for these tests on the individual orders.    CBC Auto Differential [[id number]]  (Abnormal) Collected:  03/08/20 0352    Specimen:  Blood Updated:  03/08/20 0424     WBC 5.48 10*3/mm3      RBC 3.72 10*6/mm3      Hemoglobin 11.1 g/dL      Hematocrit 31.7 %      MCV 85.2 fL      MCH 29.8 pg      MCHC 35.0 g/dL      RDW 13.7 %      RDW-SD 42.6 fl      MPV 10.8 fL      Platelets 207 10*3/mm3      Neutrophil % 50.5 %      Lymphocyte % 36.5 %      Monocyte % 9.9 %      Eosinophil % 1.8 %      Basophil % 0.9 %      Immature Grans % 0.4 %      Neutrophils, Absolute 2.77 10*3/mm3      Lymphocytes, Absolute 2.00 10*3/mm3      Monocytes, Absolute 0.54 10*3/mm3      Eosinophils, Absolute 0.10 10*3/mm3      Basophils, Absolute 0.05 10*3/mm3      Immature Grans, Absolute 0.02 10*3/mm3      nRBC 0.0 /100 WBC     Troponin [[id number]]  (Abnormal) Collected:  03/07/20 2242    Specimen:  Blood Updated:  03/07/20 2345     Troponin T 0.110 ng/mL     Narrative:       Troponin T Reference Range:  <= 0.03 ng/mL-   Negative for AMI  >0.03 ng/mL-     Abnormal for myocardial necrosis.  Clinicians would have to utilize clinical acumen, EKG, Troponin and serial changes to determine if it is an Acute Myocardial Infarction or myocardial injury due to an underlying chronic condition.       Results may be falsely decreased if patient taking Biotin.      Urinalysis, Microscopic Only - Urine, Clean Catch [[id number]]  (Abnormal) Collected:  03/07/20 1700    Specimen:  Urine, Clean Catch Updated:  03/07/20 1750     RBC, UA 3-5 /HPF      WBC, UA 6-12 /HPF      Bacteria, UA 2+ /HPF      Squamous Epithelial Cells, UA 0-2 /HPF      Hyaline Casts, UA 31-50 /LPF      Mucus, UA Large/3+ /HPF      Methodology Manual Light Microscopy    Urinalysis With Culture If Indicated - Urine, Clean Catch  [[id number]]  (Abnormal) Collected:  03/07/20 1700    Specimen:  Urine, Clean Catch Updated:  03/07/20 1735     Color, UA Dark Yellow     Appearance, UA Cloudy     pH, UA 6.0     Specific Gravity, UA 1.023     Glucose, UA Negative     Ketones, UA Trace     Bilirubin, UA Small (1+)     Blood, UA Negative     Protein,  mg/dL (2+)     Leuk Esterase, UA Negative     Nitrite, UA Negative     Urobilinogen, UA 0.2 E.U./dL    aPTT [[id number]]  (Normal) Collected:  03/07/20 1657    Specimen:  Blood Updated:  03/07/20 1732     PTT 36.2 seconds     Narrative:       The recommended Heparin therapeutic range is 68-97 seconds.    Protime-INR [[id number]]  (Normal) Collected:  03/07/20 1657    Specimen:  Blood Updated:  03/07/20 1732     Protime 14.0 Seconds      INR 1.10    Narrative:       Therapeutic range for most indications is 2.0-3.0 INR,  or 2.5-3.5 for mechanical heart valves.    Troponin [[id number]]  (Abnormal) Collected:  03/07/20 1657    Specimen:  Blood Updated:  03/07/20 1730     Troponin T 0.146 ng/mL     Narrative:       Troponin T Reference Range:  <= 0.03 ng/mL-   Negative for AMI  >0.03 ng/mL-     Abnormal for myocardial necrosis.  Clinicians would have to utilize clinical acumen, EKG, Troponin and serial changes to determine if it is an Acute Myocardial Infarction or myocardial injury due to an underlying chronic condition.       Results may be falsely decreased if patient taking Biotin.      Lactic Acid, Reflex [[id number]]  (Normal) Collected:  03/07/20 1657    Specimen:  Blood Updated:  03/07/20 1722     Lactate 1.8 mmol/L     Lactic Acid, Reflex Timer (This will reflex a repeat order 3-3:15 hours after ordered.) [[id number]] Collected:  03/07/20 1316    Specimen:  Blood from Arm, Left Updated:  03/07/20 1645     Hold Tube Hold for add-ons.     Comment: Auto resulted.       Lactic Acid, Plasma [[id number]]  (Abnormal) Collected:  03/07/20 1316    Specimen:  Blood from Arm, Left Updated:  03/07/20 1343      Lactate 2.7 mmol/L     Influenza Antigen, Rapid - Swab, Nasopharynx [[id number]]  (Normal) Collected:  03/07/20 1321    Specimen:  Swab from Nasopharynx Updated:  03/07/20 1335     Influenza A Ag, EIA Negative     Influenza B Ag, EIA Negative    [place] Draw [[id number]] Collected:  03/07/20 1158    Specimen:  Blood Updated:  03/07/20 1300    Narrative:       The following orders were created for panel order [place] Draw.  Procedure                               Abnormality         Status                     ---------                               -----------         ------                     Light Blue Top[[id number]]                                   Final result               Green Top (Gel)[[id number]]                                  Final result               Lavender Top[[id number]]                                     Final result               Gold Top - SST[[id number]]                                   Final result                 Please view results for these tests on the individual orders.    Light Blue Top [[id number]] Collected:  03/07/20 1158    Specimen:  Blood Updated:  03/07/20 1300     Extra Tube hold for add-on     Comment: Auto resulted       Green Top (Gel) [[id number]] Collected:  03/07/20 1158    Specimen:  Blood Updated:  03/07/20 1300     Extra Tube Hold for add-ons.     Comment: Auto resulted.       Lavender Top [[id number]] Collected:  03/07/20 1158    Specimen:  Blood Updated:  03/07/20 1300     Extra Tube hold for add-on     Comment: Auto resulted       Gold Top - SST [[id number]] Collected:  03/07/20 1158    Specimen:  Blood Updated:  03/07/20 1300     Extra Tube Hold for add-ons.     Comment: Auto resulted.       Comprehensive Metabolic Panel [[id number]]  (Abnormal) Collected:  03/07/20 1158    Specimen:  Blood Updated:  03/07/20 1223     Glucose 156 mg/dL      BUN 13 mg/dL      Creatinine 2.34 mg/dL      Sodium 138 mmol/L      Potassium 2.9 mmol/L      Chloride 94 mmol/L      CO2 23.0 mmol/L        Calcium 9.8 mg/dL      Total Protein 8.1 g/dL      Albumin 4.30 g/dL      ALT (SGPT) 17 U/L      AST (SGOT) 19 U/L      Alkaline Phosphatase 34 U/L      Total Bilirubin 0.6 mg/dL      eGFR Non African Amer 29 mL/min/1.73      Globulin 3.8 gm/dL      A/G Ratio 1.1 g/dL      BUN/Creatinine Ratio 5.6     Anion Gap 21.0 mmol/L     Narrative:       GFR Normal >60  Chronic Kidney Disease <60  Kidney Failure <15      Lipase [[id number]]  (Abnormal) Collected:  03/07/20 1158    Specimen:  Blood Updated:  03/07/20 1222     Lipase 61 U/L     BNP [[id number]]  (Normal) Collected:  03/07/20 1158    Specimen:  Blood Updated:  03/07/20 1220     proBNP 169.4 pg/mL     Narrative:       Among patients with dyspnea, NT-proBNP is highly sensitive for the detection of acute congestive heart failure. In addition NT-proBNP of <300 pg/ml effectively rules out acute congestive heart failure with 99% negative predictive value.    Results may be falsely decreased if patient taking Biotin.          Imaging Results (Most Recent)     Procedure Component Value Units Date/Time    CT Abdomen Pelvis Without Contrast [[id number]] Collected:  03/07/20 1238     Updated:  03/07/20 1329    Narrative:         CT Abdomen and Pelvis Without Contrast    History: Generalized abdominal pain. Syncope. Fell.    Axials spiral scans of the abdomen and pelvis were obtained  without contrast.  Coronal and sagital reconstructions were  preformed.      This exam was performed according to our departmental  dose-optimization program, which includes automated exposure  control, adjustment of the mA and/or kV according to patient size  and/or use of iterative reconstruction technique.    DLP: 631.80    Comparison: None    Findings:   Scans of the lung bases demonstrate old granulomatous disease.  Coronary artery calcifications.  Small anterior pericardial effusion.    No acute osseous abnormality.  Degenerative changes thoracic spine.  Degenerative disc disease and  spondylotic change L4-5 and L5-S1.  Some stenosis of the neural foramina for the L4 and L5 nerve  roots bilaterally.    Fatty infiltration of the liver.  The gallbladder is unremarkable.  The spleen is unremarkable.  The pancreas is unremarkable.  The adrenal glands are unremarkable.    Absent left kidney.  5.4 cm right renal cyst.  Punctate nonobstructive right renal calculi.  No ureteral calculi.  No hydronephrosis.    Unremarkable bladder.  No pelvic mass.    No abdominal aortic aneurysm.  No adenopathy.    Diverticulosis of the colon.  No bowel obstruction.  Normal appendix.  No free air.  No free fluid.    No hernia.      Impression:       Conclusion:  Fatty infiltration of the liver.  Absent left kidney.  5.4 cm right renal cyst.  Punctate nonobstructive right renal calculi.  Diverticulosis of the colon.  Coronary artery calcifications.  Degenerative disc disease and spondylotic change L4-5 and L5-S1.  Some stenosis of the neural foramina for the L4 and L5 nerve  roots bilaterally.    [id number]    Electronically signed by:  Jayme Casey MD  3/7/2020 1:28 PM [facility]  Workstation: [id number]    CT Cervical Spine Without Contrast [[id number]] Collected:  03/07/20 1238     Updated:  03/07/20 1311    Narrative:       CT cervical spine without contrast    HISTORY: Fell. Syncope.    Nonenhanced axial scans of the cervical spine were obtained.  Sagittal and coronal reconstructions were performed.    This exam was performed according to our departmental  dose-optimization program, which includes automated exposure  control, adjustment of the mA and/or kV according to patient size  and/or use of iterative reconstruction technique.    CT DLP: 343.30    Findings:  Normal cervical lordosis.   Fusion of the C2 and C3 vertebral bodies and posterior facets.  Multilevel facet arthropathy, degenerative disease and  spondylotic change with spinal stenosis and neural foraminal  stenosis.  Vertebral height and alignment maintained.   No  fracture identified.     The paravertebral soft tissues are unremarkable.      Impression:       CONCLUSION:  No cervical fracture.  Fusion of the C2 and C3 vertebral bodies and posterior facets.  Multilevel facet arthropathy, degenerative disease and  spondylotic change with spinal stenosis and neural foraminal  stenosis.    [id number]    Electronically signed by:  Jayme Casey MD  3/7/2020 1:10 PM CST  Workstation: [id number]    CT Head Without Contrast [[id number]] Collected:  03/07/20 1238     Updated:  03/07/20 1308    Narrative:         CT Head Without Contrast    History: Syncope. Dizziness.    Axial scans of the brain were obtained without intravenous  contrast.  Coronal and sagital reconstructions were preformed.    This exam was performed according to our departmental  dose-optimization program, which includes automated exposure  control, adjustment of the mA and/or kV according to patient size  and/or use of iterative reconstruction technique.    DLP: 992.70    Comparison: November 8, 2018    Findings:  Bone windows are unremarkable.  The visualized paranasal sinuses are unremarkable.    No acute process.  Minimal cerebral atrophy.  Minimal small vessel disease.  No hemorrhage.  No mass.  No abnormal areas of increased attenuation.  No midline shift.  No abnormal extra-axial fluid collections.      Impression:       CONCLUSION:  No acute process.  Minimal cerebral atrophy.  Minimal small vessel disease.    [id number]    Electronically signed by:  Jayme Casey MD  3/7/2020 1:06 PM CST  Workstation: [id number]    XR Chest 1 View [[id number]] Collected:  03/07/20 1159     Updated:  03/07/20 1219    Narrative:         PORTABLE CHEST    HISTORY: Syncope and dizziness.    Portable AP upright film of the chest was obtained at 11:17 AM.  COMPARISON: September 17, 2018    FINDINGS:   EKG leads.  The lungs are clear of an acute process.  Old granulomatous disease is present.  The heart is not enlarged.  The pulmonary vasculature  is not increased.  No pleural effusion.  No pneumothorax.  No acute osseous abnormality.  Degenerative changes are present in the thoracic spine.      Impression:       CONCLUSION:  No Acute Disease    [id number]    Electronically signed by:  Jayme Casey MD  3/7/2020 12:17 PM CST  Workstation: [id number]          Chief Complaint on Day of Discharge: none    Hospital Course:  59-year-old  male who presents with 3 to 4 days of multiple episodes of nausea and vomiting.  He thought this morning he did feel some better so he went to work.  He began to have episode nausea and vomiting at work so he decided to leave.  He was walking out the door he had a syncopal episode before.  He brought emergency department.  Neurology recommends department he was found to be in atrial fibrillation with rapid ventricular response.  He has no history of this.  He has had minimal oral intake over the last 48 hours.  He denies chest pain, shortness of breath, numbness, dysuria, headache, and rectal bleeding.    Pt was found to have salmonella gastroenteritis. Pt diarrhea improved and clinically pt improved after ivf and replacing electrolytes. Pt was laos started on levaquin and gi symptoms resolved.     Pt was started on tx dose lovenox for afib but pt converted to nsr very soon after admission to the hospital.     Echocardiogram shows Left ventricular wall thickness is consistent with mild concentric hypertrophy. Left ventricular systolic function is normal. LV EF: 61-65%. Mild hypertrophy with pseudonormal diastolic function. Right ventricular function is normal. The cavity is mildly dilated. Pulmonic valve thickening with mild-to-moderate pulmonic valve regurgitation is present. Tubular ascending aorta demonstrates ectasia to 3.8 cm    Pt had low k and mg which were replaced and wnl.     Pt is doing well and is back to his baseline. Pt is recommended to take asa 325mg daily and pt is recommended to f/u with pcp in 3 days and check  "mg, bmp and tsh.             Condition on Discharge:  stable    Physical Exam on Discharge:  /84 (BP Location: Left arm, Patient Position: Lying)   Pulse 73   Temp 97.3 °F (36.3 °C) (Temporal)   Resp 16   Ht 175.3 cm (69.02\")   Wt 110 kg (242 lb 14.4 oz)   SpO2 98%   BMI 35.85 kg/m²   Physical Exam   Constitutional: He is oriented to person, place, and time. He appears well-developed.   HENT:   Head: Normocephalic.   Right Ear: External ear normal.   Eyes: Pupils are equal, round, and reactive to light.   Neck: Normal range of motion.   Cardiovascular: Normal rate and regular rhythm.   Pulmonary/Chest: Effort normal and breath sounds normal.   Abdominal: Soft. Bowel sounds are normal.   Musculoskeletal: Normal range of motion.   Neurological: He is alert and oriented to person, place, and time.   Skin: Skin is warm and dry.         Discharge Disposition:  Home or Self Care    Discharge Medications:     Discharge Medications      New Medications      Instructions Start Date   acidophilus tablet tablet   1 tablet, Oral, Daily   Start Date:  March 10, 2020     aspirin  MG tablet   325 mg, Oral, Daily      levoFLOXacin 500 MG tablet  Commonly known as:  LEVAQUIN   500 mg, Oral, Daily         Continue These Medications      Instructions Start Date   CHLORPHENIRAMINE MALEATE PO   4 mg, Oral      Coenzyme Q-10 200 MG capsule   1 tablet, Oral, Nightly      doxazosin 4 MG tablet  Commonly known as:  CARDURA   4 mg, Oral, Daily      Ergocalciferol 50 MCG (2000 UT) tablet   Oral      FLUTICASONE FUROATE IN   Inhalation      glucose blood test strip   1 each, Other, Daily, Use as instructed       hydroxychloroquine 200 MG tablet  Commonly known as:  PLAQUENIL   200 mg, Oral, Daily      JANUMET  MG per tablet  Generic drug:  sitaGLIPtin-metFORMIN   TAKE 1 TABLET BY MOUTH TWICE DAILY WITH MEALS      lansoprazole 30 MG capsule  Commonly known as:  PREVACID   30 mg, Oral, Daily      "   lisinopril-hydrochlorothiazide 10-12.5 MG per tablet  Commonly known as:  PRINZIDE,ZESTORETIC   1 tablet, Oral, Daily      metFORMIN 500 MG tablet  Commonly known as:  GLUCOPHAGE   500 mg, Oral, 2 Times Daily With Meals      methocarbamol 500 MG tablet  Commonly known as:  ROBAXIN   500 mg, Oral, 4 Times Daily      ondansetron 4 MG tablet  Commonly known as:  ZOFRAN   4 mg, Oral, Every 8 Hours PRN      ONETOUCH DELICA LANCETS 33G misc   1 each, Subdermal, Daily      oxyCODONE-acetaminophen 7.5-325 MG per tablet  Commonly known as:  PERCOCET   1 tablet, Oral, Every 4 Hours PRN      PARoxetine CR 25 MG 24 hr tablet  Commonly known as:  PAXIL-CR   25 mg, Oral, Every Morning      Potassium Gluconate 550 MG tablet   Oral      COLT LO 40 40 % cream  Generic drug:  urea   Topical      rosuvastatin 5 MG tablet  Commonly known as:  CRESTOR   5 mg, Oral, Daily      SUMAtriptan 50 MG tablet  Commonly known as:  IMITREX   Take one tablet at onset of headache. May repeat dose one time in 2 hours if headache not relieved.         Stop These Medications    omeprazole 40 MG capsule  Commonly known as:  priLOSEC     sucralfate 1 g tablet  Commonly known as:  CARAFATE            Discharge Diet:   Diet Instructions     Diet: Consistent Carbohydrate, Cardiac      Discharge Diet:   Consistent Carbohydrate  Cardiac             Activity at Discharge:   Activity Instructions     Activity as Tolerated            Discharge Care Plan/Instructions:     F/u with pcp in 3 days and check tsh, bmp, mg in 3 days with pcp    Follow-up Appointments:   Future Appointments   Date Time Provider Department Center   5/21/2020  1:15 PM Nasir Garcia MD [facility] None       Test Results Pending at Discharge:    Order Current Status    Magnesium In process    Potassium In process    Blood Culture - Blood, Arm, Left Preliminary result    Blood Culture - Blood, Hand, Right Preliminary result    Gastrointestinal Panel, PCR - Stool, Per Rectum  Preliminary result          [unfilled]    Time: discharge took over 30 miutes

## 2020-03-09 NOTE — DISCHARGE INSTR - APPOINTMENTS
Mar 12, 2020  8:45 AM CDT  Office Visit with Nasir Garcia MD  [facility] (--) [address] [place] [facility] [address] [phone]

## 2020-03-09 NOTE — PLAN OF CARE
Problem: Patient Care Overview  Goal: Plan of Care Review  3/9/2020 0531 by Kayla Arango, RN  Flowsheets (Taken 3/9/2020 0531)  Outcome Summary: Pt VSS, NSR on monitor.  Changed to PO Amio gtt d/c.  PRN percocet for chronic pain.  K 3.3, replacement given.  Continuous monitoring.  3/9/2020 0514 by Kayla Arango, RN  Outcome: Ongoing (interventions implemented as appropriate)

## 2020-03-10 ENCOUNTER — READMISSION MANAGEMENT (OUTPATIENT)
Dept: CALL CENTER | Facility: HOSPITAL | Age: 60
End: 2020-03-10

## 2020-03-10 NOTE — PAYOR COMM NOTE
"Rosalva Pandey  [facility]  (P)[phone]  (F)[phone]    auth#[id number]      Zaheer Cody (59 y.o. Male)     Date of Birth Social Security Number Address Home Phone MRN    1960  [address] [facility] [phone] [id number]    Mandaeism Marital Status          Buddhist        Admission Date Admission Type Admitting Provider Attending Provider Department, Room/Bed    3/7/20 Emergency Mu Martinez MD  [facility] CRITICAL CARE STEPDOWN, 02/A    Discharge Date Discharge Disposition Discharge Destination        3/9/2020 Home or Self Care              Attending Provider:  (none)   Allergies:  Nsaids    Isolation:  None   Infection:  Salmonella  (03/08/20)   Code Status:  Prior    Ht:  175.3 cm (69.02\")   Wt:  110 kg (242 lb 14.4 oz)    Admission Cmt:  None   Principal Problem:  None                Active Insurance as of 3/7/2020     Primary Coverage     Payor Plan Insurance Group Employer/Plan Group    [employer] [facility] [employer] [facility] [id number]     Payor Plan Address Payor Plan Phone Number Payor Plan Fax Number Effective Dates    [phone]  1/1/2013 - None Entered    [place] [address]       Subscriber Name Subscriber Birth Date Member ID       AVERY CODY 6/11/1962 [id number]                 Emergency Contacts      (Rel.) Home Phone Work Phone Mobile Phone    Avery Cody (Spouse) [phone] -- [phone]               Discharge Summary      Negro Harvey DO at 03/09/20 1325              [facility] Medicine Services  DISCHARGE SUMMARY       Date of Admission: 3/7/2020  Date of Discharge:  3/9/2020  Primary Care Physician: Nasir Garcia MD    Presenting Problem/History of Present Illness:  Syncope and collapse [R55]  Dehydration [E86.0]  Hypokalemia [E87.6]  Hypomagnesemia [E83.42]  Gastroenteritis " [K52.9]  Atrial fibrillation with RVR (CMS/[facility]) [I48.91]  Acute renal failure, unspecified acute renal failure type (CMS/[facility]) [N17.9]       Final Discharge Diagnoses:  Active Hospital Problems    Diagnosis   • Acute renal failure (CMS/[facility])       Consults:   Consults     No orders found from 2/7/2020 to 3/8/2020.          Procedures Performed:                 Pertinent Test Results:   Lab Results (most recent)     Procedure Component Value Units Date/Time    Potassium [[id number]] Collected:  03/09/20 1311    Specimen:  Blood Updated:  03/09/20 1313    Magnesium [[id number]] Collected:  03/09/20 1311    Specimen:  Blood Updated:  03/09/20 1313    POC Glucose Once [[id number]]  (Normal) Collected:  03/09/20 1101    Specimen:  Blood Updated:  03/09/20 1214     Glucose 109 mg/dL      Comment: : [id number] REDDEN AUSTINMeter ID: [id number]       POC Glucose Once [[id number]]  (Abnormal) Collected:  03/09/20 0851    Specimen:  Blood Updated:  03/09/20 0925     Glucose 183 mg/dL      Comment: RN NotifiedOperator: [id number] REDDEN AUSTINMeter ID: [id number]       Magnesium [[id number]]  (Normal) Collected:  03/09/20 0403    Specimen:  Blood Updated:  03/09/20 0923     Magnesium 1.9 mg/dL     Basic Metabolic Panel [[id number]]  (Abnormal) Collected:  03/09/20 0403    Specimen:  Blood Updated:  03/09/20 0432     Glucose 150 mg/dL      BUN 6 mg/dL      Creatinine 1.06 mg/dL      Sodium 140 mmol/L      Potassium 3.3 mmol/L      Chloride 103 mmol/L      CO2 26.0 mmol/L      Calcium 8.1 mg/dL      eGFR Non African Amer 71 mL/min/1.73      BUN/Creatinine Ratio 5.7     Anion Gap 11.0 mmol/L     Narrative:       GFR Normal >60  Chronic Kidney Disease <60  Kidney Failure <15      CBC & Differential [[id number]] Collected:  03/09/20 0403    Specimen:  Blood Updated:  03/09/20 0412    Narrative:       The following orders were created for panel order CBC & Differential.  Procedure                               Abnormality          Status                     ---------                               -----------         ------                     CBC Auto Differential[[id number]]        Abnormal            Final result                 Please view results for these tests on the individual orders.    CBC Auto Differential [[id number]]  (Abnormal) Collected:  03/09/20 0403    Specimen:  Blood Updated:  03/09/20 0412     WBC 4.82 10*3/mm3      RBC 3.51 10*6/mm3      Hemoglobin 10.4 g/dL      Hematocrit 30.4 %      MCV 86.6 fL      MCH 29.6 pg      MCHC 34.2 g/dL      RDW 14.0 %      RDW-SD 43.9 fl      MPV 10.2 fL      Platelets 218 10*3/mm3      Neutrophil % 43.2 %      Lymphocyte % 41.9 %      Monocyte % 10.4 %      Eosinophil % 3.3 %      Basophil % 1.0 %      Immature Grans % 0.2 %      Neutrophils, Absolute 2.08 10*3/mm3      Lymphocytes, Absolute 2.02 10*3/mm3      Monocytes, Absolute 0.50 10*3/mm3      Eosinophils, Absolute 0.16 10*3/mm3      Basophils, Absolute 0.05 10*3/mm3      Immature Grans, Absolute 0.01 10*3/mm3      nRBC 0.0 /100 WBC     Urine Culture - Urine, Urine, Clean Catch [[id number]]  (Normal) Collected:  03/07/20 1700    Specimen:  Urine, Clean Catch Updated:  03/09/20 0253     Urine Culture No growth    Blood Culture - Blood, Hand, Right [[id number]] Collected:  03/07/20 1657    Specimen:  Blood from Hand, Right Updated:  03/08/20 1815     Blood Culture No growth at 24 hours    Potassium [[id number]]  (Normal) Collected:  03/08/20 1431    Specimen:  Blood Updated:  03/08/20 1459     Potassium 3.9 mmol/L     Blood Culture - Blood, Arm, Left [[id number]] Collected:  03/07/20 1316    Specimen:  Blood from Arm, Left Updated:  03/08/20 1430     Blood Culture No growth at 24 hours    Gastrointestinal Panel, PCR - Stool, Per Rectum [[id number]]  (Abnormal) Collected:  03/08/20 0527    Specimen:  Stool from Per Rectum Updated:  03/08/20 0817     Campylobacter Not Detected     Plesiomonas shigelloides Not Detected     Salmonella Detected      Vibrio Not Detected     Vibrio cholerae Not Detected     Yersinia enterocolitica Not Detected     Enteroaggregative E. coli (EAEC) Not Detected     Enteropathogenic E. coli (EPEC) Not Detected     Enterotoxigenic E. coli (ETEC) lt/st Not Detected     Shiga-like toxin-producing E. coli (STEC) stx1/stx2 Not Detected     E. coli O157 Not Detected     Shigella/Enteroinvasive E. coli (EIEC) Not Detected     Cryptosporidium Not Detected     Cyclospora cayetanensis Not Detected     Entamoeba histolytica Not Detected     Giardia lamblia Not Detected     Adenovirus F40/41 Not Detected     Astrovirus Not Detected     Norovirus GI/GII Not Detected     Rotavirus A Not Detected     Sapovirus (I, II, IV or V) Not Detected    Narrative:       Testing performed by multiplex PCR system.    Basic Metabolic Panel [[id number]]  (Abnormal) Collected:  03/08/20 0352    Specimen:  Blood Updated:  03/08/20 0447     Glucose 157 mg/dL      BUN 11 mg/dL      Creatinine 1.60 mg/dL      Sodium 136 mmol/L      Potassium 2.7 mmol/L      Chloride 98 mmol/L      CO2 23.0 mmol/L      Calcium 8.5 mg/dL      eGFR Non African Amer 44 mL/min/1.73      BUN/Creatinine Ratio 6.9     Anion Gap 15.0 mmol/L     Narrative:       GFR Normal >60  Chronic Kidney Disease <60  Kidney Failure <15      CBC & Differential [[id number]] Collected:  03/08/20 0352    Specimen:  Blood Updated:  03/08/20 0424    Narrative:       The following orders were created for panel order CBC & Differential.  Procedure                               Abnormality         Status                     ---------                               -----------         ------                     CBC Auto Differential[[id number]]        Abnormal            Final result                 Please view results for these tests on the individual orders.    CBC Auto Differential [[id number]]  (Abnormal) Collected:  03/08/20 0352    Specimen:  Blood Updated:  03/08/20 0424     WBC 5.48 10*3/mm3      RBC 3.72  10*6/mm3      Hemoglobin 11.1 g/dL      Hematocrit 31.7 %      MCV 85.2 fL      MCH 29.8 pg      MCHC 35.0 g/dL      RDW 13.7 %      RDW-SD 42.6 fl      MPV 10.8 fL      Platelets 207 10*3/mm3      Neutrophil % 50.5 %      Lymphocyte % 36.5 %      Monocyte % 9.9 %      Eosinophil % 1.8 %      Basophil % 0.9 %      Immature Grans % 0.4 %      Neutrophils, Absolute 2.77 10*3/mm3      Lymphocytes, Absolute 2.00 10*3/mm3      Monocytes, Absolute 0.54 10*3/mm3      Eosinophils, Absolute 0.10 10*3/mm3      Basophils, Absolute 0.05 10*3/mm3      Immature Grans, Absolute 0.02 10*3/mm3      nRBC 0.0 /100 WBC     Troponin [[id number]]  (Abnormal) Collected:  03/07/20 2242    Specimen:  Blood Updated:  03/07/20 2345     Troponin T 0.110 ng/mL     Narrative:       Troponin T Reference Range:  <= 0.03 ng/mL-   Negative for AMI  >0.03 ng/mL-     Abnormal for myocardial necrosis.  Clinicians would have to utilize clinical acumen, EKG, Troponin and serial changes to determine if it is an Acute Myocardial Infarction or myocardial injury due to an underlying chronic condition.       Results may be falsely decreased if patient taking Biotin.      Urinalysis, Microscopic Only - Urine, Clean Catch [[id number]]  (Abnormal) Collected:  03/07/20 1700    Specimen:  Urine, Clean Catch Updated:  03/07/20 1750     RBC, UA 3-5 /HPF      WBC, UA 6-12 /HPF      Bacteria, UA 2+ /HPF      Squamous Epithelial Cells, UA 0-2 /HPF      Hyaline Casts, UA 31-50 /LPF      Mucus, UA Large/3+ /HPF      Methodology Manual Light Microscopy    Urinalysis With Culture If Indicated - Urine, Clean Catch [[id number]]  (Abnormal) Collected:  03/07/20 1700    Specimen:  Urine, Clean Catch Updated:  03/07/20 1735     Color, UA Dark Yellow     Appearance, UA Cloudy     pH, UA 6.0     Specific Gravity, UA 1.023     Glucose, UA Negative     Ketones, UA Trace     Bilirubin, UA Small (1+)     Blood, UA Negative     Protein,  mg/dL (2+)     Leuk Esterase, UA Negative      Nitrite, UA Negative     Urobilinogen, UA 0.2 E.U./dL    aPTT [[id number]]  (Normal) Collected:  03/07/20 1657    Specimen:  Blood Updated:  03/07/20 1732     PTT 36.2 seconds     Narrative:       The recommended Heparin therapeutic range is 68-97 seconds.    Protime-INR [[id number]]  (Normal) Collected:  03/07/20 1657    Specimen:  Blood Updated:  03/07/20 1732     Protime 14.0 Seconds      INR 1.10    Narrative:       Therapeutic range for most indications is 2.0-3.0 INR,  or 2.5-3.5 for mechanical heart valves.    Troponin [[id number]]  (Abnormal) Collected:  03/07/20 1657    Specimen:  Blood Updated:  03/07/20 1730     Troponin T 0.146 ng/mL     Narrative:       Troponin T Reference Range:  <= 0.03 ng/mL-   Negative for AMI  >0.03 ng/mL-     Abnormal for myocardial necrosis.  Clinicians would have to utilize clinical acumen, EKG, Troponin and serial changes to determine if it is an Acute Myocardial Infarction or myocardial injury due to an underlying chronic condition.       Results may be falsely decreased if patient taking Biotin.      Lactic Acid, Reflex [[id number]]  (Normal) Collected:  03/07/20 1657    Specimen:  Blood Updated:  03/07/20 1722     Lactate 1.8 mmol/L     Lactic Acid, Reflex Timer (This will reflex a repeat order 3-3:15 hours after ordered.) [[id number]] Collected:  03/07/20 1316    Specimen:  Blood from Arm, Left Updated:  03/07/20 1645     Hold Tube Hold for add-ons.     Comment: Auto resulted.       Lactic Acid, Plasma [[id number]]  (Abnormal) Collected:  03/07/20 1316    Specimen:  Blood from Arm, Left Updated:  03/07/20 1343     Lactate 2.7 mmol/L     Influenza Antigen, Rapid - Swab, Nasopharynx [[id number]]  (Normal) Collected:  03/07/20 1321    Specimen:  Swab from Nasopharynx Updated:  03/07/20 1335     Influenza A Ag, EIA Negative     Influenza B Ag, EIA Negative    [place] Draw [[id number]] Collected:  03/07/20 1158    Specimen:  Blood Updated:  03/07/20 1300    Narrative:       The  following orders were created for panel order [place] Draw.  Procedure                               Abnormality         Status                     ---------                               -----------         ------                     Light Blue Top[[id number]]                                   Final result               Green Top (Gel)[[id number]]                                  Final result               Lavender Top[[id number]]                                     Final result               Gold Top - SST[[id number]]                                   Final result                 Please view results for these tests on the individual orders.    Light Blue Top [[id number]] Collected:  03/07/20 1158    Specimen:  Blood Updated:  03/07/20 1300     Extra Tube hold for add-on     Comment: Auto resulted       Green Top (Gel) [[id number]] Collected:  03/07/20 1158    Specimen:  Blood Updated:  03/07/20 1300     Extra Tube Hold for add-ons.     Comment: Auto resulted.       Lavender Top [[id number]] Collected:  03/07/20 1158    Specimen:  Blood Updated:  03/07/20 1300     Extra Tube hold for add-on     Comment: Auto resulted       Gold Top - SST [[id number]] Collected:  03/07/20 1158    Specimen:  Blood Updated:  03/07/20 1300     Extra Tube Hold for add-ons.     Comment: Auto resulted.       Comprehensive Metabolic Panel [[id number]]  (Abnormal) Collected:  03/07/20 1158    Specimen:  Blood Updated:  03/07/20 1223     Glucose 156 mg/dL      BUN 13 mg/dL      Creatinine 2.34 mg/dL      Sodium 138 mmol/L      Potassium 2.9 mmol/L      Chloride 94 mmol/L      CO2 23.0 mmol/L      Calcium 9.8 mg/dL      Total Protein 8.1 g/dL      Albumin 4.30 g/dL      ALT (SGPT) 17 U/L      AST (SGOT) 19 U/L      Alkaline Phosphatase 34 U/L      Total Bilirubin 0.6 mg/dL      eGFR Non African Amer 29 mL/min/1.73      Globulin 3.8 gm/dL      A/G Ratio 1.1 g/dL      BUN/Creatinine Ratio 5.6     Anion Gap 21.0 mmol/L     Narrative:       GFR Normal  >60  Chronic Kidney Disease <60  Kidney Failure <15      Lipase [[id number]]  (Abnormal) Collected:  03/07/20 1158    Specimen:  Blood Updated:  03/07/20 1222     Lipase 61 U/L     BNP [[id number]]  (Normal) Collected:  03/07/20 1158    Specimen:  Blood Updated:  03/07/20 1220     proBNP 169.4 pg/mL     Narrative:       Among patients with dyspnea, NT-proBNP is highly sensitive for the detection of acute congestive heart failure. In addition NT-proBNP of <300 pg/ml effectively rules out acute congestive heart failure with 99% negative predictive value.    Results may be falsely decreased if patient taking Biotin.          Imaging Results (Most Recent)     Procedure Component Value Units Date/Time    CT Abdomen Pelvis Without Contrast [[id number]] Collected:  03/07/20 1238     Updated:  03/07/20 1329    Narrative:         CT Abdomen and Pelvis Without Contrast    History: Generalized abdominal pain. Syncope. Fell.    Axials spiral scans of the abdomen and pelvis were obtained  without contrast.  Coronal and sagital reconstructions were  preformed.      This exam was performed according to our departmental  dose-optimization program, which includes automated exposure  control, adjustment of the mA and/or kV according to patient size  and/or use of iterative reconstruction technique.    DLP: 631.80    Comparison: None    Findings:   Scans of the lung bases demonstrate old granulomatous disease.  Coronary artery calcifications.  Small anterior pericardial effusion.    No acute osseous abnormality.  Degenerative changes thoracic spine.  Degenerative disc disease and spondylotic change L4-5 and L5-S1.  Some stenosis of the neural foramina for the L4 and L5 nerve  roots bilaterally.    Fatty infiltration of the liver.  The gallbladder is unremarkable.  The spleen is unremarkable.  The pancreas is unremarkable.  The adrenal glands are unremarkable.    Absent left kidney.  5.4 cm right renal cyst.  Punctate nonobstructive right  renal calculi.  No ureteral calculi.  No hydronephrosis.    Unremarkable bladder.  No pelvic mass.    No abdominal aortic aneurysm.  No adenopathy.    Diverticulosis of the colon.  No bowel obstruction.  Normal appendix.  No free air.  No free fluid.    No hernia.      Impression:       Conclusion:  Fatty infiltration of the liver.  Absent left kidney.  5.4 cm right renal cyst.  Punctate nonobstructive right renal calculi.  Diverticulosis of the colon.  Coronary artery calcifications.  Degenerative disc disease and spondylotic change L4-5 and L5-S1.  Some stenosis of the neural foramina for the L4 and L5 nerve  roots bilaterally.    [id number]    Electronically signed by:  Jayme Casey MD  3/7/2020 1:28 PM CST  Workstation: [id number]    CT Cervical Spine Without Contrast [[id number]] Collected:  03/07/20 1238     Updated:  03/07/20 1311    Narrative:       CT cervical spine without contrast    HISTORY: Fell. Syncope.    Nonenhanced axial scans of the cervical spine were obtained.  Sagittal and coronal reconstructions were performed.    This exam was performed according to our departmental  dose-optimization program, which includes automated exposure  control, adjustment of the mA and/or kV according to patient size  and/or use of iterative reconstruction technique.    CT DLP: 343.30    Findings:  Normal cervical lordosis.   Fusion of the C2 and C3 vertebral bodies and posterior facets.  Multilevel facet arthropathy, degenerative disease and  spondylotic change with spinal stenosis and neural foraminal  stenosis.  Vertebral height and alignment maintained.   No fracture identified.     The paravertebral soft tissues are unremarkable.      Impression:       CONCLUSION:  No cervical fracture.  Fusion of the C2 and C3 vertebral bodies and posterior facets.  Multilevel facet arthropathy, degenerative disease and  spondylotic change with spinal stenosis and neural foraminal  stenosis.    [id number]    Electronically signed by:  Jayme  Jacinto MARSHALL  3/7/2020 1:10 PM CST  Workstation: [id number]    CT Head Without Contrast [[id number]] Collected:  03/07/20 1238     Updated:  03/07/20 1308    Narrative:         CT Head Without Contrast    History: Syncope. Dizziness.    Axial scans of the brain were obtained without intravenous  contrast.  Coronal and sagital reconstructions were preformed.    This exam was performed according to our departmental  dose-optimization program, which includes automated exposure  control, adjustment of the mA and/or kV according to patient size  and/or use of iterative reconstruction technique.    DLP: 992.70    Comparison: November 8, 2018    Findings:  Bone windows are unremarkable.  The visualized paranasal sinuses are unremarkable.    No acute process.  Minimal cerebral atrophy.  Minimal small vessel disease.  No hemorrhage.  No mass.  No abnormal areas of increased attenuation.  No midline shift.  No abnormal extra-axial fluid collections.      Impression:       CONCLUSION:  No acute process.  Minimal cerebral atrophy.  Minimal small vessel disease.    [id number]    Electronically signed by:  Jayme Casey MD  3/7/2020 1:06 PM CST  Workstation: [id number]    XR Chest 1 View [[id number]] Collected:  03/07/20 1159     Updated:  03/07/20 1219    Narrative:         PORTABLE CHEST    HISTORY: Syncope and dizziness.    Portable AP upright film of the chest was obtained at 11:17 AM.  COMPARISON: September 17, 2018    FINDINGS:   EKG leads.  The lungs are clear of an acute process.  Old granulomatous disease is present.  The heart is not enlarged.  The pulmonary vasculature is not increased.  No pleural effusion.  No pneumothorax.  No acute osseous abnormality.  Degenerative changes are present in the thoracic spine.      Impression:       CONCLUSION:  No Acute Disease    [id number]    Electronically signed by:  Jayme Casey MD  3/7/2020 12:17 PM CST  Workstation: [id number]          Chief Complaint on Day of Discharge: none    Hospital  "Course:  59-year-old  male who presents with 3 to 4 days of multiple episodes of nausea and vomiting.  He thought this morning he did feel some better so he went to work.  He began to have episode nausea and vomiting at work so he decided to leave.  He was walking out the door he had a syncopal episode before.  He brought emergency department.  Neurology recommends department he was found to be in atrial fibrillation with rapid ventricular response.  He has no history of this.  He has had minimal oral intake over the last 48 hours.  He denies chest pain, shortness of breath, numbness, dysuria, headache, and rectal bleeding.    Pt was found to have salmonella gastroenteritis. Pt diarrhea improved and clinically pt improved after ivf and replacing electrolytes. Pt was laos started on levaquin and gi symptoms resolved.     Pt was started on tx dose lovenox for afib but pt converted to nsr very soon after admission to the hospital.     Echocardiogram shows Left ventricular wall thickness is consistent with mild concentric hypertrophy. Left ventricular systolic function is normal. LV EF: 61-65%. Mild hypertrophy with pseudonormal diastolic function. Right ventricular function is normal. The cavity is mildly dilated. Pulmonic valve thickening with mild-to-moderate pulmonic valve regurgitation is present. Tubular ascending aorta demonstrates ectasia to 3.8 cm    Pt had low k and mg which were replaced and wnl.     Pt is doing well and is back to his baseline. Pt is recommended to take asa 325mg daily and pt is recommended to f/u with pcp in 3 days and check mg, bmp and tsh.             Condition on Discharge:  stable    Physical Exam on Discharge:  /84 (BP Location: Left arm, Patient Position: Lying)   Pulse 73   Temp 97.3 °F (36.3 °C) (Temporal)   Resp 16   Ht 175.3 cm (69.02\")   Wt 110 kg (242 lb 14.4 oz)   SpO2 98%   BMI 35.85 kg/m²    Physical Exam   Constitutional: He is oriented to person, " place, and time. He appears well-developed.   HENT:   Head: Normocephalic.   Right Ear: External ear normal.   Eyes: Pupils are equal, round, and reactive to light.   Neck: Normal range of motion.   Cardiovascular: Normal rate and regular rhythm.   Pulmonary/Chest: Effort normal and breath sounds normal.   Abdominal: Soft. Bowel sounds are normal.   Musculoskeletal: Normal range of motion.   Neurological: He is alert and oriented to person, place, and time.   Skin: Skin is warm and dry.         Discharge Disposition:  Home or Self Care    Discharge Medications:     Discharge Medications      New Medications      Instructions Start Date   acidophilus tablet tablet   1 tablet, Oral, Daily   Start Date:  March 10, 2020     aspirin  MG tablet   325 mg, Oral, Daily      levoFLOXacin 500 MG tablet  Commonly known as:  LEVAQUIN   500 mg, Oral, Daily         Continue These Medications      Instructions Start Date   CHLORPHENIRAMINE MALEATE PO   4 mg, Oral      Coenzyme Q-10 200 MG capsule   1 tablet, Oral, Nightly      doxazosin 4 MG tablet  Commonly known as:  CARDURA   4 mg, Oral, Daily      Ergocalciferol 50 MCG (2000 UT) tablet   Oral      FLUTICASONE FUROATE IN   Inhalation      glucose blood test strip   1 each, Other, Daily, Use as instructed       hydroxychloroquine 200 MG tablet  Commonly known as:  PLAQUENIL   200 mg, Oral, Daily      JANUMET  MG per tablet  Generic drug:  sitaGLIPtin-metFORMIN   TAKE 1 TABLET BY MOUTH TWICE DAILY WITH MEALS      lansoprazole 30 MG capsule  Commonly known as:  PREVACID   30 mg, Oral, Daily      lisinopril-hydrochlorothiazide 10-12.5 MG per tablet  Commonly known as:  PRINZIDE,ZESTORETIC   1 tablet, Oral, Daily      metFORMIN 500 MG tablet  Commonly known as:  GLUCOPHAGE   500 mg, Oral, 2 Times Daily With Meals      methocarbamol 500 MG tablet  Commonly known as:  ROBAXIN   500 mg, Oral, 4 Times Daily      ondansetron 4 MG tablet  Commonly known as:  ZOFRAN   4 mg,  Oral, Every 8 Hours PRN      ONETOUCH DELICA LANCETS 33G misc   1 each, Subdermal, Daily      oxyCODONE-acetaminophen 7.5-325 MG per tablet  Commonly known as:  PERCOCET   1 tablet, Oral, Every 4 Hours PRN      PARoxetine CR 25 MG 24 hr tablet  Commonly known as:  PAXIL-CR   25 mg, Oral, Every Morning      Potassium Gluconate 550 MG tablet   Oral      COLT LO 40 40 % cream  Generic drug:  urea   Topical      rosuvastatin 5 MG tablet  Commonly known as:  CRESTOR   5 mg, Oral, Daily      SUMAtriptan 50 MG tablet  Commonly known as:  IMITREX   Take one tablet at onset of headache. May repeat dose one time in 2 hours if headache not relieved.         Stop These Medications    omeprazole 40 MG capsule  Commonly known as:  priLOSEC     sucralfate 1 g tablet  Commonly known as:  CARAFATE            Discharge Diet:   Diet Instructions     Diet: Consistent Carbohydrate, Cardiac      Discharge Diet:   Consistent Carbohydrate  Cardiac             Activity at Discharge:   Activity Instructions     Activity as Tolerated            Discharge Care Plan/Instructions:     F/u with pcp in 3 days and check tsh, bmp, mg in 3 days with pcp    Follow-up Appointments:   Future Appointments   Date Time Provider Department Center   5/21/2020  1:15 PM Nasir Garcia MD [facility] None       Test Results Pending at Discharge:    Order Current Status    Magnesium In process    Potassium In process    Blood Culture - Blood, Arm, Left Preliminary result    Blood Culture - Blood, Hand, Right Preliminary result    Gastrointestinal Panel, PCR - Stool, Per Rectum Preliminary result          [unfilled]    Time: discharge took over 30 miutes                Electronically signed by Negro Harvey DO at 03/09/20 1330       Discharge Order (From admission, onward)     Start     Ordered    03/09/20 1319  Discharge patient  Once     Expected Discharge Date:  03/09/20    Discharge Disposition:  Home or Self Care    Physician of Record for  Attribution - Please select from Treatment Team:  ELIDIA DISLA [[id number]]    Review needed by CMO to determine Physician of Record:  No       Question Answer Comment   Physician of Record for Attribution - Please select from Treatment Team ELIDIA DISLA    Review needed by CMO to determine Physician of Record No        03/09/20 9452

## 2020-03-10 NOTE — OUTREACH NOTE
Prep Survey      Responses   [facility] patient discharged from?  [place]   Is LACE score < 7 ?  No   Eligibility  Readm Mgmt   Discharge diagnosis  acute renal failure   Does the patient have one of the following disease processes/diagnoses(primary or secondary)?  Other   Does the patient have Home health ordered?  No   Is there a DME ordered?  No   Comments regarding appointments  see AVS   Prep survey completed?  Yes          Maria Fernanda Kelly RN

## 2020-03-11 ENCOUNTER — LAB (OUTPATIENT)
Dept: LAB | Facility: HOSPITAL | Age: 60
End: 2020-03-11

## 2020-03-11 DIAGNOSIS — I10 ESSENTIAL HYPERTENSION: Primary | ICD-10-CM

## 2020-03-11 DIAGNOSIS — E11.8 CONTROLLED TYPE 2 DIABETES MELLITUS WITH COMPLICATION, WITHOUT LONG-TERM CURRENT USE OF INSULIN (HCC): ICD-10-CM

## 2020-03-11 DIAGNOSIS — I10 ESSENTIAL HYPERTENSION: ICD-10-CM

## 2020-03-11 LAB
ANION GAP SERPL CALCULATED.3IONS-SCNC: 13.3 MMOL/L (ref 5–15)
BUN BLD-MCNC: 5 MG/DL (ref 8–23)
BUN/CREAT SERPL: 4.8 (ref 7–25)
CALCIUM SPEC-SCNC: 9 MG/DL (ref 8.6–10.5)
CHLORIDE SERPL-SCNC: 100 MMOL/L (ref 98–107)
CO2 SERPL-SCNC: 28.7 MMOL/L (ref 22–29)
CREAT BLD-MCNC: 1.05 MG/DL (ref 0.76–1.27)
GFR SERPL CREATININE-BSD FRML MDRD: 72 ML/MIN/1.73
GLUCOSE BLD-MCNC: 116 MG/DL (ref 65–99)
MAGNESIUM SERPL-MCNC: 1.8 MG/DL (ref 1.6–2.4)
POTASSIUM BLD-SCNC: 3.5 MMOL/L (ref 3.5–5.2)
SODIUM BLD-SCNC: 142 MMOL/L (ref 136–145)

## 2020-03-11 PROCEDURE — 83735 ASSAY OF MAGNESIUM: CPT

## 2020-03-11 PROCEDURE — 36415 COLL VENOUS BLD VENIPUNCTURE: CPT

## 2020-03-11 PROCEDURE — 80048 BASIC METABOLIC PNL TOTAL CA: CPT

## 2020-03-12 ENCOUNTER — OFFICE VISIT (OUTPATIENT)
Dept: FAMILY MEDICINE CLINIC | Facility: CLINIC | Age: 60
End: 2020-03-12

## 2020-03-12 VITALS
BODY MASS INDEX: 36.32 KG/M2 | SYSTOLIC BLOOD PRESSURE: 128 MMHG | OXYGEN SATURATION: 100 % | WEIGHT: 245.19 LBS | DIASTOLIC BLOOD PRESSURE: 62 MMHG | HEIGHT: 69 IN | HEART RATE: 91 BPM

## 2020-03-12 DIAGNOSIS — E11.9 TYPE 2 DIABETES MELLITUS WITHOUT COMPLICATION, WITHOUT LONG-TERM CURRENT USE OF INSULIN (HCC): ICD-10-CM

## 2020-03-12 DIAGNOSIS — Z86.39 HISTORY OF DEHYDRATION: ICD-10-CM

## 2020-03-12 DIAGNOSIS — Z09 HOSPITAL DISCHARGE FOLLOW-UP: Primary | ICD-10-CM

## 2020-03-12 LAB
BACTERIA SPEC AEROBE CULT: NORMAL
BACTERIA SPEC AEROBE CULT: NORMAL

## 2020-03-12 PROCEDURE — 99214 OFFICE O/P EST MOD 30 MIN: CPT | Performed by: FAMILY MEDICINE

## 2020-03-12 NOTE — PROGRESS NOTES
"Subjective   Zaheer Baron is a 59 y.o. male.     Hypertension   This is a chronic problem. The current episode started more than 1 year ago. There are no associated agents to hypertension. Past treatments include ACE inhibitors and diuretics.   Diabetes   Associated symptoms include polydipsia, polyphagia and polyuria. Pertinent negatives for diabetes include no fatigue.   He was in the hospital for dehydration. He had a syncope episode at work. He is doing better. He is feeling better.    The following portions of the patient's history were reviewed and updated as appropriate: allergies, current medications, past family history, past medical history, past social history, past surgical history and problem list.    Review of Systems   Constitutional: Negative for fatigue and fever.   Gastrointestinal: Positive for nausea and vomiting.   Endocrine: Positive for polydipsia, polyphagia and polyuria.       Objective   Physical Exam   Constitutional: He appears well-developed and well-nourished.   HENT:   Head: Normocephalic and atraumatic.   Right Ear: External ear normal.   Left Ear: External ear normal.   Nose: Nose normal.   Mouth/Throat: Oropharynx is clear and moist.   Eyes: Conjunctivae are normal.   Neck: Normal range of motion. Thyromegaly present.   Cardiovascular: Normal rate, regular rhythm and normal heart sounds. Exam reveals no gallop and no friction rub.   No murmur heard.  Pulmonary/Chest: Effort normal and breath sounds normal. No stridor. No respiratory distress. He has no wheezes.   Abdominal: Soft. Bowel sounds are normal.   Skin: Skin is warm and dry.   Vitals reviewed.        Visit Vitals  /62   Pulse 91   Ht 175.3 cm (69\")   Wt 111 kg (245 lb 3 oz)   SpO2 100%   BMI 36.21 kg/m²     Body mass index is 36.21 kg/m².      Assessment/Plan   Zaheer was seen today for follow-up, hypertension and diabetes.    Diagnoses and all orders for this visit:    Hospital discharge follow-up    History " of dehydration    Type 2 diabetes mellitus without complication, without long-term current use of insulin (CMS/[facility])    Return to the clinic in 3 month/s.  Will contact with results as needed.

## 2020-03-17 ENCOUNTER — TELEPHONE (OUTPATIENT)
Dept: FAMILY MEDICINE CLINIC | Facility: CLINIC | Age: 60
End: 2020-03-17

## 2020-03-18 ENCOUNTER — READMISSION MANAGEMENT (OUTPATIENT)
Dept: CALL CENTER | Facility: HOSPITAL | Age: 60
End: 2020-03-18

## 2020-03-18 NOTE — OUTREACH NOTE
Medical Week 1 Survey      Responses   [facility] patient discharged from?  [place]   Does the patient have one of the following disease processes/diagnoses(primary or secondary)?  Other   Is there a successful TCM telephone encounter documented?  No   Week 1 attempt successful?  Yes   Call start time  1430   Call end time  1432   Person spoke with today (if not patient) and relationship  Spouse   Meds reviewed with patient/caregiver?  Yes   Is the patient having any side effects they believe may be caused by any medication additions or changes?  No   Does the patient have all medications ordered at discharge?  Yes   Is the patient taking all medications as directed (includes completed medication regime)?  Yes   Does the patient have a primary care provider?   Yes   Does the patient have an appointment with their PCP within 7 days of discharge?  Yes   Comments regarding PCP  Pt has seen PCP and has been released to return to work   Has the patient kept scheduled appointments due by today?  Yes   Psychosocial issues?  No   Did the patient receive a copy of their discharge instructions?  Yes   Nursing interventions  Reviewed instructions with patient   What is the patient's perception of their health status since discharge?  Improving   Week 1 call completed?  Yes   Graduated  Yes   Did the patient feel the follow up calls were helpful during their recovery period?  Yes   Was the number of calls appropriate?  Yes   Graduated/Revoked comments  Spouse states pt is doing very well.  He has been released by PCP and has returned to work.            Mckenna Cifuentes RN

## 2020-03-19 LAB
ADV 40+41 DNA STL QL NAA+NON-PROBE: NOT DETECTED
ASTRO TYP 1-8 RNA STL QL NAA+NON-PROBE: NOT DETECTED
C CAYETANENSIS DNA STL QL NAA+NON-PROBE: NOT DETECTED
CAMPY SP DNA.DIARRHEA STL QL NAA+PROBE: NOT DETECTED
CRYPTOSP STL CULT: NOT DETECTED
E COLI DNA SPEC QL NAA+PROBE: NOT DETECTED
E HISTOLYT AG STL-ACNC: NOT DETECTED
EAEC PAA PLAS AGGR+AATA ST NAA+NON-PRB: NOT DETECTED
EC STX1 + STX2 GENES STL NAA+PROBE: NOT DETECTED
EPEC EAE GENE STL QL NAA+NON-PROBE: NOT DETECTED
ETEC LTA+ST1A+ST1B TOX ST NAA+NON-PROBE: NOT DETECTED
G LAMBLIA DNA SPEC QL NAA+PROBE: NOT DETECTED
NOROVIRUS GI+II RNA STL QL NAA+NON-PROBE: NOT DETECTED
P SHIGELLOIDES DNA STL QL NAA+PROBE: NOT DETECTED
RV RNA STL NAA+PROBE: NOT DETECTED
SALMONELLA DNA SPEC QL NAA+PROBE: DETECTED
SAPO I+II+IV+V RNA STL QL NAA+NON-PROBE: NOT DETECTED
SHIGELLA SP+EIEC IPAH STL QL NAA+PROBE: NOT DETECTED
V CHOLERAE DNA SPEC QL NAA+PROBE: NOT DETECTED
VIBRIO DNA SPEC NAA+PROBE: NOT DETECTED
YERSINIA STL CULT: NOT DETECTED

## 2020-05-21 ENCOUNTER — TRANSCRIBE ORDERS (OUTPATIENT)
Dept: LAB | Facility: HOSPITAL | Age: 60
End: 2020-05-21

## 2020-05-21 DIAGNOSIS — N17.9 ACUTE RENAL FAILURE, UNSPECIFIED ACUTE RENAL FAILURE TYPE (HCC): ICD-10-CM

## 2020-05-21 DIAGNOSIS — E87.6 HYPOKALEMIA: ICD-10-CM

## 2020-05-21 DIAGNOSIS — N18.30 CHRONIC KIDNEY DISEASE, STAGE III (MODERATE) (HCC): ICD-10-CM

## 2020-05-21 DIAGNOSIS — I10 ESSENTIAL (PRIMARY) HYPERTENSION: ICD-10-CM

## 2020-05-21 DIAGNOSIS — Z79.1 ENCOUNTER FOR LONG-TERM (CURRENT) USE OF NON-STEROIDAL ANTI-INFLAMMATORIES: Primary | ICD-10-CM

## 2020-05-27 ENCOUNTER — APPOINTMENT (OUTPATIENT)
Dept: LAB | Facility: HOSPITAL | Age: 60
End: 2020-05-27

## 2020-05-27 LAB
25(OH)D3 SERPL-MCNC: 25.6 NG/ML (ref 30–100)
ALBUMIN SERPL-MCNC: 4.7 G/DL (ref 3.5–5.2)
ANION GAP SERPL CALCULATED.3IONS-SCNC: 16 MMOL/L (ref 5–15)
BUN BLD-MCNC: 13 MG/DL (ref 8–23)
BUN/CREAT SERPL: 10.7 (ref 7–25)
CALCIUM SPEC-SCNC: 10.1 MG/DL (ref 8.6–10.5)
CHLORIDE SERPL-SCNC: 96 MMOL/L (ref 98–107)
CO2 SERPL-SCNC: 25 MMOL/L (ref 22–29)
CREAT BLD-MCNC: 1.22 MG/DL (ref 0.76–1.27)
CREAT UR-MCNC: 106 MG/DL
GFR SERPL CREATININE-BSD FRML MDRD: 61 ML/MIN/1.73
GLUCOSE BLD-MCNC: 199 MG/DL (ref 65–99)
PHOSPHATE SERPL-MCNC: 3.1 MG/DL (ref 2.5–4.5)
POTASSIUM BLD-SCNC: 3.9 MMOL/L (ref 3.5–5.2)
PROT UR-MCNC: 19 MG/DL
PROT/CREAT UR: 179.2 MG/G CREA (ref 0–200)
SODIUM BLD-SCNC: 137 MMOL/L (ref 136–145)

## 2020-05-27 PROCEDURE — 36415 COLL VENOUS BLD VENIPUNCTURE: CPT | Performed by: INTERNAL MEDICINE

## 2020-05-27 PROCEDURE — 82306 VITAMIN D 25 HYDROXY: CPT | Performed by: INTERNAL MEDICINE

## 2020-05-27 PROCEDURE — 84156 ASSAY OF PROTEIN URINE: CPT | Performed by: INTERNAL MEDICINE

## 2020-05-27 PROCEDURE — 80069 RENAL FUNCTION PANEL: CPT | Performed by: INTERNAL MEDICINE

## 2020-05-27 PROCEDURE — 82570 ASSAY OF URINE CREATININE: CPT | Performed by: INTERNAL MEDICINE

## 2020-06-29 ENCOUNTER — HOSPITAL ENCOUNTER (EMERGENCY)
Facility: HOSPITAL | Age: 60
Discharge: HOME | End: 2020-06-29
Attending: FAMILY MEDICINE | Admitting: FAMILY MEDICINE

## 2020-06-29 VITALS
DIASTOLIC BLOOD PRESSURE: 80 MMHG | OXYGEN SATURATION: 99 % | RESPIRATION RATE: 18 BRPM | HEART RATE: 102 BPM | SYSTOLIC BLOOD PRESSURE: 133 MMHG | TEMPERATURE: 99.1 F | HEIGHT: 69 IN | WEIGHT: 235.6 LBS | BODY MASS INDEX: 34.9 KG/M2

## 2020-06-29 DIAGNOSIS — R51.9 ACUTE NONINTRACTABLE HEADACHE, UNSPECIFIED HEADACHE TYPE: ICD-10-CM

## 2020-06-29 DIAGNOSIS — R53.83 FATIGUE, UNSPECIFIED TYPE: Primary | ICD-10-CM

## 2020-06-29 PROBLEM — K57.30 DIVERTICULOSIS OF COLON WITHOUT DIVERTICULITIS: Status: ACTIVE | Noted: 2017-03-14

## 2020-06-29 LAB
ALBUMIN SERPL-MCNC: 4.5 G/DL (ref 3.5–5.2)
ALBUMIN/GLOB SERPL: 1.3 G/DL
ALP SERPL-CCNC: 43 U/L (ref 39–117)
ALT SERPL W P-5'-P-CCNC: 30 U/L (ref 1–41)
ANION GAP SERPL CALCULATED.3IONS-SCNC: 15 MMOL/L (ref 5–15)
AST SERPL-CCNC: 41 U/L (ref 1–40)
BASOPHILS # BLD AUTO: 0.03 10*3/MM3 (ref 0–0.2)
BASOPHILS NFR BLD AUTO: 0.8 % (ref 0–1.5)
BILIRUB SERPL-MCNC: 0.9 MG/DL (ref 0.2–1.2)
BUN BLD-MCNC: 18 MG/DL (ref 8–23)
BUN/CREAT SERPL: 14 (ref 7–25)
CALCIUM SPEC-SCNC: 9.6 MG/DL (ref 8.6–10.5)
CHLORIDE SERPL-SCNC: 92 MMOL/L (ref 98–107)
CK SERPL-CCNC: 176 U/L (ref 20–200)
CO2 SERPL-SCNC: 26 MMOL/L (ref 22–29)
CREAT BLD-MCNC: 1.29 MG/DL (ref 0.76–1.27)
DEPRECATED RDW RBC AUTO: 41 FL (ref 37–54)
EOSINOPHIL # BLD AUTO: 0.01 10*3/MM3 (ref 0–0.4)
EOSINOPHIL NFR BLD AUTO: 0.3 % (ref 0.3–6.2)
ERYTHROCYTE [DISTWIDTH] IN BLOOD BY AUTOMATED COUNT: 13.5 % (ref 12.3–15.4)
GFR SERPL CREATININE-BSD FRML MDRD: 57 ML/MIN/1.73
GLOBULIN UR ELPH-MCNC: 3.5 GM/DL
GLUCOSE BLD-MCNC: 149 MG/DL (ref 65–99)
HCT VFR BLD AUTO: 39.5 % (ref 37.5–51)
HGB BLD-MCNC: 13.4 G/DL (ref 13–17.7)
HOLD SPECIMEN: NORMAL
IMM GRANULOCYTES # BLD AUTO: 0.02 10*3/MM3 (ref 0–0.05)
IMM GRANULOCYTES NFR BLD AUTO: 0.5 % (ref 0–0.5)
LYMPHOCYTES # BLD AUTO: 0.8 10*3/MM3 (ref 0.7–3.1)
LYMPHOCYTES NFR BLD AUTO: 21.9 % (ref 19.6–45.3)
MAGNESIUM SERPL-MCNC: 1.6 MG/DL (ref 1.6–2.4)
MCH RBC QN AUTO: 28.3 PG (ref 26.6–33)
MCHC RBC AUTO-ENTMCNC: 33.9 G/DL (ref 31.5–35.7)
MCV RBC AUTO: 83.5 FL (ref 79–97)
MONOCYTES # BLD AUTO: 0.35 10*3/MM3 (ref 0.1–0.9)
MONOCYTES NFR BLD AUTO: 9.6 % (ref 5–12)
NEUTROPHILS # BLD AUTO: 2.44 10*3/MM3 (ref 1.7–7)
NEUTROPHILS NFR BLD AUTO: 66.9 % (ref 42.7–76)
NRBC BLD AUTO-RTO: 0 /100 WBC (ref 0–0.2)
PLATELET # BLD AUTO: 230 10*3/MM3 (ref 140–450)
PMV BLD AUTO: 10.4 FL (ref 6–12)
POTASSIUM BLD-SCNC: 3.4 MMOL/L (ref 3.5–5.2)
PROT SERPL-MCNC: 8 G/DL (ref 6–8.5)
RBC # BLD AUTO: 4.73 10*6/MM3 (ref 4.14–5.8)
SARS-COV-2 N GENE RESP QL NAA+PROBE: NOT DETECTED
SODIUM BLD-SCNC: 133 MMOL/L (ref 136–145)
WBC NRBC COR # BLD: 3.65 10*3/MM3 (ref 3.4–10.8)
WHOLE BLOOD HOLD SPECIMEN: NORMAL

## 2020-06-29 PROCEDURE — 25010000002 PROMETHAZINE PER 50 MG: Performed by: FAMILY MEDICINE

## 2020-06-29 PROCEDURE — 99284 EMERGENCY DEPT VISIT MOD MDM: CPT

## 2020-06-29 PROCEDURE — 85025 COMPLETE CBC W/AUTO DIFF WBC: CPT | Performed by: FAMILY MEDICINE

## 2020-06-29 PROCEDURE — 80053 COMPREHEN METABOLIC PANEL: CPT | Performed by: FAMILY MEDICINE

## 2020-06-29 PROCEDURE — 87635 SARS-COV-2 COVID-19 AMP PRB: CPT | Performed by: FAMILY MEDICINE

## 2020-06-29 PROCEDURE — 96372 THER/PROPH/DIAG INJ SC/IM: CPT

## 2020-06-29 PROCEDURE — 82550 ASSAY OF CK (CPK): CPT | Performed by: FAMILY MEDICINE

## 2020-06-29 PROCEDURE — 83735 ASSAY OF MAGNESIUM: CPT | Performed by: FAMILY MEDICINE

## 2020-06-29 PROCEDURE — 96374 THER/PROPH/DIAG INJ IV PUSH: CPT

## 2020-06-29 RX ORDER — SUMATRIPTAN 6 MG/.5ML
6 INJECTION, SOLUTION SUBCUTANEOUS ONCE
Status: COMPLETED | OUTPATIENT
Start: 2020-06-29 | End: 2020-06-29

## 2020-06-29 RX ORDER — PROMETHAZINE HYDROCHLORIDE 25 MG/ML
12.5 INJECTION, SOLUTION INTRAMUSCULAR; INTRAVENOUS ONCE
Status: COMPLETED | OUTPATIENT
Start: 2020-06-29 | End: 2020-06-29

## 2020-06-29 RX ORDER — PROMETHAZINE HYDROCHLORIDE 25 MG/1
25 TABLET ORAL EVERY 6 HOURS PRN
Qty: 15 TABLET | Refills: 0 | Status: SHIPPED | OUTPATIENT
Start: 2020-06-29 | End: 2021-08-12

## 2020-06-29 RX ORDER — SODIUM CHLORIDE 9 MG/ML
125 INJECTION, SOLUTION INTRAVENOUS CONTINUOUS
Status: DISCONTINUED | OUTPATIENT
Start: 2020-06-29 | End: 2020-06-29 | Stop reason: HOSPADM

## 2020-06-29 RX ORDER — SUMATRIPTAN 100 MG/1
50 TABLET, FILM COATED ORAL
Qty: 10 TABLET | Refills: 0 | Status: SHIPPED | OUTPATIENT
Start: 2020-06-29 | End: 2020-07-02 | Stop reason: SDUPTHER

## 2020-06-29 RX ADMIN — PROMETHAZINE HYDROCHLORIDE 12.5 MG: 25 INJECTION INTRAMUSCULAR; INTRAVENOUS at 11:02

## 2020-06-29 RX ADMIN — SUMATRIPTAN 6 MG: 6 INJECTION SUBCUTANEOUS at 11:07

## 2020-06-29 RX ADMIN — SODIUM CHLORIDE 1000 ML: 9 INJECTION, SOLUTION INTRAVENOUS at 11:02

## 2020-06-30 ENCOUNTER — EPISODE CHANGES (OUTPATIENT)
Dept: CASE MANAGEMENT | Facility: OTHER | Age: 60
End: 2020-06-30

## 2020-06-30 ENCOUNTER — TELEPHONE (OUTPATIENT)
Dept: EMERGENCY DEPT | Facility: HOSPITAL | Age: 60
End: 2020-06-30

## 2020-07-01 ENCOUNTER — EPISODE CHANGES (OUTPATIENT)
Dept: CASE MANAGEMENT | Facility: OTHER | Age: 60
End: 2020-07-01

## 2020-07-02 ENCOUNTER — OFFICE VISIT (OUTPATIENT)
Dept: FAMILY MEDICINE CLINIC | Facility: CLINIC | Age: 60
End: 2020-07-02

## 2020-07-02 ENCOUNTER — LAB (OUTPATIENT)
Dept: LAB | Facility: HOSPITAL | Age: 60
End: 2020-07-02

## 2020-07-02 VITALS
WEIGHT: 234.25 LBS | OXYGEN SATURATION: 99 % | HEART RATE: 98 BPM | DIASTOLIC BLOOD PRESSURE: 60 MMHG | SYSTOLIC BLOOD PRESSURE: 114 MMHG | BODY MASS INDEX: 34.69 KG/M2 | HEIGHT: 69 IN

## 2020-07-02 DIAGNOSIS — R11.2 NAUSEA AND VOMITING, INTRACTABILITY OF VOMITING NOT SPECIFIED, UNSPECIFIED VOMITING TYPE: ICD-10-CM

## 2020-07-02 DIAGNOSIS — E87.6 HYPOKALEMIA: Primary | ICD-10-CM

## 2020-07-02 DIAGNOSIS — R11.2 NAUSEA AND VOMITING, INTRACTABILITY OF VOMITING NOT SPECIFIED, UNSPECIFIED VOMITING TYPE: Primary | ICD-10-CM

## 2020-07-02 DIAGNOSIS — R53.1 WEAK: ICD-10-CM

## 2020-07-02 DIAGNOSIS — R42 DIZZINESS: ICD-10-CM

## 2020-07-02 LAB
ANION GAP SERPL CALCULATED.3IONS-SCNC: 10 MMOL/L (ref 5–15)
BUN SERPL-MCNC: 18 MG/DL (ref 8–23)
BUN/CREAT SERPL: 12.5 (ref 7–25)
CALCIUM SPEC-SCNC: 9.6 MG/DL (ref 8.6–10.5)
CHLORIDE SERPL-SCNC: 93 MMOL/L (ref 98–107)
CO2 SERPL-SCNC: 29 MMOL/L (ref 22–29)
CREAT SERPL-MCNC: 1.44 MG/DL (ref 0.76–1.27)
GFR SERPL CREATININE-BSD FRML MDRD: 50 ML/MIN/1.73
GLUCOSE SERPL-MCNC: 136 MG/DL (ref 65–99)
POTASSIUM SERPL-SCNC: 3.4 MMOL/L (ref 3.5–5.2)
SODIUM SERPL-SCNC: 132 MMOL/L (ref 136–145)

## 2020-07-02 PROCEDURE — 80048 BASIC METABOLIC PNL TOTAL CA: CPT

## 2020-07-02 PROCEDURE — 99214 OFFICE O/P EST MOD 30 MIN: CPT | Performed by: FAMILY MEDICINE

## 2020-07-02 PROCEDURE — 36415 COLL VENOUS BLD VENIPUNCTURE: CPT

## 2020-07-02 RX ORDER — SUMATRIPTAN 50 MG/1
50 TABLET, FILM COATED ORAL
COMMUNITY
End: 2020-10-23 | Stop reason: SDUPTHER

## 2020-07-02 RX ORDER — POTASSIUM CHLORIDE 20 MEQ/1
20 TABLET, EXTENDED RELEASE ORAL DAILY
Qty: 30 TABLET | Refills: 2 | Status: SHIPPED | OUTPATIENT
Start: 2020-07-02 | End: 2021-12-13

## 2020-07-02 NOTE — PROGRESS NOTES
Subjective   Zaheer Baron is a 59 y.o. male.    cc: weakness from GI bug  Dehydration   This is a new problem. The current episode started in the past 7 days. The problem occurs intermittently. The problem has been gradually improving. Associated symptoms include arthralgias, chills, diaphoresis, fatigue and weakness. Pertinent negatives include no abdominal pain, anorexia, change in bowel habit, chest pain, congestion, coughing, fever, headaches, joint swelling, myalgias, nausea, neck pain, numbness, rash, sore throat, swollen glands, vertigo or vomiting.   Nausea   This is a new problem. The current episode started in the past 7 days. The problem occurs intermittently. The problem has been gradually improving. Associated symptoms include arthralgias, chills, diaphoresis, fatigue and weakness. Pertinent negatives include no abdominal pain, anorexia, change in bowel habit, chest pain, congestion, coughing, fever, headaches, joint swelling, myalgias, nausea, neck pain, numbness, rash, sore throat, swollen glands, vertigo or vomiting.   Fatigue   This is a new problem. The current episode started in the past 7 days. The problem occurs intermittently. The problem has been gradually improving. Associated symptoms include arthralgias, chills, diaphoresis, fatigue and weakness. Pertinent negatives include no abdominal pain, anorexia, change in bowel habit, chest pain, congestion, coughing, fever, headaches, joint swelling, myalgias, nausea, neck pain, numbness, rash, sore throat, swollen glands, vertigo or vomiting.   Dizziness   This is a new problem. The current episode started in the past 7 days. The problem occurs intermittently. The problem has been gradually improving. Associated symptoms include arthralgias, chills, diaphoresis, fatigue and weakness. Pertinent negatives include no abdominal pain, anorexia, change in bowel habit, chest pain, congestion, coughing, fever, headaches, joint swelling, myalgias,  "nausea, neck pain, numbness, rash, sore throat, swollen glands, vertigo or vomiting.   He became ill four days ago. He went to the ED and received IV fluids. His Potassium level was down slightly. He is just now feeling better.    The following portions of the patient's history were reviewed and updated as appropriate: allergies, current medications, past family history, past medical history, past social history, past surgical history and problem list.    Review of Systems   Constitutional: Positive for chills, diaphoresis and fatigue. Negative for fever.   HENT: Negative for congestion and sore throat.    Respiratory: Negative for cough.    Cardiovascular: Negative for chest pain.   Gastrointestinal: Negative for abdominal pain, anorexia, change in bowel habit, nausea and vomiting.   Musculoskeletal: Positive for arthralgias. Negative for joint swelling, myalgias and neck pain.   Skin: Negative for rash.   Neurological: Positive for dizziness and weakness. Negative for vertigo, numbness and headaches.       Objective   Physical Exam   Constitutional: He appears well-developed and well-nourished.   HENT:   Head: Normocephalic and atraumatic.   Right Ear: External ear normal.   Left Ear: External ear normal.   Nose: Nose normal.   Mouth/Throat: Oropharynx is clear and moist.   Eyes: Pupils are equal, round, and reactive to light. EOM are normal.   Neck: Normal range of motion.   Cardiovascular: Normal rate, regular rhythm and normal heart sounds. Exam reveals no gallop and no friction rub.   No murmur heard.  Pulmonary/Chest: Effort normal and breath sounds normal. No stridor. No respiratory distress. He has no wheezes. He has no rales.   Abdominal: Soft. Bowel sounds are normal. He exhibits no distension. There is no tenderness. There is no guarding.   Neurological: He is alert.   Skin: Skin is warm and dry.   Vitals reviewed.        Visit Vitals  /60   Pulse 98   Ht 175.3 cm (69\")   Wt 106 kg (234 lb 4 oz) "   SpO2 99%   BMI 34.59 kg/m²     Body mass index is 34.59 kg/m².      Assessment/Plan   Zaheer was seen today for follow-up, er f/u and dehydration.    Diagnoses and all orders for this visit:    Nausea and vomiting, intractability of vomiting not specified, unspecified vomiting type  -     Basic metabolic panel; Future    Weak  -     Basic metabolic panel; Future    Dizziness  -     Basic metabolic panel; Future    Continue with Karine Richter . Also discussed consuming foods high in Potassium.  Follow up as needed.

## 2020-07-15 RX ORDER — LANCETS 33 GAUGE
EACH MISCELLANEOUS
Qty: 100 EACH | Refills: 8 | Status: SHIPPED | OUTPATIENT
Start: 2020-07-15

## 2020-07-30 RX ORDER — PAROXETINE HYDROCHLORIDE HEMIHYDRATE 25 MG/1
25 TABLET, FILM COATED, EXTENDED RELEASE ORAL EVERY MORNING
Qty: 90 TABLET | Refills: 2 | Status: SHIPPED | OUTPATIENT
Start: 2020-07-30 | End: 2021-02-10 | Stop reason: SDUPTHER

## 2020-07-30 RX ORDER — SITAGLIPTIN AND METFORMIN HYDROCHLORIDE 500; 50 MG/1; MG/1
TABLET, FILM COATED ORAL
Qty: 60 TABLET | Refills: 5 | Status: SHIPPED | OUTPATIENT
Start: 2020-07-30 | End: 2021-02-10 | Stop reason: SDUPTHER

## 2020-10-19 RX ORDER — SITAGLIPTIN AND METFORMIN HYDROCHLORIDE 500; 50 MG/1; MG/1
1 TABLET, FILM COATED ORAL 2 TIMES DAILY WITH MEALS
Qty: 180 TABLET | Refills: 2 | OUTPATIENT
Start: 2020-10-19

## 2020-10-19 RX ORDER — DOXAZOSIN MESYLATE 4 MG/1
4 TABLET ORAL DAILY
Qty: 90 TABLET | Refills: 2 | OUTPATIENT
Start: 2020-10-19

## 2020-10-19 RX ORDER — PAROXETINE HYDROCHLORIDE HEMIHYDRATE 25 MG/1
25 TABLET, FILM COATED, EXTENDED RELEASE ORAL EVERY MORNING
Qty: 90 TABLET | Refills: 2 | OUTPATIENT
Start: 2020-10-19

## 2020-10-19 RX ORDER — METHOCARBAMOL 500 MG/1
500 TABLET, FILM COATED ORAL 4 TIMES DAILY
Qty: 360 TABLET | Refills: 1 | OUTPATIENT
Start: 2020-10-19

## 2020-10-19 RX ORDER — ROSUVASTATIN CALCIUM 5 MG/1
5 TABLET, COATED ORAL DAILY
Qty: 90 TABLET | Refills: 2 | OUTPATIENT
Start: 2020-10-19

## 2020-10-19 RX ORDER — LISINOPRIL AND HYDROCHLOROTHIAZIDE 12.5; 1 MG/1; MG/1
1 TABLET ORAL DAILY
Qty: 90 TABLET | Refills: 3 | OUTPATIENT
Start: 2020-10-19

## 2020-10-23 ENCOUNTER — TELEMEDICINE (OUTPATIENT)
Dept: FAMILY MEDICINE CLINIC | Facility: CLINIC | Age: 60
End: 2020-10-23

## 2020-10-23 DIAGNOSIS — E11.65 TYPE 2 DIABETES MELLITUS WITH HYPERGLYCEMIA, UNSPECIFIED WHETHER LONG TERM INSULIN USE (HCC): ICD-10-CM

## 2020-10-23 DIAGNOSIS — I10 ESSENTIAL HYPERTENSION: Primary | ICD-10-CM

## 2020-10-23 DIAGNOSIS — G43.809 OTHER MIGRAINE WITHOUT STATUS MIGRAINOSUS, NOT INTRACTABLE: ICD-10-CM

## 2020-10-23 DIAGNOSIS — E78.2 MIXED HYPERLIPIDEMIA: ICD-10-CM

## 2020-10-23 PROCEDURE — 99214 OFFICE O/P EST MOD 30 MIN: CPT | Performed by: FAMILY MEDICINE

## 2020-10-23 RX ORDER — SUMATRIPTAN 50 MG/1
TABLET, FILM COATED ORAL
Qty: 10 TABLET | Refills: 3 | Status: SHIPPED | OUTPATIENT
Start: 2020-10-23 | End: 2021-02-10 | Stop reason: SDUPTHER

## 2020-10-23 NOTE — PROGRESS NOTES
You have chosen to receive care through a telehealth visit.  Do you consent to use a video/audio connection for your medical care today? Yes    Subjective   Zaheer Baron is a 59 y.o. male.     Diabetes  He presents for his follow-up diabetic visit. He has type 2 diabetes mellitus. The initial diagnosis of diabetes was made 10 years ago. Pertinent negatives for hypoglycemia include no dizziness, headaches or sweats. Pertinent negatives for diabetes include no blurred vision, no chest pain, no fatigue, no foot paresthesias, no foot ulcerations, no polydipsia, no polyphagia, no polyuria and no weakness.   Hypertension  Pertinent negatives include no anxiety, blurred vision, chest pain, headaches, malaise/fatigue, neck pain, orthopnea, peripheral edema, PND, shortness of breath or sweats. Current antihypertension treatment includes ACE inhibitors and diuretics.   Migraine   This is a chronic problem. The current episode started more than 1 year ago. The problem occurs intermittently. The pain is located in the frontal region. The quality of the pain is described as squeezing, throbbing and aching. The pain is at a severity of 4/10. Pertinent negatives include no abdominal pain, anorexia, blurred vision, dizziness, drainage, fever, hearing loss, muscle aches, neck pain, numbness, phonophobia, photophobia, sinus pressure, sore throat or weakness.       The following portions of the patient's history were reviewed and updated as appropriate: allergies, current medications, past family history, past medical history, past social history, past surgical history and problem list.    Review of Systems   Constitutional: Negative for fatigue, fever and malaise/fatigue.   HENT: Negative for hearing loss, sinus pressure and sore throat.    Eyes: Negative for blurred vision and photophobia.   Respiratory: Negative for shortness of breath.    Cardiovascular: Negative for chest pain, orthopnea and PND.   Gastrointestinal:  Negative for abdominal pain and anorexia.   Endocrine: Negative for polydipsia, polyphagia and polyuria.   Musculoskeletal: Negative for neck pain.   Neurological: Negative for dizziness, weakness, numbness and headaches.       Objective   Physical Exam  Constitutional:       Appearance: Normal appearance.   HENT:      Head: Normocephalic and atraumatic.      Right Ear: Tympanic membrane and external ear normal.      Left Ear: Tympanic membrane and external ear normal.      Nose: Nose normal.      Mouth/Throat:      Mouth: Mucous membranes are moist.      Pharynx: Oropharynx is clear.   Eyes:      Extraocular Movements: Extraocular movements intact.      Pupils: Pupils are equal, round, and reactive to light.   Neck:      Musculoskeletal: Normal range of motion and neck supple.   Cardiovascular:      Rate and Rhythm: Normal rate and regular rhythm.      Heart sounds: Normal heart sounds. No murmur. No friction rub. No gallop.    Pulmonary:      Effort: Pulmonary effort is normal. No respiratory distress.      Breath sounds: No stridor. No wheezing, rhonchi or rales.   Chest:      Chest wall: No tenderness.   Abdominal:      Palpations: Abdomen is soft.      Tenderness: There is no abdominal tenderness.   Musculoskeletal:      Comments: Back is non tender on palpation.   Skin:     General: Skin is warm and dry.   Neurological:      Mental Status: He is alert.           There were no vitals taken for this visit.  There is no height or weight on file to calculate BMI.      Assessment/Plan   Diagnoses and all orders for this visit:    1. Essential hypertension (Primary)    2. Mixed hyperlipidemia    3. Other migraine without status migrainosus, not intractable    4. Type 2 diabetes mellitus with hyperglycemia, unspecified whether long term insulin use (CMS/[facility])    Other orders  -     SUMAtriptan (IMITREX) 50 MG tablet; Take one tablet at onset of headache. May repeat dose one time in 2 hours if headache not relieved.   Dispense: 10 tablet; Refill: 3    Return to the clinic in 3 month/s.  Will contact with results as needed.

## 2020-10-28 ENCOUNTER — DOCUMENTATION (OUTPATIENT)
Dept: FAMILY MEDICINE CLINIC | Facility: CLINIC | Age: 60
End: 2020-10-28

## 2020-10-28 RX ORDER — ROSUVASTATIN CALCIUM 5 MG/1
5 TABLET, COATED ORAL DAILY
Qty: 90 TABLET | Refills: 2 | Status: SHIPPED | OUTPATIENT
Start: 2020-10-28 | End: 2021-02-10

## 2020-12-03 ENCOUNTER — TRANSCRIBE ORDERS (OUTPATIENT)
Dept: LAB | Facility: HOSPITAL | Age: 60
End: 2020-12-03

## 2020-12-03 ENCOUNTER — LAB (OUTPATIENT)
Dept: LAB | Facility: HOSPITAL | Age: 60
End: 2020-12-03

## 2020-12-03 DIAGNOSIS — I10 ESSENTIAL (PRIMARY) HYPERTENSION: ICD-10-CM

## 2020-12-03 DIAGNOSIS — Z79.1 ENCOUNTER FOR LONG-TERM (CURRENT) USE OF NON-STEROIDAL ANTI-INFLAMMATORIES: Primary | ICD-10-CM

## 2020-12-03 DIAGNOSIS — N18.30 STAGE 3 CHRONIC KIDNEY DISEASE, UNSPECIFIED WHETHER STAGE 3A OR 3B CKD (HCC): ICD-10-CM

## 2020-12-03 DIAGNOSIS — N17.9 ACUTE RENAL FAILURE, UNSPECIFIED ACUTE RENAL FAILURE TYPE (HCC): ICD-10-CM

## 2020-12-03 DIAGNOSIS — E87.6 HYPOKALEMIA: ICD-10-CM

## 2020-12-03 LAB
ANION GAP SERPL CALCULATED.3IONS-SCNC: 12.1 MMOL/L (ref 5–15)
BASOPHILS # BLD AUTO: 0.09 10*3/MM3 (ref 0–0.2)
BASOPHILS NFR BLD AUTO: 1.3 % (ref 0–1.5)
BUN SERPL-MCNC: 15 MG/DL (ref 8–23)
BUN/CREAT SERPL: 14.9 (ref 7–25)
CALCIUM SPEC-SCNC: 9.7 MG/DL (ref 8.6–10.5)
CHLORIDE SERPL-SCNC: 99 MMOL/L (ref 98–107)
CO2 SERPL-SCNC: 26.9 MMOL/L (ref 22–29)
CREAT SERPL-MCNC: 1.01 MG/DL (ref 0.76–1.27)
CREAT UR-MCNC: 83.5 MG/DL
DEPRECATED RDW RBC AUTO: 45 FL (ref 37–54)
EOSINOPHIL # BLD AUTO: 0.21 10*3/MM3 (ref 0–0.4)
EOSINOPHIL NFR BLD AUTO: 3.1 % (ref 0.3–6.2)
ERYTHROCYTE [DISTWIDTH] IN BLOOD BY AUTOMATED COUNT: 14.5 % (ref 12.3–15.4)
GFR SERPL CREATININE-BSD FRML MDRD: 75 ML/MIN/1.73
GLUCOSE SERPL-MCNC: 147 MG/DL (ref 65–99)
HCT VFR BLD AUTO: 39.5 % (ref 37.5–51)
HGB BLD-MCNC: 13.5 G/DL (ref 13–17.7)
IMM GRANULOCYTES # BLD AUTO: 0.01 10*3/MM3 (ref 0–0.05)
IMM GRANULOCYTES NFR BLD AUTO: 0.1 % (ref 0–0.5)
LYMPHOCYTES # BLD AUTO: 2.15 10*3/MM3 (ref 0.7–3.1)
LYMPHOCYTES NFR BLD AUTO: 32.1 % (ref 19.6–45.3)
MAGNESIUM SERPL-MCNC: 2 MG/DL (ref 1.6–2.4)
MCH RBC QN AUTO: 29.5 PG (ref 26.6–33)
MCHC RBC AUTO-ENTMCNC: 34.2 G/DL (ref 31.5–35.7)
MCV RBC AUTO: 86.4 FL (ref 79–97)
MONOCYTES # BLD AUTO: 0.42 10*3/MM3 (ref 0.1–0.9)
MONOCYTES NFR BLD AUTO: 6.3 % (ref 5–12)
NEUTROPHILS NFR BLD AUTO: 3.81 10*3/MM3 (ref 1.7–7)
NEUTROPHILS NFR BLD AUTO: 57.1 % (ref 42.7–76)
NRBC BLD AUTO-RTO: 0 /100 WBC (ref 0–0.2)
PLATELET # BLD AUTO: 262 10*3/MM3 (ref 140–450)
PMV BLD AUTO: 11 FL (ref 6–12)
POTASSIUM SERPL-SCNC: 3.9 MMOL/L (ref 3.5–5.2)
PROT UR-MCNC: 18 MG/DL
PROT/CREAT UR: 215.6 MG/G CREA (ref 0–200)
RBC # BLD AUTO: 4.57 10*6/MM3 (ref 4.14–5.8)
SODIUM SERPL-SCNC: 138 MMOL/L (ref 136–145)
WBC # BLD AUTO: 6.69 10*3/MM3 (ref 3.4–10.8)

## 2020-12-03 PROCEDURE — 83735 ASSAY OF MAGNESIUM: CPT | Performed by: INTERNAL MEDICINE

## 2020-12-03 PROCEDURE — 36415 COLL VENOUS BLD VENIPUNCTURE: CPT | Performed by: INTERNAL MEDICINE

## 2020-12-03 PROCEDURE — 80048 BASIC METABOLIC PNL TOTAL CA: CPT | Performed by: INTERNAL MEDICINE

## 2020-12-03 PROCEDURE — 82570 ASSAY OF URINE CREATININE: CPT | Performed by: INTERNAL MEDICINE

## 2020-12-03 PROCEDURE — 85025 COMPLETE CBC W/AUTO DIFF WBC: CPT | Performed by: INTERNAL MEDICINE

## 2020-12-03 PROCEDURE — 84156 ASSAY OF PROTEIN URINE: CPT | Performed by: INTERNAL MEDICINE

## 2021-01-26 RX ORDER — LISINOPRIL AND HYDROCHLOROTHIAZIDE 12.5; 1 MG/1; MG/1
1 TABLET ORAL DAILY
Qty: 90 TABLET | Refills: 0 | Status: SHIPPED | OUTPATIENT
Start: 2021-01-26 | End: 2021-02-10 | Stop reason: SDUPTHER

## 2021-01-29 NOTE — TELEPHONE ENCOUNTER
Discussed labs with patient. He had a positive DOMINGUEZ. Has FH of autoimmune. May need to see rheumatology.  Will wait for other labs. Also increased his glyburide. Will continue Januvia.   pt is low risk given no current SI/SA

## 2021-02-10 ENCOUNTER — TELEMEDICINE (OUTPATIENT)
Dept: FAMILY MEDICINE CLINIC | Facility: CLINIC | Age: 61
End: 2021-02-10

## 2021-02-10 VITALS — RESPIRATION RATE: 22 BRPM | HEART RATE: 80 BPM | TEMPERATURE: 97 F

## 2021-02-10 DIAGNOSIS — I10 ESSENTIAL HYPERTENSION: ICD-10-CM

## 2021-02-10 DIAGNOSIS — E78.2 MIXED HYPERLIPIDEMIA: ICD-10-CM

## 2021-02-10 DIAGNOSIS — E11.8 CONTROLLED TYPE 2 DIABETES MELLITUS WITH COMPLICATION, WITHOUT LONG-TERM CURRENT USE OF INSULIN (HCC): Primary | ICD-10-CM

## 2021-02-10 PROCEDURE — 99214 OFFICE O/P EST MOD 30 MIN: CPT | Performed by: FAMILY MEDICINE

## 2021-02-10 RX ORDER — LANSOPRAZOLE 30 MG/1
30 CAPSULE, DELAYED RELEASE ORAL DAILY
Qty: 90 CAPSULE | Refills: 1 | Status: SHIPPED | OUTPATIENT
Start: 2021-02-10 | End: 2021-02-25 | Stop reason: SDUPTHER

## 2021-02-10 RX ORDER — SUMATRIPTAN 50 MG/1
TABLET, FILM COATED ORAL
Qty: 30 TABLET | Refills: 1 | Status: SHIPPED | OUTPATIENT
Start: 2021-02-10 | End: 2021-02-25 | Stop reason: SDUPTHER

## 2021-02-10 RX ORDER — SITAGLIPTIN AND METFORMIN HYDROCHLORIDE 500; 50 MG/1; MG/1
1 TABLET, FILM COATED ORAL 2 TIMES DAILY WITH MEALS
Qty: 180 TABLET | Refills: 1 | Status: SHIPPED | OUTPATIENT
Start: 2021-02-10 | End: 2021-02-25 | Stop reason: SDUPTHER

## 2021-02-10 RX ORDER — LISINOPRIL AND HYDROCHLOROTHIAZIDE 12.5; 1 MG/1; MG/1
1 TABLET ORAL DAILY
Qty: 90 TABLET | Refills: 1 | Status: SHIPPED | OUTPATIENT
Start: 2021-02-10 | End: 2021-02-25 | Stop reason: SDUPTHER

## 2021-02-10 RX ORDER — DOXAZOSIN MESYLATE 4 MG/1
4 TABLET ORAL DAILY
Qty: 90 TABLET | Refills: 1 | Status: SHIPPED | OUTPATIENT
Start: 2021-02-10 | End: 2021-02-25 | Stop reason: SDUPTHER

## 2021-02-10 RX ORDER — UBIDECARENONE 200 MG
200 CAPSULE ORAL NIGHTLY
Qty: 90 EACH | Refills: 1 | Status: SHIPPED | OUTPATIENT
Start: 2021-02-10 | End: 2021-02-25 | Stop reason: SDUPTHER

## 2021-02-10 RX ORDER — HYDROXYCHLOROQUINE SULFATE 200 MG/1
200 TABLET, FILM COATED ORAL DAILY
Qty: 90 TABLET | Refills: 1 | Status: SHIPPED | OUTPATIENT
Start: 2021-02-10 | End: 2021-02-25 | Stop reason: SDUPTHER

## 2021-02-10 RX ORDER — PAROXETINE HYDROCHLORIDE HEMIHYDRATE 25 MG/1
25 TABLET, FILM COATED, EXTENDED RELEASE ORAL EVERY MORNING
Qty: 90 TABLET | Refills: 1 | Status: SHIPPED | OUTPATIENT
Start: 2021-02-10 | End: 2021-02-25 | Stop reason: SDUPTHER

## 2021-02-10 RX ORDER — CHLORPHENIRAMINE MALEATE 4 MG/1
4 TABLET ORAL DAILY
Qty: 90 TABLET | Refills: 1 | Status: SHIPPED | OUTPATIENT
Start: 2021-02-10 | End: 2021-02-25 | Stop reason: SDUPTHER

## 2021-02-10 NOTE — PROGRESS NOTES
You have chosen to receive care through a telehealth visit.  Do you consent to use a video/audio connection for your medical care today? Yes    Subjective   Zaheer Baron is a 60 y.o. male.     Hypertension  This is a chronic problem. The current episode started more than 1 year ago. The problem has been gradually improving since onset. Pertinent negatives include no anxiety, blurred vision, chest pain, headaches, malaise/fatigue, neck pain, orthopnea, palpitations, peripheral edema, PND, shortness of breath or sweats. Current antihypertension treatment includes ACE inhibitors and diuretics.   Hyperlipidemia  This is a chronic problem. The current episode started more than 1 year ago. Recent lipid tests were reviewed and are variable. Pertinent negatives include no chest pain or shortness of breath. Current antihyperlipidemic treatment includes statins.   Diabetes  He presents for his follow-up diabetic visit. He has type 2 diabetes mellitus. The initial diagnosis of diabetes was made 6 years ago. Pertinent negatives for hypoglycemia include no headaches or sweats. Pertinent negatives for diabetes include no blurred vision, no chest pain, no fatigue, no foot paresthesias, no foot ulcerations, no polydipsia, no polyphagia and no polyuria.       The following portions of the patient's history were reviewed and updated as appropriate: allergies, current medications, past family history, past medical history, past social history, past surgical history and problem list.    Review of Systems   Constitutional: Negative for fatigue and malaise/fatigue.   Eyes: Negative for blurred vision.   Respiratory: Negative for shortness of breath.    Cardiovascular: Negative for chest pain, palpitations, orthopnea and PND.   Endocrine: Negative for polydipsia, polyphagia and polyuria.   Musculoskeletal: Negative for neck pain.   Neurological: Negative for headaches.       Objective   Physical Exam  Vitals signs reviewed.    Constitutional:       Appearance: Normal appearance.   HENT:      Head: Normocephalic and atraumatic.      Left Ear: External ear normal.      Nose: Nose normal.      Mouth/Throat:      Mouth: Mucous membranes are moist.      Pharynx: Oropharynx is clear.   Eyes:      Extraocular Movements: Extraocular movements intact.      Pupils: Pupils are equal, round, and reactive to light.   Neck:      Musculoskeletal: Normal range of motion and neck supple.   Cardiovascular:      Rate and Rhythm: Normal rate and regular rhythm.   Pulmonary:      Effort: Pulmonary effort is normal.      Breath sounds: No wheezing.   Abdominal:      Palpations: Abdomen is soft.      Tenderness: There is no abdominal tenderness.   Musculoskeletal:      Comments: The low back is non tender.   Skin:     General: Skin is warm and dry.   Neurological:      Mental Status: He is alert.           Visit Vitals  Pulse 80   Temp 97 °F (36.1 °C)   Resp 22     There is no height or weight on file to calculate BMI.      Assessment/Plan   Diagnoses and all orders for this visit:    1. Controlled type 2 diabetes mellitus with complication, without long-term current use of insulin (CMS/[facility]) (Primary)  -     CBC w AUTO Differential; Future    2. Essential hypertension  -     Comprehensive metabolic panel; Future  -     Hemoglobin A1c; Future    3. Mixed hyperlipidemia  -     Lipid panel; Future    Other orders  -     doxazosin (CARDURA) 4 MG tablet; Take 1 tablet by mouth Daily.  Dispense: 90 tablet; Refill: 1  -     chlorpheniramine (CHLOR-TRIMETON) 4 MG tablet; Take 1 tablet by mouth Daily.  Dispense: 90 tablet; Refill: 1  -     Coenzyme Q-10 200 MG capsule; Take 1 capsule by mouth Every Night.  Dispense: 90 each; Refill: 1  -     Ergocalciferol 50 MCG (2000 UT) tablet; Take 50 mcg by mouth Daily.  Dispense: 90 tablet; Refill: 1  -     hydroxychloroquine (PLAQUENIL) 200 MG tablet; Take 1 tablet by mouth Daily for 90 days. Indications: osteoarthritis   Dispense: 90 tablet; Refill: 1  -     sitaGLIPtin-metFORMIN (Janumet)  MG per tablet; Take 1 tablet by mouth 2 (Two) Times a Day With Meals.  Dispense: 180 tablet; Refill: 1  -     lansoprazole (PREVACID) 30 MG capsule; Take 1 capsule by mouth Daily.  Dispense: 90 capsule; Refill: 1  -     lisinopril-hydrochlorothiazide (PRINZIDE,ZESTORETIC) 10-12.5 MG per tablet; Take 1 tablet by mouth Daily.  Dispense: 90 tablet; Refill: 1  -     PARoxetine CR (PAXIL-CR) 25 MG 24 hr tablet; Take 1 tablet by mouth Every Morning.  Dispense: 90 tablet; Refill: 1  -     SUMAtriptan (IMITREX) 50 MG tablet; Take one tablet at onset of headache. May repeat dose one time in 2 hours if headache not relieved.  Dispense: 30 tablet; Refill: 1    Return to the clinic in 3 month/s.  Will contact with results as needed.  This was an audio-video enabled telemedicine encounter-time spent was 15 minutes.

## 2021-02-25 RX ORDER — PAROXETINE HYDROCHLORIDE HEMIHYDRATE 25 MG/1
25 TABLET, FILM COATED, EXTENDED RELEASE ORAL EVERY MORNING
Qty: 90 TABLET | Refills: 2 | Status: SHIPPED | OUTPATIENT
Start: 2021-02-25 | End: 2021-08-12 | Stop reason: SDUPTHER

## 2021-02-25 RX ORDER — HYDROXYCHLOROQUINE SULFATE 200 MG/1
TABLET, FILM COATED ORAL DAILY
Status: CANCELLED | OUTPATIENT
Start: 2021-02-25

## 2021-02-25 RX ORDER — CHLORPHENIRAMINE MALEATE 4 MG/1
4 TABLET ORAL DAILY
Qty: 90 TABLET | Refills: 2 | Status: SHIPPED | OUTPATIENT
Start: 2021-02-25 | End: 2021-08-12 | Stop reason: SDUPTHER

## 2021-02-25 RX ORDER — LISINOPRIL AND HYDROCHLOROTHIAZIDE 12.5; 1 MG/1; MG/1
1 TABLET ORAL DAILY
Qty: 90 TABLET | Refills: 2 | Status: SHIPPED | OUTPATIENT
Start: 2021-02-25 | End: 2021-08-12 | Stop reason: SDUPTHER

## 2021-02-25 RX ORDER — HYDROXYCHLOROQUINE SULFATE 200 MG/1
200 TABLET, FILM COATED ORAL DAILY
Qty: 90 TABLET | Refills: 2 | Status: CANCELLED | OUTPATIENT
Start: 2021-02-25 | End: 2025-02-25

## 2021-02-25 RX ORDER — UBIDECARENONE 200 MG
200 CAPSULE ORAL NIGHTLY
Qty: 90 EACH | Refills: 2 | Status: SHIPPED | OUTPATIENT
Start: 2021-02-25 | End: 2023-02-17

## 2021-02-25 RX ORDER — HYDROXYCHLOROQUINE SULFATE 200 MG/1
200 TABLET, FILM COATED ORAL DAILY
Qty: 30 TABLET | Refills: 2 | Status: SHIPPED | OUTPATIENT
Start: 2021-02-25 | End: 2021-03-27

## 2021-02-25 RX ORDER — DOXAZOSIN MESYLATE 4 MG/1
4 TABLET ORAL DAILY
Qty: 90 TABLET | Refills: 2 | Status: SHIPPED | OUTPATIENT
Start: 2021-02-25 | End: 2021-08-12 | Stop reason: SDUPTHER

## 2021-02-25 RX ORDER — SITAGLIPTIN AND METFORMIN HYDROCHLORIDE 500; 50 MG/1; MG/1
1 TABLET, FILM COATED ORAL 2 TIMES DAILY WITH MEALS
Qty: 180 TABLET | Refills: 2 | Status: SHIPPED | OUTPATIENT
Start: 2021-02-25 | End: 2021-08-12 | Stop reason: SDUPTHER

## 2021-02-25 RX ORDER — SUMATRIPTAN 50 MG/1
TABLET, FILM COATED ORAL
Qty: 30 TABLET | Refills: 8 | Status: SHIPPED | OUTPATIENT
Start: 2021-02-25 | End: 2021-05-13 | Stop reason: SDUPTHER

## 2021-02-25 RX ORDER — LANSOPRAZOLE 30 MG/1
30 CAPSULE, DELAYED RELEASE ORAL DAILY
Qty: 90 CAPSULE | Refills: 3 | Status: SHIPPED | OUTPATIENT
Start: 2021-02-25 | End: 2021-12-13

## 2021-02-25 NOTE — TELEPHONE ENCOUNTER
Patient needs medications to go to [facility] and 2 weeks of BP medications to go to [facility]

## 2021-02-25 NOTE — TELEPHONE ENCOUNTER
PT'S WIFE CALLED TO REPORT PT GAVE THE WRONG PHARMACY AT HIS LAST VISIT WITH DR. NIETO 2/10    PT NEEDS HIS PRESCRIPTIONS FROM THAT DATE SENT TO [facility]    [facility] - [address] [phone] [facility] [phone]      CALLBACK [phone]

## 2021-03-04 ENCOUNTER — IMMUNIZATION (OUTPATIENT)
Dept: VACCINE CLINIC | Facility: HOSPITAL | Age: 61
End: 2021-03-04

## 2021-03-04 ENCOUNTER — TELEPHONE (OUTPATIENT)
Dept: FAMILY MEDICINE CLINIC | Facility: CLINIC | Age: 61
End: 2021-03-04

## 2021-03-04 PROCEDURE — 0001A: CPT | Performed by: NURSE PRACTITIONER

## 2021-03-04 PROCEDURE — 91300 HC SARSCOV02 VAC 30MCG/0.3ML IM: CPT | Performed by: NURSE PRACTITIONER

## 2021-03-04 NOTE — TELEPHONE ENCOUNTER
Called [facility] spoke with pharmacist they understand that the Imitrex 30 is for 90 days she also stated that Prevacid was not covered by patient's insurance and would have to be changed to Prilosec or Protonix patient had been on Prilosec previously but was changed to Prevacid asked if they could switch to Protonix 40 mg 1 tab daily #90 okay given if patient tries and has a failure to the Protonix a PA can be done for the Prevacid.

## 2021-03-04 NOTE — TELEPHONE ENCOUNTER
[facility] cain and stated that the pts IMITREX 50mg qty 30  Didn't state how many day supply it was  The insurance will pay for qty of 30 for a 90 day supply or qty of 36 for 90 day they need a call back the call back number and reference number is below.      [phone]   Ref # [id number]

## 2021-03-25 ENCOUNTER — IMMUNIZATION (OUTPATIENT)
Dept: VACCINE CLINIC | Facility: HOSPITAL | Age: 61
End: 2021-03-25

## 2021-03-25 PROCEDURE — 0002A: CPT | Performed by: THORACIC SURGERY (CARDIOTHORACIC VASCULAR SURGERY)

## 2021-03-25 PROCEDURE — 91300 HC SARSCOV02 VAC 30MCG/0.3ML IM: CPT | Performed by: THORACIC SURGERY (CARDIOTHORACIC VASCULAR SURGERY)

## 2021-05-13 ENCOUNTER — OFFICE VISIT (OUTPATIENT)
Dept: FAMILY MEDICINE CLINIC | Facility: CLINIC | Age: 61
End: 2021-05-13

## 2021-05-13 VITALS
BODY MASS INDEX: 36.52 KG/M2 | WEIGHT: 246.56 LBS | HEIGHT: 69 IN | SYSTOLIC BLOOD PRESSURE: 132 MMHG | HEART RATE: 105 BPM | DIASTOLIC BLOOD PRESSURE: 78 MMHG | OXYGEN SATURATION: 98 %

## 2021-05-13 DIAGNOSIS — I10 ESSENTIAL HYPERTENSION: Primary | ICD-10-CM

## 2021-05-13 DIAGNOSIS — G43.809 OTHER MIGRAINE WITHOUT STATUS MIGRAINOSUS, NOT INTRACTABLE: ICD-10-CM

## 2021-05-13 DIAGNOSIS — E78.2 MIXED HYPERLIPIDEMIA: ICD-10-CM

## 2021-05-13 DIAGNOSIS — E11.65 TYPE 2 DIABETES MELLITUS WITH HYPERGLYCEMIA, UNSPECIFIED WHETHER LONG TERM INSULIN USE (HCC): ICD-10-CM

## 2021-05-13 PROCEDURE — 99214 OFFICE O/P EST MOD 30 MIN: CPT | Performed by: FAMILY MEDICINE

## 2021-05-13 RX ORDER — SUMATRIPTAN 50 MG/1
TABLET, FILM COATED ORAL
Qty: 30 TABLET | Refills: 2 | Status: SHIPPED | OUTPATIENT
Start: 2021-05-13 | End: 2021-08-12 | Stop reason: SDUPTHER

## 2021-05-13 NOTE — PROGRESS NOTES
Subjective   Zaheer Baron is a 60 y.o. male.   Cc: follow up of chronic medical issues    Hypertension  This is a chronic problem. The current episode started more than 1 year ago. The problem has been gradually improving since onset. Associated symptoms include headaches and neck pain. Pertinent negatives include no anxiety, blurred vision, chest pain, malaise/fatigue, orthopnea, palpitations, peripheral edema, PND, shortness of breath or sweats. Current antihypertension treatment includes ACE inhibitors, diuretics and calcium channel blockers.   Hyperlipidemia  This is a chronic problem. The current episode started more than 1 year ago. Recent lipid tests were reviewed and are variable. Pertinent negatives include no chest pain or shortness of breath. Current antihyperlipidemic treatment includes diet change.   Diabetes  He presents for his follow-up diabetic visit. He has type 2 diabetes mellitus. The initial diagnosis of diabetes was made 10 years ago. Hypoglycemia symptoms include headaches. Pertinent negatives for hypoglycemia include no sweats. Associated symptoms include fatigue. Pertinent negatives for diabetes include no blurred vision, no chest pain, no foot paresthesias, no foot ulcerations, no polydipsia, no polyphagia and no polyuria.   He has Migraine headache forrom one to three a month. He needs his Imitrex refilled.    The following portions of the patient's history were reviewed and updated as appropriate: allergies, current medications, past family history, past medical history, past social history, past surgical history and problem list.    Review of Systems   Constitutional: Positive for fatigue. Negative for malaise/fatigue.   Eyes: Negative for blurred vision.   Respiratory: Negative for shortness of breath.    Cardiovascular: Negative for chest pain, palpitations, orthopnea and PND.   Endocrine: Negative for polydipsia, polyphagia and polyuria.   Musculoskeletal: Positive for neck  "pain.   Neurological: Positive for headaches.       Objective   Physical Exam  Vitals reviewed.   Constitutional:       Appearance: Normal appearance.   HENT:      Head: Normocephalic and atraumatic.      Right Ear: Tympanic membrane, ear canal and external ear normal.      Left Ear: Tympanic membrane, ear canal and external ear normal.      Nose: Nose normal.      Mouth/Throat:      Mouth: Mucous membranes are moist.      Pharynx: Oropharynx is clear.   Cardiovascular:      Rate and Rhythm: Normal rate and regular rhythm.      Heart sounds: Normal heart sounds. No murmur heard.   No friction rub. No gallop.    Pulmonary:      Effort: Pulmonary effort is normal. No respiratory distress.      Breath sounds: Normal breath sounds. No stridor. No wheezing, rhonchi or rales.   Chest:      Chest wall: No tenderness.   Abdominal:      General: Abdomen is flat. Bowel sounds are normal. There is no distension.      Palpations: Abdomen is soft.      Tenderness: There is no abdominal tenderness. There is no guarding.   Musculoskeletal:      Cervical back: Normal range of motion.   Skin:     General: Skin is warm and dry.      Comments: No calluses,No lesions. Stereognosis is intact. Monofilament exam is normal.     Neurological:      Mental Status: He is alert.           Visit Vitals  /78   Pulse 105   Ht 175.3 cm (69\")   Wt 112 kg (246 lb 9 oz)   SpO2 98%   BMI 36.41 kg/m²     Body mass index is 36.41 kg/m².      Assessment/Plan   Diagnoses and all orders for this visit:    1. Essential hypertension (Primary)  -     Comprehensive metabolic panel; Future  -     CBC w AUTO Differential; Future    2. Mixed hyperlipidemia  -     Lipid panel; Future    3. Type 2 diabetes mellitus with hyperglycemia, unspecified whether long term insulin use (CMS/[facility])  -     Hemoglobin A1c; Future    4. Other migraine without status migrainosus, not intractable    Other orders  -     SUMAtriptan (IMITREX) 50 MG tablet; Take one tablet at " onset of headache. May repeat dose one time in 2 hours if headache not relieved.  Dispense: 30 tablet; Refill: 2    Return to the clinic in 3 month/s.  Will contact with results as needed.

## 2021-08-12 ENCOUNTER — OFFICE VISIT (OUTPATIENT)
Dept: FAMILY MEDICINE CLINIC | Facility: CLINIC | Age: 61
End: 2021-08-12

## 2021-08-12 ENCOUNTER — LAB (OUTPATIENT)
Dept: LAB | Facility: HOSPITAL | Age: 61
End: 2021-08-12

## 2021-08-12 VITALS
HEART RATE: 112 BPM | BODY MASS INDEX: 36.62 KG/M2 | HEIGHT: 69 IN | SYSTOLIC BLOOD PRESSURE: 134 MMHG | OXYGEN SATURATION: 98 % | DIASTOLIC BLOOD PRESSURE: 76 MMHG | WEIGHT: 247.25 LBS

## 2021-08-12 DIAGNOSIS — I10 ESSENTIAL HYPERTENSION: Primary | ICD-10-CM

## 2021-08-12 DIAGNOSIS — E55.9 VITAMIN D DEFICIENCY: ICD-10-CM

## 2021-08-12 DIAGNOSIS — E78.2 MIXED HYPERLIPIDEMIA: ICD-10-CM

## 2021-08-12 DIAGNOSIS — E11.8 CONTROLLED TYPE 2 DIABETES MELLITUS WITH COMPLICATION, WITHOUT LONG-TERM CURRENT USE OF INSULIN (HCC): ICD-10-CM

## 2021-08-12 DIAGNOSIS — E11.65 TYPE 2 DIABETES MELLITUS WITH HYPERGLYCEMIA, UNSPECIFIED WHETHER LONG TERM INSULIN USE (HCC): ICD-10-CM

## 2021-08-12 DIAGNOSIS — I10 ESSENTIAL HYPERTENSION: ICD-10-CM

## 2021-08-12 DIAGNOSIS — G43.809 OTHER MIGRAINE WITHOUT STATUS MIGRAINOSUS, NOT INTRACTABLE: ICD-10-CM

## 2021-08-12 PROCEDURE — 83036 HEMOGLOBIN GLYCOSYLATED A1C: CPT

## 2021-08-12 PROCEDURE — 36415 COLL VENOUS BLD VENIPUNCTURE: CPT

## 2021-08-12 PROCEDURE — 99214 OFFICE O/P EST MOD 30 MIN: CPT | Performed by: FAMILY MEDICINE

## 2021-08-12 PROCEDURE — 80053 COMPREHEN METABOLIC PANEL: CPT

## 2021-08-12 PROCEDURE — 80061 LIPID PANEL: CPT

## 2021-08-12 PROCEDURE — 85025 COMPLETE CBC W/AUTO DIFF WBC: CPT

## 2021-08-12 PROCEDURE — 82306 VITAMIN D 25 HYDROXY: CPT

## 2021-08-12 RX ORDER — DOXAZOSIN MESYLATE 4 MG/1
4 TABLET ORAL DAILY
Qty: 90 TABLET | Refills: 2 | Status: SHIPPED | OUTPATIENT
Start: 2021-08-12 | End: 2022-03-23

## 2021-08-12 RX ORDER — PANTOPRAZOLE SODIUM 40 MG/1
1 TABLET, DELAYED RELEASE ORAL DAILY
COMMUNITY
Start: 2021-06-04 | End: 2021-12-13

## 2021-08-12 RX ORDER — CHLORPHENIRAMINE MALEATE 4 MG/1
4 TABLET ORAL DAILY
Qty: 90 TABLET | Refills: 2 | Status: SHIPPED | OUTPATIENT
Start: 2021-08-12

## 2021-08-12 RX ORDER — SITAGLIPTIN AND METFORMIN HYDROCHLORIDE 500; 50 MG/1; MG/1
1 TABLET, FILM COATED ORAL 2 TIMES DAILY WITH MEALS
Qty: 180 TABLET | Refills: 2 | Status: SHIPPED | OUTPATIENT
Start: 2021-08-12 | End: 2021-08-24

## 2021-08-12 RX ORDER — PAROXETINE HYDROCHLORIDE HEMIHYDRATE 25 MG/1
25 TABLET, FILM COATED, EXTENDED RELEASE ORAL EVERY MORNING
Qty: 90 TABLET | Refills: 2 | Status: SHIPPED | OUTPATIENT
Start: 2021-08-12 | End: 2021-10-25

## 2021-08-12 RX ORDER — LISINOPRIL AND HYDROCHLOROTHIAZIDE 12.5; 1 MG/1; MG/1
1 TABLET ORAL DAILY
Qty: 90 TABLET | Refills: 2 | Status: SHIPPED | OUTPATIENT
Start: 2021-08-12 | End: 2022-03-23

## 2021-08-12 RX ORDER — SUMATRIPTAN 50 MG/1
TABLET, FILM COATED ORAL
Qty: 30 TABLET | Refills: 2 | Status: SHIPPED | OUTPATIENT
Start: 2021-08-12 | End: 2022-08-22 | Stop reason: SDUPTHER

## 2021-08-12 NOTE — PROGRESS NOTES
Subjective   Zaheer Baron is a 60 y.o. male.   .Cc: follow up of chronic medical issues    Hypertension  This is a chronic problem. The current episode started more than 1 year ago. The problem has been gradually improving since onset. Pertinent negatives include no anxiety, blurred vision, chest pain, headaches, malaise/fatigue, neck pain, orthopnea, palpitations, peripheral edema, PND, shortness of breath or sweats. Current antihypertension treatment includes ACE inhibitors and diuretics.   Hyperlipidemia  This is a chronic problem. The current episode started more than 1 year ago. Recent lipid tests were reviewed and are variable. Pertinent negatives include no chest pain or shortness of breath. Current antihyperlipidemic treatment includes diet change.   Diabetes  He presents for his follow-up diabetic visit. He has type 2 diabetes mellitus. The initial diagnosis of diabetes was made 10 years ago. Pertinent negatives for hypoglycemia include no dizziness, headaches, seizures or sweats. Pertinent negatives for diabetes include no blurred vision, no chest pain, no fatigue, no foot paresthesias, no foot ulcerations, no polydipsia, no polyphagia, no polyuria, no visual change, no weakness and no weight loss.   Migraine   This is a chronic problem. The current episode started more than 1 year ago. The problem has been gradually improving. The quality of the pain is described as throbbing. The pain is at a severity of 8/10. The pain is moderate. Associated symptoms include eye watering, insomnia, nausea, photophobia, scalp tenderness and vomiting. Pertinent negatives include no abdominal pain, abnormal behavior, anorexia, back pain, blurred vision, coughing, dizziness, drainage, ear pain, eye pain, eye redness, facial sweating, fever, hearing loss, loss of balance, muscle aches, neck pain, numbness, phonophobia, rhinorrhea, seizures, sinus pressure, sore throat, swollen glands, tingling, tinnitus, visual  change, weakness or weight loss.   He has a history of Vitamin D deficiency. It is stable.    The following portions of the patient's history were reviewed and updated as appropriate: allergies, current medications, past family history, past medical history, past social history, past surgical history and problem list.    Review of Systems   Constitutional: Negative for fatigue, fever, malaise/fatigue and weight loss.   HENT: Negative for ear pain, hearing loss, rhinorrhea, sinus pressure, sore throat and tinnitus.    Eyes: Positive for photophobia. Negative for blurred vision, pain and redness.   Respiratory: Negative for cough and shortness of breath.    Cardiovascular: Negative for chest pain, palpitations, orthopnea and PND.   Gastrointestinal: Positive for nausea and vomiting. Negative for abdominal pain and anorexia.   Endocrine: Negative for polydipsia, polyphagia and polyuria.   Musculoskeletal: Negative for back pain and neck pain.   Neurological: Negative for dizziness, tingling, seizures, weakness, numbness, headaches and loss of balance.   Psychiatric/Behavioral: The patient has insomnia.        Objective   Physical Exam  Vitals reviewed.   Constitutional:       Appearance: Normal appearance.   HENT:      Head: Normocephalic and atraumatic.      Right Ear: Tympanic membrane, ear canal and external ear normal.      Left Ear: Tympanic membrane, ear canal and external ear normal.      Nose: Nose normal.      Mouth/Throat:      Mouth: Mucous membranes are moist.      Pharynx: Oropharynx is clear.   Cardiovascular:      Rate and Rhythm: Normal rate and regular rhythm.      Heart sounds: Normal heart sounds. No murmur heard.   No friction rub. No gallop.    Pulmonary:      Effort: Pulmonary effort is normal. No respiratory distress.      Breath sounds: Normal breath sounds. No stridor. No wheezing, rhonchi or rales.   Chest:      Chest wall: No tenderness.   Abdominal:      General: Abdomen is flat. Bowel  "sounds are normal. There is no distension.      Palpations: Abdomen is soft. There is no mass.      Tenderness: There is no abdominal tenderness. There is no rebound.      Hernia: No hernia is present.   Musculoskeletal:      Cervical back: Normal range of motion.   Skin:     General: Skin is warm and dry.   Neurological:      Mental Status: He is alert.           Visit Vitals  /76   Pulse 112   Ht 175.3 cm (69\")   Wt 112 kg (247 lb 4 oz)   SpO2 98%   BMI 36.51 kg/m²     Body mass index is 36.51 kg/m².      Assessment/Plan   Diagnoses and all orders for this visit:    1. Essential hypertension (Primary)  -     doxazosin (CARDURA) 4 MG tablet; Take 1 tablet by mouth Daily.  Dispense: 90 tablet; Refill: 2  -     lisinopril-hydrochlorothiazide (PRINZIDE,ZESTORETIC) 10-12.5 MG per tablet; Take 1 tablet by mouth Daily.  Dispense: 90 tablet; Refill: 2  -     Comprehensive metabolic panel; Future  -     CBC w AUTO Differential; Future  -     Hemoglobin A1c; Future    2. Vitamin D deficiency  -     Ergocalciferol 50 MCG (2000 UT) tablet; Take 50 mcg by mouth Daily.  Dispense: 90 tablet; Refill: 2  -     Vitamin D 25 hydroxy; Future    3. Type 2 diabetes mellitus with hyperglycemia, unspecified whether long term insulin use (CMS/[facility])  -     sitaGLIPtin-metFORMIN (Janumet)  MG per tablet; Take 1 tablet by mouth 2 (Two) Times a Day With Meals.  Dispense: 180 tablet; Refill: 2  -     Lipid panel; Future  -     Hemoglobin A1c; Future    4. Other migraine without status migrainosus, not intractable  -     SUMAtriptan (IMITREX) 50 MG tablet; Take one tablet at onset of headache. May repeat dose one time in 2 hours if headache not relieved.  Dispense: 30 tablet; Refill: 2    Other orders  -     chlorpheniramine (CHLOR-TRIMETON) 4 MG tablet; Take 1 tablet by mouth Daily.  Dispense: 90 tablet; Refill: 2  -     PARoxetine CR (PAXIL-CR) 25 MG 24 hr tablet; Take 1 tablet by mouth Every Morning.  Dispense: 90 tablet; Refill: " 2    Return to the clinic in 3 month/s.  Will contact with results as needed.

## 2021-08-13 LAB
25(OH)D3 SERPL-MCNC: 21.9 NG/ML (ref 30–100)
ALBUMIN SERPL-MCNC: 4.6 G/DL (ref 3.5–5.2)
ALBUMIN/GLOB SERPL: 1.6 G/DL
ALP SERPL-CCNC: 44 U/L (ref 39–117)
ALT SERPL W P-5'-P-CCNC: 40 U/L (ref 1–41)
ANION GAP SERPL CALCULATED.3IONS-SCNC: 11.7 MMOL/L (ref 5–15)
AST SERPL-CCNC: 41 U/L (ref 1–40)
BASOPHILS # BLD AUTO: 0.07 10*3/MM3 (ref 0–0.2)
BASOPHILS NFR BLD AUTO: 0.9 % (ref 0–1.5)
BILIRUB SERPL-MCNC: 0.4 MG/DL (ref 0–1.2)
BUN SERPL-MCNC: 16 MG/DL (ref 8–23)
BUN/CREAT SERPL: 12.5 (ref 7–25)
CALCIUM SPEC-SCNC: 9.4 MG/DL (ref 8.6–10.5)
CHLORIDE SERPL-SCNC: 100 MMOL/L (ref 98–107)
CHOLEST SERPL-MCNC: 181 MG/DL (ref 0–200)
CO2 SERPL-SCNC: 26.3 MMOL/L (ref 22–29)
CREAT SERPL-MCNC: 1.28 MG/DL (ref 0.76–1.27)
DEPRECATED RDW RBC AUTO: 43.7 FL (ref 37–54)
EOSINOPHIL # BLD AUTO: 0.24 10*3/MM3 (ref 0–0.4)
EOSINOPHIL NFR BLD AUTO: 3.1 % (ref 0.3–6.2)
ERYTHROCYTE [DISTWIDTH] IN BLOOD BY AUTOMATED COUNT: 13.5 % (ref 12.3–15.4)
GFR SERPL CREATININE-BSD FRML MDRD: 57 ML/MIN/1.73
GLOBULIN UR ELPH-MCNC: 2.9 GM/DL
GLUCOSE SERPL-MCNC: 159 MG/DL (ref 65–99)
HBA1C MFR BLD: 7.2 % (ref 4.8–5.6)
HCT VFR BLD AUTO: 38.8 % (ref 37.5–51)
HDLC SERPL-MCNC: 29 MG/DL (ref 40–60)
HGB BLD-MCNC: 12.8 G/DL (ref 13–17.7)
IMM GRANULOCYTES # BLD AUTO: 0.04 10*3/MM3 (ref 0–0.05)
IMM GRANULOCYTES NFR BLD AUTO: 0.5 % (ref 0–0.5)
LDLC SERPL CALC-MCNC: 90 MG/DL (ref 0–100)
LDLC/HDLC SERPL: 2.67 {RATIO}
LYMPHOCYTES # BLD AUTO: 2.45 10*3/MM3 (ref 0.7–3.1)
LYMPHOCYTES NFR BLD AUTO: 31.3 % (ref 19.6–45.3)
MCH RBC QN AUTO: 29.2 PG (ref 26.6–33)
MCHC RBC AUTO-ENTMCNC: 33 G/DL (ref 31.5–35.7)
MCV RBC AUTO: 88.4 FL (ref 79–97)
MONOCYTES # BLD AUTO: 0.52 10*3/MM3 (ref 0.1–0.9)
MONOCYTES NFR BLD AUTO: 6.6 % (ref 5–12)
NEUTROPHILS NFR BLD AUTO: 4.5 10*3/MM3 (ref 1.7–7)
NEUTROPHILS NFR BLD AUTO: 57.6 % (ref 42.7–76)
NRBC BLD AUTO-RTO: 0.1 /100 WBC (ref 0–0.2)
PLATELET # BLD AUTO: 248 10*3/MM3 (ref 140–450)
PMV BLD AUTO: 11.3 FL (ref 6–12)
POTASSIUM SERPL-SCNC: 4.3 MMOL/L (ref 3.5–5.2)
PROT SERPL-MCNC: 7.5 G/DL (ref 6–8.5)
RBC # BLD AUTO: 4.39 10*6/MM3 (ref 4.14–5.8)
SODIUM SERPL-SCNC: 138 MMOL/L (ref 136–145)
TRIGL SERPL-MCNC: 373 MG/DL (ref 0–150)
VLDLC SERPL-MCNC: 62 MG/DL (ref 5–40)
WBC # BLD AUTO: 7.82 10*3/MM3 (ref 3.4–10.8)

## 2021-08-24 DIAGNOSIS — E11.65 TYPE 2 DIABETES MELLITUS WITH HYPERGLYCEMIA, UNSPECIFIED WHETHER LONG TERM INSULIN USE (HCC): ICD-10-CM

## 2021-08-24 RX ORDER — SITAGLIPTIN AND METFORMIN HYDROCHLORIDE 500; 50 MG/1; MG/1
TABLET, FILM COATED ORAL
Qty: 60 TABLET | Refills: 1 | Status: SHIPPED | OUTPATIENT
Start: 2021-08-24 | End: 2022-03-23

## 2021-10-13 ENCOUNTER — TELEPHONE (OUTPATIENT)
Dept: FAMILY MEDICINE CLINIC | Facility: CLINIC | Age: 61
End: 2021-10-13

## 2021-10-13 DIAGNOSIS — H91.90 DECREASED HEARING, UNSPECIFIED LATERALITY: Primary | ICD-10-CM

## 2021-10-13 NOTE — TELEPHONE ENCOUNTER
PT CALLED TO REQUEST REFERRAL TO ENT FOR HEARING TEST.    PLEASE CONTACT PT WITH QUESTIONS OR CONCERNS.    [phone]

## 2021-10-25 RX ORDER — PAROXETINE HYDROCHLORIDE HEMIHYDRATE 25 MG/1
25 TABLET, FILM COATED, EXTENDED RELEASE ORAL EVERY MORNING
Qty: 90 TABLET | Refills: 2 | Status: SHIPPED | OUTPATIENT
Start: 2021-10-25 | End: 2022-05-31

## 2021-12-13 ENCOUNTER — OFFICE VISIT (OUTPATIENT)
Dept: FAMILY MEDICINE CLINIC | Facility: CLINIC | Age: 61
End: 2021-12-13

## 2021-12-13 VITALS
OXYGEN SATURATION: 100 % | RESPIRATION RATE: 20 BRPM | HEIGHT: 69 IN | HEART RATE: 96 BPM | TEMPERATURE: 97.7 F | SYSTOLIC BLOOD PRESSURE: 125 MMHG | BODY MASS INDEX: 35.76 KG/M2 | WEIGHT: 241.4 LBS | DIASTOLIC BLOOD PRESSURE: 82 MMHG

## 2021-12-13 DIAGNOSIS — Z51.81 MEDICATION MONITORING ENCOUNTER: ICD-10-CM

## 2021-12-13 DIAGNOSIS — E78.5 DYSLIPIDEMIA: ICD-10-CM

## 2021-12-13 DIAGNOSIS — I10 PRIMARY HYPERTENSION: ICD-10-CM

## 2021-12-13 DIAGNOSIS — R53.82 CHRONIC FATIGUE: ICD-10-CM

## 2021-12-13 DIAGNOSIS — R42 DIZZINESS: ICD-10-CM

## 2021-12-13 DIAGNOSIS — E55.9 VITAMIN D DEFICIENCY: ICD-10-CM

## 2021-12-13 DIAGNOSIS — E11.9 TYPE 2 DIABETES MELLITUS WITHOUT COMPLICATION, WITHOUT LONG-TERM CURRENT USE OF INSULIN (HCC): Primary | ICD-10-CM

## 2021-12-13 PROCEDURE — 99204 OFFICE O/P NEW MOD 45 MIN: CPT | Performed by: PHYSICIAN ASSISTANT

## 2021-12-13 PROCEDURE — 93000 ELECTROCARDIOGRAM COMPLETE: CPT | Performed by: PHYSICIAN ASSISTANT

## 2021-12-13 NOTE — PROGRESS NOTES
Subjective   Zaeher Baron is a 61 y.o. male.     History of Present Illness   Pt presents to establish care   Has been diagnosed with DM, HTN and GERD   Okay on refills at this time. Due for lab check up. Not fasting today. Was seeing last provider every 6 months.   BS has generally been well controlled     Notes he has been having frequent dizzy spells. Can last up to a day.   Started around a year ago following diarrheal infection. Temporary bout of A fib while in hospital suspected to be secondary to hypovolemia. Not had issues with this since that he is aware of   Not on antiarrythmic or BB.   Denies heart palpitations.   Has never had carotid duplex   Hx of migraines, but they are very infrequent   Not checking sugars when having dizzy spells. Hx in chart from 2018 had EKG checked for dizziness and was having hypoglycemia around that time   Sometimes associated with nausea. No vomiting   Can be worse with sudden position changes   Denies hearing changes. Does struggle with sinus congestion at times       Sees pain management for chronic back pain and arthritis     Born with one kidney (right) can not take NSAIDS and is careful what he takes so not to affect kidney function  Not on STATIN he reports for this reason along with joint pain  Not taking fish oil     GERD managed with OTC Nexium. Insurance will not cover for PPI     Struggles with fatigue. Asking if he is safe to take testosterone booster    The following portions of the patient's history were reviewed and updated as appropriate: allergies, current medications, past family history, past medical history, past social history, past surgical history and problem list.    Review of Systems   Constitutional: Positive for fatigue. Negative for chills and fever.   HENT: Negative for trouble swallowing.    Respiratory: Negative for cough, shortness of breath and wheezing.    Cardiovascular: Negative for chest pain, palpitations and leg swelling.  "  Gastrointestinal: Negative for diarrhea, nausea and vomiting.   Skin: Negative for rash.   Neurological: Positive for dizziness, light-headedness and headaches.       Objective    Blood pressure 125/82, pulse 96, temperature 97.7 °F (36.5 °C), resp. rate 20, height 175.3 cm (69.02\"), weight 109 kg (241 lb 6.4 oz), SpO2 100 %.     Physical Exam  Vitals and nursing note reviewed.   Constitutional:       Appearance: Normal appearance. He is well-developed.   HENT:      Head: Normocephalic and atraumatic.      Right Ear: Tympanic membrane, ear canal and external ear normal.      Left Ear: Tympanic membrane, ear canal and external ear normal.      Nose: Nose normal.      Mouth/Throat:      Pharynx: No oropharyngeal exudate or posterior oropharyngeal erythema.   Eyes:      Conjunctiva/sclera: Conjunctivae normal.   Neck:      Thyroid: No thyromegaly.      Trachea: No tracheal deviation.   Cardiovascular:      Rate and Rhythm: Normal rate and regular rhythm.      Heart sounds: Normal heart sounds.   Pulmonary:      Effort: Pulmonary effort is normal. No respiratory distress.      Breath sounds: Normal breath sounds. No wheezing or rales.   Chest:      Chest wall: No tenderness.   Abdominal:      General: Bowel sounds are normal. There is no distension.      Palpations: Abdomen is soft. There is no mass.      Tenderness: There is no abdominal tenderness. There is no guarding or rebound.      Hernia: No hernia is present.   Musculoskeletal:      Cervical back: Normal range of motion and neck supple.   Lymphadenopathy:      Cervical: No cervical adenopathy.   Skin:     General: Skin is warm and dry.   Neurological:      Mental Status: He is alert and oriented to person, place, and time.   Psychiatric:         Mood and Affect: Mood normal.         Behavior: Behavior normal.         Thought Content: Thought content normal.         Judgment: Judgment normal.           ECG 12 Lead    Date/Time: 12/13/2021 10:30 AM  Performed " by: Eliana Singer PA  Authorized by: Elaina Singer PA   Comparison: compared with previous ECG from 3/9/2020  Comparison to previous ECG: A fib from last EKG has resolved.   Rhythm: sinus rhythm  Rate: normal  Conduction: conduction normal  T Waves: T waves normal  QRS axis: normal    Clinical impression: abnormal EKG  Comments: Possible inferior infarct - age undetermined             Assessment/Plan   Diagnoses and all orders for this visit:    1. Type 2 diabetes mellitus without complication, without long-term current use of insulin (HCC) (Primary)  -     CBC w AUTO Differential  -     Comprehensive metabolic panel  -     Lipid Panel  -     Hemoglobin A1c    2. Primary hypertension  -     CBC w AUTO Differential  -     Comprehensive metabolic panel    3. Dyslipidemia  -     CBC w AUTO Differential  -     Comprehensive metabolic panel  -     Lipid Panel    4. Vitamin D deficiency  -     Vitamin D 25 hydroxy    5. Medication monitoring encounter  -     Magnesium    6. Dizziness  -     CBC w AUTO Differential  -     Comprehensive metabolic panel  -     Vitamin B12  -     Iron Profile  -     ECG 12 Lead    7. Chronic fatigue  -     Testosterone  -     TSH    EKG showing normal sinus rhythm. Possible evidence of old inferior infarct. Denies any known hx of this. Does not have stents. Hx of stress test many years ago   Check labs as outlined in plan- return when fasting   Consider carotid duplex if dizziness persists. Track blood sugar during these spells to make sure not dropping too low.   F/u in 6 months for annual exam if labs are stable.

## 2021-12-23 ENCOUNTER — PATIENT ROUNDING (BHMG ONLY) (OUTPATIENT)
Dept: FAMILY MEDICINE CLINIC | Facility: CLINIC | Age: 61
End: 2021-12-23

## 2022-01-26 LAB
25(OH)D3+25(OH)D2 SERPL-MCNC: 28.7 NG/ML (ref 30–100)
ALBUMIN SERPL-MCNC: 4.3 G/DL (ref 3.5–5.2)
ALBUMIN/GLOB SERPL: 1.3 G/DL
ALP SERPL-CCNC: 45 U/L (ref 39–117)
ALT SERPL-CCNC: 34 U/L (ref 1–41)
AST SERPL-CCNC: 31 U/L (ref 1–40)
BASOPHILS # BLD AUTO: 0.07 10*3/MM3 (ref 0–0.2)
BASOPHILS NFR BLD AUTO: 0.9 % (ref 0–1.5)
BILIRUB SERPL-MCNC: 0.3 MG/DL (ref 0–1.2)
BUN SERPL-MCNC: 18 MG/DL (ref 8–23)
BUN/CREAT SERPL: 14.2 (ref 7–25)
CALCIUM SERPL-MCNC: 9.9 MG/DL (ref 8.6–10.5)
CHLORIDE SERPL-SCNC: 97 MMOL/L (ref 98–107)
CHOLEST SERPL-MCNC: 203 MG/DL (ref 0–200)
CO2 SERPL-SCNC: 26.1 MMOL/L (ref 22–29)
CREAT SERPL-MCNC: 1.27 MG/DL (ref 0.76–1.27)
EOSINOPHIL # BLD AUTO: 0.24 10*3/MM3 (ref 0–0.4)
EOSINOPHIL NFR BLD AUTO: 3.2 % (ref 0.3–6.2)
ERYTHROCYTE [DISTWIDTH] IN BLOOD BY AUTOMATED COUNT: 13.9 % (ref 12.3–15.4)
GLOBULIN SER CALC-MCNC: 3.2 GM/DL
GLUCOSE SERPL-MCNC: 161 MG/DL (ref 65–99)
HBA1C MFR BLD: 8 % (ref 4.8–5.6)
HCT VFR BLD AUTO: 40.5 % (ref 37.5–51)
HDLC SERPL-MCNC: 34 MG/DL (ref 40–60)
HGB BLD-MCNC: 13.7 G/DL (ref 13–17.7)
IMM GRANULOCYTES # BLD AUTO: 0.02 10*3/MM3 (ref 0–0.05)
IMM GRANULOCYTES NFR BLD AUTO: 0.3 % (ref 0–0.5)
IRON SATN MFR SERPL: 15 % (ref 20–50)
IRON SERPL-MCNC: 55 MCG/DL (ref 59–158)
LDLC SERPL CALC-MCNC: 101 MG/DL (ref 0–100)
LYMPHOCYTES # BLD AUTO: 3.06 10*3/MM3 (ref 0.7–3.1)
LYMPHOCYTES NFR BLD AUTO: 41 % (ref 19.6–45.3)
MAGNESIUM SERPL-MCNC: 1.7 MG/DL (ref 1.6–2.4)
MCH RBC QN AUTO: 29.8 PG (ref 26.6–33)
MCHC RBC AUTO-ENTMCNC: 33.8 G/DL (ref 31.5–35.7)
MCV RBC AUTO: 88 FL (ref 79–97)
MONOCYTES # BLD AUTO: 0.51 10*3/MM3 (ref 0.1–0.9)
MONOCYTES NFR BLD AUTO: 6.8 % (ref 5–12)
NEUTROPHILS # BLD AUTO: 3.57 10*3/MM3 (ref 1.7–7)
NEUTROPHILS NFR BLD AUTO: 47.8 % (ref 42.7–76)
NRBC BLD AUTO-RTO: 0 /100 WBC (ref 0–0.2)
PLATELET # BLD AUTO: 261 10*3/MM3 (ref 140–450)
POTASSIUM SERPL-SCNC: 4.4 MMOL/L (ref 3.5–5.2)
PROT SERPL-MCNC: 7.5 G/DL (ref 6–8.5)
RBC # BLD AUTO: 4.6 10*6/MM3 (ref 4.14–5.8)
SODIUM SERPL-SCNC: 138 MMOL/L (ref 136–145)
TESTOST SERPL-MCNC: 252 NG/DL (ref 264–916)
TIBC SERPL-MCNC: 373 MCG/DL
TRIGL SERPL-MCNC: 400 MG/DL (ref 0–150)
TSH SERPL-ACNC: 1.48 UIU/ML (ref 0.27–4.2)
UIBC SERPL-MCNC: 318 MCG/DL (ref 112–346)
VIT B12 SERPL-MCNC: 703 PG/ML (ref 211–946)
VLDLC SERPL CALC-MCNC: 68 MG/DL (ref 5–40)
WBC # BLD AUTO: 7.47 10*3/MM3 (ref 3.4–10.8)

## 2022-03-22 DIAGNOSIS — I10 ESSENTIAL HYPERTENSION: ICD-10-CM

## 2022-03-22 DIAGNOSIS — E11.65 TYPE 2 DIABETES MELLITUS WITH HYPERGLYCEMIA, UNSPECIFIED WHETHER LONG TERM INSULIN USE: ICD-10-CM

## 2022-03-22 RX ORDER — DOXAZOSIN MESYLATE 4 MG/1
4 TABLET ORAL DAILY
Qty: 90 TABLET | Refills: 3 | Status: CANCELLED | OUTPATIENT
Start: 2022-03-22

## 2022-03-22 RX ORDER — LISINOPRIL AND HYDROCHLOROTHIAZIDE 12.5; 1 MG/1; MG/1
1 TABLET ORAL DAILY
Qty: 90 TABLET | Refills: 3 | Status: CANCELLED | OUTPATIENT
Start: 2022-03-22

## 2022-03-22 RX ORDER — SITAGLIPTIN AND METFORMIN HYDROCHLORIDE 500; 50 MG/1; MG/1
TABLET, FILM COATED ORAL
Qty: 180 TABLET | Refills: 3 | Status: CANCELLED | OUTPATIENT
Start: 2022-03-22

## 2022-03-23 DIAGNOSIS — I10 ESSENTIAL HYPERTENSION: ICD-10-CM

## 2022-03-23 DIAGNOSIS — E11.65 TYPE 2 DIABETES MELLITUS WITH HYPERGLYCEMIA, UNSPECIFIED WHETHER LONG TERM INSULIN USE: ICD-10-CM

## 2022-03-23 RX ORDER — DOXAZOSIN MESYLATE 4 MG/1
4 TABLET ORAL DAILY
Qty: 90 TABLET | Refills: 3 | Status: SHIPPED | OUTPATIENT
Start: 2022-03-23 | End: 2023-02-20

## 2022-03-23 RX ORDER — LISINOPRIL AND HYDROCHLOROTHIAZIDE 12.5; 1 MG/1; MG/1
1 TABLET ORAL DAILY
Qty: 90 TABLET | Refills: 3 | Status: SHIPPED | OUTPATIENT
Start: 2022-03-23 | End: 2023-02-20

## 2022-03-23 RX ORDER — SITAGLIPTIN AND METFORMIN HYDROCHLORIDE 500; 50 MG/1; MG/1
TABLET, FILM COATED ORAL
Qty: 180 TABLET | Refills: 3 | Status: SHIPPED | OUTPATIENT
Start: 2022-03-23 | End: 2023-02-20

## 2022-03-23 NOTE — TELEPHONE ENCOUNTER
This encounter was created in a department in the wrong time zone. It has been re-created in [facility].

## 2022-03-30 DIAGNOSIS — R53.82 CHRONIC FATIGUE: Primary | ICD-10-CM

## 2022-03-30 DIAGNOSIS — E29.1 HYPOGONADISM IN MALE: ICD-10-CM

## 2022-03-31 DIAGNOSIS — E29.1 HYPOGONADISM IN MALE: Primary | ICD-10-CM

## 2022-03-31 LAB — TESTOST SERPL-MCNC: 252 NG/DL (ref 264–916)

## 2022-05-31 RX ORDER — PAROXETINE HYDROCHLORIDE HEMIHYDRATE 25 MG/1
TABLET, FILM COATED, EXTENDED RELEASE ORAL
Qty: 90 TABLET | Refills: 1 | Status: SHIPPED | OUTPATIENT
Start: 2022-05-31 | End: 2022-08-22 | Stop reason: SDUPTHER

## 2022-08-22 ENCOUNTER — OFFICE VISIT (OUTPATIENT)
Dept: FAMILY MEDICINE CLINIC | Facility: CLINIC | Age: 62
End: 2022-08-22

## 2022-08-22 VITALS
DIASTOLIC BLOOD PRESSURE: 62 MMHG | BODY MASS INDEX: 35.32 KG/M2 | TEMPERATURE: 96.5 F | HEART RATE: 108 BPM | WEIGHT: 238.5 LBS | HEIGHT: 69 IN | SYSTOLIC BLOOD PRESSURE: 118 MMHG | OXYGEN SATURATION: 96 % | RESPIRATION RATE: 18 BRPM

## 2022-08-22 DIAGNOSIS — E78.5 DYSLIPIDEMIA: ICD-10-CM

## 2022-08-22 DIAGNOSIS — Z23 NEED FOR PNEUMOCOCCAL VACCINATION: ICD-10-CM

## 2022-08-22 DIAGNOSIS — B07.9 WART OF HAND: ICD-10-CM

## 2022-08-22 DIAGNOSIS — Z12.5 SCREENING FOR MALIGNANT NEOPLASM OF PROSTATE: ICD-10-CM

## 2022-08-22 DIAGNOSIS — Z00.00 ENCOUNTER FOR WELL ADULT EXAM WITHOUT ABNORMAL FINDINGS: Primary | ICD-10-CM

## 2022-08-22 DIAGNOSIS — H61.23 BILATERAL IMPACTED CERUMEN: ICD-10-CM

## 2022-08-22 DIAGNOSIS — R25.1 TREMOR: ICD-10-CM

## 2022-08-22 DIAGNOSIS — Z51.81 MEDICATION MONITORING ENCOUNTER: ICD-10-CM

## 2022-08-22 DIAGNOSIS — E66.9 OBESITY (BMI 30-39.9): ICD-10-CM

## 2022-08-22 DIAGNOSIS — K21.9 GASTROESOPHAGEAL REFLUX DISEASE WITHOUT ESOPHAGITIS: ICD-10-CM

## 2022-08-22 DIAGNOSIS — E11.9 TYPE 2 DIABETES MELLITUS WITHOUT COMPLICATION, WITHOUT LONG-TERM CURRENT USE OF INSULIN: ICD-10-CM

## 2022-08-22 DIAGNOSIS — G43.809 OTHER MIGRAINE WITHOUT STATUS MIGRAINOSUS, NOT INTRACTABLE: ICD-10-CM

## 2022-08-22 DIAGNOSIS — I48.91 ATRIAL FIBRILLATION, UNSPECIFIED TYPE: ICD-10-CM

## 2022-08-22 DIAGNOSIS — I10 PRIMARY HYPERTENSION: ICD-10-CM

## 2022-08-22 DIAGNOSIS — E55.9 VITAMIN D DEFICIENCY: ICD-10-CM

## 2022-08-22 LAB
POC CREATININE URINE: 200
POC MICROALBUMIN URINE: 80

## 2022-08-22 PROCEDURE — 69210 REMOVE IMPACTED EAR WAX UNI: CPT | Performed by: PHYSICIAN ASSISTANT

## 2022-08-22 PROCEDURE — 93000 ELECTROCARDIOGRAM COMPLETE: CPT | Performed by: PHYSICIAN ASSISTANT

## 2022-08-22 PROCEDURE — 17110 DESTRUCTION B9 LES UP TO 14: CPT | Performed by: PHYSICIAN ASSISTANT

## 2022-08-22 PROCEDURE — 99396 PREV VISIT EST AGE 40-64: CPT | Performed by: PHYSICIAN ASSISTANT

## 2022-08-22 PROCEDURE — 90471 IMMUNIZATION ADMIN: CPT | Performed by: PHYSICIAN ASSISTANT

## 2022-08-22 PROCEDURE — 90677 PCV20 VACCINE IM: CPT | Performed by: PHYSICIAN ASSISTANT

## 2022-08-22 PROCEDURE — 82044 UR ALBUMIN SEMIQUANTITATIVE: CPT | Performed by: PHYSICIAN ASSISTANT

## 2022-08-22 RX ORDER — SUMATRIPTAN 50 MG/1
TABLET, FILM COATED ORAL
Qty: 30 TABLET | Refills: 2 | Status: SHIPPED | OUTPATIENT
Start: 2022-08-22

## 2022-08-22 RX ORDER — PAROXETINE HYDROCHLORIDE HEMIHYDRATE 25 MG/1
25 TABLET, FILM COATED, EXTENDED RELEASE ORAL EVERY MORNING
Qty: 90 TABLET | Refills: 3 | Status: SHIPPED | OUTPATIENT
Start: 2022-08-22

## 2022-08-22 RX ORDER — TAMSULOSIN HYDROCHLORIDE 0.4 MG/1
0.4 CAPSULE ORAL DAILY
COMMUNITY
Start: 2022-04-20

## 2022-08-22 RX ORDER — TESTOSTERONE 16.2 MG/G
20.25 GEL TRANSDERMAL DAILY
COMMUNITY
End: 2023-02-17

## 2022-08-22 NOTE — PROGRESS NOTES
Chief Complaint   Patient presents with   • physical     Physical-covid booster questions-warts on rt thumb-shaky in morning       Subjective   The patient is a 61 y.o. male who presents for annual exam      Nutrition:  well balanced. Avoiding sweets. Drinks 8 diet cokes per day and 1 cup of coffee   Exercise:  walking        Last Colonoscopy:  2013       Past Medical History,Medications, Allergies reviewed.       HPI  Following with urology for low T and BPH symptoms     F/u for DM. Last A1c in 12/21 of 8. Has had to reschedule a couple of follow up appointments   Taking medication as directed     Temporary bout of A fib while in hospital suspected to be secondary to hypovolemia. Not had issues with this since that he is aware of   Not on antiarrythmic or BB.   Denies heart palpitations.   Has never had carotid duplex   Hx of migraines, but they are very infrequent   Not checking sugars when having dizzy spells. Hx in chart from 2018 had EKG checked for dizziness and was having hypoglycemia around that time   Sometimes associated with nausea. No vomiting   Can be worse with sudden position changes   Denies hearing changes. Does struggle with sinus congestion at times     Follows with pain management for chronic low back and arthritis pain     Born with one kidney (right) can not take NSAIDS and is careful what he takes so not to affect kidney function  Not on STATIN he reports for this reason along with joint pain  Not taking fish oil   Has not been drinking a lot of water     Has not been following with cardiology or renal doctor.     Notes he has been shaky in the mornings with R hand tremor for several minutes. No concerning family hx     Review of Systems   Constitutional: Negative for chills and fever.   Respiratory: Negative for cough, shortness of breath and wheezing.    Cardiovascular: Negative for chest pain.   Neurological: Positive for tremors.       Objective     /62   Pulse 108   Temp 96.5 °F  "(35.8 °C)   Resp 18   Ht 175.3 cm (69\")   Wt 108 kg (238 lb 8 oz)   SpO2 96%   BMI 35.22 kg/m²     Physical Exam  Vitals and nursing note reviewed.   Constitutional:       Appearance: Normal appearance. He is well-developed.   HENT:      Head: Normocephalic and atraumatic.      Right Ear: External ear normal. There is impacted cerumen.      Left Ear: External ear normal. There is impacted cerumen.      Nose: Nose normal.      Mouth/Throat:      Pharynx: No oropharyngeal exudate.   Eyes:      Conjunctiva/sclera: Conjunctivae normal.   Neck:      Thyroid: No thyromegaly.      Trachea: No tracheal deviation.   Cardiovascular:      Rate and Rhythm: Normal rate and regular rhythm.      Heart sounds: Normal heart sounds.   Pulmonary:      Effort: Pulmonary effort is normal. No respiratory distress.      Breath sounds: Normal breath sounds. No wheezing or rales.   Chest:      Chest wall: No tenderness.   Abdominal:      General: Bowel sounds are normal. There is no distension.      Palpations: Abdomen is soft. There is no mass.      Tenderness: There is no abdominal tenderness. There is no guarding or rebound.      Hernia: No hernia is present.   Musculoskeletal:      Cervical back: Normal range of motion and neck supple.   Lymphadenopathy:      Cervical: No cervical adenopathy.   Skin:     General: Skin is warm and dry.      Comments: Warty growth on R thumb    Neurological:      Mental Status: He is alert and oriented to person, place, and time.   Psychiatric:         Mood and Affect: Mood normal.         Behavior: Behavior normal.         Thought Content: Thought content normal.         Judgment: Judgment normal.           Ear Cerumen Removal    Date/Time: 8/22/2022 8:49 AM  Performed by: Eliana Ann PA  Authorized by: Eliana Ann PA   Consent: Verbal consent obtained.  Risks and benefits: risks, benefits and alternatives were discussed  Consent given by: patient  Patient understanding: patient states " understanding of the procedure being performed  Patient consent: the patient's understanding of the procedure matches consent given  Procedure consent: procedure consent matches procedure scheduled  Location: both ears   Patient tolerance: Patient tolerated the procedure well with no immediate complications  Procedure type: instrumentation, curette   Sedation:  Patient sedated: no        ECG 12 Lead    Date/Time: 8/22/2022 8:50 AM  Performed by: Eliana Ann PA  Authorized by: Eliana Ann PA   Comparison: compared with previous ECG from 12/14/2021  Similar to previous ECG  Rhythm: sinus rhythm  Rate: normal  Conduction: conduction normal  ST Segments: ST segments normal  T Waves: T waves normal  QRS axis: normal    Clinical impression: non-specific ECG  Comments: Possible inferior infarct. Age undetermined       Cryotherapy, Skin Lesion    Date/Time: 8/22/2022 8:30 AM  Performed by: Eliana Ann PA  Authorized by: Eliana Ann PA   Consent: Verbal consent obtained.  Risks and benefits: risks, benefits and alternatives were discussed  Consent given by: patient  Patient understanding: patient states understanding of the procedure being performed  Patient consent: the patient's understanding of the procedure matches consent given  Procedure consent: procedure consent matches procedure scheduled  Patient tolerance: patient tolerated the procedure well with no immediate complications  Comments: Warty growth on R thumb frozen and thawed X 3           Assessment & Plan     1. Encounter for well adult exam without abnormal findings  - CBC w AUTO Differential  - Comprehensive metabolic panel  - Lipid Panel  - Hemoglobin A1c  - Magnesium  - Vitamin D 25 hydroxy  - PSA SCREENING  - Iron Profile  - Vitamin B12  - Folate  - Ceruloplasmin  - Pneumococcal Conjugate Vaccine 20-Valent (PCV20)  - POCT microalbumin  - ECG 12 Lead    2. Primary hypertension  - CBC w AUTO Differential  - Comprehensive metabolic  panel    3. Dyslipidemia  - CBC w AUTO Differential  - Comprehensive metabolic panel  - Lipid Panel    4. Gastroesophageal reflux disease without esophagitis  - Magnesium    5. Vitamin D deficiency  - Vitamin D 25 hydroxy    6. Type 2 diabetes mellitus without complication, without long-term current use of insulin (HCC)  - CBC w AUTO Differential  - Comprehensive metabolic panel  - Hemoglobin A1c  - POCT microalbumin    7. Medication monitoring encounter  - Magnesium    8. Screening for malignant neoplasm of prostate  - PSA SCREENING    9. Tremor  - Iron Profile  - Vitamin B12  - Folate  - Ceruloplasmin    10. Other migraine without status migrainosus, not intractable  - SUMAtriptan (IMITREX) 50 MG tablet; Take one tablet at onset of headache. May repeat dose one time in 2 hours if headache not relieved.  Dispense: 30 tablet; Refill: 2    11. Need for pneumococcal vaccination  - Pneumococcal Conjugate Vaccine 20-Valent (PCV20)    12. Bilateral impacted cerumen  - Cerumen Removal    13. Atrial fibrillation, unspecified type (HCC)  - ECG 12 Lead    14. Obesity (BMI 30-39.9)  15. Wart of hand   -cryotherapy     EKG stable.   Check labs for tremor   Labs as outlined in plan   Okay on most refills at this time   prevnar 20 updated with patient's consent. May come in for walk-in Tdap in one week   Will hold off on additional covid booster until fall     Counseling was given to patient for the following topics: diagnostic results, instructions for management, risk factor reductions, patient and family education and impressions . Total time of the encounter was 30 minutes and 15 minutes was spent counseling.      JENNIFER Valdez

## 2022-08-23 LAB
25(OH)D3+25(OH)D2 SERPL-MCNC: 47.2 NG/ML (ref 30–100)
ALBUMIN SERPL-MCNC: 4.7 G/DL (ref 3.5–5.2)
ALBUMIN/GLOB SERPL: 1.8 G/DL
ALP SERPL-CCNC: 43 U/L (ref 39–117)
ALT SERPL-CCNC: 24 U/L (ref 1–41)
AST SERPL-CCNC: 28 U/L (ref 1–40)
BASOPHILS # BLD AUTO: 0.06 10*3/MM3 (ref 0–0.2)
BASOPHILS NFR BLD AUTO: 0.8 % (ref 0–1.5)
BILIRUB SERPL-MCNC: 0.5 MG/DL (ref 0–1.2)
BUN SERPL-MCNC: 17 MG/DL (ref 8–23)
BUN/CREAT SERPL: 13.5 (ref 7–25)
CALCIUM SERPL-MCNC: 9.6 MG/DL (ref 8.6–10.5)
CERULOPLASMIN SERPL-MCNC: 20 MG/DL (ref 16–31)
CHLORIDE SERPL-SCNC: 96 MMOL/L (ref 98–107)
CHOLEST SERPL-MCNC: 218 MG/DL (ref 0–200)
CO2 SERPL-SCNC: 27.6 MMOL/L (ref 22–29)
CREAT SERPL-MCNC: 1.26 MG/DL (ref 0.76–1.27)
EGFRCR-CYS SERPLBLD CKD-EPI 2021: 64.5 ML/MIN/1.73
EOSINOPHIL # BLD AUTO: 0.16 10*3/MM3 (ref 0–0.4)
EOSINOPHIL NFR BLD AUTO: 2.2 % (ref 0.3–6.2)
ERYTHROCYTE [DISTWIDTH] IN BLOOD BY AUTOMATED COUNT: 13.6 % (ref 12.3–15.4)
FOLATE SERPL-MCNC: >20 NG/ML (ref 4.78–24.2)
GLOBULIN SER CALC-MCNC: 2.6 GM/DL
GLUCOSE SERPL-MCNC: 162 MG/DL (ref 65–99)
HBA1C MFR BLD: 7.6 % (ref 4.8–5.6)
HCT VFR BLD AUTO: 38.7 % (ref 37.5–51)
HDLC SERPL-MCNC: 35 MG/DL (ref 40–60)
HGB BLD-MCNC: 13.2 G/DL (ref 13–17.7)
IMM GRANULOCYTES # BLD AUTO: 0.03 10*3/MM3 (ref 0–0.05)
IMM GRANULOCYTES NFR BLD AUTO: 0.4 % (ref 0–0.5)
IRON SATN MFR SERPL: 20 % (ref 20–50)
IRON SERPL-MCNC: 74 MCG/DL (ref 59–158)
LDLC SERPL CALC-MCNC: 128 MG/DL (ref 0–100)
LYMPHOCYTES # BLD AUTO: 2.1 10*3/MM3 (ref 0.7–3.1)
LYMPHOCYTES NFR BLD AUTO: 28.5 % (ref 19.6–45.3)
MAGNESIUM SERPL-MCNC: 1.7 MG/DL (ref 1.6–2.4)
MCH RBC QN AUTO: 29.7 PG (ref 26.6–33)
MCHC RBC AUTO-ENTMCNC: 34.1 G/DL (ref 31.5–35.7)
MCV RBC AUTO: 87.2 FL (ref 79–97)
MONOCYTES # BLD AUTO: 0.53 10*3/MM3 (ref 0.1–0.9)
MONOCYTES NFR BLD AUTO: 7.2 % (ref 5–12)
NEUTROPHILS # BLD AUTO: 4.48 10*3/MM3 (ref 1.7–7)
NEUTROPHILS NFR BLD AUTO: 60.9 % (ref 42.7–76)
NRBC BLD AUTO-RTO: 0 /100 WBC (ref 0–0.2)
PLATELET # BLD AUTO: 240 10*3/MM3 (ref 140–450)
POTASSIUM SERPL-SCNC: 4 MMOL/L (ref 3.5–5.2)
PROT SERPL-MCNC: 7.3 G/DL (ref 6–8.5)
PSA SERPL-MCNC: 0.17 NG/ML (ref 0–4)
RBC # BLD AUTO: 4.44 10*6/MM3 (ref 4.14–5.8)
SODIUM SERPL-SCNC: 139 MMOL/L (ref 136–145)
TIBC SERPL-MCNC: 370 MCG/DL
TRIGL SERPL-MCNC: 308 MG/DL (ref 0–150)
UIBC SERPL-MCNC: 296 MCG/DL (ref 112–346)
VIT B12 SERPL-MCNC: 498 PG/ML (ref 211–946)
VLDLC SERPL CALC-MCNC: 55 MG/DL (ref 5–40)
WBC # BLD AUTO: 7.36 10*3/MM3 (ref 3.4–10.8)

## 2022-09-03 DIAGNOSIS — Z00.00 ENCOUNTER FOR WELL ADULT EXAM WITHOUT ABNORMAL FINDINGS: Primary | ICD-10-CM

## 2022-09-03 DIAGNOSIS — Z23 NEED FOR TETANUS BOOSTER: ICD-10-CM

## 2022-09-03 DIAGNOSIS — R25.1 TREMOR OF RIGHT HAND: ICD-10-CM

## 2022-09-15 ENCOUNTER — CLINICAL SUPPORT (OUTPATIENT)
Dept: FAMILY MEDICINE CLINIC | Facility: CLINIC | Age: 62
End: 2022-09-15

## 2022-09-15 DIAGNOSIS — Z23 NEED FOR TETANUS BOOSTER: Primary | ICD-10-CM

## 2022-09-15 PROCEDURE — 90471 IMMUNIZATION ADMIN: CPT | Performed by: PHYSICIAN ASSISTANT

## 2022-09-15 PROCEDURE — 90715 TDAP VACCINE 7 YRS/> IM: CPT | Performed by: PHYSICIAN ASSISTANT

## 2022-10-05 ENCOUNTER — FLU SHOT (OUTPATIENT)
Dept: FAMILY MEDICINE CLINIC | Facility: CLINIC | Age: 62
End: 2022-10-05

## 2022-10-05 ENCOUNTER — CLINICAL SUPPORT (OUTPATIENT)
Dept: FAMILY MEDICINE CLINIC | Facility: CLINIC | Age: 62
End: 2022-10-05

## 2022-10-05 DIAGNOSIS — Z23 NEED FOR COVID-19 VACCINE: Primary | ICD-10-CM

## 2022-10-05 DIAGNOSIS — Z23 NEED FOR INFLUENZA VACCINATION: Primary | ICD-10-CM

## 2022-10-05 PROCEDURE — 90686 IIV4 VACC NO PRSV 0.5 ML IM: CPT | Performed by: PHYSICIAN ASSISTANT

## 2022-10-05 PROCEDURE — 0124A PR ADM SARSCOV2 30MCG/0.3ML BST: CPT | Performed by: PHYSICIAN ASSISTANT

## 2022-10-05 PROCEDURE — 91312 COVID-19 (PFIZER) BIVALENT BOOSTER 12+YRS: CPT | Performed by: PHYSICIAN ASSISTANT

## 2022-10-05 PROCEDURE — 90471 IMMUNIZATION ADMIN: CPT | Performed by: PHYSICIAN ASSISTANT

## 2022-10-19 ENCOUNTER — TELEPHONE (OUTPATIENT)
Dept: NEUROLOGY | Facility: CLINIC | Age: 62
End: 2022-10-19

## 2023-02-16 NOTE — PROGRESS NOTES
Neuro Office Visit      Encounter Date: 2023   Patient Name: Zaheer Baron  : 1960   MRN: [id number]   PCP: JENNIFER Valdez  Chief Complaint:    Chief Complaint   Patient presents with   • Tremors       History of Present Illness: Zaheer Baron is a 62 y.o. male who is here today in Neurology for tremor.      Tremor  Onset . Intermittent bilat hand tremor radiates up to elbows for 30 seconds to 1 minute. Tremor is worse with movement. No tremor at rest. Not worse with exertion or improved with etoh. Drinking 7-8 diet cokes a day.  Denies paresthesia in hands and feet. Does not interfere with ADL.  No injury to neck, shoulder, elbows or wrists.  Does not wake from sleep.    Denies acting out dreams, hallucinations, falls. Cautious gait. Denies trouble rolling over in bed, slurred speech, dysphagia.     Poor balance  Does not feel that tremor contributes to poor balance.    Dizziness  Positional light headedness when changes from sitting to standing.  Denies vertigo    Labs:egfr 57, vit b12  498, vit d 47, cbc, TSH wnl    PMH: DM Last A1c 7.6, a-fib, migraines, solitary kidney, myalgia w statin, DOMINGUEZ +  FH: -tremor, -PD  SH: drinks 8 diet cokes and 1 cup of coffee daily  Subjective      Past Medical History:   Past Medical History:   Diagnosis Date   • Acute sinusitis    • Allergic rhinitis    • Arthropathy     of lumbar facet joint   • Artificial lens present     position - right   • Atrial fibrillation (HCC) 2020    patient had episode of atrial fibrillation w/ ventricular response secondary to hypovelemia r/t salmonella infection   • Backache     chronic back problems   • Benign prostatic hyperplasia    • Bronchitis     perisistent   • Cataract    • Chronic obstructive lung disease (HCC)    • Diabetes mellitus (HCC)     no retinopathy   • Disorder of kidney     Disorder of kidney and/or ureter - Congenital solitary right kidney      • Dizziness     History of Meniere's  like condition, left ear      • Drug therapy     long-term   • Dysfunction of eustachian tube    • Dyslipidemia    • Gout    • Headache     History of possible migraine/cluster      • Hearing loss    • Hypertension    • Hypokalemia    • Impacted cerumen    • Infestation by Sarcoptes scabiei alta hominis    • Inguinal hernia     History of, repaired      • Knee pain    • Osteoarthritis    • Pneumonia     in the past   • Posterior subcapsular polar senile cataract    • Sjogren's syndrome (HCC)    • Type 2 diabetes mellitus (HCC)        Past Surgical History:   Past Surgical History:   Procedure Laterality Date   • BACK SURGERY      1/1/02   • CARPAL TUNNEL RELEASE      (Carpal tunnel release on the left)   09/10/2010 , Carpal tunnel release on the right)   12/03/2010    • CATARACT EXTRACTION, BILATERAL     • ENDOSCOPY N/A 3/16/2017    Procedure: ESOPHAGOGASTRODUODENOSCOPY;  Surgeon: Alli Shook DO;  Location: [facility] ENDOSCOPY;  Service:    • EYE SURGERY Bilateral     cataract removed   • HERNIA REPAIR     • INGUINAL HERNIA REPAIR      (Left inguinal hernioplasty with mesh.)   06/07/2007    • INJECTION OF MEDICATION      Injection for nerve block (Lumbar medial branch block.)   03/31/2016    • INJECTION OF MEDICATION  03/19/2015    solu-medrol    • KNEE SURGERY      (Lateral release, removal of loose bodies, excision of suprapatellar plica.)   04/29/1982    • OTHER SURGICAL HISTORY      Lumbar surgery (Lumbosacral radio frequency thermal coagulation. Lumbosacral spondylosis.)   06/30/2016 ,Lumbar surgery (Lumbosacral radio frequency thermal coagulation.)   12/21/2015     • OTHER SURGICAL HISTORY      Remove cataract, insert lens (Right eye.)   05/07/2015    • OTHER SURGICAL HISTORY      Remove hip/pelvis lesion (Melanoma, right hip.)   2005    • VASECTOMY         Family History:   Family History   Problem Relation Age of Onset   • Lung disease Mother         autoimmune   • Heart failure Father 35   • Stomach  cancer Sister 43        autoimmune   • Diabetes Paternal Grandmother        Social History:   Social History     Socioeconomic History   • Marital status:    Tobacco Use   • Smoking status: Never   • Smokeless tobacco: Current     Types: Snuff   Vaping Use   • Vaping Use: Never used   Substance and Sexual Activity   • Alcohol use: No   • Drug use: No   • Sexual activity: Defer     Partners: Female       Medications:     Current Outpatient Medications:   •  chlorpheniramine (CHLOR-TRIMETON) 4 MG tablet, Take 1 tablet by mouth Daily., Disp: 90 tablet, Rfl: 2  •  doxazosin (CARDURA) 4 MG tablet, TAKE 1 TABLET BY MOUTH  DAILY, Disp: 90 tablet, Rfl: 3  •  Ergocalciferol 50 MCG (2000 UT) tablet, Take 50 mcg by mouth Daily., Disp: 90 tablet, Rfl: 2  •  esomeprazole (nexIUM) 20 MG capsule, Take 20 mg by mouth Every Morning Before Breakfast., Disp: , Rfl:   •  FLUTICASONE FUROATE IN, Inhale., Disp: , Rfl:   •  glucose blood test strip, 1 each by Other route Daily. Use as instructed, Disp: 200 each, Rfl: 6  •  Janumet  MG per tablet, TAKE 1 TABLET BY MOUTH  TWICE DAILY WITH MEALS, Disp: 180 tablet, Rfl: 3  •  Lancets (ONETOUCH DELICA PLUS [id number]) misc, USE DAILY TO CHECK BLOOD SUGAR, Disp: 100 each, Rfl: 8  •  lisinopril-hydrochlorothiazide (PRINZIDE,ZESTORETIC) 10-12.5 MG per tablet, TAKE 1 TABLET BY MOUTH  DAILY, Disp: 90 tablet, Rfl: 3  •  oxyCODONE-acetaminophen (PERCOCET) 7.5-325 MG per tablet, Take 1 tablet by mouth Every 4 (Four) Hours As Needed. Patient taking  mg 5 times daily., Disp: , Rfl:   •  PARoxetine CR (PAXIL-CR) 25 MG 24 hr tablet, Take 1 tablet by mouth Every Morning., Disp: 90 tablet, Rfl: 3  •  SUMAtriptan (IMITREX) 50 MG tablet, Take one tablet at onset of headache. May repeat dose one time in 2 hours if headache not relieved., Disp: 30 tablet, Rfl: 2  •  tamsulosin (FLOMAX) 0.4 MG capsule 24 hr capsule, Take 0.4 mg by mouth Daily., Disp: , Rfl:   •  albuterol sulfate  (90  Base) MCG/ACT inhaler, INHALE 1 TO 2 PUFFS EVERY 4 TO 6 HOURS AS NEEDED FOR WHEEZE FOR UP TO 30 DAYS, Disp: , Rfl:   •  benzonatate (TESSALON) 100 MG capsule, SWALLOW WHOLE TAKE 1 CAPSULE 3 TIMES A DAY AS NEEDED *DO NOT BREAK CHEW, DISSOLVE, CUT, OR CRUSH*, Disp: , Rfl:     Allergies:   Allergies   Allergen Reactions   • Nsaids Other (See Comments)     Solitary kidney; renal failure with NSAID use.  Solitary kidney; renal failure with NSAID use.       PHQ-9 Total Score:     STEADI Fall Risk Assessment has not been completed.    Objective     Physical Exam:   Physical Exam  Eyes:      Pupils: Pupils are equal, round, and reactive to light.   Neurological:      Mental Status: He is oriented to person, place, and time.      Coordination: Finger-Nose-Finger Test, Heel to Shin Test and Romberg Test normal.      Gait: Gait is intact.      Deep Tendon Reflexes:      Reflex Scores:       Tricep reflexes are 2+ on the right side and 2+ on the left side.       Bicep reflexes are 2+ on the right side and 2+ on the left side.       Brachioradialis reflexes are 2+ on the right side and 2+ on the left side.       Patellar reflexes are 2+ on the right side and 2+ on the left side.       Achilles reflexes are 2+ on the right side and 2+ on the left side.  Psychiatric:         Speech: Speech normal.         Neurologic Exam     Mental Status   Oriented to person, place, and time.   Follows 3 step commands.   Attention: normal. Concentration: normal.   Speech: speech is normal   Level of consciousness: alert  Knowledge: consistent with education.   Normal comprehension.     Cranial Nerves     CN III, IV, VI   Pupils are equal, round, and reactive to light.  Right pupil: Accommodation: intact.   Left pupil: Accommodation: intact.   CN III: no CN III palsy  CN VI: no CN VI palsy  Nystagmus: none   Diplopia: none  Upgaze: normal  Downgaze: normal  Conjugate gaze: present    CN VII   Facial expression full, symmetric.     CN VIII  "  Hearing: intact    CN XII   CN XII normal.     Motor Exam   Muscle bulk: normal  Overall muscle tone: normal    Strength   Right biceps: 5/5  Left biceps: 5/5  Right triceps: 5/5  Left triceps: 5/5  Right interossei: 5/5  Left interossei: 5/5  Right quadriceps: 5/5  Left quadriceps: 5/5  Right anterior tibial: 5/5  Left anterior tibial: 5/5  Right posterior tibial: 5/5  Left posterior tibial: 5/5    Sensory Exam   Light touch normal.   Right arm light touch: normal  Left arm light touch: normal  Right leg light touch: normal  Left leg light touch: normal  Right arm vibration: normal  Left arm vibration: normal  Right leg vibration: decreased from toes  Left leg vibration: decreased from toes  Right arm pinprick: normal  Left arm pinprick: normal  Right leg pinprick: decreased from toes  Left leg pinprick: decreased from toes    Gait, Coordination, and Reflexes     Gait  Gait: normal    Coordination   Romberg: negative  Finger to nose coordination: normal  Heel to shin coordination: normal    Tremor   Resting tremor: absent  Action tremor: absent    Reflexes   Right brachioradialis: 2+  Left brachioradialis: 2+  Right biceps: 2+  Left biceps: 2+  Right triceps: 2+  Left triceps: 2+  Right patellar: 2+  Left patellar: 2+  Right achilles: 2+  Left achilles: 2+  Right : 2+  Left : 2+       Vital Signs:   Vitals:    02/17/23 1102   BP: 124/70   Pulse: 120   Temp: 96.9 °F (36.1 °C)   SpO2: 98%   Weight: 108 kg (237 lb 6.4 oz)   Height: 175.3 cm (69.02\")     Body mass index is 35.04 kg/m².         Assessment / Plan      Assessment/Plan:   Diagnoses and all orders for this visit:    1. Benign essential tremor (Primary)  Comments:  Mild and intermittent. No treatment at this time. Recommend decreasing caffeine.    2. Balance problem  Comments:  Possible early peripheral neuropathy. Recommend PT. He declines at this time but will do exercises at home. Consider EMG    3. Dizziness  Comments:  Likely orthostatic. " Recommend stopping soda and hydrating with water. Change positions slowly. FU prn.           Patient Education:       Reviewed medications, potential side effects and signs and symptoms to report. Discussed risk versus benefits of treatment plan with patient and/or family-including medications, labs and radiology that may be ordered. Addressed questions and concerns during visit. Patient and/or family verbalized understanding and agree with plan. Instructed to call the office with any questions and report to ER with any life-threatening symptoms.     Follow Up:   PRN  During this visit the following were done:  Labs Reviewed [x]    Labs Ordered []    Radiology Reports Reviewed []    Radiology Ordered []    PCP Records Reviewed [x]    Referring Provider Records Reviewed []    ER Records Reviewed []    Hospital Records Reviewed []    History Obtained From Family []    Radiology Images Reviewed []    Other Reviewed [x]    Records Requested []      Lavonne Barragan, DNP, APRN

## 2023-02-17 ENCOUNTER — OFFICE VISIT (OUTPATIENT)
Dept: NEUROLOGY | Facility: CLINIC | Age: 63
End: 2023-02-17
Payer: COMMERCIAL

## 2023-02-17 VITALS
HEIGHT: 69 IN | WEIGHT: 237.4 LBS | BODY MASS INDEX: 35.16 KG/M2 | TEMPERATURE: 96.9 F | SYSTOLIC BLOOD PRESSURE: 124 MMHG | DIASTOLIC BLOOD PRESSURE: 70 MMHG | OXYGEN SATURATION: 98 % | HEART RATE: 120 BPM

## 2023-02-17 DIAGNOSIS — R42 DIZZINESS: ICD-10-CM

## 2023-02-17 DIAGNOSIS — G25.0 BENIGN ESSENTIAL TREMOR: Primary | ICD-10-CM

## 2023-02-17 DIAGNOSIS — R26.89 BALANCE PROBLEM: ICD-10-CM

## 2023-02-17 DIAGNOSIS — I10 ESSENTIAL HYPERTENSION: ICD-10-CM

## 2023-02-17 DIAGNOSIS — E11.65 TYPE 2 DIABETES MELLITUS WITH HYPERGLYCEMIA, UNSPECIFIED WHETHER LONG TERM INSULIN USE: ICD-10-CM

## 2023-02-17 PROCEDURE — 99214 OFFICE O/P EST MOD 30 MIN: CPT | Performed by: NURSE PRACTITIONER

## 2023-02-17 RX ORDER — BENZONATATE 100 MG/1
CAPSULE ORAL
COMMUNITY
Start: 2023-02-08

## 2023-02-17 RX ORDER — ALBUTEROL SULFATE 90 UG/1
AEROSOL, METERED RESPIRATORY (INHALATION)
COMMUNITY
Start: 2023-02-08

## 2023-02-21 RX ORDER — SITAGLIPTIN AND METFORMIN HYDROCHLORIDE 500; 50 MG/1; MG/1
TABLET, FILM COATED ORAL
Qty: 180 TABLET | Refills: 1 | Status: SHIPPED | OUTPATIENT
Start: 2023-02-21

## 2023-02-21 RX ORDER — DOXAZOSIN MESYLATE 4 MG/1
4 TABLET ORAL DAILY
Qty: 90 TABLET | Refills: 1 | Status: SHIPPED | OUTPATIENT
Start: 2023-02-21

## 2023-02-21 RX ORDER — LISINOPRIL AND HYDROCHLOROTHIAZIDE 12.5; 1 MG/1; MG/1
1 TABLET ORAL DAILY
Qty: 90 TABLET | Refills: 1 | Status: SHIPPED | OUTPATIENT
Start: 2023-02-21

## 2023-07-31 ENCOUNTER — TELEPHONE (OUTPATIENT)
Dept: FAMILY MEDICINE CLINIC | Facility: CLINIC | Age: 63
End: 2023-07-31
Payer: COMMERCIAL

## 2023-08-02 DIAGNOSIS — B35.6 JOCK ITCH: Primary | ICD-10-CM

## 2023-08-02 DIAGNOSIS — F51.01 PRIMARY INSOMNIA: Primary | ICD-10-CM

## 2023-08-02 RX ORDER — ZOLPIDEM TARTRATE 5 MG/1
5 TABLET ORAL NIGHTLY PRN
Qty: 30 TABLET | Refills: 2 | Status: SHIPPED | OUTPATIENT
Start: 2023-08-02

## 2023-08-02 RX ORDER — CLOTRIMAZOLE AND BETAMETHASONE DIPROPIONATE 10; .64 MG/G; MG/G
1 CREAM TOPICAL 2 TIMES DAILY
Qty: 45 G | Refills: 0 | Status: SHIPPED | OUTPATIENT
Start: 2023-08-02 | End: 2023-08-16

## 2023-08-03 DIAGNOSIS — E11.65 TYPE 2 DIABETES MELLITUS WITH HYPERGLYCEMIA, UNSPECIFIED WHETHER LONG TERM INSULIN USE: ICD-10-CM

## 2023-08-03 DIAGNOSIS — I10 ESSENTIAL HYPERTENSION: ICD-10-CM

## 2023-08-03 RX ORDER — DOXAZOSIN MESYLATE 4 MG/1
4 TABLET ORAL DAILY
Qty: 90 TABLET | Refills: 3 | Status: SHIPPED | OUTPATIENT
Start: 2023-08-03

## 2023-08-03 RX ORDER — SITAGLIPTIN AND METFORMIN HYDROCHLORIDE 500; 50 MG/1; MG/1
TABLET, FILM COATED ORAL
Qty: 180 TABLET | Refills: 3 | Status: SHIPPED | OUTPATIENT
Start: 2023-08-03

## 2023-08-03 RX ORDER — LISINOPRIL AND HYDROCHLOROTHIAZIDE 12.5; 1 MG/1; MG/1
1 TABLET ORAL DAILY
Qty: 90 TABLET | Refills: 3 | OUTPATIENT
Start: 2023-08-03

## 2023-08-24 ENCOUNTER — TELEPHONE (OUTPATIENT)
Dept: FAMILY MEDICINE CLINIC | Facility: CLINIC | Age: 63
End: 2023-08-24
Payer: COMMERCIAL

## 2023-08-24 NOTE — TELEPHONE ENCOUNTER
Pt called and stated he starts to sweat and profusely.  Sugar goes really low in the 50's and this has happened 2-3 times this week .  Has concerns would like to speak to Eliana if possible.  After he drinks juice he starts to feel better but still feels drained,  he has someone there with him incase he needs to go to ER. Advised him to go to ER if this happens again!

## 2023-08-24 NOTE — TELEPHONE ENCOUNTER
Caller: Zaheer Baron    Relationship: Self    Best call back number: [phone]     What is the best time to reach you: ASAP    Who are you requesting to speak with (clinical staff, provider,  specific staff member): JENNIFER MATHIS       What was the call regarding: PATIENT CALLED STATING HE HAS BEEN HAVING EPISODES WHERE HIS BLOOD PRESSURE DROPS TO 40/50. WHEN THIS OCCURS HE HAS COLD SWEATS, CAN BARELY GET UP, AND IS VERY WEAK AND FATIGUE.    PATIENT NEEDS TO KNOW WHAT HE NEEDS TO DO REGARDING THIS CONCERN ASAP

## 2023-08-25 NOTE — TELEPHONE ENCOUNTER
Called and discussed with patient.  He will hold his Janumet for few days and check blood sugar regularly.  Patient will contact office next week with blood sugar values.  He has been making significant lifestyle changes as well as losing weight which could be affecting his blood sugar values.  Patient also notes that the Ambien 5 mg is not helping with insomnia.  He will try two tablets at bedtime and see if this helps with insomnia.

## 2023-08-30 ENCOUNTER — TELEPHONE (OUTPATIENT)
Dept: FAMILY MEDICINE CLINIC | Facility: CLINIC | Age: 63
End: 2023-08-30
Payer: COMMERCIAL

## 2023-08-30 DIAGNOSIS — F51.01 PRIMARY INSOMNIA: ICD-10-CM

## 2023-08-30 DIAGNOSIS — E11.9 TYPE 2 DIABETES MELLITUS WITHOUT COMPLICATION, WITHOUT LONG-TERM CURRENT USE OF INSULIN: Primary | ICD-10-CM

## 2023-08-30 NOTE — TELEPHONE ENCOUNTER
Caller: Zaheer Baron    Relationship: Self    Best call back number: [phone]     Requested Prescriptions:      zolpidem (Ambien) 5 MG tablet     Pharmacy where request should be sent: [facility] [id number] - [place], [place] - [address] - [phone]  - [phone] FX     Last office visit with prescribing clinician: 7/12/2023   Last telemedicine visit with prescribing clinician: Visit date not found   Next office visit with prescribing clinician: 10/12/2023     Additional details provided by patient: STATED PROVIDER HAD HIM ON AMBIEN ONE A DAY AND RECOMMENDED TO INCREASE TO TWO A DAY.    STATED TWO A DAY HAS BEEN WORKING AND STATED HE NEEDS A REFILL    Does the patient have less than a 3 day supply:  [x] Yes  [] No    Would you like a call back once the refill request has been completed: [x] Yes [] No    If the office needs to give you a call back, can they leave a voicemail: [x] Yes [] No    Rabia Gusman   08/30/23 10:08 EDT

## 2023-08-30 NOTE — TELEPHONE ENCOUNTER
Caller: Zaheer Baron    Relationship to patient: Self    Best call back number: [phone]     Patient is needing:     MONDAY   3:18 PM  BLOOD SUGAR: 175     8:15 PM BLOOD SUGAR: 156    TUESDAY   7:45 AM BLOOD SUGAR: 139    12:08 PM BLOOD SUGAR: 213    5:30 PM BLOOD SUGAR: 153    WEDNESDAY   6:50 AM BLOOD SUGAR: 158  4 PM BLOOD SUGAR 164

## 2023-08-31 RX ORDER — ZOLPIDEM TARTRATE 10 MG/1
10 TABLET ORAL NIGHTLY PRN
Qty: 30 TABLET | Refills: 2 | Status: SHIPPED | OUTPATIENT
Start: 2023-08-31

## 2023-08-31 NOTE — TELEPHONE ENCOUNTER
Blood sugars are back up. Lets try Januvia alone at 100 mg daily. I will send this to his pharmacy to start taking (do not resume the Janumet at this time) . Let me know if getting hypoglycemic episodes again

## 2023-09-06 NOTE — PROGRESS NOTES
"Sleep Clinic Video Visit Consult Note    You have chosen to receive care through a telehealth visit.  Do you consent to use a video/audio connection for your medical care today? Yes     Chief Complaint  Sleep problem    Subjective     History of Present Illness:  Zaheer Baron is a 62 y.o. male with a history of HTN, hyperlipidemia, atrial fibrillation, type 2 diabetes mellitus, and Sjogren's syndrome.  The patient is referred by JENNIFER Orellana with primary care.  Patient recently reported difficulty with fragmented sleep and fatigue. He wakes frequently due to back pain. He is prescribed ambien.    Further details are as follows:    Moroni Scale is (out of 24): Total score: 4     Estimated average amount of sleep per night: 3-4 hours  Number of times he wakes up at night: 1-3 times  Difficulty falling back asleep: yes  It usually takes 20 minutes to go to sleep.  He feels sleepy upon waking up: yes  Rotating or night shift work: no    Drowsiness/Sleepiness:  He exhibits the following:  excessive daytime sleepiness  excessive daytime fatigue  takes scheduled naps during the day    Snoring/Breathing:  He exhibits the following:  loud snoring  awakens gasping for breath  morning headaches    Head Injury:  He exhibits the following:  No    Reflux:  He describes the following:  none    Narcolepsy:  He exhibits the following:  none    RLS/PLMs:  He describes the following:  none    Insomnia:  He describes the following:  frequent awakenings  bothered by pain at night    Parasomnia:  He exhibits the following:  Wild dreams    Weight:  Weight change in the last year:  loss: 20 lbs+    The patient's relevant past medical, surgical, family, and social history reviewed and updated in Epic as appropriate.    Review of Systems    Objective   Vital Signs:  Ht 175.3 cm (69\")   Wt 103 kg (228 lb)   BMI 33.67 kg/m²     BMI is >= 30 and <35. (Class 1 Obesity). The following options were offered after discussion;: " cutting down on sugar        Physical Exam  Constitutional:       Appearance: Normal appearance.   Neurological:      Mental Status: He is alert and oriented to person, place, and time.   Psychiatric:         Mood and Affect: Mood normal.         Behavior: Behavior normal.         Thought Content: Thought content normal.         Judgment: Judgment normal.           Result Review :              Assessment and Plan  Zaheer Baron is a 62 y.o. male with a past medical history of HTN, hyperlipidemia, atrial fibrillation, type 2 diabetes mellitus, and Sjogren's syndrome who presents for further evaluation of insomnia, excessive daytime sleepiness and fatigue, nonrestorative sleep, and concerns for sleep disordered breathing and obstructive sleep apnea. The patient's symptoms, particularly snoring, are concerning for significant sleep disordered breathing and obstructive sleep apnea.  We will obtain a home sleep test for further evaluation.  The patient will return for follow-up and recommendations after test.  I have discussed weight loss as it pertains to obstructive sleep apnea.  Patient has been working on his weight with diet.  He has been cutting sugar out of his diet and has lost approximately 20+ pounds thus far.    Diagnoses and all orders for this visit:    1. Snoring (Primary)  -     Home Sleep Study; Future    2. Suspected sleep apnea  -     Home Sleep Study; Future    3. Excessive daytime sleepiness  -     Home Sleep Study; Future    4. Psychophysiological insomnia    5. Primary hypertension    6. Obesity (BMI 30.0-34.9)                 I discussed the consequences of uncontrolled sleep apnea including hypertension, heart disease, diabetes, stroke, and dementia. I further discussed sleep apnea therapeutic options including CPAP, Weight loss, Oral dental appliance, and surgery.         Follow Up  Return for Follow up after study.  Patient was given instructions and counseling regarding his condition or  for health maintenance advice. Please see specific information pulled into the AVS if appropriate.     WILLA Landry, ACNP-BC  Pulmonary, Critical Care Medicine, and Sleep Medicine

## 2023-09-07 ENCOUNTER — TELEMEDICINE (OUTPATIENT)
Dept: SLEEP MEDICINE | Facility: HOSPITAL | Age: 63
End: 2023-09-07
Payer: COMMERCIAL

## 2023-09-07 VITALS — BODY MASS INDEX: 33.77 KG/M2 | WEIGHT: 228 LBS | HEIGHT: 69 IN

## 2023-09-07 DIAGNOSIS — R29.818 SUSPECTED SLEEP APNEA: ICD-10-CM

## 2023-09-07 DIAGNOSIS — I10 PRIMARY HYPERTENSION: ICD-10-CM

## 2023-09-07 DIAGNOSIS — E66.9 OBESITY (BMI 30.0-34.9): ICD-10-CM

## 2023-09-07 DIAGNOSIS — F51.04 PSYCHOPHYSIOLOGICAL INSOMNIA: ICD-10-CM

## 2023-09-07 DIAGNOSIS — R06.83 SNORING: Primary | ICD-10-CM

## 2023-09-07 DIAGNOSIS — G47.19 EXCESSIVE DAYTIME SLEEPINESS: ICD-10-CM

## 2023-10-05 ENCOUNTER — HOSPITAL ENCOUNTER (OUTPATIENT)
Dept: SLEEP MEDICINE | Facility: HOSPITAL | Age: 63
End: 2023-10-05
Payer: COMMERCIAL

## 2023-10-05 VITALS — BODY MASS INDEX: 33.77 KG/M2 | WEIGHT: 228 LBS | HEIGHT: 69 IN

## 2023-10-05 DIAGNOSIS — R29.818 SUSPECTED SLEEP APNEA: ICD-10-CM

## 2023-10-05 DIAGNOSIS — R06.83 SNORING: ICD-10-CM

## 2023-10-05 DIAGNOSIS — G47.19 EXCESSIVE DAYTIME SLEEPINESS: ICD-10-CM

## 2023-10-05 PROCEDURE — 95800 SLP STDY UNATTENDED: CPT

## 2023-10-10 DIAGNOSIS — J44.9 CHRONIC OBSTRUCTIVE PULMONARY DISEASE, UNSPECIFIED COPD TYPE: ICD-10-CM

## 2023-10-10 DIAGNOSIS — G47.33 OSA (OBSTRUCTIVE SLEEP APNEA): Primary | ICD-10-CM

## 2023-10-12 ENCOUNTER — OFFICE VISIT (OUTPATIENT)
Dept: FAMILY MEDICINE CLINIC | Facility: CLINIC | Age: 63
End: 2023-10-12
Payer: COMMERCIAL

## 2023-10-12 VITALS
SYSTOLIC BLOOD PRESSURE: 96 MMHG | BODY MASS INDEX: 35.07 KG/M2 | TEMPERATURE: 97.3 F | OXYGEN SATURATION: 97 % | HEIGHT: 69 IN | HEART RATE: 107 BPM | RESPIRATION RATE: 16 BRPM | DIASTOLIC BLOOD PRESSURE: 54 MMHG | WEIGHT: 236.8 LBS

## 2023-10-12 DIAGNOSIS — F51.01 PRIMARY INSOMNIA: ICD-10-CM

## 2023-10-12 DIAGNOSIS — E11.9 TYPE 2 DIABETES MELLITUS WITHOUT COMPLICATION, WITHOUT LONG-TERM CURRENT USE OF INSULIN: ICD-10-CM

## 2023-10-12 DIAGNOSIS — R39.11 BENIGN PROSTATIC HYPERPLASIA WITH URINARY HESITANCY: Primary | ICD-10-CM

## 2023-10-12 DIAGNOSIS — F51.01 PRIMARY INSOMNIA: Primary | ICD-10-CM

## 2023-10-12 DIAGNOSIS — Z23 NEED FOR INFLUENZA VACCINATION: ICD-10-CM

## 2023-10-12 DIAGNOSIS — R10.31 RIGHT GROIN PAIN: ICD-10-CM

## 2023-10-12 DIAGNOSIS — I10 PRIMARY HYPERTENSION: ICD-10-CM

## 2023-10-12 DIAGNOSIS — N40.1 BENIGN PROSTATIC HYPERPLASIA WITH URINARY HESITANCY: Primary | ICD-10-CM

## 2023-10-12 DIAGNOSIS — B35.6 JOCK ITCH: ICD-10-CM

## 2023-10-12 LAB
EXPIRATION DATE: ABNORMAL
HBA1C MFR BLD: 8.2 % (ref 4.5–5.7)
Lab: ABNORMAL

## 2023-10-12 RX ORDER — ESZOPICLONE 1 MG/1
1 TABLET, FILM COATED ORAL NIGHTLY
Qty: 30 TABLET | Refills: 0 | Status: SHIPPED | OUTPATIENT
Start: 2023-10-12 | End: 2023-10-16 | Stop reason: SDUPTHER

## 2023-10-12 RX ORDER — TAMSULOSIN HYDROCHLORIDE 0.4 MG/1
1 CAPSULE ORAL DAILY
COMMUNITY
Start: 2023-10-09

## 2023-10-12 RX ORDER — LISINOPRIL 5 MG/1
5 TABLET ORAL DAILY
Qty: 90 TABLET | Refills: 3 | Status: SHIPPED | OUTPATIENT
Start: 2023-10-12 | End: 2023-10-16 | Stop reason: SDUPTHER

## 2023-10-12 RX ORDER — DOXAZOSIN 2 MG/1
2 TABLET ORAL NIGHTLY
Qty: 90 TABLET | Refills: 0 | Status: SHIPPED | OUTPATIENT
Start: 2023-10-12

## 2023-10-12 NOTE — PROGRESS NOTES
"Subjective   Zaheer Baron is a 62 y.o. male.     History of Present Illness   Pt presents for follow up for DM, HTN,  Insomnia, and newly diagnosed BREONNA   Insomnia still not controlled with ambien 10 mg. Not able to stay asleep with medication.     Blood sugars have been up again.     Medication adjustments recently made with BP medication as well as diabetes medication due to dizziness   Just notified of sleep study results within the past couple days. Will be getting set up with CPAP device   BP noted to be low in office today. Urology does have him on cardura for BPH symptoms which I have taken over refills on since he discontinued testosterone therapy at their office   Still having dizzy spells about once a day. But much better.     Rash around genital region improved with topical treatment but still recurs. Will refer to dermatology     1 month ago   Playing golf and hit ground causing right inner groin pain   Stepped up on boat and feel like something tore. Difficult to walk when first happened  Told he had hernia in this area 20-25 years ago, but not to the level of needing surgery yet.   No bulge.       The following portions of the patient's history were reviewed and updated as appropriate: allergies, current medications, past family history, past medical history, past social history, past surgical history, and problem list.      Objective   Blood pressure 96/54, pulse 107, temperature 97.3 °F (36.3 °C), resp. rate 16, height 175.3 cm (69\"), weight 107 kg (236 lb 12.8 oz), SpO2 97%.     Physical Exam  Vitals and nursing note reviewed.   Constitutional:       Appearance: Normal appearance.   Cardiovascular:      Rate and Rhythm: Normal rate and regular rhythm.   Pulmonary:      Effort: Pulmonary effort is normal.      Breath sounds: Normal breath sounds.   Abdominal:      Comments: TTP of R groin. No palpable bulge   Skin:     General: Skin is warm and dry.   Neurological:      Mental Status: He is " alert and oriented to person, place, and time.   Psychiatric:         Mood and Affect: Mood normal.         Behavior: Behavior normal.         Assessment & Plan   Diagnoses and all orders for this visit:    1. Benign prostatic hyperplasia with urinary hesitancy (Primary)  -     doxazosin (Cardura) 2 MG tablet; Take 1 tablet by mouth Every Night.  Dispense: 90 tablet; Refill: 0    2. Jock itch  -     Ambulatory Referral to Dermatology    3. Primary hypertension  -    lisinopril (PRINIVIL,ZESTRIL) 5 MG tablet; Take 1 tablet by mouth Daily.  Dispense: 90 tablet; Refill: 3    4. Need for influenza vaccination  -     Fluzone >6 Months ([id number])    5. Right groin pain  -     US pelvis limited    6. Type 2 diabetes mellitus without complication, without long-term current use of insulin  -     POC Glycosylated Hemoglobin (Hb A1C)    7. Primary insomnia   -Lunesta trial to replace ambien     A1c up today in office to 8.2. encourage patient work on lifestyle changes to try and get this down before more medication adjustments due to ongoing dizziness. Will do trial of decreasing cardura due to dizziness symptoms and low BP values to see if this helps with symptoms. Continue with low dose lisinopril due to DM  Referral to dermatology for ongoing jock itch symptoms   Flu vaccine administered with patient's consent   Proceed with US of pelvis for groin pain and hx of suspected untreated herniation in this area   Lunesta trial to [facility] to replace ambien for insomnia management

## 2023-10-16 ENCOUNTER — TELEPHONE (OUTPATIENT)
Dept: FAMILY MEDICINE CLINIC | Facility: CLINIC | Age: 63
End: 2023-10-16

## 2023-10-16 DIAGNOSIS — I10 PRIMARY HYPERTENSION: ICD-10-CM

## 2023-10-16 DIAGNOSIS — F51.01 PRIMARY INSOMNIA: ICD-10-CM

## 2023-10-16 RX ORDER — ESZOPICLONE 1 MG/1
1 TABLET, FILM COATED ORAL NIGHTLY
Qty: 30 TABLET | Refills: 0 | Status: SHIPPED | OUTPATIENT
Start: 2023-10-16

## 2023-10-16 RX ORDER — LISINOPRIL 5 MG/1
5 TABLET ORAL DAILY
Qty: 90 TABLET | Refills: 3 | Status: SHIPPED | OUTPATIENT
Start: 2023-10-16

## 2023-10-24 ENCOUNTER — HOSPITAL ENCOUNTER (OUTPATIENT)
Dept: ULTRASOUND IMAGING | Facility: HOSPITAL | Age: 63
Discharge: HOME | End: 2023-10-24
Admitting: PHYSICIAN ASSISTANT
Payer: COMMERCIAL

## 2023-10-24 PROCEDURE — 76882 US LMTD JT/FCL EVL NVASC XTR: CPT

## 2023-11-14 ENCOUNTER — TELEPHONE (OUTPATIENT)
Dept: FAMILY MEDICINE CLINIC | Facility: CLINIC | Age: 63
End: 2023-11-14
Payer: COMMERCIAL

## 2023-11-14 DIAGNOSIS — F51.01 PRIMARY INSOMNIA: ICD-10-CM

## 2023-11-14 RX ORDER — ESZOPICLONE 1 MG/1
1 TABLET, FILM COATED ORAL NIGHTLY
Qty: 30 TABLET | Refills: 2 | Status: SHIPPED | OUTPATIENT
Start: 2023-11-14

## 2023-11-14 NOTE — TELEPHONE ENCOUNTER
Caller: Zaheer Baron    Relationship: Self    Best call back number: [phone]     Requested Prescriptions:   Requested Prescriptions     Pending Prescriptions Disp Refills    eszopiclone (Lunesta) 1 MG tablet 30 tablet 0     Sig: Take 1 tablet by mouth Every Night. Take immediately before bedtime        Pharmacy where request should be sent: [facility] [id number] [address] [phone] [facility] [phone]      Last office visit with prescribing clinician: 10/12/2023   Last telemedicine visit with prescribing clinician: Visit date not found   Next office visit with prescribing clinician: 12/7/2023     Additional details provided by patient: THIS MEDICATION WAS SENT TO HIS MAIL IN PHARMACY. HE'S OUT OF THIS MEDICATION AND NEEDS IT SENT TO [facility].    Does the patient have less than a 3 day supply:  [x] Yes  [] No    Would you like a call back once the refill request has been completed: [] Yes [x] No    If the office needs to give you a call back, can they leave a voicemail: [] Yes [x] No    Rabia Mcbride Rep   11/14/23 09:52 EST

## 2023-11-14 NOTE — TELEPHONE ENCOUNTER
Patient should still be taking lisinopril 5 mg. If BP still elevated when checking at home take two tablets for 10 mg daily and follow up with BP values

## 2023-11-14 NOTE — TELEPHONE ENCOUNTER
Patient called stating that he was taken off his blood pressure medication but he has been experiencing high BP.  Does he need to come in or should he start taking medication again?

## 2023-12-07 ENCOUNTER — OFFICE VISIT (OUTPATIENT)
Dept: FAMILY MEDICINE CLINIC | Facility: CLINIC | Age: 63
End: 2023-12-07
Payer: COMMERCIAL

## 2023-12-07 VITALS
HEIGHT: 69 IN | DIASTOLIC BLOOD PRESSURE: 64 MMHG | SYSTOLIC BLOOD PRESSURE: 102 MMHG | BODY MASS INDEX: 35.43 KG/M2 | WEIGHT: 239.2 LBS | HEART RATE: 101 BPM | OXYGEN SATURATION: 98 % | TEMPERATURE: 97.1 F | RESPIRATION RATE: 18 BRPM

## 2023-12-07 DIAGNOSIS — N40.1 BENIGN PROSTATIC HYPERPLASIA WITH URINARY HESITANCY: ICD-10-CM

## 2023-12-07 DIAGNOSIS — G43.809 OTHER MIGRAINE WITHOUT STATUS MIGRAINOSUS, NOT INTRACTABLE: ICD-10-CM

## 2023-12-07 DIAGNOSIS — B35.6 JOCK ITCH: ICD-10-CM

## 2023-12-07 DIAGNOSIS — E11.9 TYPE 2 DIABETES MELLITUS WITHOUT COMPLICATION, WITHOUT LONG-TERM CURRENT USE OF INSULIN: Primary | ICD-10-CM

## 2023-12-07 DIAGNOSIS — I10 PRIMARY HYPERTENSION: ICD-10-CM

## 2023-12-07 DIAGNOSIS — Z51.81 MEDICATION MONITORING ENCOUNTER: ICD-10-CM

## 2023-12-07 DIAGNOSIS — F51.01 PRIMARY INSOMNIA: ICD-10-CM

## 2023-12-07 DIAGNOSIS — R39.11 BENIGN PROSTATIC HYPERPLASIA WITH URINARY HESITANCY: ICD-10-CM

## 2023-12-07 DIAGNOSIS — N28.9 FUNCTION KIDNEY DECREASED: ICD-10-CM

## 2023-12-07 LAB
ALBUMIN SERPL-MCNC: 4.8 G/DL (ref 3.5–5.2)
ALBUMIN/GLOB SERPL: 2 G/DL
ALP SERPL-CCNC: 52 U/L (ref 39–117)
ALT SERPL-CCNC: 23 U/L (ref 1–41)
AST SERPL-CCNC: 20 U/L (ref 1–40)
BASOPHILS # BLD AUTO: 0.09 10*3/MM3 (ref 0–0.2)
BASOPHILS NFR BLD AUTO: 1.1 % (ref 0–1.5)
BILIRUB SERPL-MCNC: 0.3 MG/DL (ref 0–1.2)
BUN SERPL-MCNC: 19 MG/DL (ref 8–23)
BUN/CREAT SERPL: 14.4 (ref 7–25)
CALCIUM SERPL-MCNC: 10.2 MG/DL (ref 8.6–10.5)
CHLORIDE SERPL-SCNC: 100 MMOL/L (ref 98–107)
CO2 SERPL-SCNC: 25.7 MMOL/L (ref 22–29)
CREAT SERPL-MCNC: 1.32 MG/DL (ref 0.76–1.27)
EGFRCR SERPLBLD CKD-EPI 2021: 60.2 ML/MIN/1.73
EOSINOPHIL # BLD AUTO: 0.27 10*3/MM3 (ref 0–0.4)
EOSINOPHIL NFR BLD AUTO: 3.4 % (ref 0.3–6.2)
ERYTHROCYTE [DISTWIDTH] IN BLOOD BY AUTOMATED COUNT: 14 % (ref 12.3–15.4)
EXPIRATION DATE: ABNORMAL
GLOBULIN SER CALC-MCNC: 2.4 GM/DL
GLUCOSE SERPL-MCNC: 204 MG/DL (ref 65–99)
HBA1C MFR BLD: 8.5 % (ref 4.5–5.7)
HCT VFR BLD AUTO: 41.9 % (ref 37.5–51)
HGB BLD-MCNC: 13.7 G/DL (ref 13–17.7)
IMM GRANULOCYTES # BLD AUTO: 0.03 10*3/MM3 (ref 0–0.05)
IMM GRANULOCYTES NFR BLD AUTO: 0.4 % (ref 0–0.5)
LYMPHOCYTES # BLD AUTO: 3.12 10*3/MM3 (ref 0.7–3.1)
LYMPHOCYTES NFR BLD AUTO: 39.6 % (ref 19.6–45.3)
Lab: ABNORMAL
MAGNESIUM SERPL-MCNC: 1.9 MG/DL (ref 1.6–2.4)
MCH RBC QN AUTO: 28.2 PG (ref 26.6–33)
MCHC RBC AUTO-ENTMCNC: 32.7 G/DL (ref 31.5–35.7)
MCV RBC AUTO: 86.2 FL (ref 79–97)
MONOCYTES # BLD AUTO: 0.43 10*3/MM3 (ref 0.1–0.9)
MONOCYTES NFR BLD AUTO: 5.5 % (ref 5–12)
NEUTROPHILS # BLD AUTO: 3.94 10*3/MM3 (ref 1.7–7)
NEUTROPHILS NFR BLD AUTO: 50 % (ref 42.7–76)
NRBC BLD AUTO-RTO: 0 /100 WBC (ref 0–0.2)
PLATELET # BLD AUTO: 294 10*3/MM3 (ref 140–450)
POTASSIUM SERPL-SCNC: 4 MMOL/L (ref 3.5–5.2)
PROT SERPL-MCNC: 7.2 G/DL (ref 6–8.5)
RBC # BLD AUTO: 4.86 10*6/MM3 (ref 4.14–5.8)
SODIUM SERPL-SCNC: 139 MMOL/L (ref 136–145)
WBC # BLD AUTO: 7.88 10*3/MM3 (ref 3.4–10.8)

## 2023-12-07 RX ORDER — DOXAZOSIN 2 MG/1
2 TABLET ORAL
Qty: 90 TABLET | Refills: 3 | Status: SHIPPED | OUTPATIENT
Start: 2023-12-07

## 2023-12-07 RX ORDER — SITAGLIPTIN AND METFORMIN HYDROCHLORIDE 500; 50 MG/1; MG/1
1 TABLET, FILM COATED ORAL 2 TIMES DAILY WITH MEALS
Start: 2023-12-07

## 2023-12-07 RX ORDER — CLOTRIMAZOLE AND BETAMETHASONE DIPROPIONATE 10; .64 MG/G; MG/G
1 CREAM TOPICAL 2 TIMES DAILY
Qty: 45 G | Refills: 2 | Status: SHIPPED | OUTPATIENT
Start: 2023-12-07

## 2023-12-07 RX ORDER — SITAGLIPTIN AND METFORMIN HYDROCHLORIDE 500; 50 MG/1; MG/1
1 TABLET, FILM COATED ORAL 2 TIMES DAILY WITH MEALS
COMMUNITY
Start: 2023-11-23 | End: 2023-12-07 | Stop reason: SDUPTHER

## 2023-12-07 RX ORDER — PAROXETINE HYDROCHLORIDE HEMIHYDRATE 25 MG/1
25 TABLET, FILM COATED, EXTENDED RELEASE ORAL EVERY MORNING
Qty: 90 TABLET | Refills: 3 | Status: SHIPPED | OUTPATIENT
Start: 2023-12-07

## 2023-12-07 RX ORDER — LISINOPRIL 10 MG/1
10 TABLET ORAL DAILY
Qty: 90 TABLET | Refills: 3 | Status: SHIPPED | OUTPATIENT
Start: 2023-12-07

## 2023-12-07 RX ORDER — SUMATRIPTAN 50 MG/1
TABLET, FILM COATED ORAL
Qty: 30 TABLET | Refills: 2 | Status: SHIPPED | OUTPATIENT
Start: 2023-12-07

## 2023-12-07 NOTE — PROGRESS NOTES
"Subjective   Zaheer Baron is a 63 y.o. male.     History of Present Illness   Pt presents for 8 week follow up for dizziness and insomnia.     Last visit Cardura was decreased from 4 mg to 2 mg due to low BP values. Since last visit patient did call and note that he had variable HP values. Has been taking some of his wife's lisinopril as mail order has not sent him his yet. BP doing well in office   BPH symptoms have been stable. Stopped flomax when he ran out of Rx a couple weeks ago. Symptoms are about the same. Will remain off for now and continue with Cardura     Insomnia is still uncontrolled with Lunesta. Notes first few days of taking seemed to help, but now up all the time throughout the night.  Failed trazodone and Ambien as well. Has BREONNA. Having a hard time tolerating mask. Has tried nose piece but felt like he was suffocating. Full mask felt like he was getting too much air pressure. Sleep medicine has been working on adjustments.      Groin pain from last visit is almost gone.     DM- blood sugar has been running higher   120-150 in the morning   After eating, BS is up to 270.    Down several lbs at home.   Has been snacking more at night due to waking up frequently     Dizziness overall has improved     The following portions of the patient's history were reviewed and updated as appropriate: allergies, current medications, past family history, past medical history, past social history, past surgical history, and problem list.      Objective   Blood pressure 102/64, pulse 101, temperature 97.1 °F (36.2 °C), resp. rate 18, height 175.3 cm (69\"), weight 109 kg (239 lb 3.2 oz), SpO2 98%.     Physical Exam  Vitals and nursing note reviewed.   Constitutional:       Appearance: Normal appearance.   Cardiovascular:      Rate and Rhythm: Normal rate and regular rhythm.   Pulmonary:      Effort: Pulmonary effort is normal.      Breath sounds: Normal breath sounds.   Skin:     General: Skin is warm and dry. "   Neurological:      Mental Status: He is alert and oriented to person, place, and time.   Psychiatric:         Mood and Affect: Mood normal.         Behavior: Behavior normal.         Assessment & Plan   Diagnoses and all orders for this visit:    1. Type 2 diabetes mellitus without complication, without long-term current use of insulin (Primary)  -     POC Glycosylated Hemoglobin (Hb A1C)  -     Janumet  MG per tablet; Take 1 tablet by mouth 2 (Two) Times a Day With Meals.    2. Primary hypertension  -     lisinopril (PRINIVIL,ZESTRIL) 10 MG tablet; Take 1 tablet by mouth Daily.  Dispense: 90 tablet; Refill: 3  -     CBC w AUTO Differential    3. Function kidney decreased  -     Comprehensive metabolic panel    4. Other migraine without status migrainosus, not intractable  -     SUMAtriptan (IMITREX) 50 MG tablet; Take one tablet at onset of headache. May repeat dose one time in 2 hours if headache not relieved.  Dispense: 30 tablet; Refill: 2    5. Medication monitoring encounter  -     Magnesium    6. Jock itch  -     clotrimazole-betamethasone (Lotrisone) 1-0.05 % cream; Apply 1 application  topically to the appropriate area as directed 2 (Two) Times a Day.  Dispense: 45 g; Refill: 2    7. Insomnia   -     increase lunesta to 2 mg daily and discuss further sleep issues with sleep medicine follow up for BREONNA       Lunesta 2 mg for next few nights to see if this helps more with insomnia. Discuss with sleep medicine provider as well. Call if needing new Rx with dose adjustment if this seems to help   A1c 8.5. continue with Janumet and lifestyle changes   Rx for lisinopril 10 sent to mail order. This is his former dose. Remain on this for now and monitor BP closely and call if not becoming better controlled   Told by arthritis doctor he would be on dialysis if he took NSAIDs. Is on injections and long term percocet management but feels like since no longer has arthritis medicine for inflammation his overall  pain has been worse and affecting his quality of life. Glucosamine Chondroitin and tumeric recommended to patient and will recheck kidney function today

## 2023-12-11 ENCOUNTER — TELEPHONE (OUTPATIENT)
Dept: FAMILY MEDICINE CLINIC | Facility: CLINIC | Age: 63
End: 2023-12-11
Payer: COMMERCIAL

## 2023-12-11 DIAGNOSIS — F51.01 PRIMARY INSOMNIA: ICD-10-CM

## 2023-12-11 RX ORDER — ESZOPICLONE 1 MG/1
1 TABLET, FILM COATED ORAL NIGHTLY
Qty: 30 TABLET | Refills: 2 | Status: CANCELLED | OUTPATIENT
Start: 2023-12-11

## 2023-12-11 NOTE — TELEPHONE ENCOUNTER
Caller: Zaheer Baron    Relationship: Self    Best call back number: [phone]     Requested Prescriptions:   Requested Prescriptions     Pending Prescriptions Disp Refills    eszopiclone (Lunesta) 1 MG tablet 30 tablet 2     Sig: Take 1 tablet by mouth Every Night. Take immediately before bedtime      Pharmacy where request should be sent: [facility] [id number] [address] [phone] [facility] [phone]      Last office visit with prescribing clinician: 12/7/2023   Last telemedicine visit with prescribing clinician: Visit date not found   Next office visit with prescribing clinician: 4/3/2024     Additional details provided by patient: PATIENT WILL RUN OUT AFTER TONIGHT. THE PATIENT WAS INSTRUCTED TO TRY TAKING 2 PILL INSTEAD AND SEE IF IT WORKS. HE SAID THAT TAKING 2 MG WORKS A LOT BETTER. PLEASE CORRECT THIS PRESCRIPTION TO BE 2 MG AND SEND IN ASAP.     Does the patient have less than a 3 day supply:  [x] Yes  [] No    Would you like a call back once the refill request has been completed: [x] Yes [] No    If the office needs to give you a call back, can they leave a voicemail: [x] Yes [] No    Rabia Bond   12/11/23 16:00 EST

## 2023-12-13 RX ORDER — ESZOPICLONE 2 MG/1
2 TABLET, FILM COATED ORAL NIGHTLY
Qty: 30 TABLET | Refills: 1 | Status: SHIPPED | OUTPATIENT
Start: 2023-12-13

## 2023-12-13 NOTE — TELEPHONE ENCOUNTER
Please call, requested meds sent to pharmacy.     This is for the higher dose so only take ONE tablet.

## 2023-12-13 NOTE — TELEPHONE ENCOUNTER
Dr. Kelly  After recent appointment patient was instructed to take 2 mg at bedtime when insomnia was not improved. He notes this has helped a lot more. Please send in Lunesta 2 mg tablets. Thanks!

## 2023-12-13 NOTE — PROGRESS NOTES
Sleep Clinic Follow Up Note    Chief Complaint  Follow-up    Subjective     History of Present Illness (from previous encounter on 9/7/2023):  Zaheer Baron is a 62 y.o. male with a history of HTN, hyperlipidemia, atrial fibrillation, type 2 diabetes mellitus, and Sjogren's syndrome.  The patient is referred by JENNIFER Orellana with primary care.  Patient recently reported difficulty with fragmented sleep and fatigue. He wakes frequently due to back pain. He is prescribed ambien. (End copied text).    -A home sleep test was obtained on 10/6/2023 revealing mild obstructive sleep apnea with an AHI of 9.7/H.    Interval History:  Zaheer Baron is a 63 y.o. male returns for follow up and compliance of PAP therapy. The patient was last seen on 9/7/2023. Overall the patient feels poor with regard to therapy. He has had difficulty with his masks. He tried nasal mask but his mouth will open. He was started on a full face mask and there was too much pressure.  Patient feels that he is smothering.  He is unsure he will be able to use the device. The device appears to be working appropriately. On average the patient sleeps 1-5 hours per night. The patient wakes multiple times per night. He has DDD as his back will wake him up. He gets up and goes to recliner. He is on pain medication and can't be treated much due to kidney issues.     The patient reports the following changes to their medical and medication history since they were last seen:  None      Further details are as follows:      [place] Scale is (out of 24): Total score: 7     Weight:  Current Weight: 241 lb    The patient's relevant past medical, surgical, family, and social history reviewed and updated in Epic as appropriate.    PMH:    Past Medical History:   Diagnosis Date    Acute sinusitis     Allergic rhinitis     Arthropathy     of lumbar facet joint    Artificial lens present     position - right    Atrial fibrillation 03/07/2020    patient had  episode of atrial fibrillation w/ ventricular response secondary to hypovelemia r/t salmonella infection    Backache     chronic back problems    Benign prostatic hyperplasia     Bronchitis     perisistent    Cataract     Chronic obstructive lung disease     Diabetes mellitus     no retinopathy    Disorder of kidney     Disorder of kidney and/or ureter - Congenital solitary right kidney       Dizziness     History of Meniere's like condition, left ear       Drug therapy     long-term    Dysfunction of eustachian tube     Dyslipidemia     Gout     Headache     History of possible migraine/cluster       Hearing loss     Hypertension     Hypokalemia     Impacted cerumen     Infestation by Sarcoptes scabiei alta hominis     Inguinal hernia     History of, repaired       Knee pain     Osteoarthritis     Pneumonia     in the past    Posterior subcapsular polar senile cataract     Sjogren's syndrome     Type 2 diabetes mellitus      Past Surgical History:   Procedure Laterality Date    BACK SURGERY      1/1/02    CARPAL TUNNEL RELEASE      (Carpal tunnel release on the left)   09/10/2010 , Carpal tunnel release on the right)   12/03/2010     CATARACT EXTRACTION, BILATERAL      ENDOSCOPY N/A 3/16/2017    Procedure: ESOPHAGOGASTRODUODENOSCOPY;  Surgeon: Alli Shook DO;  Location: [facility] ENDOSCOPY;  Service:     EYE SURGERY Bilateral     cataract removed    HERNIA REPAIR      INGUINAL HERNIA REPAIR      (Left inguinal hernioplasty with mesh.)   06/07/2007     INJECTION OF MEDICATION      Injection for nerve block (Lumbar medial branch block.)   03/31/2016     INJECTION OF MEDICATION  03/19/2015    solu-medrol     KNEE SURGERY      (Lateral release, removal of loose bodies, excision of suprapatellar plica.)   04/29/1982     OTHER SURGICAL HISTORY      Lumbar surgery (Lumbosacral radio frequency thermal coagulation. Lumbosacral spondylosis.)   06/30/2016 ,Lumbar surgery (Lumbosacral radio frequency thermal coagulation.)    12/21/2015      OTHER SURGICAL HISTORY      Remove cataract, insert lens (Right eye.)   05/07/2015     OTHER SURGICAL HISTORY      Remove hip/pelvis lesion (Melanoma, right hip.)   2005     VASECTOMY         Allergies   Allergen Reactions    Nsaids Other (See Comments)     Solitary kidney; renal failure with NSAID use.  Solitary kidney; renal failure with NSAID use.       MEDS:  Prior to Admission medications    Medication Sig Start Date End Date Taking? Authorizing Provider   albuterol sulfate  (90 Base) MCG/ACT inhaler INHALE 1 TO 2 PUFFS EVERY 4 TO 6 HOURS AS NEEDED FOR WHEEZE FOR UP TO 30 DAYS 2/8/23   Soto De La Rosa MD   clotrimazole-betamethasone (Lotrisone) 1-0.05 % cream Apply 1 application  topically to the appropriate area as directed 2 (Two) Times a Day. 12/7/23   Eliana Ann PA   doxazosin (CARDURA) 2 MG tablet TAKE 1 TABLET BY MOUTH AT NIGHT 12/7/23   Eliana Ann PA   Ergocalciferol 50 MCG (2000 UT) tablet Take 50 mcg by mouth Daily. 8/12/21   Nasir Garcia MD   esomeprazole (nexIUM) 20 MG capsule Take 1 capsule by mouth Every Morning Before Breakfast.    Soto De La Rosa MD   eszopiclone (Lunesta) 1 MG tablet Take 1 tablet by mouth Every Night. Take immediately before bedtime 11/14/23   Nicholas Kelly MD   FLUTICASONE FUROATE IN Inhale.    Soto De La Rosa MD   glucose blood test strip 1 each by Other route Daily. Use as instructed 11/21/19   Nasir Garcia MD   Janumet  MG per tablet Take 1 tablet by mouth 2 (Two) Times a Day With Meals. 12/7/23   Eliana Ann PA   Lancets (ONETOUCH DELICA PLUS [id number]) misc USE DAILY TO CHECK BLOOD SUGAR 7/15/20   Nasir Garcia MD   lisinopril (PRINIVIL,ZESTRIL) 10 MG tablet Take 1 tablet by mouth Daily. 12/7/23   Eliana Ann PA   naloxone (NARCAN) 4 MG/0.1ML nasal spray 1 spray into the nostril(s) as directed by provider. 5/17/23   Soto De La Rosa MD   ondansetron ODT (ZOFRAN-ODT) 4 MG  "disintegrating tablet TAKE 1 TABLET BY MOUTH EVERY 6-8 HOURS AS NEEDED FOR NAUSEA 3/6/23   Provider, MD Soto   oxyCODONE-acetaminophen (PERCOCET) 7.5-325 MG per tablet Take 1 tablet by mouth Every 4 (Four) Hours As Needed. Patient taking  mg 5 times daily.    Emergency, Nurse Epic, RN   PARoxetine CR (PAXIL-CR) 25 MG 24 hr tablet Take 1 tablet by mouth Every Morning. 12/7/23   Eliana Ann PA   SUMAtriptan (IMITREX) 50 MG tablet Take one tablet at onset of headache. May repeat dose one time in 2 hours if headache not relieved. 12/7/23   Eliana Ann PA         FH:  Family History   Problem Relation Age of Onset    Lung disease Mother         autoimmune    Heart failure Father 35    Stomach cancer Sister 43        autoimmune    Diabetes Paternal Grandmother        Objective   Vital Signs:  /82 (BP Location: Left arm, Patient Position: Sitting)   Pulse 90   Ht 175.3 cm (69\")   Wt 110 kg (241 lb 9.6 oz)   SpO2 96%   BMI 35.68 kg/m²              Physical Exam          Result Review :           PAP Report:  AHI: 3.3/h  Days of Usage: 11/33 (33%)  Number of Days Greater than 4 hours: 0%  Average time (days used): 1 hour 4 minutes  95th Percentile Pressure: 8.1 cm H2O  95th percentile leaks: 62 L/min  Settings: Auto CPAP-8/18 cm H2O, EPR full-time, EPR level 1, response standard.       Assessment and Plan  Zaheer Baron is a 63 y.o. male who returns for follow-up and compliance of PAP therapy.  The Pap report has been reviewed.  Overall usage is at 33% with compliance at 0%.  Patient averages 1 hour 4 minutes on nights used.  With usage, sleep apnea is controlled with an AHI of 3.3/H.  He has a large leak at 62 L/min and I have suggested a mask change/fitting with DME company.  He has done this multiple times and is currently using a fullface mask.  He feels that he is getting too much pressure and often that he is smothering.  I will adjust the patient's upper and lower limit pressure " setting to see if this is helpful.  The patient will let me know whether or not he has improved with regard to feeling as though he is smothering.  If he continues to have difficulty we we will need to order a titration study.  If he is doing well then I will have him back in approximately 10 to 12 weeks for compliance recheck.  In the meantime, I will refill the patient's supplies.      Diagnoses and all orders for this visit:    1. BREONNA (obstructive sleep apnea) (Primary)  -     PAP Therapy    2. Obesity (BMI 30.0-34.9)           The patient continues to use and benefit from PAP therapy.    1. The patient was counseled regarding multimodal approach with healthy nutrition, healthy sleep, regular physical activity, social activities, counseling, and medications. Encouraged to practice lateral sleep position. Avoid alcohol and sedatives close to bedtime.     2.  We will refill supplies x1 year.  Follow-up in approximately 8 to 10 weeks for recheck.         Follow Up  Return for Recheck.  Patient was given instructions and counseling regarding his condition or for health maintenance advice. Please see specific information pulled into the AVS if appropriate.       WILLA Landry, ACNP-BC  Pulmonology, Critical Care, and Sleep Medicine

## 2023-12-18 ENCOUNTER — OFFICE VISIT (OUTPATIENT)
Dept: SLEEP MEDICINE | Facility: HOSPITAL | Age: 63
End: 2023-12-18
Payer: COMMERCIAL

## 2023-12-18 VITALS
BODY MASS INDEX: 35.78 KG/M2 | SYSTOLIC BLOOD PRESSURE: 156 MMHG | HEART RATE: 90 BPM | HEIGHT: 69 IN | WEIGHT: 241.6 LBS | DIASTOLIC BLOOD PRESSURE: 82 MMHG | OXYGEN SATURATION: 96 %

## 2023-12-18 DIAGNOSIS — E66.9 OBESITY (BMI 30.0-34.9): ICD-10-CM

## 2023-12-18 DIAGNOSIS — G47.33 OSA (OBSTRUCTIVE SLEEP APNEA): Primary | ICD-10-CM

## 2024-01-08 ENCOUNTER — TELEPHONE (OUTPATIENT)
Dept: FAMILY MEDICINE CLINIC | Facility: CLINIC | Age: 64
End: 2024-01-08
Payer: COMMERCIAL

## 2024-01-08 DIAGNOSIS — F51.01 PRIMARY INSOMNIA: ICD-10-CM

## 2024-01-08 NOTE — TELEPHONE ENCOUNTER
Caller: Zaheer Baron    Relationship: Self    Best call back number: [phone]    Requested Prescriptions:   Requested Prescriptions     Pending Prescriptions Disp Refills    eszopiclone (Lunesta) 2 MG tablet 30 tablet 1     Sig: Take 1 tablet by mouth Every Night. Take immediately before bedtime        Pharmacy where request should be sent: [facility] [id number] [address] [phone] [facility] [phone]      Last office visit with prescribing clinician: 12/7/2023   Last telemedicine visit with prescribing clinician: Visit date not found   Next office visit with prescribing clinician: 4/3/2024     Additional details provided by patient: PATIENT STATES THAT MEDICATION IS WORKING WELL, BETTER THAN ANY OTHER MEDICATION HE'S HAD    Does the patient have less than a 3 day supply:  [] Yes  [x] No    Would you like a call back once the refill request has been completed: [] Yes [x] No    If the office needs to give you a call back, can they leave a voicemail: [] Yes [x] No    Rabia Bowden Rep   01/08/24 14:40 EST

## 2024-01-09 RX ORDER — ESZOPICLONE 2 MG/1
2 TABLET, FILM COATED ORAL NIGHTLY
Qty: 30 TABLET | Refills: 2 | Status: SHIPPED | OUTPATIENT
Start: 2024-01-09

## 2024-01-09 NOTE — TELEPHONE ENCOUNTER
Please call, requested meds sent to pharmacy.               ========================  Cristobal reviewed 1/9/2024 . Follow up appt is scheduled on 4/3/2024 .    Last office visit  : 12/7/2023

## 2024-03-13 NOTE — PROGRESS NOTES
Sleep Clinic Follow Up Note    Chief Complaint  Follow-up    Subjective     History of Present Illness (from previous encounter on 12/27/2023):  Zaheer Baron is a 63 y.o. male who returns for follow-up and compliance of PAP therapy.  The Pap report has been reviewed.  Overall usage is at 33% with compliance at 0%.  Patient averages 1 hour 4 minutes on nights used.  With usage, sleep apnea is controlled with an AHI of 3.3/H.  He has a large leak at 62 L/min and I have suggested a mask change/fitting with DME company.  He has done this multiple times and is currently using a fullface mask.  He feels that he is getting too much pressure and often that he is smothering.  I will adjust the patient's upper and lower limit pressure setting to see if this is helpful.  The patient will let me know whether or not he has improved with regard to feeling as though he is smothering.  If he continues to have difficulty we we will need to order a titration study.  If he is doing well then I will have him back in approximately 10 to 12 weeks for compliance recheck.  In the meantime, I will refill the patient's supplies.  (End copied text).    Interval History:  Zaheer Baron is a 63 y.o. male returns for follow up and compliance of PAP therapy. The patient was last seen on 12/27/2023 for compliance.  A pressure change was made on 12/18/2023. Overall the patient feels poor with regard to therapy. He does not feel comfortable with the device and has claustrophobia with the mask.   The patient reports the following changes to their medical and medication history since they were last seen: None          Further details are as follows:      [place] Scale is (out of 24): Total score: 3     Weight:  Current Weight: 244 lb    Weight change in the last year:  gain: 0 lbs    The patient's relevant past medical, surgical, family, and social history reviewed and updated in Epic as appropriate.    PMH:    Past Medical History:   Diagnosis  Date    Acute sinusitis     Allergic rhinitis     Arthropathy     of lumbar facet joint    Artificial lens present     position - right    Atrial fibrillation 03/07/2020    patient had episode of atrial fibrillation w/ ventricular response secondary to hypovelemia r/t salmonella infection    Backache     chronic back problems    Benign prostatic hyperplasia     Bronchitis     perisistent    Cataract     Chronic obstructive lung disease     Diabetes mellitus     no retinopathy    Disorder of kidney     Disorder of kidney and/or ureter - Congenital solitary right kidney       Dizziness     History of Meniere's like condition, left ear       Drug therapy     long-term    Dysfunction of eustachian tube     Dyslipidemia     Gout     Headache     History of possible migraine/cluster       Hearing loss     Hypertension     Hypokalemia     Impacted cerumen     Infestation by Sarcoptes scabiei alta hominis     Inguinal hernia     History of, repaired       Knee pain     Osteoarthritis     Pneumonia     in the past    Posterior subcapsular polar senile cataract     Sjogren's syndrome     Type 2 diabetes mellitus      Past Surgical History:   Procedure Laterality Date    BACK SURGERY      1/1/02    CARPAL TUNNEL RELEASE      (Carpal tunnel release on the left)   09/10/2010 , Carpal tunnel release on the right)   12/03/2010     CATARACT EXTRACTION, BILATERAL      ENDOSCOPY N/A 3/16/2017    Procedure: ESOPHAGOGASTRODUODENOSCOPY;  Surgeon: Alli Shook DO;  Location: [facility] ENDOSCOPY;  Service:     EYE SURGERY Bilateral     cataract removed    HERNIA REPAIR      INGUINAL HERNIA REPAIR      (Left inguinal hernioplasty with mesh.)   06/07/2007     INJECTION OF MEDICATION      Injection for nerve block (Lumbar medial branch block.)   03/31/2016     INJECTION OF MEDICATION  03/19/2015    solu-medrol     KNEE SURGERY      (Lateral release, removal of loose bodies, excision of suprapatellar plica.)   04/29/1982     OTHER SURGICAL  HISTORY      Lumbar surgery (Lumbosacral radio frequency thermal coagulation. Lumbosacral spondylosis.)   06/30/2016 ,Lumbar surgery (Lumbosacral radio frequency thermal coagulation.)   12/21/2015      OTHER SURGICAL HISTORY      Remove cataract, insert lens (Right eye.)   05/07/2015     OTHER SURGICAL HISTORY      Remove hip/pelvis lesion (Melanoma, right hip.)   2005     VASECTOMY         Allergies   Allergen Reactions    Nsaids Other (See Comments)     Solitary kidney; renal failure with NSAID use.  Solitary kidney; renal failure with NSAID use.       MEDS:  Prior to Admission medications    Medication Sig Start Date End Date Taking? Authorizing Provider   albuterol sulfate  (90 Base) MCG/ACT inhaler INHALE 1 TO 2 PUFFS EVERY 4 TO 6 HOURS AS NEEDED FOR WHEEZE FOR UP TO 30 DAYS 2/8/23   Soto De La Rosa MD   clotrimazole-betamethasone (Lotrisone) 1-0.05 % cream Apply 1 application  topically to the appropriate area as directed 2 (Two) Times a Day. 12/7/23   Eliana Ann PA   doxazosin (CARDURA) 2 MG tablet TAKE 1 TABLET BY MOUTH AT NIGHT 12/7/23   Eliana Ann PA   Ergocalciferol 50 MCG (2000 UT) tablet Take 50 mcg by mouth Daily. 8/12/21   Nasir Garcia MD   esomeprazole (nexIUM) 20 MG capsule Take 1 capsule by mouth Every Morning Before Breakfast.    Soto De La Rosa MD   eszopiclone (Lunesta) 2 MG tablet Take 1 tablet by mouth Every Night. Take immediately before bedtime 1/9/24   Nicholas Kelly MD   FLUTICASONE FUROATE IN Inhale.    Soto De La Rosa MD   glucose blood test strip 1 each by Other route Daily. Use as instructed 11/21/19   Nasir Garcia MD   Janumet  MG per tablet Take 1 tablet by mouth 2 (Two) Times a Day With Meals. 12/7/23   Eliana Ann PA   Lancets (ONETOUCH DELICA PLUS [id number]) misc USE DAILY TO CHECK BLOOD SUGAR 7/15/20   Nasir Garcia MD   lisinopril (PRINIVIL,ZESTRIL) 10 MG tablet Take 1 tablet by mouth Daily. 12/7/23    "Eliana Ann PA   naloxone (NARCAN) 4 MG/0.1ML nasal spray 1 spray into the nostril(s) as directed by provider. 5/17/23   ProviderSoto MD   ondansetron ODT (ZOFRAN-ODT) 4 MG disintegrating tablet TAKE 1 TABLET BY MOUTH EVERY 6-8 HOURS AS NEEDED FOR NAUSEA 3/6/23   Provider, MD Soto   oxyCODONE-acetaminophen (PERCOCET) 7.5-325 MG per tablet Take 1 tablet by mouth Every 4 (Four) Hours As Needed. Patient taking  mg 5 times daily.    Emergency, Nurse Epic, RN   PARoxetine CR (PAXIL-CR) 25 MG 24 hr tablet Take 1 tablet by mouth Every Morning. 12/7/23   Eliana Ann PA   SUMAtriptan (IMITREX) 50 MG tablet Take one tablet at onset of headache. May repeat dose one time in 2 hours if headache not relieved. 12/7/23   Eliana Ann PA         FH:  Family History   Problem Relation Age of Onset    Lung disease Mother         autoimmune    Heart failure Father 35    Stomach cancer Sister 43        autoimmune    Diabetes Paternal Grandmother        Objective   Vital Signs:  /86 (BP Location: Right arm, Patient Position: Sitting, Cuff Size: Adult)   Pulse 94   Ht 175.3 cm (69\")   Wt 111 kg (244 lb)   SpO2 97%   BMI 36.03 kg/m²              Physical Exam  Vitals reviewed.   Constitutional:       Appearance: Normal appearance.   HENT:      Head: Normocephalic and atraumatic.      Nose: Nose normal.      Mouth/Throat:      Mouth: Mucous membranes are moist.   Cardiovascular:      Rate and Rhythm: Normal rate and regular rhythm.      Heart sounds: No murmur heard.     No friction rub. No gallop.   Pulmonary:      Effort: Pulmonary effort is normal. No respiratory distress.      Breath sounds: Normal breath sounds. No wheezing or rhonchi.   Neurological:      Mental Status: He is alert and oriented to person, place, and time.   Psychiatric:         Behavior: Behavior normal.               Result Review :           PAP Report:  AHI: 0.0/h  Days of Usage: 1/30 (3%)  Number of Days Greater than " 4 hours: 0/30 (0%)  Average time (days used): 17 minutes  95th Percentile Pressure: 6.8 cmH2O  95th percentile leaks: 27.6 L/min  Settings: Auto CPAP-8/18 cm H2O, EPR full-time, EPR level 1, response standard.       Assessment and Plan  Zaheer Baron is a 63 y.o. male who returns for follow-up and compliance of PAP therapy.  The Pap report has been reviewed.  Overall usage is at 3% with compliance at 0%.  He continues to have significant difficulty with regard to therapy and particularly with regard to feeling claustrophobic.  At this point he would like to discontinue use.  I have discussed consequences of untreated sleep apnea, as well as alternatives to PAP therapy.  I have asked the patient return his device and have noted that should he wish to return we would need to start over with a new sleep study.  Patient will return as needed.      Diagnoses and all orders for this visit:    1. BREONNA (obstructive sleep apnea) (Primary)  -     PAP Therapy    2. Psychophysiological insomnia    3. Chronic obstructive pulmonary disease, unspecified COPD type    4. Obesity (BMI 30.0-34.9)             1. The patient was counseled regarding multimodal approach with healthy nutrition, healthy sleep, regular physical activity, social activities, counseling, and medications. Encouraged to practice lateral sleep position. Avoid alcohol and sedatives close to bedtime.            Follow Up  Return if symptoms worsen or fail to improve.  Patient was given instructions and counseling regarding his condition or for health maintenance advice. Please see specific information pulled into the AVS if appropriate.       WILLA Landry, ACNP-BC  Pulmonology, Critical Care, and Sleep Medicine

## 2024-03-18 ENCOUNTER — OFFICE VISIT (OUTPATIENT)
Dept: SLEEP MEDICINE | Facility: HOSPITAL | Age: 64
End: 2024-03-18
Payer: COMMERCIAL

## 2024-03-18 VITALS
WEIGHT: 244 LBS | HEIGHT: 69 IN | OXYGEN SATURATION: 97 % | BODY MASS INDEX: 36.14 KG/M2 | DIASTOLIC BLOOD PRESSURE: 86 MMHG | SYSTOLIC BLOOD PRESSURE: 148 MMHG | HEART RATE: 94 BPM

## 2024-03-18 DIAGNOSIS — J44.9 CHRONIC OBSTRUCTIVE PULMONARY DISEASE, UNSPECIFIED COPD TYPE: ICD-10-CM

## 2024-03-18 DIAGNOSIS — G47.33 OSA (OBSTRUCTIVE SLEEP APNEA): Primary | ICD-10-CM

## 2024-03-18 DIAGNOSIS — F51.04 PSYCHOPHYSIOLOGICAL INSOMNIA: ICD-10-CM

## 2024-03-18 DIAGNOSIS — E66.9 OBESITY (BMI 30.0-34.9): ICD-10-CM

## 2024-03-18 PROCEDURE — 99213 OFFICE O/P EST LOW 20 MIN: CPT | Performed by: NURSE PRACTITIONER

## 2024-04-03 ENCOUNTER — OFFICE VISIT (OUTPATIENT)
Dept: FAMILY MEDICINE CLINIC | Facility: CLINIC | Age: 64
End: 2024-04-03
Payer: COMMERCIAL

## 2024-04-03 VITALS
WEIGHT: 241.4 LBS | BODY MASS INDEX: 35.76 KG/M2 | TEMPERATURE: 97.8 F | HEIGHT: 69 IN | SYSTOLIC BLOOD PRESSURE: 96 MMHG | RESPIRATION RATE: 16 BRPM | DIASTOLIC BLOOD PRESSURE: 68 MMHG | HEART RATE: 109 BPM | OXYGEN SATURATION: 98 %

## 2024-04-03 DIAGNOSIS — E55.9 VITAMIN D DEFICIENCY: ICD-10-CM

## 2024-04-03 DIAGNOSIS — Z12.11 SCREENING FOR MALIGNANT NEOPLASM OF COLON: ICD-10-CM

## 2024-04-03 DIAGNOSIS — I10 PRIMARY HYPERTENSION: ICD-10-CM

## 2024-04-03 DIAGNOSIS — E11.9 TYPE 2 DIABETES MELLITUS WITHOUT COMPLICATION, WITHOUT LONG-TERM CURRENT USE OF INSULIN: ICD-10-CM

## 2024-04-03 DIAGNOSIS — E78.2 MIXED HYPERLIPIDEMIA: ICD-10-CM

## 2024-04-03 DIAGNOSIS — Z12.5 SCREENING FOR MALIGNANT NEOPLASM OF PROSTATE: ICD-10-CM

## 2024-04-03 DIAGNOSIS — G43.909 ACUTE MIGRAINE: ICD-10-CM

## 2024-04-03 DIAGNOSIS — R45.89 DEPRESSED MOOD: ICD-10-CM

## 2024-04-03 DIAGNOSIS — R42 DIZZINESS: ICD-10-CM

## 2024-04-03 DIAGNOSIS — Z00.00 ENCOUNTER FOR WELL ADULT EXAM WITHOUT ABNORMAL FINDINGS: Primary | ICD-10-CM

## 2024-04-03 RX ORDER — RIMEGEPANT SULFATE 75 MG/75MG
1 TABLET, ORALLY DISINTEGRATING ORAL DAILY PRN
Qty: 16 TABLET | Refills: 11 | Status: SHIPPED | OUTPATIENT
Start: 2024-04-03

## 2024-04-03 RX ORDER — LISINOPRIL 10 MG/1
10 TABLET ORAL DAILY
Qty: 90 TABLET | Refills: 3 | Status: SHIPPED | OUTPATIENT
Start: 2024-04-03

## 2024-04-03 RX ORDER — SITAGLIPTIN AND METFORMIN HYDROCHLORIDE 500; 50 MG/1; MG/1
1 TABLET, FILM COATED ORAL 2 TIMES DAILY WITH MEALS
Qty: 180 TABLET | Refills: 1 | Status: SHIPPED | OUTPATIENT
Start: 2024-04-03

## 2024-04-03 RX ORDER — BUPROPION HYDROCHLORIDE 75 MG/1
75 TABLET ORAL 2 TIMES DAILY
Qty: 60 TABLET | Refills: 0 | Status: SHIPPED | OUTPATIENT
Start: 2024-04-03

## 2024-04-03 NOTE — PROGRESS NOTES
Chief Complaint   Patient presents with    Annual Exam     Physical-pt stated BP been running low at home yesterday 111/72-pt hasn't had BP meds for 2 weeks-188/111 at dentist-had lisinopril-hctz left over so started taking that-pt stated got light headed yesterday and fell-stated this used to happen but not since 6-12 months       Subjective   The patient is a 63 y.o. male who presents for annual physical      Last Colonoscopy:  2013. Agreeable to order today. Notes he did not do well with previous prep so will ask for something different when they call to schedule. May choose cologuard as previous scope normal if no alternatives for prep available          Past Medical History,Medications, Allergies reviewed.     HPI  F/u for DM. Blood sugars running from 100-240. Due to check A1c has been taking janumet as prescribed.   For some reason mail order has not been sending his lisinopril 10 mg  even though a year supply sent in December. Has been out for a couple weeks and went to dentist, BP was 188/111. Started back on old lisinopril-HCTZ he had and BP has been very low since and dizziness/ light headedness has been exacerbated again. Notes he fell yesterday landing on bottom. Did not hit head     Chronic pain. Is on long term percocet for pain management through specialty office. Notes since had to d/c NSAIDs at recommendation of nephrologist, his pain has been worse. Having to rely on percocet to get through the day   Was on plaquenil for several years by rheumatologist but stopped this as well (thinks due to kidney concerns?) has not seen rheumatologist in several years. Diagnosis of scleroderma   Does not worsening headaches. Imitrex no longer seems to help with (50 mg)     Has been following with sleep medicine. Just can not tolerate mask. Feels like suffocating. Has not been using and sleep medicine aware. Did mention Aspire but told unlikely insurance would cover due to noncompliance with CPAP. Pt has not  "reached out to insurance to double check on this yet but would likely benefit     Has been on paroxetine for many years. Started after his son passed and work stressors at the time. Unsure if it does much   Does not little motivation at times     Lunesta best sleep aid he has tried so far but still waking up frequently. Due for refills         Review of Systems  As noted per HPI     Objective     BP 96/68   Pulse 109   Temp 97.8 °F (36.6 °C)   Resp 16   Ht 175.3 cm (69\")   Wt 109 kg (241 lb 6.4 oz)   SpO2 98%   BMI 35.65 kg/m²     Physical Exam  Vitals and nursing note reviewed.   Constitutional:       Appearance: Normal appearance.   HENT:      Head: Normocephalic.      Right Ear: Tympanic membrane, ear canal and external ear normal.      Left Ear: Tympanic membrane, ear canal and external ear normal.      Nose: Nose normal.      Mouth/Throat:      Pharynx: No oropharyngeal exudate or posterior oropharyngeal erythema.   Cardiovascular:      Rate and Rhythm: Normal rate and regular rhythm.   Pulmonary:      Effort: Pulmonary effort is normal.      Breath sounds: Normal breath sounds.   Abdominal:      General: Abdomen is flat. Bowel sounds are normal.      Tenderness: There is no abdominal tenderness. There is no guarding.   Skin:     General: Skin is warm and dry.   Neurological:      Mental Status: He is alert and oriented to person, place, and time.   Psychiatric:         Mood and Affect: Mood normal.         Behavior: Behavior normal.         Assessment & Plan     1. Encounter for well adult exam without abnormal findings  - CBC w AUTO Differential  - Comprehensive metabolic panel  - Hemoglobin A1c  - Iron Profile  - Vitamin B12  - Folate  - Vitamin D 25 hydroxy  - Magnesium  - TSH      - T4, free  - Thyroid Antibodies  - DOMINGUEZ  - Vitamin B1, Whole Blood  - Vitamin B6  - Vitamin C  - PSA SCREENING  - Lipid panel    2. Type 2 diabetes mellitus without complication, without long-term current use of insulin  - " CBC w AUTO Differential  - Comprehensive metabolic panel  - Hemoglobin A1c  - Janumet  MG per tablet; Take 1 tablet by mouth 2 (Two) Times a Day With Meals.  Dispense: 180 tablet; Refill: 1    3. Mixed hyperlipidemia  - CBC w AUTO Differential  - Comprehensive metabolic panel  - Lipid panel    4. Screening for malignant neoplasm of prostate  - PSA SCREENING    5. Dizziness  - CBC w AUTO Differential  - Comprehensive metabolic panel  - Iron Profile  - Vitamin B12  - Folate  - Magnesium  - TSH  - T4, free  - Thyroid Antibodies  - DOMINGUEZ  - Vitamin B1, Whole Blood  - Vitamin B6  - Vitamin C    6. Vitamin D deficiency  - Vitamin D 25 hydroxy    7. Acute migraine  - Rimegepant Sulfate (Nurtec) 75 MG tablet dispersible tablet; Take 1 tablet by mouth Daily As Needed (migraine headache).  Dispense: 16 tablet; Refill: 11    8. Primary hypertension  - lisinopril (PRINIVIL,ZESTRIL) 10 MG tablet; Take 1 tablet by mouth Daily.  Dispense: 90 tablet; Refill: 3    9. Screening for malignant neoplasm of colon  - Ambulatory Referral For Screening Colonoscopy    10. Depressed mood  - buPROPion (WELLBUTRIN) 75 MG tablet; Take 1 tablet by mouth 2 (Two) Times a Day.  Dispense: 60 tablet; Refill: 0      Patient may try taking paxil every other day X 1 week and then stopping to see how symptoms do. Would like to try and do a trial of wellbutrin to see if this helps with energy and motivation. Sent to local pharmacy   Unsure why mail order has not been sending his medicine. Resent Rx's and patient directed to call them to follow up. Hold BP medicine until above 130/80 then resume lisinopril 10 mg. Previously doing well on this   Trial of nurtec for migraines if approved by insurance   Recommend glucosamine chondroitin and tumeric for arthritic pain as he is trying to avoid NSAIDs. May be something to reconsider due to quality of life being affected by poorly controlled pain. Plaquenil should not be worrisome for kidney function- may  introduce this back and recommend follow up with rheumatologist     Counseling was given to patient for the following topics: diagnostic results, instructions for management, risk factor reductions, patient and family education, impressions, and risks and benefits of treatment options . Total time of the encounter was 20 minutes and 10 minutes was spent counseling.      JENNIFER Valdez

## 2024-04-04 ENCOUNTER — TELEPHONE (OUTPATIENT)
Dept: FAMILY MEDICINE CLINIC | Facility: CLINIC | Age: 64
End: 2024-04-04
Payer: COMMERCIAL

## 2024-04-04 DIAGNOSIS — F51.01 PRIMARY INSOMNIA: ICD-10-CM

## 2024-04-04 RX ORDER — ESZOPICLONE 2 MG/1
2 TABLET, FILM COATED ORAL NIGHTLY
Qty: 30 TABLET | Refills: 2 | Status: CANCELLED | OUTPATIENT
Start: 2024-04-04

## 2024-04-04 RX ORDER — ESZOPICLONE 2 MG/1
2 TABLET, FILM COATED ORAL NIGHTLY
Qty: 90 TABLET | Refills: 1 | Status: SHIPPED | OUTPATIENT
Start: 2024-04-04

## 2024-04-04 NOTE — TELEPHONE ENCOUNTER
IF/WHEN PT CALLS BACK, SEE ANJELICA'S MSG BELOW;     [facility] 75mg PA submitted. Key: [id number]

## 2024-04-04 NOTE — TELEPHONE ENCOUNTER
Caller: Zaheer Baron    Relationship: Self    Best call back number: [phone]    Requested Prescriptions:   Requested Prescriptions     Pending Prescriptions Disp Refills    eszopiclone (Lunesta) 2 MG tablet 30 tablet 2     Sig: Take 1 tablet by mouth Every Night. Take immediately before bedtime        Pharmacy where request should be sent:   [facility] [id number] [address] [phone] [facility] [phone]           Last office visit with prescribing clinician: 4/3/2024   Last telemedicine visit with prescribing clinician: Visit date not found   Next office visit with prescribing clinician: 7/3/2024     Additional details provided by patient: REQUESTING A 90 DAY SUPPLY; [employer] WILL NOT REFILL FOR A 90 DAY SUPPLY    Does the patient have less than a 3 day supply:  [x] Yes  [] No    Would you like a call back once the refill request has been completed: [x] Yes [] No    If the office needs to give you a call back, can they leave a voicemail: [x] Yes [] No    Rabia Barnes Rep   04/04/24 11:30 EDT

## 2024-04-04 NOTE — TELEPHONE ENCOUNTER
Please call, requested meds sent to pharmacy.     Per Eliana. Ok to send in 90 day supply          ========================  Cristobal reviewed 4/4/2024 . Follow up appt is scheduled on 7/3/2024 .    Last office visit with Eliana Ann: 4/3/2024

## 2024-04-11 LAB
25(OH)D3+25(OH)D2 SERPL-MCNC: 38.4 NG/ML (ref 30–100)
ALBUMIN SERPL-MCNC: 4.5 G/DL (ref 3.9–4.9)
ALBUMIN/GLOB SERPL: 1.5 {RATIO} (ref 1.2–2.2)
ALP SERPL-CCNC: 51 IU/L (ref 44–121)
ALT SERPL-CCNC: 18 IU/L (ref 0–44)
ANA SER QL: POSITIVE
AST SERPL-CCNC: 18 IU/L (ref 0–40)
BASOPHILS # BLD AUTO: 0.1 X10E3/UL (ref 0–0.2)
BASOPHILS NFR BLD AUTO: 1 %
BILIRUB SERPL-MCNC: 0.2 MG/DL (ref 0–1.2)
BUN SERPL-MCNC: 16 MG/DL (ref 8–27)
BUN/CREAT SERPL: 12 (ref 10–24)
CALCIUM SERPL-MCNC: 9.8 MG/DL (ref 8.6–10.2)
CHLORIDE SERPL-SCNC: 98 MMOL/L (ref 96–106)
CHOLEST SERPL-MCNC: 204 MG/DL (ref 100–199)
CO2 SERPL-SCNC: 25 MMOL/L (ref 20–29)
CREAT SERPL-MCNC: 1.33 MG/DL (ref 0.76–1.27)
DSDNA AB SER-ACNC: <1 IU/ML (ref 0–9)
EGFRCR SERPLBLD CKD-EPI 2021: 60 ML/MIN/1.73
EOSINOPHIL # BLD AUTO: 0.3 X10E3/UL (ref 0–0.4)
EOSINOPHIL NFR BLD AUTO: 3 %
ERYTHROCYTE [DISTWIDTH] IN BLOOD BY AUTOMATED COUNT: 13.6 % (ref 11.6–15.4)
FOLATE SERPL-MCNC: >20 NG/ML
GLOBULIN SER CALC-MCNC: 3 G/DL (ref 1.5–4.5)
GLUCOSE SERPL-MCNC: 181 MG/DL (ref 70–99)
HBA1C MFR BLD: 7.6 % (ref 4.8–5.6)
HCT VFR BLD AUTO: 43.2 % (ref 37.5–51)
HDLC SERPL-MCNC: 33 MG/DL
HGB BLD-MCNC: 14.5 G/DL (ref 13–17.7)
IMM GRANULOCYTES # BLD AUTO: 0 X10E3/UL (ref 0–0.1)
IMM GRANULOCYTES NFR BLD AUTO: 0 %
IRON SATN MFR SERPL: 16 % (ref 15–55)
IRON SERPL-MCNC: 53 UG/DL (ref 38–169)
LDLC SERPL CALC-MCNC: 114 MG/DL (ref 0–99)
LYMPHOCYTES # BLD AUTO: 2.4 X10E3/UL (ref 0.7–3.1)
LYMPHOCYTES NFR BLD AUTO: 31 %
Lab: NORMAL
MAGNESIUM SERPL-MCNC: 1.8 MG/DL (ref 1.6–2.3)
MCH RBC QN AUTO: 29.8 PG (ref 26.6–33)
MCHC RBC AUTO-ENTMCNC: 33.6 G/DL (ref 31.5–35.7)
MCV RBC AUTO: 89 FL (ref 79–97)
MONOCYTES # BLD AUTO: 0.5 X10E3/UL (ref 0.1–0.9)
MONOCYTES NFR BLD AUTO: 7 %
NEUTROPHILS # BLD AUTO: 4.5 X10E3/UL (ref 1.4–7)
NEUTROPHILS NFR BLD AUTO: 58 %
PLATELET # BLD AUTO: 319 X10E3/UL (ref 150–450)
POTASSIUM SERPL-SCNC: 4.1 MMOL/L (ref 3.5–5.2)
PROT SERPL-MCNC: 7.5 G/DL (ref 6–8.5)
PSA SERPL-MCNC: 0.3 NG/ML (ref 0–4)
PYRIDOXAL PHOS SERPL-MCNC: 4.5 UG/L (ref 3.4–65.2)
RBC # BLD AUTO: 4.87 X10E6/UL (ref 4.14–5.8)
SODIUM SERPL-SCNC: 141 MMOL/L (ref 134–144)
T4 FREE SERPL-MCNC: 1.22 NG/DL (ref 0.82–1.77)
THYROGLOB AB SERPL-ACNC: <1 IU/ML (ref 0–0.9)
THYROPEROXIDASE AB SERPL-ACNC: 11 IU/ML (ref 0–34)
TIBC SERPL-MCNC: 331 UG/DL (ref 250–450)
TRIGL SERPL-MCNC: 326 MG/DL (ref 0–149)
TSH SERPL DL<=0.005 MIU/L-ACNC: 1.07 UIU/ML (ref 0.45–4.5)
UIBC SERPL-MCNC: 278 UG/DL (ref 111–343)
VIT B1 BLD-SCNC: 126.4 NMOL/L (ref 66.5–200)
VIT B12 SERPL-MCNC: 1483 PG/ML (ref 232–1245)
VIT C SERPL-MCNC: 0.1 MG/DL (ref 0.4–2)
VLDLC SERPL CALC-MCNC: 57 MG/DL (ref 5–40)
WBC # BLD AUTO: 7.8 X10E3/UL (ref 3.4–10.8)

## 2024-04-23 ENCOUNTER — OFFICE VISIT (OUTPATIENT)
Dept: FAMILY MEDICINE CLINIC | Facility: CLINIC | Age: 64
End: 2024-04-23
Payer: COMMERCIAL

## 2024-04-23 VITALS
OXYGEN SATURATION: 98 % | TEMPERATURE: 99.5 F | SYSTOLIC BLOOD PRESSURE: 162 MMHG | BODY MASS INDEX: 35.1 KG/M2 | DIASTOLIC BLOOD PRESSURE: 100 MMHG | HEIGHT: 69 IN | WEIGHT: 237 LBS | HEART RATE: 114 BPM

## 2024-04-23 DIAGNOSIS — I10 PRIMARY HYPERTENSION: Primary | ICD-10-CM

## 2024-04-23 DIAGNOSIS — N28.9 FUNCTION KIDNEY DECREASED: ICD-10-CM

## 2024-04-23 PROCEDURE — 99214 OFFICE O/P EST MOD 30 MIN: CPT

## 2024-04-23 PROCEDURE — 93000 ELECTROCARDIOGRAM COMPLETE: CPT

## 2024-04-23 RX ORDER — LISINOPRIL 20 MG/1
20 TABLET ORAL DAILY
Qty: 30 TABLET | Refills: 0 | Status: CANCELLED | OUTPATIENT
Start: 2024-04-23

## 2024-04-23 RX ORDER — FLUTICASONE PROPIONATE 50 MCG
SPRAY, SUSPENSION (ML) NASAL
COMMUNITY

## 2024-04-23 NOTE — PROGRESS NOTES
"Chief Complaint   Patient presents with    Hypertension     220/140 something... when he went to get a colonoscopy,  could not complete the colonoscopy.  Has stage 3 renal failure with only 1 kidney.  Past 2 years has not felt good at all.       Subjective      Zaheer Baron is a 63 y.o. who presents for follow up on HTN.  States he went for colonoscopy this morning and his blood pressure was too high to complete the procedure.  States he has concerns that he only has one kidney due to birth defect.  He is in stage 3 renal failure. He had a nephrologist previously but has not seen one since moving to [place]. He was given two doses of blood pressure medication IV and it was still uncontrolled in the ER today.  He has had some dizziness, headaches and intermittently has had some shortness of breath for the last two months.  He does not have chest pain or SOA currently.  He does not have any chest tightness or heaviness.      The following portions of the patient's history were reviewed and updated as appropriate: allergies, current medications, past family history, past medical history, past social history, past surgical history, and problem list.    Review of Systems   Constitutional:  Negative for chills and fever.   Eyes:  Negative for blurred vision and double vision.   Respiratory:  Positive for shortness of breath. Negative for chest tightness.    Cardiovascular:  Negative for chest pain and leg swelling.   Neurological:  Positive for dizziness (sometimes) and headache (for a little over two months). Negative for speech difficulty and numbness.       Objective   Vital Signs:  /100   Pulse 114   Temp 99.5 °F (37.5 °C)   Ht 175.3 cm (69\")   Wt 108 kg (237 lb)   SpO2 98%   BMI 35.00 kg/m²               Physical Exam  Vitals and nursing note reviewed.   Constitutional:       Appearance: Normal appearance.   Cardiovascular:      Rate and Rhythm: Tachycardia present.      Heart sounds: Normal heart " sounds.   Pulmonary:      Effort: Pulmonary effort is normal.      Breath sounds: Normal breath sounds.   Neurological:      Mental Status: He is alert.          Result Review              ECG 12 Lead    Date/Time: 4/23/2024 1:03 PM  Performed by: Mohini Shabazz APRN    Authorized by: Mohini Shabazz APRN  Rhythm: sinus tachycardia  Rate: tachycardic  ST Elevation: aVF    Clinical impression: abnormal EKG              Assessment and Plan  Diagnoses and all orders for this visit:    1. Primary hypertension (Primary)  -     ECG 12 Lead    2. Function kidney decreased      Blood pressure remains 160's over 100's with tachycardia despite two doses of an IV anti-hypertensive.  Pressure was systolic over 200 when patient went to hospital this AM.  He has had some intermittent symptoms but no CP.  After discussion with patient, recommended he go to ER for further evaluation to r/o cardiac cause.  If normal workup, would consider increasing lisinopril to 20 mg daily and referral to nephrology at this point.    Patient to call back with outcome of ER visit.          Discussed medications prescribed and OTC medications recommended.  Discussed medication safety, possible side effects and how to take or administer medications. Instructed patient to report any adverse reactions, side effects or concerns.     Reviewed physical exam findings and plan with patient who verbalized understanding and agrees with plan of care. Patient was given opportunity to ask questions and all concerns were addressed prior to the conclusion of today's visit.     Follow Up  No follow-ups on file.  Patient was given instructions and counseling regarding his condition or for health maintenance advice. Please see specific information pulled into the AVS if appropriate.     Note to patient: The 21st Century Cures Act makes medical notes like these available to patients in the interest of transparency. However, be advised this is a medical document. It  is intended as peer to peer communication. It is written in medical language and may contain abbreviations or verbiage that are unfamiliar. It may appear blunt or direct. Medical documents are intended to carry relevant information, facts as evident, and the clinical opinion of the provider.

## 2024-04-24 ENCOUNTER — TELEPHONE (OUTPATIENT)
Dept: FAMILY MEDICINE CLINIC | Facility: CLINIC | Age: 64
End: 2024-04-24

## 2024-04-24 NOTE — TELEPHONE ENCOUNTER
Pt called with a severe headache and BP  was 185/121  Today .  Was seen yesterday by Mohini and  was told to go to ER , the patient went to ER and was sent home yesterday, Even after medication for BP it is not going down  Pt was told to go back to ER  today and schedule a F/U with here tomorrow pt agreed

## 2024-04-24 NOTE — TELEPHONE ENCOUNTER
Caller: Zaheer Baron    Relationship: Self    Best call back number: [phone]    What is the best time to reach you: ANYTIME     Who are you requesting to speak with (clinical staff, provider,  specific staff member): NICOLAS ARIAS      What was the call regarding: THE PATIENT WOULD LIKE A CALL BACK TO TELL NICOLAS ABOUT THE TESTS THAT THE HAD DONE AT THE HOSPITAL YESTERDAY AND TO SEE WHAT SHE WANTS TO DO

## 2024-04-25 ENCOUNTER — OFFICE VISIT (OUTPATIENT)
Dept: FAMILY MEDICINE CLINIC | Facility: CLINIC | Age: 64
End: 2024-04-25
Payer: COMMERCIAL

## 2024-04-25 VITALS
BODY MASS INDEX: 35.25 KG/M2 | HEIGHT: 69 IN | OXYGEN SATURATION: 97 % | DIASTOLIC BLOOD PRESSURE: 84 MMHG | HEART RATE: 104 BPM | TEMPERATURE: 98.2 F | SYSTOLIC BLOOD PRESSURE: 158 MMHG | WEIGHT: 238 LBS

## 2024-04-25 DIAGNOSIS — N28.9 FUNCTION KIDNEY DECREASED: ICD-10-CM

## 2024-04-25 DIAGNOSIS — I10 PRIMARY HYPERTENSION: Primary | ICD-10-CM

## 2024-04-25 DIAGNOSIS — Z90.5 SINGLE KIDNEY: ICD-10-CM

## 2024-04-25 RX ORDER — LISINOPRIL 5 MG/1
5 TABLET ORAL DAILY
Qty: 30 TABLET | Refills: 0 | Status: SHIPPED | OUTPATIENT
Start: 2024-04-25

## 2024-04-25 RX ORDER — CLONIDINE HYDROCHLORIDE 0.1 MG/1
0.1 TABLET ORAL 2 TIMES DAILY
COMMUNITY

## 2024-04-25 RX ORDER — CLONIDINE 0.1 MG/24H
1 PATCH, EXTENDED RELEASE TRANSDERMAL WEEKLY
COMMUNITY

## 2024-04-25 RX ORDER — LISINOPRIL 20 MG/1
20 TABLET ORAL DAILY
Qty: 30 TABLET | Refills: 0 | Status: CANCELLED | OUTPATIENT
Start: 2024-04-25

## 2024-04-25 NOTE — PROGRESS NOTES
"Chief Complaint   Patient presents with    Follow-up     Hospital f/u x 2   All labs and EKG was good, was giving medication to help bring BP down       Subjective      Zaheer Baron is a 63 y.o. who presents for follow up on hypertension.  Has been to the ER twice in the past two days, first for elevated blood pressure - all EKG and labs were good and then for elevated blood pressure with severe headache yesterday.  All testing was normal. Headache only occurs with elevated blood pressure.  He just decreased his lisinopril from 20 mg to 10 mg three weeks ago because he was having symptomatic hypotension.  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      The following portions of the patient's history were reviewed and updated as appropriate: allergies, current medications, past family history, past medical history, past social history, past surgical history, and problem list.    Review of Systems   Constitutional:  Negative for chills and fever.   Eyes:  Positive for blurred vision.   Respiratory:  Negative for shortness of breath.    Cardiovascular:  Negative for chest pain, palpitations and leg swelling.   Neurological:  Positive for headache.       Objective   Vital Signs:  /84   Pulse 104   Temp 98.2 °F (36.8 °C)   Ht 175.3 cm (69\")   Wt 108 kg (238 lb)   SpO2 97%   BMI 35.15 kg/m²               Physical Exam  Vitals and nursing note reviewed.   Constitutional:       Appearance: Normal appearance.   Cardiovascular:      Rate and Rhythm: Normal rate and regular rhythm.      Heart sounds: Normal heart sounds.   Pulmonary:      Breath sounds: Normal breath sounds.   Musculoskeletal:      Right lower leg: No edema.      Left lower leg: No edema.   Neurological:      Mental Status: He is alert.   Psychiatric:         Mood and Affect: Mood normal.         Behavior: Behavior normal.          Result Review                     Assessment and Plan  Diagnoses and all orders for this visit:    1. " Primary hypertension (Primary)  -     lisinopril (PRINIVIL,ZESTRIL) 5 MG tablet; Take 1 tablet by mouth Daily.  Dispense: 30 tablet; Refill: 0    2. Function kidney decreased  -     Ambulatory Referral to Nephrology    3. Single kidney  -     Ambulatory Referral to Nephrology      Patient previously treated with nephrology twice per year until the last few years.  Will refer back given his status of single kidney and decreased function overall though stable.   Increase lisinopril to 15 mg once daily since 10 mg is not controlling blood pressure and 20 mg caused hypotension.   Follow up next week for recheck, sooner if needed.      Patient also has clonidine from ER in case of systolic > 180 or diastolic > 100 which he was counseled is okay to take with those parameters prior to ER visit. He was counseled to return to ER with any severe HA, chest pain or SOA / dizziness.           Follow Up  No follow-ups on file.  Patient was given instructions and counseling regarding his condition or for health maintenance advice. Please see specific information pulled into the AVS if appropriate.     Note to patient: The 21st Century Cures Act makes medical notes like these available to patients in the interest of transparency. However, be advised this is a medical document. It is intended as peer to peer communication. It is written in medical language and may contain abbreviations or verbiage that are unfamiliar. It may appear blunt or direct. Medical documents are intended to carry relevant information, facts as evident, and the clinical opinion of the provider.

## 2024-04-30 ENCOUNTER — OFFICE VISIT (OUTPATIENT)
Dept: FAMILY MEDICINE CLINIC | Facility: CLINIC | Age: 64
End: 2024-04-30
Payer: COMMERCIAL

## 2024-04-30 VITALS
OXYGEN SATURATION: 98 % | WEIGHT: 241 LBS | DIASTOLIC BLOOD PRESSURE: 76 MMHG | SYSTOLIC BLOOD PRESSURE: 132 MMHG | HEART RATE: 105 BPM | TEMPERATURE: 98.6 F | BODY MASS INDEX: 35.7 KG/M2 | HEIGHT: 69 IN

## 2024-04-30 DIAGNOSIS — L70.9 ACNE, UNSPECIFIED ACNE TYPE: ICD-10-CM

## 2024-04-30 DIAGNOSIS — I10 PRIMARY HYPERTENSION: Primary | ICD-10-CM

## 2024-04-30 PROCEDURE — 99214 OFFICE O/P EST MOD 30 MIN: CPT

## 2024-04-30 RX ORDER — GINSENG 100 MG
1 CAPSULE ORAL 2 TIMES DAILY
Qty: 28 G | Refills: 0 | Status: SHIPPED | OUTPATIENT
Start: 2024-04-30

## 2024-04-30 NOTE — PROGRESS NOTES
"Chief Complaint   Patient presents with    Follow-up     Hypertension  At home - has been up and down, Has readings.       Subjective      Zaheer Baron is a 63 y.o. who presents for follow up on HTN. AT goal today, but has had readings that were elevated at home.    Brings lo24 - 2:04 /84  24 6:54 /83  24 - 732 /96  2024 346 /100  24 - 719 /90  24 - 546 /97  24 - 633 /97  24 - 545 /91  24 - 442 /85  24 - 938 /109  Today's reading below    The following portions of the patient's history were reviewed and updated as appropriate: allergies, current medications, past family history, past medical history, past social history, past surgical history, and problem list.    Review of Systems   Constitutional:  Negative for chills and fever.   Eyes:  Negative for blurred vision.   Respiratory:  Negative for chest tightness and shortness of breath.    Cardiovascular:  Negative for chest pain, palpitations and leg swelling.   Neurological:  Negative for dizziness.       Objective   Vital Signs:  /76   Pulse 105   Temp 98.6 °F (37 °C)   Ht 175.3 cm (69\")   Wt 109 kg (241 lb)   SpO2 98%   BMI 35.59 kg/m²               Physical Exam  Vitals and nursing note reviewed.   Constitutional:       Appearance: Normal appearance.   Pulmonary:      Effort: Pulmonary effort is normal.   Musculoskeletal:      Right lower leg: No edema.      Left lower leg: No edema.   Skin:     Findings: Acne (scalp) present.   Neurological:      Mental Status: He is alert.   Psychiatric:         Mood and Affect: Mood normal.         Behavior: Behavior normal.          Result Review                     Assessment and Plan  Diagnoses and all orders for this visit:    1. Primary hypertension (Primary)    2. Acne, unspecified acne type  -     bacitracin 500 UNIT/GM ointment; Apply 1 Application topically to the appropriate area as " directed 2 (Two) Times a Day.  Dispense: 28 g; Refill: 0        Take lisinopril 20 mg daily (increased from 15 mg). Not going to start HCTZ at this time due to status one kidney.    If dizziness occurs, decrease back to 15 mg daily until follow up.   Follow up in 1 week with PCP for recheck.            Discussed medications prescribed and OTC medications recommended.  Discussed medication safety, possible side effects and how to take or administer medications. Instructed patient to report any adverse reactions, side effects or concerns.     Reviewed physical exam findings and plan with patient who verbalized understanding and agrees with plan of care. Patient was given opportunity to ask questions and all concerns were addressed prior to the conclusion of today's visit.     Follow Up  Return in about 1 week (around 5/7/2024) for Recheck, Follow up with PCP.  Patient was given instructions and counseling regarding his condition or for health maintenance advice. Please see specific information pulled into the AVS if appropriate.     Note to patient: The 21st Century Cures Act makes medical notes like these available to patients in the interest of transparency. However, be advised this is a medical document. It is intended as peer to peer communication. It is written in medical language and may contain abbreviations or verbiage that are unfamiliar. It may appear blunt or direct. Medical documents are intended to carry relevant information, facts as evident, and the clinical opinion of the provider.

## 2024-05-07 ENCOUNTER — OFFICE VISIT (OUTPATIENT)
Dept: FAMILY MEDICINE CLINIC | Facility: CLINIC | Age: 64
End: 2024-05-07
Payer: COMMERCIAL

## 2024-05-07 VITALS
OXYGEN SATURATION: 97 % | TEMPERATURE: 97.5 F | BODY MASS INDEX: 35.25 KG/M2 | HEIGHT: 69 IN | HEART RATE: 104 BPM | DIASTOLIC BLOOD PRESSURE: 100 MMHG | SYSTOLIC BLOOD PRESSURE: 158 MMHG | WEIGHT: 238 LBS

## 2024-05-07 DIAGNOSIS — G43.909 ACUTE MIGRAINE: ICD-10-CM

## 2024-05-07 DIAGNOSIS — K59.00 CONSTIPATION, UNSPECIFIED CONSTIPATION TYPE: ICD-10-CM

## 2024-05-07 DIAGNOSIS — I10 UNCONTROLLED HYPERTENSION: Primary | ICD-10-CM

## 2024-05-07 PROCEDURE — 99214 OFFICE O/P EST MOD 30 MIN: CPT | Performed by: PHYSICIAN ASSISTANT

## 2024-05-07 RX ORDER — NALOXEGOL OXALATE 25 MG/1
25 TABLET, FILM COATED ORAL DAILY
COMMUNITY
Start: 2024-05-01

## 2024-05-07 RX ORDER — VALSARTAN 80 MG/1
80 TABLET ORAL DAILY
Qty: 90 TABLET | Refills: 0 | Status: SHIPPED | OUTPATIENT
Start: 2024-05-07

## 2024-05-07 RX ORDER — CLONIDINE HYDROCHLORIDE 0.1 MG/1
0.1 TABLET ORAL DAILY PRN
Qty: 90 TABLET | Refills: 1 | Status: SHIPPED | OUTPATIENT
Start: 2024-05-07

## 2024-05-08 ENCOUNTER — HOSPITAL ENCOUNTER (OUTPATIENT)
Dept: GENERAL RADIOLOGY | Facility: HOSPITAL | Age: 64
Discharge: HOME | End: 2024-05-08
Admitting: PHYSICIAN ASSISTANT
Payer: COMMERCIAL

## 2024-05-08 PROCEDURE — 74018 RADEX ABDOMEN 1 VIEW: CPT

## 2024-05-14 ENCOUNTER — CLINICAL SUPPORT (OUTPATIENT)
Dept: FAMILY MEDICINE CLINIC | Facility: CLINIC | Age: 64
End: 2024-05-14
Payer: COMMERCIAL

## 2024-05-14 ENCOUNTER — TELEPHONE (OUTPATIENT)
Dept: FAMILY MEDICINE CLINIC | Facility: CLINIC | Age: 64
End: 2024-05-14

## 2024-05-14 DIAGNOSIS — I10 UNCONTROLLED HYPERTENSION: Primary | ICD-10-CM

## 2024-05-14 NOTE — TELEPHONE ENCOUNTER
Pt came in today for a BP check, stil going up and down but is slowly getting lower.  Today it was 146/92 at 1130 am.  Also still constipated, and when stomach hurts he gets a headache.  Using  mirlax 2 times a day.

## 2024-05-16 NOTE — TELEPHONE ENCOUNTER
Please get updated BP value from today. Recommend if still not having significant BM since earlier this week, to try a fleet enema OTC

## 2024-05-17 NOTE — TELEPHONE ENCOUNTER
PT did have a BM, But BP still hovering around 147/ 110 has been as high as 150 systolic and 113 diastolic.

## 2024-05-20 NOTE — TELEPHONE ENCOUNTER
Would like patient to go up to 160 mg of valsartan (two of his current 60 mg tablets) come in for BP check and bring machine in 1 to 2 weeks.

## 2024-05-23 ENCOUNTER — TELEPHONE (OUTPATIENT)
Dept: FAMILY MEDICINE CLINIC | Facility: CLINIC | Age: 64
End: 2024-05-23
Payer: COMMERCIAL

## 2024-05-28 NOTE — TELEPHONE ENCOUNTER
You have met the criteria to receive this medication. UBRELVY TAB 100MG has been approved through 08/23/2024. However, per your health plan's criteria, more than 0.34 per day is covered if you meet one of the following:    (A) The higher dose or amount is supported in the dosage and administration part of the  's prescribing information.  (B) The higher dose or amount is supported by one of the following medical references:  (I) [facility] Formulary Service (AHFS) Drug Information.  (II) DRUGDEX System by [employer].  The information provided does not show that you meet the criteria listed above.    Called Ania to see if Rx would go through but she said his plan requires you wait 23d instead of the regular 19d. Pt did p/u on 5/9 w/ no copay. Rx that I called in was placed on hold until the 23d fermín. Pt aware and understood.

## 2024-07-03 ENCOUNTER — OFFICE VISIT (OUTPATIENT)
Dept: FAMILY MEDICINE CLINIC | Facility: CLINIC | Age: 64
End: 2024-07-03
Payer: COMMERCIAL

## 2024-07-03 ENCOUNTER — TELEPHONE (OUTPATIENT)
Dept: FAMILY MEDICINE CLINIC | Facility: CLINIC | Age: 64
End: 2024-07-03

## 2024-07-03 VITALS
BODY MASS INDEX: 35.7 KG/M2 | HEART RATE: 66 BPM | OXYGEN SATURATION: 96 % | DIASTOLIC BLOOD PRESSURE: 78 MMHG | RESPIRATION RATE: 14 BRPM | WEIGHT: 241 LBS | TEMPERATURE: 97.1 F | HEIGHT: 69 IN | SYSTOLIC BLOOD PRESSURE: 132 MMHG

## 2024-07-03 DIAGNOSIS — E11.9 TYPE 2 DIABETES MELLITUS WITHOUT COMPLICATION, WITHOUT LONG-TERM CURRENT USE OF INSULIN: Primary | ICD-10-CM

## 2024-07-03 DIAGNOSIS — R21 RASH: ICD-10-CM

## 2024-07-03 DIAGNOSIS — R45.89 DEPRESSED MOOD: ICD-10-CM

## 2024-07-03 DIAGNOSIS — E83.42 HYPOMAGNESEMIA: ICD-10-CM

## 2024-07-03 DIAGNOSIS — E11.65 TYPE 2 DIABETES MELLITUS WITH HYPERGLYCEMIA, WITHOUT LONG-TERM CURRENT USE OF INSULIN: Primary | ICD-10-CM

## 2024-07-03 DIAGNOSIS — E54 VITAMIN C DEFICIENCY: ICD-10-CM

## 2024-07-03 LAB
EXPIRATION DATE: ABNORMAL
HBA1C MFR BLD: 8.2 % (ref 4.5–5.7)
Lab: ABNORMAL

## 2024-07-03 RX ORDER — METOPROLOL SUCCINATE 100 MG/1
TABLET, EXTENDED RELEASE ORAL
COMMUNITY
Start: 2024-06-12

## 2024-07-03 RX ORDER — BUPROPION HYDROCHLORIDE 150 MG/1
150 TABLET ORAL DAILY
Qty: 90 TABLET | Refills: 0 | Status: SHIPPED | OUTPATIENT
Start: 2024-07-03

## 2024-07-03 RX ORDER — TRIAMCINOLONE ACETONIDE 5 MG/G
1 OINTMENT TOPICAL 2 TIMES DAILY
Qty: 15 G | Refills: 0 | Status: SHIPPED | OUTPATIENT
Start: 2024-07-03

## 2024-07-03 RX ORDER — TAMSULOSIN HYDROCHLORIDE 0.4 MG/1
CAPSULE ORAL
COMMUNITY
Start: 2024-06-12

## 2024-07-03 NOTE — TELEPHONE ENCOUNTER
Pt stated Dr Montesinos (nephrology [phone]) [facility] alternative [facility]. At [facility]'s request, contacted their office and left msg kaplan/ Cheryl to ask Dr Montesinos. Stated it would be after 2p since he's making his hospital rounds.

## 2024-07-03 NOTE — PROGRESS NOTES
"Subjective   Zaheer Baron is a 63 y.o. male.     History of Present Illness   F/u for type 2 DM. Told by Dr. Montesinos with nephrology to stop Janumet due to concerns with kidney function. No alternative Rx prescribed. A1c uncontrolled today in office at 8.2%     Nephrology also stopped valsartan and started metoprolol 100 mg daily and increased cardura back up to 4 mg daily. Feeling a little better. Noticing less swings in BP values     HA doing a little better since BP has been under better control. Not a severe. Nurtec samples not really helped. Has send in and approved Ubrelevy on his preferred plan. Pt has not yet tried     Lack of motivation. No significant change with wellbutrin. Tried to go off paxil but had to resume     Has been taking vit C and magnesium as directed last visit     Continues to follow with pain management. Nephrology did not okay him to start back on plaquenil by rheumatology     Right flank redness and itching for several weeks. Tried otc hydrocortisone and lotion with no significant relief     The following portions of the patient's history were reviewed and updated as appropriate: allergies, current medications, past family history, past medical history, past social history, past surgical history, and problem list.    Review of Systems  As noted per HPI     Objective   Blood pressure 132/78, pulse 66, temperature 97.1 °F (36.2 °C), resp. rate 14, height 175.3 cm (69\"), weight 109 kg (241 lb), SpO2 96%.     Physical Exam  Vitals reviewed.   Constitutional:       Appearance: Normal appearance.   Cardiovascular:      Rate and Rhythm: Normal rate and regular rhythm.   Pulmonary:      Effort: Pulmonary effort is normal.      Breath sounds: Normal breath sounds.   Skin:     General: Skin is warm and dry.   Neurological:      Mental Status: He is alert and oriented to person, place, and time.   Psychiatric:         Mood and Affect: Mood normal.         Behavior: Behavior normal. "         Assessment & Plan   Diagnoses and all orders for this visit:    1. Type 2 diabetes mellitus without complication, without long-term current use of insulin (Primary)  -     POC Glycosylated Hemoglobin (Hb A1C)  -     Comprehensive metabolic panel  -     Lipid panel    2. Depressed mood  -     buPROPion XL (Wellbutrin XL) 150 MG 24 hr tablet; Take 1 tablet by mouth Daily.  Dispense: 90 tablet; Refill: 0    3. Vitamin C deficiency  -     Vitamin C    4. Hypomagnesemia  -     Magnesium    5. Rash  -     triamcinolone (KENALOG) 0.5 % ointment; Apply 1 Application topically to the appropriate area as directed 2 (Two) Times a Day.  Dispense: 15 g; Refill: 0    Labs as outlined in plan   Change wellbutrin to 150 XL. Consider going up if symptoms not controlled   Blood sugar not to goal. Will reach out ot nephrology office to see what they recommend he do for blood sugar since stopping his other medication.

## 2024-07-10 LAB
ALBUMIN SERPL-MCNC: 4.2 G/DL (ref 3.9–4.9)
ALP SERPL-CCNC: 50 IU/L (ref 44–121)
ALT SERPL-CCNC: 20 IU/L (ref 0–44)
AST SERPL-CCNC: 21 IU/L (ref 0–40)
BILIRUB SERPL-MCNC: 0.2 MG/DL (ref 0–1.2)
BUN SERPL-MCNC: 18 MG/DL (ref 8–27)
BUN/CREAT SERPL: 15 (ref 10–24)
CALCIUM SERPL-MCNC: 9.4 MG/DL (ref 8.6–10.2)
CHLORIDE SERPL-SCNC: 98 MMOL/L (ref 96–106)
CHOLEST SERPL-MCNC: 201 MG/DL (ref 100–199)
CO2 SERPL-SCNC: 25 MMOL/L (ref 20–29)
CREAT SERPL-MCNC: 1.22 MG/DL (ref 0.76–1.27)
EGFRCR SERPLBLD CKD-EPI 2021: 66 ML/MIN/1.73
GLOBULIN SER CALC-MCNC: 2.5 G/DL (ref 1.5–4.5)
GLUCOSE SERPL-MCNC: 236 MG/DL (ref 70–99)
HDLC SERPL-MCNC: 34 MG/DL
LDLC SERPL CALC-MCNC: 98 MG/DL (ref 0–99)
MAGNESIUM SERPL-MCNC: 1.9 MG/DL (ref 1.6–2.3)
POTASSIUM SERPL-SCNC: 4.3 MMOL/L (ref 3.5–5.2)
PROT SERPL-MCNC: 6.7 G/DL (ref 6–8.5)
SODIUM SERPL-SCNC: 138 MMOL/L (ref 134–144)
TRIGL SERPL-MCNC: 413 MG/DL (ref 0–149)
VIT C SERPL-MCNC: 1.3 MG/DL (ref 0.4–2)
VLDLC SERPL CALC-MCNC: 69 MG/DL (ref 5–40)

## 2024-07-10 NOTE — TELEPHONE ENCOUNTER
Please call and make sure patient is aware and confirm he has been taking jardiance. Add to med list and remove janumet

## 2024-07-11 ENCOUNTER — TELEPHONE (OUTPATIENT)
Dept: FAMILY MEDICINE CLINIC | Facility: CLINIC | Age: 64
End: 2024-07-11
Payer: COMMERCIAL

## 2024-07-11 DIAGNOSIS — E78.1 HYPERTRIGLYCERIDEMIA: Primary | ICD-10-CM

## 2024-07-11 RX ORDER — FENOFIBRATE 145 MG/1
145 TABLET, COATED ORAL DAILY
Qty: 90 TABLET | Refills: 1 | Status: SHIPPED | OUTPATIENT
Start: 2024-07-11

## 2024-07-11 NOTE — TELEPHONE ENCOUNTER
Approvedtoday  Request Reference Number: [id number]. JARDIANCE TAB 25MG is approved through 07/11/2025. Your patient may now fill this prescription and it will be covered.PA for Jardiance Key: [id number]  Faxed form and pt informed

## 2024-07-11 NOTE — TELEPHONE ENCOUNTER
Patient called and stated his insurance is not going to pay for the Jardiance. He requested a PA or an alternative medication.

## 2024-07-11 NOTE — TELEPHONE ENCOUNTER
Jardiance PA approved! Key: [id number]. PA Case ID #: [id number]    Pt aware and will notify pharm.

## 2024-10-21 DIAGNOSIS — N40.1 BENIGN PROSTATIC HYPERPLASIA WITH URINARY HESITANCY: ICD-10-CM

## 2024-10-21 DIAGNOSIS — R39.11 BENIGN PROSTATIC HYPERPLASIA WITH URINARY HESITANCY: ICD-10-CM

## 2024-10-22 NOTE — TELEPHONE ENCOUNTER
Appears patient is taking flomax (tamsulosin) that was prescribed by outside office. This is not usually combined with other BPH medications like Doxazosin.

## 2024-10-29 NOTE — TELEPHONE ENCOUNTER
Dr. Montesinos [phone] Nephrologist will call us back as soon as possible on taking both medications Flomax and doxazosin  10/30

## 2024-10-29 NOTE — TELEPHONE ENCOUNTER
Need to see who is prescribing the flomax. Again these, two medications are not typically combined and increase risk of sudden drops of BP

## 2024-10-30 RX ORDER — DOXAZOSIN 4 MG/1
4 TABLET ORAL NIGHTLY
Qty: 90 TABLET | Refills: 3 | Status: SHIPPED | OUTPATIENT
Start: 2024-10-30

## 2024-10-30 RX ORDER — DOXAZOSIN 2 MG/1
2 TABLET ORAL
Qty: 90 TABLET | Refills: 3 | OUTPATIENT
Start: 2024-10-30

## 2024-10-30 NOTE — TELEPHONE ENCOUNTER
Dr. Montesinos called back as long as his bp and pulse are good he can stay on the medication and doxazocin will be 4 mg

## 2024-11-12 ENCOUNTER — OFFICE VISIT (OUTPATIENT)
Dept: FAMILY MEDICINE CLINIC | Facility: CLINIC | Age: 64
End: 2024-11-12
Payer: MEDICARE

## 2024-11-12 VITALS
HEIGHT: 69 IN | RESPIRATION RATE: 18 BRPM | DIASTOLIC BLOOD PRESSURE: 92 MMHG | TEMPERATURE: 98 F | WEIGHT: 237 LBS | SYSTOLIC BLOOD PRESSURE: 142 MMHG | BODY MASS INDEX: 35.1 KG/M2 | HEART RATE: 80 BPM

## 2024-11-12 DIAGNOSIS — F51.01 PRIMARY INSOMNIA: ICD-10-CM

## 2024-11-12 DIAGNOSIS — M23.51 RECURRENT RIGHT KNEE INSTABILITY: ICD-10-CM

## 2024-11-12 DIAGNOSIS — M25.461 PAIN AND SWELLING OF RIGHT KNEE: ICD-10-CM

## 2024-11-12 DIAGNOSIS — M25.561 CHRONIC PAIN OF RIGHT KNEE: Primary | ICD-10-CM

## 2024-11-12 DIAGNOSIS — M25.561 PAIN AND SWELLING OF RIGHT KNEE: ICD-10-CM

## 2024-11-12 DIAGNOSIS — G89.29 CHRONIC PAIN OF RIGHT KNEE: Primary | ICD-10-CM

## 2024-11-12 DIAGNOSIS — F51.01 PRIMARY INSOMNIA: Primary | ICD-10-CM

## 2024-11-12 PROCEDURE — 99213 OFFICE O/P EST LOW 20 MIN: CPT | Performed by: PHYSICIAN ASSISTANT

## 2024-11-12 PROCEDURE — 3052F HG A1C>EQUAL 8.0%<EQUAL 9.0%: CPT | Performed by: PHYSICIAN ASSISTANT

## 2024-11-12 PROCEDURE — 3080F DIAST BP >= 90 MM HG: CPT | Performed by: PHYSICIAN ASSISTANT

## 2024-11-12 PROCEDURE — 3077F SYST BP >= 140 MM HG: CPT | Performed by: PHYSICIAN ASSISTANT

## 2024-11-12 PROCEDURE — 1125F AMNT PAIN NOTED PAIN PRSNT: CPT | Performed by: PHYSICIAN ASSISTANT

## 2024-11-12 RX ORDER — SUVOREXANT 10 MG/1
10 TABLET, FILM COATED ORAL NIGHTLY PRN
Qty: 15 TABLET | Refills: 0 | Status: SHIPPED | OUTPATIENT
Start: 2024-11-12

## 2024-11-12 NOTE — PROGRESS NOTES
"Subjective   Zaheer Baron is a 64 y.o. male.     Knee Pain        History of Present Illness  The patient presents for evaluation of right knee pain.    He has been experiencing worsening knee pain for approximately 5 weeks. Initially, the pain seemed to improve with rest and ice application. However, after resuming golf, he noticed swelling in his knee, which has not subsided. He reports difficulty walking due to the pain but does not experience any popping or clicking sounds from the knee. He also mentions a sore spot on the lateral side of the knee, which becomes tight after sitting for a few minutes. He describes a sensation of the knee buckling under him. His of scope of this knee several years ago.  He has been taking anti-inflammatory medication in addition to his regular pain management regimen. He has a history of knee surgery performed years ago.    He also reports running out of his sleep medication about a month ago, which he felt was not effective (lunesta) has also tried and failed Ambien. His sleep pattern is irregular, with more sleep during the day than at night. He typically sleeps for 3 to 4 hours at night and takes a 1.5 to 2-hour nap during the day. He often wakes up due to pain in his knees, back, hips, and shoulders. He has been taking Lunesta 2 mg, which initially made him feel sleepy and allowed him to sleep for about 2 hours before waking up and staying awake for 3 to 4 hours. He does not have trouble falling asleep but struggles to maintain sleep.       The following portions of the patient's history were reviewed and updated as appropriate: allergies, current medications, past family history, past medical history, past social history, past surgical history, and problem list.    Review of Systems  As noted per HPI     Objective   Blood pressure 142/92, pulse 80, temperature 98 °F (36.7 °C), resp. rate 18, height 175.3 cm (69\"), weight 108 kg (237 lb). Body mass index is 35 kg/m². "     Physical Exam  Constitutional:       Appearance: Normal appearance.   Cardiovascular:      Rate and Rhythm: Normal rate.   Pulmonary:      Effort: Pulmonary effort is normal.   Musculoskeletal:      Right knee: Swelling, effusion and bony tenderness present. Decreased range of motion. Tenderness present over the lateral joint line.   Skin:     General: Skin is warm and dry.   Neurological:      Mental Status: He is alert and oriented to person, place, and time.   Psychiatric:         Mood and Affect: Mood normal.         Behavior: Behavior normal.         Results      Assessment & Plan   Assessment & Plan  1. Knee pain.  The patient's knee pain has persisted for about 5 weeks, with initial improvement followed by exacerbation after playing golf. The knee exhibits effusion, suggesting irritation, and the sensation of buckling raises concerns about potential meniscus or collateral ligament damage. An MRI will be ordered to further investigate the knee issue. He is advised to continue icing the knee and keeping it elevated as much as possible. A hinged knee brace is recommended for stability, which can be obtained over the counter at [employer] or online. The patient is also encouraged to use an ice machine available through his son-in-law's training room for additional relief.    2. Sleep disturbance.  The patient reports difficulty staying asleep, sleeping only 3 to 4 hours at night and napping for 1.5 to 2 hours during the day. He has run out of his sleeping pills about a month ago and found them ineffective. A prescription for Belsomra will be provided to aid with sleep. The patient is advised that while this medication may help with sleep, it may not alleviate the pain that contributes to his sleep disturbances.    3. Medication management.  The patient has been managing his pain with his current medication regimen, taking up to five doses a day as prescribed. He sometimes takes a dose in the middle of the  night and skips the morning dose to stay within the prescribed limit. He is advised to continue this regimen and adjust the timing of doses as needed, without exceeding the prescribed amount. Seeing pain management office          Diagnoses and all orders for this visit:    1. Chronic pain of right knee (Primary)  -     MRI Knee Right Without Contrast    2. Recurrent right knee instability  -     MRI Knee Right Without Contrast    3. Pain and swelling of right knee  -     MRI Knee Right Without Contrast    4. Primary insomnia               Patient or patient representative verbalized consent for the use of Ambient Listening during the visit with  JENNIFER Valdez for chart documentation. 11/12/2024  13:58 EST

## 2024-11-12 NOTE — PROGRESS NOTES
Asked to send in Belsomra for patient by Eliana Ann.  Sent in a 2-week supply of 10 mg Belsomra 1 at bedtime.  If not improving with this, then can increase to the 20 mg dose.  Cristobal reviewed dated November 12, 2024

## 2024-11-23 ENCOUNTER — HOSPITAL ENCOUNTER (OUTPATIENT)
Dept: MRI IMAGING | Facility: HOSPITAL | Age: 64
Discharge: HOME | End: 2024-11-23
Payer: MEDICARE

## 2024-11-23 PROCEDURE — 73721 MRI JNT OF LWR EXTRE W/O DYE: CPT

## 2024-11-25 ENCOUNTER — TELEPHONE (OUTPATIENT)
Dept: BEHAVIORAL HEALTH | Facility: CLINIC | Age: 64
End: 2024-11-25
Payer: MEDICARE

## 2024-11-25 DIAGNOSIS — F51.01 PRIMARY INSOMNIA: ICD-10-CM

## 2024-11-25 RX ORDER — SUVOREXANT 10 MG/1
1 TABLET, FILM COATED ORAL NIGHTLY PRN
Qty: 10 TABLET | OUTPATIENT
Start: 2024-11-25

## 2024-11-25 NOTE — TELEPHONE ENCOUNTER
Sarah from [facility] pharmacy called stating Pt's Belsomra RX amount must be written in quantities of 10; she states they come to the pharmacy in a 10 pack and they cannot use a partial pack

## 2024-11-25 NOTE — TELEPHONE ENCOUNTER
Spoke with pharmacy. Gave verbal for only 10 and next refill we will change quantity to 10 instead of 15. (Comes as a pack of 10)        Mayte Hurley  You24 minutes ago (11:52 AM)     JR Orellana this is mislabeled;  from [facility] told me it was [facility]. Not certain who is handling Dr. Kelly's RXs while he is out     Mayte Hurley L26 minutes ago (11:50 AM)     JR Smith from [facility] pharmacy called stating Pt's Belsomra RX amount must be written in quantities of 10; she states they come to the pharmacy in a 10 pack and they cannot use a partial pack

## 2024-11-26 DIAGNOSIS — M25.561 CHRONIC PAIN OF RIGHT KNEE: ICD-10-CM

## 2024-11-26 DIAGNOSIS — M17.11 OSTEOARTHRITIS OF RIGHT KNEE, UNSPECIFIED OSTEOARTHRITIS TYPE: ICD-10-CM

## 2024-11-26 DIAGNOSIS — G89.29 CHRONIC PAIN OF RIGHT KNEE: ICD-10-CM

## 2024-11-26 DIAGNOSIS — S83.241D TEAR OF MEDIAL MENISCUS OF RIGHT KNEE, CURRENT, UNSPECIFIED TEAR TYPE, SUBSEQUENT ENCOUNTER: Primary | ICD-10-CM

## 2024-12-05 ENCOUNTER — OFFICE VISIT (OUTPATIENT)
Dept: ORTHOPEDIC SURGERY | Facility: CLINIC | Age: 64
End: 2024-12-05
Payer: MEDICARE

## 2024-12-05 VITALS
SYSTOLIC BLOOD PRESSURE: 132 MMHG | BODY MASS INDEX: 35.16 KG/M2 | DIASTOLIC BLOOD PRESSURE: 94 MMHG | WEIGHT: 237.4 LBS | HEIGHT: 69 IN

## 2024-12-05 DIAGNOSIS — M17.11 PRIMARY OSTEOARTHRITIS OF RIGHT KNEE: Primary | ICD-10-CM

## 2024-12-05 DIAGNOSIS — M23.203 OLD COMPLEX TEAR OF MEDIAL MENISCUS OF RIGHT KNEE: ICD-10-CM

## 2024-12-05 DIAGNOSIS — M25.561 RIGHT KNEE PAIN, UNSPECIFIED CHRONICITY: ICD-10-CM

## 2024-12-05 RX ORDER — TRIAMCINOLONE ACETONIDE 40 MG/ML
80 INJECTION, SUSPENSION INTRA-ARTICULAR; INTRAMUSCULAR
Status: COMPLETED | OUTPATIENT
Start: 2024-12-05 | End: 2024-12-05

## 2024-12-05 RX ORDER — LIDOCAINE HYDROCHLORIDE 10 MG/ML
3 INJECTION, SOLUTION EPIDURAL; INFILTRATION; INTRACAUDAL; PERINEURAL
Status: COMPLETED | OUTPATIENT
Start: 2024-12-05 | End: 2024-12-05

## 2024-12-05 RX ORDER — BUPIVACAINE HYDROCHLORIDE 2.5 MG/ML
3 INJECTION, SOLUTION EPIDURAL; INFILTRATION; INTRACAUDAL
Status: COMPLETED | OUTPATIENT
Start: 2024-12-05 | End: 2024-12-05

## 2024-12-05 RX ADMIN — TRIAMCINOLONE ACETONIDE 80 MG: 40 INJECTION, SUSPENSION INTRA-ARTICULAR; INTRAMUSCULAR at 10:26

## 2024-12-05 RX ADMIN — LIDOCAINE HYDROCHLORIDE 3 ML: 10 INJECTION, SOLUTION EPIDURAL; INFILTRATION; INTRACAUDAL; PERINEURAL at 10:26

## 2024-12-05 RX ADMIN — BUPIVACAINE HYDROCHLORIDE 3 ML: 2.5 INJECTION, SOLUTION EPIDURAL; INFILTRATION; INTRACAUDAL at 10:26

## 2024-12-05 NOTE — PROGRESS NOTES
Orthopaedic Clinic Note: Knee New Patient    Chief Complaint   Patient presents with    Right Knee - Pain, Initial Evaluation        HPI    Zaheer Baron is a 64 y.o. male who presents with right knee pain for 8 week(s). Onset atraumatic and gradual in nature. Pain is localized to the entire knee (globally) and is a 7/10 on the pain scale. Pain is described as aching and throbbing. Associated symptoms include pain, swelling, popping, stiffness, and giving way/buckling. The pain is worse with any movement of the joint; ice, pain medication and/or NSAID, and lying down make it better. Previous treatments have included: nothing since symptom onset. Although some transient relief was reported with these interventions, these conservative measures have failed and symptoms have persisted. The patient is limited in daily activities and has had a significant decrease in quality of life as a result. He denies fevers, chills, or constitutional symptoms.    I have reviewed the following portions of the patient's history:History of Present Illness    Past Medical History:   Diagnosis Date    Acute sinusitis     Allergic rhinitis     Arthropathy     of lumbar facet joint    Artificial lens present     position - right    Atrial fibrillation 03/07/2020    patient had episode of atrial fibrillation w/ ventricular response secondary to hypovelemia r/t salmonella infection    Backache     chronic back problems    Benign prostatic hyperplasia     Bronchitis     perisistent    Cataract     Chronic obstructive lung disease     Diabetes mellitus     no retinopathy    Disorder of kidney     Disorder of kidney and/or ureter - Congenital solitary right kidney       Dizziness     History of Meniere's like condition, left ear       Drug therapy     long-term    Dysfunction of eustachian tube     Dyslipidemia     Gout     Headache     History of possible migraine/cluster       Hearing loss     Hypertension     Hypokalemia     Impacted cerumen      Infestation by Sarcoptes scabiei alta hominis     Inguinal hernia     History of, repaired       Knee pain     Osteoarthritis     Pneumonia     in the past    Posterior subcapsular polar senile cataract     Sjogren's syndrome     Type 2 diabetes mellitus       Past Surgical History:   Procedure Laterality Date    BACK SURGERY      1/1/02    CARPAL TUNNEL RELEASE      (Carpal tunnel release on the left)   09/10/2010 , Carpal tunnel release on the right)   12/03/2010     CATARACT EXTRACTION, BILATERAL      ENDOSCOPY N/A 3/16/2017    Procedure: ESOPHAGOGASTRODUODENOSCOPY;  Surgeon: Alli Shook DO;  Location: [facility] ENDOSCOPY;  Service:     EYE SURGERY Bilateral     cataract removed    HERNIA REPAIR      INGUINAL HERNIA REPAIR      (Left inguinal hernioplasty with mesh.)   06/07/2007     INJECTION OF MEDICATION      Injection for nerve block (Lumbar medial branch block.)   03/31/2016     INJECTION OF MEDICATION  03/19/2015    solu-medrol     KNEE SURGERY      (Lateral release, removal of loose bodies, excision of suprapatellar plica.)   04/29/1982     OTHER SURGICAL HISTORY      Lumbar surgery (Lumbosacral radio frequency thermal coagulation. Lumbosacral spondylosis.)   06/30/2016 ,Lumbar surgery (Lumbosacral radio frequency thermal coagulation.)   12/21/2015      OTHER SURGICAL HISTORY      Remove cataract, insert lens (Right eye.)   05/07/2015     OTHER SURGICAL HISTORY      Remove hip/pelvis lesion (Melanoma, right hip.)   2005     VASECTOMY        Family History   Problem Relation Age of Onset    Lung disease Mother         autoimmune    Heart failure Father 35    Stomach cancer Sister 43        autoimmune    Diabetes Paternal Grandmother      Social History     Socioeconomic History    Marital status:    Tobacco Use    Smoking status: Never    Smokeless tobacco: Current     Types: Snuff   Vaping Use    Vaping status: Never Used   Substance and Sexual Activity    Alcohol use: No    Drug use: No     Sexual activity: Defer     Partners: Female      Current Outpatient Medications on File Prior to Visit   Medication Sig Dispense Refill    albuterol sulfate  (90 Base) MCG/ACT inhaler INHALE 1 TO 2 PUFFS EVERY 4 TO 6 HOURS AS NEEDED FOR WHEEZE FOR UP TO 30 DAYS      bacitracin 500 UNIT/GM ointment Apply 1 Application topically to the appropriate area as directed 2 (Two) Times a Day. 28 g 0    buPROPion XL (Wellbutrin XL) 150 MG 24 hr tablet Take 1 tablet by mouth Daily. 90 tablet 0    cloNIDine (CATAPRES) 0.1 MG tablet Take 1 tablet by mouth Daily As Needed for High Blood Pressure. 90 tablet 1    cloNIDine (CATAPRES-TTS) 0.1 MG/24HR patch Place 1 patch on the skin as directed by provider 1 (One) Time Per Week.      clotrimazole-betamethasone (Lotrisone) 1-0.05 % cream Apply 1 application  topically to the appropriate area as directed 2 (Two) Times a Day. 45 g 2    doxazosin (Cardura) 4 MG tablet Take 1 tablet by mouth Every Night. 90 tablet 3    empagliflozin (Jardiance) 25 MG tablet tablet Take 1 tablet by mouth Daily. 90 tablet 1    Ergocalciferol 50 MCG (2000 UT) tablet Take 50 mcg by mouth Daily. 90 tablet 2    esomeprazole (nexIUM) 20 MG capsule Take 1 capsule by mouth Every Morning Before Breakfast.      eszopiclone (Lunesta) 2 MG tablet Take 1 tablet by mouth Every Night. Take immediately before bedtime 90 tablet 1    fenofibrate (Tricor) 145 MG tablet Take 1 tablet by mouth Daily. 90 tablet 1    fluticasone (FLONASE) 50 MCG/ACT nasal spray       glucose blood test strip 1 each by Other route Daily. Use as instructed 200 each 6    Lancets (ONETOUCH DELICA PLUS [id number]) misc USE DAILY TO CHECK BLOOD SUGAR 100 each 8    metoprolol succinate XL (TOPROL-XL) 100 MG 24 hr tablet       Movantik 25 MG tablet Take 1 tablet by mouth Daily.      naloxone (NARCAN) 4 MG/0.1ML nasal spray Administer 1 spray into the nostril(s) as directed by provider.      ondansetron ODT (ZOFRAN-ODT) 4 MG disintegrating tablet  "TAKE 1 TABLET BY MOUTH EVERY 6-8 HOURS AS NEEDED FOR NAUSEA      oxyCODONE-acetaminophen (PERCOCET) 7.5-325 MG per tablet Take 1 tablet by mouth Every 4 (Four) Hours As Needed. Patient taking  mg 5 times daily.      PARoxetine CR (PAXIL-CR) 25 MG 24 hr tablet Take 1 tablet by mouth Every Morning. 90 tablet 3    Suvorexant (Belsomra) 10 MG tablet Take 10 mg by mouth At Night As Needed (sleep). 15 tablet 0    tamsulosin (FLOMAX) 0.4 MG capsule 24 hr capsule       triamcinolone (KENALOG) 0.5 % ointment Apply 1 Application topically to the appropriate area as directed 2 (Two) Times a Day. 15 g 0    ubrogepant (Ubrelvy) 100 MG tablet Take 1 tablet by mouth Daily As Needed (migraine). 16 tablet 0     No current facility-administered medications on file prior to visit.      Allergies   Allergen Reactions    Nsaids Other (See Comments)     Solitary kidney; renal failure with NSAID use.          Review of Systems   Constitutional: Negative.    HENT: Negative.     Eyes: Negative.    Respiratory: Negative.     Cardiovascular: Negative.    Gastrointestinal: Negative.    Endocrine: Negative.    Genitourinary: Negative.    Musculoskeletal:  Positive for arthralgias.   Skin: Negative.    Allergic/Immunologic: Negative.    Neurological: Negative.    Hematological: Negative.    Psychiatric/Behavioral: Negative.          The patient's Review of Systems was personally reviewed and confirmed as accurate.    The following portions of the patient's history were reviewed and updated as appropriate: allergies, current medications, past family history, past medical history, past social history, past surgical history, and problem list.    Physical Exam  Blood pressure 132/94, height 175.3 cm (69.02\"), weight 108 kg (237 lb 6.4 oz).    Body mass index is 35.04 kg/m².    GENERAL APPEARANCE: awake, alert & oriented x 3, in no acute distress and well developed, well nourished  PSYCH: normal affect  LUNGS:  breathing nonlabored  EYES: " sclera anicteric  CARDIOVASCULAR: palpable dorsalis pedis, palpable posterior tibial bilaterally. Capillary refill less than 2 seconds  EXTREMITIES: no clubbing, cyanosis  GAIT:  Antalgic            Right Lower Extremity Exam:   ----------  Hip Exam  ----------  FLEXION CONTRACTURE: None  FLEXION: 110 degrees  INTERNAL ROTATION: 20 degrees at 90 degrees of flexion   EXTERNAL ROTATION: 40 degrees at 90 degrees of flexion    PAIN WITH HIP MOTION: no  ----------  Knee Exam  ----------  ALIGNMENT: moderate varus, correctible to neutral    RANGE OF MOTION:  Decreased (5 - 115 degrees) with no extensor lag  LIGAMENTOUS STABILITY:   stable to varus and valgus stress at terminal extension and 30 degrees; retensioning of the MCL is appreciated with valgus stress at 30 degrees consistent with medial compartment degeneration     STRENGTH:  4/5 knee flexion, extension. 5/5 ankle dorsiflexion and plantarflexion.     PAIN WITH PALPATION: global  KNEE EFFUSION: yes, mild effusion  PAIN WITH KNEE ROM: yes, global  PATELLAR CREPITUS: yes, painful and symptomatic  SPECIAL EXAM FINDINGS:  none    REFLEXES:  PATELLAR 2+/4  ACHILLES 2+/4    CLONUS: no  STRAIGHT LEG TEST:   negative    SENSATION TO LIGHT TOUCH:  DEEP PERONEAL/SUPERFICIAL PERONEAL/SURAL/SAPHENOUS/TIBIAL:   intact    EDEMA:  no  ERYTHEMA:  no  WOUNDS/INCISIONS:  no    ______________________________________________________________________  ______________________________________________________________________    RADIOGRAPHIC FINDINGS:   Indication: Right knee pain    Comparison: No prior xrays are available for comparison    Right knee(s) 4 views: moderate to severe tricompartmental arthritis with genu varum alignment, periarticular osteophytes visualized in all compartments    MRI right knee from 11/23/2024 was personally viewed interpreted.  MRI demonstrates moderate severe tricompartmental osteoarthritic changes.  There is high-grade cartilage loss in the medial  patellofemoral compartments.  Degenerative macerated medial meniscus with medial meniscal extrusion is identified.    Assessment/Plan:   Diagnosis Plan   1. Primary osteoarthritis of right knee        2. Right knee pain, unspecified chronicity  XR Knee 4+ View Right      3. Old complex tear of medial meniscus of right knee          Patient suffering from osteoarthritis of the right knee.  His meniscus tear is secondary to the arthritis in the knee joint.  As a result, I do not recommend surgical intervention for the meniscus tear directly.  I recommend treating her his osteoarthritis.  He is agreeable to cortisone injection the right knee today.  Follow-up in 3 months for repeat assessment.    Procedure Note:  I discussed with the patient the potential benefits of performing a therapeutic injection of the right knee as well as potential risks including but not limited to infection, swelling, pain, bleeding, bruising, nerve/vessel damage, skin color changes, transient elevation in blood glucose levels, and fat atrophy. After informed consent and verifying correct patient, procedure site, and type of procedure, the area was prepped with alcohol, ethyl chloride was used to numb the skin. Via the superolateral approach, 3 cc of 1% lidocaine, 3 cc of 0.25% Marcaine and 2 cc of 40mg/ml of Kenalog were injected into the right knee. The patient tolerated the procedure well. There were no complications. A sterile dressing was placed over the injection site.      Rai Holland MD  12/05/24  10:27 EST

## 2024-12-05 NOTE — PROGRESS NOTES
Procedure   Large Joint Arthrocentesis: R knee  Date/Time: 12/5/2024 10:26 AM  Consent given by: patient  Site marked: site marked  Timeout: Immediately prior to procedure a time out was called to verify the correct patient, procedure, equipment, support staff and site/side marked as required   Supporting Documentation  Indications: pain   Procedure Details  Location: knee - R knee  Preparation: Patient was prepped and draped in the usual sterile fashion  Needle gauge: 21g.  Approach: anterolateral  Medications administered: 3 mL bupivacaine (PF) 0.25 %; 3 mL lidocaine PF 1% 1 %; 80 mg triamcinolone acetonide 40 MG/ML  Patient tolerance: patient tolerated the procedure well with no immediate complications

## 2024-12-07 ENCOUNTER — TELEPHONE (OUTPATIENT)
Dept: FAMILY MEDICINE CLINIC | Facility: CLINIC | Age: 64
End: 2024-12-07
Payer: MEDICARE

## 2024-12-07 DIAGNOSIS — F51.01 PRIMARY INSOMNIA: ICD-10-CM

## 2024-12-09 RX ORDER — SUVOREXANT 20 MG/1
20 TABLET, FILM COATED ORAL NIGHTLY PRN
Qty: 30 TABLET | Refills: 1 | Status: SHIPPED | OUTPATIENT
Start: 2024-12-09

## 2024-12-09 NOTE — TELEPHONE ENCOUNTER
Called informed pt. Stated it helps but does not do the job. He falls asleep but only sleeps about 3 or 4 hours then back up again. Wants to know if this should be changed or increased.

## 2024-12-09 NOTE — TELEPHONE ENCOUNTER
Please call patient.  Received refill request for Belsomra 10 mg.  He had seen Eliana.  Has this been helpful?

## 2024-12-26 DIAGNOSIS — N40.1 BENIGN PROSTATIC HYPERPLASIA WITH URINARY HESITANCY: ICD-10-CM

## 2024-12-26 DIAGNOSIS — R39.11 BENIGN PROSTATIC HYPERPLASIA WITH URINARY HESITANCY: ICD-10-CM

## 2024-12-27 DIAGNOSIS — E11.65 TYPE 2 DIABETES MELLITUS WITH HYPERGLYCEMIA, WITHOUT LONG-TERM CURRENT USE OF INSULIN: ICD-10-CM

## 2024-12-27 RX ORDER — TAMSULOSIN HYDROCHLORIDE 0.4 MG/1
CAPSULE ORAL
Qty: 30 CAPSULE | OUTPATIENT
Start: 2024-12-27

## 2024-12-27 RX ORDER — DOXAZOSIN 4 MG/1
4 TABLET ORAL NIGHTLY
Qty: 90 TABLET | Refills: 3 | OUTPATIENT
Start: 2024-12-27

## 2025-02-09 DIAGNOSIS — F51.01 PRIMARY INSOMNIA: ICD-10-CM

## 2025-02-10 RX ORDER — SUVOREXANT 20 MG/1
TABLET, FILM COATED ORAL
Qty: 30 TABLET | Refills: 1 | Status: SHIPPED | OUTPATIENT
Start: 2025-02-10

## 2025-02-10 NOTE — TELEPHONE ENCOUNTER
========================  Cristobal reviewed 2/10/2025 . Follow up appt is scheduled on Visit date not found .    Last office visit 11/12/2024

## 2025-02-24 ENCOUNTER — TELEPHONE (OUTPATIENT)
Dept: ORTHOPEDIC SURGERY | Facility: CLINIC | Age: 65
End: 2025-02-24
Payer: MEDICARE

## 2025-02-24 NOTE — TELEPHONE ENCOUNTER
Called patient back. He states that the injection helped only briefly. He felt a pop in the knee shortly after his appointment while letting his dog in the house, then today he had an episode where he felt that his knee was going to give out from under him. I made him an appointment for tomorrow to check the knee.     Sarah

## 2025-02-24 NOTE — TELEPHONE ENCOUNTER
Caller:  ANG   Relationship to Patient: SELF  Phone Number: [phone]   Reason for Call: PATIENT CALLING STATING THAT HIS RIGHT KNEE IS HURTING HIM NOW, SATES THAT HE CANT EVEN WALK ON IT AT THIS POINT - ASKING WHAT HE CAN DO NEXT

## 2025-02-25 ENCOUNTER — OFFICE VISIT (OUTPATIENT)
Dept: ORTHOPEDIC SURGERY | Facility: CLINIC | Age: 65
End: 2025-02-25
Payer: MEDICARE

## 2025-02-25 VITALS
SYSTOLIC BLOOD PRESSURE: 108 MMHG | WEIGHT: 238.1 LBS | DIASTOLIC BLOOD PRESSURE: 72 MMHG | HEIGHT: 69 IN | BODY MASS INDEX: 35.27 KG/M2

## 2025-02-25 DIAGNOSIS — M17.11 PRIMARY OSTEOARTHRITIS OF RIGHT KNEE: Primary | ICD-10-CM

## 2025-02-25 RX ORDER — TRIAMCINOLONE ACETONIDE 40 MG/ML
80 INJECTION, SUSPENSION INTRA-ARTICULAR; INTRAMUSCULAR
Status: COMPLETED | OUTPATIENT
Start: 2025-02-25 | End: 2025-02-25

## 2025-02-25 RX ORDER — BUPIVACAINE HYDROCHLORIDE 2.5 MG/ML
3 INJECTION, SOLUTION EPIDURAL; INFILTRATION; INTRACAUDAL
Status: COMPLETED | OUTPATIENT
Start: 2025-02-25 | End: 2025-02-25

## 2025-02-25 RX ORDER — LIDOCAINE HYDROCHLORIDE 10 MG/ML
3 INJECTION, SOLUTION EPIDURAL; INFILTRATION; INTRACAUDAL; PERINEURAL
Status: COMPLETED | OUTPATIENT
Start: 2025-02-25 | End: 2025-02-25

## 2025-02-25 RX ADMIN — TRIAMCINOLONE ACETONIDE 80 MG: 40 INJECTION, SUSPENSION INTRA-ARTICULAR; INTRAMUSCULAR at 11:28

## 2025-02-25 RX ADMIN — BUPIVACAINE HYDROCHLORIDE 3 ML: 2.5 INJECTION, SOLUTION EPIDURAL; INFILTRATION; INTRACAUDAL at 11:28

## 2025-02-25 RX ADMIN — LIDOCAINE HYDROCHLORIDE 3 ML: 10 INJECTION, SOLUTION EPIDURAL; INFILTRATION; INTRACAUDAL; PERINEURAL at 11:28

## 2025-02-25 NOTE — PROGRESS NOTES
Orthopaedic Clinic Note: Knee Established Patient    Chief Complaint   Patient presents with    Follow-up     3 month follow up - Primary osteoarthritis of right knee         HPI    It has been 3  month(s) since Mr. Baron's last visit. He returns to clinic today for osteoarthritis of right knee.  Patient with cortisone injection of the right knee 3 months ago.  He states about 2 weeks after the injection he was bending down and felt a sharp pop in his knee.  After that he has had persistent pain.  Most recently, a week ago, he had his knee gave out and subsequently has had increased pain and difficulty weightbearing afterwards.  He denies any rosa fall or trauma.  He rates his pain a 9/10 on the pain scale and is currently taking Oxycodone for pain. He is ambulating with a cane. He has not attempted therapy.  He denies fevers, chills, or constitutional symptoms.  Overall, he is doing worse.     I have reviewed the following portions of the patient's history:History of Present Illness    Past Medical History:   Diagnosis Date    Acute sinusitis     Allergic rhinitis     Arthritis of back     Arthritis of neck     Arthropathy     of lumbar facet joint    Artificial lens present     position - right    Atrial fibrillation 03/07/2020    patient had episode of atrial fibrillation w/ ventricular response secondary to hypovelemia r/t salmonella infection    Backache     chronic back problems    Benign prostatic hyperplasia     Bronchitis     perisistent    Cataract     Cervical disc disorder     Chronic obstructive lung disease     Diabetes mellitus     no retinopathy    Disorder of kidney     Disorder of kidney and/or ureter - Congenital solitary right kidney       Dizziness     History of Meniere's like condition, left ear       Drug therapy     long-term    Dysfunction of eustachian tube     Dyslipidemia     Gout     Headache     History of possible migraine/cluster       Hearing loss     Hip arthrosis      Hypertension     Hypokalemia     Impacted cerumen     Infestation by Sarcoptes scabiei alta hominis     Inguinal hernia     History of, repaired       Knee pain     Knee swelling     Lumbosacral disc disease     Osteoarthritis     Periarthritis of shoulder     Pneumonia     in the past    Posterior subcapsular polar senile cataract     Sjogren's syndrome     Type 2 diabetes mellitus       Past Surgical History:   Procedure Laterality Date    BACK SURGERY      1/1/02    CARPAL TUNNEL RELEASE      (Carpal tunnel release on the left)   09/10/2010 , Carpal tunnel release on the right)   12/03/2010     CATARACT EXTRACTION, BILATERAL      ENDOSCOPY N/A 03/16/2017    Procedure: ESOPHAGOGASTRODUODENOSCOPY;  Surgeon: Alli Shook DO;  Location: [facility] ENDOSCOPY;  Service:     EYE SURGERY Bilateral     cataract removed    HAND SURGERY      HERNIA REPAIR      INGUINAL HERNIA REPAIR      (Left inguinal hernioplasty with mesh.)   06/07/2007     INJECTION OF MEDICATION      Injection for nerve block (Lumbar medial branch block.)   03/31/2016     INJECTION OF MEDICATION  03/19/2015    solu-medrol     KNEE SURGERY      (Lateral release, removal of loose bodies, excision of suprapatellar plica.)   04/29/1982     OTHER SURGICAL HISTORY      Lumbar surgery (Lumbosacral radio frequency thermal coagulation. Lumbosacral spondylosis.)   06/30/2016 ,Lumbar surgery (Lumbosacral radio frequency thermal coagulation.)   12/21/2015      OTHER SURGICAL HISTORY      Remove cataract, insert lens (Right eye.)   05/07/2015     OTHER SURGICAL HISTORY      Remove hip/pelvis lesion (Melanoma, right hip.)   2005     VASECTOMY        Family History   Problem Relation Age of Onset    Lung disease Mother         autoimmune    Heart failure Father 35    Stomach cancer Sister 43        autoimmune    Diabetes Paternal Grandmother      Social History     Socioeconomic History    Marital status:    Tobacco Use    Smoking status: Never    Smokeless  tobacco: Current     Types: Snuff   Vaping Use    Vaping status: Never Used   Substance and Sexual Activity    Alcohol use: No    Drug use: No    Sexual activity: Defer     Partners: Female      Current Outpatient Medications on File Prior to Visit   Medication Sig Dispense Refill    albuterol sulfate  (90 Base) MCG/ACT inhaler INHALE 1 TO 2 PUFFS EVERY 4 TO 6 HOURS AS NEEDED FOR WHEEZE FOR UP TO 30 DAYS      bacitracin 500 UNIT/GM ointment Apply 1 Application topically to the appropriate area as directed 2 (Two) Times a Day. 28 g 0    Belsomra 20 MG tablet TAKE 1 TABLET BY MOUTH DAILY AS NEEDED FOR SLEEP 30 tablet 1    buPROPion XL (Wellbutrin XL) 150 MG 24 hr tablet Take 1 tablet by mouth Daily. 90 tablet 0    cloNIDine (CATAPRES) 0.1 MG tablet Take 1 tablet by mouth Daily As Needed for High Blood Pressure. 90 tablet 1    cloNIDine (CATAPRES-TTS) 0.1 MG/24HR patch Place 1 patch on the skin as directed by provider 1 (One) Time Per Week.      clotrimazole-betamethasone (Lotrisone) 1-0.05 % cream Apply 1 application  topically to the appropriate area as directed 2 (Two) Times a Day. 45 g 2    doxazosin (Cardura) 4 MG tablet Take 1 tablet by mouth Every Night. 90 tablet 3    empagliflozin (Jardiance) 25 MG tablet tablet Take 1 tablet by mouth Daily. 90 tablet 1    Ergocalciferol 50 MCG (2000 UT) tablet Take 50 mcg by mouth Daily. 90 tablet 2    esomeprazole (nexIUM) 20 MG capsule Take 1 capsule by mouth Every Morning Before Breakfast.      fenofibrate (Tricor) 145 MG tablet Take 1 tablet by mouth Daily. 90 tablet 1    fluticasone (FLONASE) 50 MCG/ACT nasal spray       glucose blood test strip 1 each by Other route Daily. Use as instructed 200 each 6    Lancets (ONETOUCH DELICA PLUS [id number]) misc USE DAILY TO CHECK BLOOD SUGAR 100 each 8    metoprolol succinate XL (TOPROL-XL) 100 MG 24 hr tablet       Movantik 25 MG tablet Take 1 tablet by mouth Daily.      naloxone (NARCAN) 4 MG/0.1ML nasal spray Administer 1  "spray into the nostril(s) as directed by provider.      ondansetron ODT (ZOFRAN-ODT) 4 MG disintegrating tablet TAKE 1 TABLET BY MOUTH EVERY 6-8 HOURS AS NEEDED FOR NAUSEA      oxyCODONE-acetaminophen (PERCOCET) 7.5-325 MG per tablet Take 1 tablet by mouth Every 4 (Four) Hours As Needed. Patient taking  mg 5 times daily.      PARoxetine CR (PAXIL-CR) 25 MG 24 hr tablet Take 1 tablet by mouth Every Morning. 90 tablet 3    tamsulosin (FLOMAX) 0.4 MG capsule 24 hr capsule       triamcinolone (KENALOG) 0.5 % ointment Apply 1 Application topically to the appropriate area as directed 2 (Two) Times a Day. 15 g 0    ubrogepant (Ubrelvy) 100 MG tablet Take 1 tablet by mouth Daily As Needed (migraine). 16 tablet 0     No current facility-administered medications on file prior to visit.      Allergies   Allergen Reactions    Nsaids Other (See Comments)     Solitary kidney; renal failure with NSAID use.          Review of Systems   Constitutional: Negative.    HENT: Negative.     Eyes: Negative.    Respiratory: Negative.     Cardiovascular: Negative.    Gastrointestinal: Negative.    Endocrine: Negative.    Genitourinary: Negative.    Musculoskeletal:  Positive for arthralgias.   Skin: Negative.    Allergic/Immunologic: Negative.    Neurological: Negative.    Hematological: Negative.    Psychiatric/Behavioral: Negative.          The patient's Review of Systems was personally reviewed and confirmed as accurate.    Physical Exam  Blood pressure 108/72, height 175.3 cm (69.02\"), weight 108 kg (238 lb 1.6 oz).    Body mass index is 35.14 kg/m².    GENERAL APPEARANCE: awake, alert, oriented, in no acute distress and well developed, well nourished  LUNGS:  breathing nonlabored  EXTREMITIES: no clubbing, cyanosis  PERIPHERAL PULSES: palpable dorsalis pedis and posterior tibial pulses bilaterally.    GAIT:  Antalgic        ----------  Right Knee Exam:  ----------  ALIGNMENT: moderate varus, correctible to " neutral  ----------  RANGE OF MOTION:  Decreased (3 - 115 degrees) with no extensor lag  LIGAMENTOUS STABILITY:   stable to varus and valgus stress at terminal extension and 30 degrees; retensioning of the MCL is appreciated with valgus stress at 30 degrees consistent with medial compartment degeneration  ----------  STRENGTH:  KNEE FLEXION 4/5  KNEE EXTENSION  4/5  ANKLE DORSIFLEXION  5/5  ANKLE PLANTARFLEXION  5/5  ----------  PAIN WITH PALPATION:medial joint line and anterior knee  KNEE EFFUSION: yes, trace effusion  PAIN WITH KNEE ROM: yes  PATELLAR CREPITUS:  yes, painful and symptomatic  ----------  SENSATION TO LIGHT TOUCH:  DEEP PERONEAL/SUPERFICIAL PERONEAL/SURAL/SAPHENOUS/TIBIAL:    intact  ----------  EDEMA:  no  ERYTHEMA:    no  WOUNDS/INCISIONS:   no  _____________________________________________________________________  _____________________________________________________________________    RADIOGRAPHIC FINDINGS:   No new imaging today    Assessment/Plan:   Diagnosis Plan   1. Primary osteoarthritis of right knee          The patient has failed conservative treatment measures and is a candidate for joint arthroplasty.  I discussed the joint arthroplasty surgical process as well as the recovery and rehabilitation time frame.  The patient asked several questions regarding the joint arthroplasty surgery, which were answered accordingly.  Ultimately, the patient declines surgical intervention at this time and wishes to continue with conservative treatment measures.  Alternative conservative treatment measures were discussed including bracing, therapy, topical/oral anti-inflammatories, activity modification, and weight loss.  The patient considered these treatment options and wishes to proceed with corticosteroid injection(s) today.  Therefore we will proceed with corticosteroid injection(s) today.  Follow-up as needed.    Procedure Note:  I discussed with the patient the potential benefits of performing a  therapeutic injection of the right knee as well as potential risks including but not limited to infection, swelling, pain, bleeding, bruising, nerve/vessel damage, skin color changes, transient elevation in blood glucose levels, and fat atrophy. After informed consent and verifying correct patient, procedure site, and type of procedure, the area was prepped with alcohol, ethyl chloride was used to numb the skin. Via the superolateral approach, 3 cc of 1% lidocaine, 3 cc of 0.25% Marcaine and 2 cc of 40mg/ml of Kenalog were injected into the right knee. The patient tolerated the procedure well. There were no complications. A sterile dressing was placed over the injection site.        Rai Holland MD  02/25/25  11:21 EST

## 2025-02-25 NOTE — PROGRESS NOTES
Procedure   - Large Joint Arthrocentesis: R knee on 2/25/2025 11:28 AM  Indications: pain  Details: 21 G needle, superolateral approach  Medications: 80 mg triamcinolone acetonide 40 MG/ML; 3 mL lidocaine PF 1% 1 %; 3 mL bupivacaine (PF) 0.25 %  Outcome: tolerated well, no immediate complications  Procedure, treatment alternatives, risks and benefits explained, specific risks discussed. Consent was given by the patient. Immediately prior to procedure a time out was called to verify the correct patient, procedure, equipment, support staff and site/side marked as required. Patient was prepped and draped in the usual sterile fashion.

## 2025-03-13 ENCOUNTER — TELEPHONE (OUTPATIENT)
Dept: ORTHOPEDIC SURGERY | Facility: CLINIC | Age: 65
End: 2025-03-13

## 2025-03-13 NOTE — TELEPHONE ENCOUNTER
Caller: ANG    Relationship to patient: SELF    Best call back number: [phone]    Type of visit: SECOND INJECTION DID NOT RELIEVE PAIN- HE WOULD LIKE TO SCHEDULE SURGERY- PLEASE CALL

## 2025-03-18 ENCOUNTER — OFFICE VISIT (OUTPATIENT)
Dept: ORTHOPEDIC SURGERY | Facility: CLINIC | Age: 65
End: 2025-03-18
Payer: MEDICARE

## 2025-03-18 VITALS
HEIGHT: 69 IN | DIASTOLIC BLOOD PRESSURE: 84 MMHG | SYSTOLIC BLOOD PRESSURE: 126 MMHG | WEIGHT: 238.1 LBS | BODY MASS INDEX: 35.27 KG/M2

## 2025-03-18 DIAGNOSIS — M17.11 PRIMARY OSTEOARTHRITIS OF RIGHT KNEE: Primary | ICD-10-CM

## 2025-03-18 PROBLEM — M17.10 ARTHRITIS OF KNEE: Status: ACTIVE | Noted: 2025-03-18

## 2025-03-18 PROCEDURE — 3074F SYST BP LT 130 MM HG: CPT | Performed by: ORTHOPAEDIC SURGERY

## 2025-03-18 PROCEDURE — 3079F DIAST BP 80-89 MM HG: CPT | Performed by: ORTHOPAEDIC SURGERY

## 2025-03-18 PROCEDURE — 99214 OFFICE O/P EST MOD 30 MIN: CPT | Performed by: ORTHOPAEDIC SURGERY

## 2025-03-18 PROCEDURE — 1160F RVW MEDS BY RX/DR IN RCRD: CPT | Performed by: ORTHOPAEDIC SURGERY

## 2025-03-18 PROCEDURE — 1159F MED LIST DOCD IN RCRD: CPT | Performed by: ORTHOPAEDIC SURGERY

## 2025-03-18 RX ORDER — PREGABALIN 75 MG/1
75 CAPSULE ORAL ONCE
OUTPATIENT
Start: 2025-03-18 | End: 2025-03-18

## 2025-03-18 RX ORDER — CHLORHEXIDINE GLUCONATE 40 MG/ML
SOLUTION TOPICAL
Qty: 236 ML | Refills: 0 | Status: SHIPPED | OUTPATIENT
Start: 2025-03-18

## 2025-03-18 RX ORDER — GLIPIZIDE 5 MG/1
TABLET ORAL
COMMUNITY
Start: 2025-03-05

## 2025-03-18 RX ORDER — ACETAMINOPHEN 325 MG/1
1000 TABLET ORAL ONCE
OUTPATIENT
Start: 2025-03-18 | End: 2025-03-18

## 2025-03-18 NOTE — PROGRESS NOTES
Orthopaedic Clinic Note: Knee Established Patient    Chief Complaint   Patient presents with    Follow-up     3 week recheck- Primary osteoarthritis of right knee        HPI    It has been 3  week(s) since Mr. Baron's last visit. He returns to clinic today for follow-up right knee osteoarthritis.  Patient went cortisone injection in the right knee 3 weeks ago.  The injection provided about 2 weeks of relief.  His pain is returned.  Rates pain 8/10 on the pain scale.  He is having recurrent swelling and stiffness in the knee and difficulty walking weightbearing activities.  He is here to discuss surgical intervention as his knee pain has gotten worse and is limiting daily activities.      Past Medical History:   Diagnosis Date    Acute sinusitis     Allergic rhinitis     Arthritis of back     Arthritis of neck     Arthropathy     of lumbar facet joint    Artificial lens present     position - right    Atrial fibrillation 03/07/2020    patient had episode of atrial fibrillation w/ ventricular response secondary to hypovelemia r/t salmonella infection    Backache     chronic back problems    Benign prostatic hyperplasia     Bronchitis     perisistent    Cataract     Cervical disc disorder     Chronic obstructive lung disease     Diabetes mellitus     no retinopathy    Disorder of kidney     Disorder of kidney and/or ureter - Congenital solitary right kidney       Dizziness     History of Meniere's like condition, left ear       Drug therapy     long-term    Dysfunction of eustachian tube     Dyslipidemia     Gout     Headache     History of possible migraine/cluster       Hearing loss     Hip arthrosis     Hypertension     Hypokalemia     Impacted cerumen     Infestation by Sarcoptes scabiei alta hominis     Inguinal hernia     History of, repaired       Knee pain     Knee swelling     Lumbosacral disc disease     Osteoarthritis     Periarthritis of shoulder     Pneumonia     in the past    Posterior subcapsular polar  senile cataract     Sjogren's syndrome     Type 2 diabetes mellitus       Past Surgical History:   Procedure Laterality Date    BACK SURGERY      1/1/02    CARPAL TUNNEL RELEASE      (Carpal tunnel release on the left)   09/10/2010 , Carpal tunnel release on the right)   12/03/2010     CATARACT EXTRACTION, BILATERAL      ENDOSCOPY N/A 03/16/2017    Procedure: ESOPHAGOGASTRODUODENOSCOPY;  Surgeon: Alli Shook DO;  Location: [facility] ENDOSCOPY;  Service:     EYE SURGERY Bilateral     cataract removed    HAND SURGERY      HERNIA REPAIR      INGUINAL HERNIA REPAIR      (Left inguinal hernioplasty with mesh.)   06/07/2007     INJECTION OF MEDICATION      Injection for nerve block (Lumbar medial branch block.)   03/31/2016     INJECTION OF MEDICATION  03/19/2015    solu-medrol     KNEE SURGERY      (Lateral release, removal of loose bodies, excision of suprapatellar plica.)   04/29/1982     OTHER SURGICAL HISTORY      Lumbar surgery (Lumbosacral radio frequency thermal coagulation. Lumbosacral spondylosis.)   06/30/2016 ,Lumbar surgery (Lumbosacral radio frequency thermal coagulation.)   12/21/2015      OTHER SURGICAL HISTORY      Remove cataract, insert lens (Right eye.)   05/07/2015     OTHER SURGICAL HISTORY      Remove hip/pelvis lesion (Melanoma, right hip.)   2005     VASECTOMY        Family History   Problem Relation Age of Onset    Lung disease Mother         autoimmune    Heart failure Father 35    Stomach cancer Sister 43        autoimmune    Diabetes Paternal Grandmother      Social History     Socioeconomic History    Marital status:    Tobacco Use    Smoking status: Never    Smokeless tobacco: Current     Types: Snuff   Vaping Use    Vaping status: Never Used   Substance and Sexual Activity    Alcohol use: No    Drug use: No    Sexual activity: Defer     Partners: Female      Current Outpatient Medications on File Prior to Visit   Medication Sig Dispense Refill    glipizide (GLUCOTROL) 5 MG tablet        albuterol sulfate  (90 Base) MCG/ACT inhaler INHALE 1 TO 2 PUFFS EVERY 4 TO 6 HOURS AS NEEDED FOR WHEEZE FOR UP TO 30 DAYS      bacitracin 500 UNIT/GM ointment Apply 1 Application topically to the appropriate area as directed 2 (Two) Times a Day. 28 g 0    Belsomra 20 MG tablet TAKE 1 TABLET BY MOUTH DAILY AS NEEDED FOR SLEEP 30 tablet 1    buPROPion XL (Wellbutrin XL) 150 MG 24 hr tablet Take 1 tablet by mouth Daily. 90 tablet 0    cloNIDine (CATAPRES) 0.1 MG tablet Take 1 tablet by mouth Daily As Needed for High Blood Pressure. 90 tablet 1    cloNIDine (CATAPRES-TTS) 0.1 MG/24HR patch Place 1 patch on the skin as directed by provider 1 (One) Time Per Week.      clotrimazole-betamethasone (Lotrisone) 1-0.05 % cream Apply 1 application  topically to the appropriate area as directed 2 (Two) Times a Day. 45 g 2    doxazosin (Cardura) 4 MG tablet Take 1 tablet by mouth Every Night. 90 tablet 3    empagliflozin (Jardiance) 25 MG tablet tablet Take 1 tablet by mouth Daily. 90 tablet 1    Ergocalciferol 50 MCG (2000 UT) tablet Take 50 mcg by mouth Daily. 90 tablet 2    esomeprazole (nexIUM) 20 MG capsule Take 1 capsule by mouth Every Morning Before Breakfast.      fenofibrate (Tricor) 145 MG tablet Take 1 tablet by mouth Daily. 90 tablet 1    fluticasone (FLONASE) 50 MCG/ACT nasal spray       glucose blood test strip 1 each by Other route Daily. Use as instructed 200 each 6    Lancets (ONETOUCH DELICA PLUS [id number]) misc USE DAILY TO CHECK BLOOD SUGAR 100 each 8    metoprolol succinate XL (TOPROL-XL) 100 MG 24 hr tablet       Movantik 25 MG tablet Take 1 tablet by mouth Daily.      naloxone (NARCAN) 4 MG/0.1ML nasal spray Administer 1 spray into the nostril(s) as directed by provider.      ondansetron ODT (ZOFRAN-ODT) 4 MG disintegrating tablet TAKE 1 TABLET BY MOUTH EVERY 6-8 HOURS AS NEEDED FOR NAUSEA      oxyCODONE-acetaminophen (PERCOCET) 7.5-325 MG per tablet Take 1 tablet by mouth Every 4 (Four) Hours  "As Needed. Patient taking  mg 5 times daily.      PARoxetine CR (PAXIL-CR) 25 MG 24 hr tablet Take 1 tablet by mouth Every Morning. 90 tablet 3    tamsulosin (FLOMAX) 0.4 MG capsule 24 hr capsule       triamcinolone (KENALOG) 0.5 % ointment Apply 1 Application topically to the appropriate area as directed 2 (Two) Times a Day. 15 g 0    ubrogepant (Ubrelvy) 100 MG tablet Take 1 tablet by mouth Daily As Needed (migraine). 16 tablet 0     No current facility-administered medications on file prior to visit.      Allergies   Allergen Reactions    Nsaids Other (See Comments)     Solitary kidney; renal failure with NSAID use.          Review of Systems   Constitutional: Negative.    HENT: Negative.     Eyes: Negative.    Respiratory: Negative.     Cardiovascular: Negative.    Gastrointestinal: Negative.    Endocrine: Negative.    Genitourinary: Negative.    Musculoskeletal:  Positive for arthralgias.   Skin: Negative.    Allergic/Immunologic: Negative.    Neurological: Negative.    Hematological: Negative.    Psychiatric/Behavioral: Negative.          The patient's Review of Systems was personally reviewed and confirmed as accurate.    Physical Exam  Blood pressure 126/84, height 175.3 cm (69.02\"), weight 108 kg (238 lb 1.6 oz).    Body mass index is 35.14 kg/m².    GENERAL APPEARANCE: awake, alert, oriented, in no acute distress and well developed, well nourished  LUNGS:  breathing nonlabored  EXTREMITIES: no clubbing, cyanosis  PERIPHERAL PULSES: palpable dorsalis pedis and posterior tibial pulses bilaterally.    GAIT:  Antalgic        ----------  Right Knee Exam:  ----------  ALIGNMENT: moderate varus, correctible to neutral  ----------  RANGE OF MOTION:  Decreased (3 - 115 degrees) with no extensor lag  LIGAMENTOUS STABILITY:   stable to varus and valgus stress at terminal extension and 30 degrees; retensioning of the MCL is appreciated with valgus stress at 30 degrees consistent with medial compartment " degeneration  ----------  STRENGTH:  KNEE FLEXION 4/5  KNEE EXTENSION  4/5  ANKLE DORSIFLEXION  5/5  ANKLE PLANTARFLEXION  5/5  ----------  PAIN WITH PALPATION:medial joint line and anterior knee  KNEE EFFUSION: yes, trace effusion  PAIN WITH KNEE ROM: yes  PATELLAR CREPITUS:  yes, painful and symptomatic  ----------  SENSATION TO LIGHT TOUCH:  DEEP PERONEAL/SUPERFICIAL PERONEAL/SURAL/SAPHENOUS/TIBIAL:    intact  ----------  EDEMA:  no  ERYTHEMA:    no  WOUNDS/INCISIONS:   no  _____________________________________________________________________  _____________________________________________________________________    RADIOGRAPHIC FINDINGS:   No new imaging today.  Prior imaging of the right knee from 12/5/2024 reveals moderate to severe tricompartmental osteoarthritis with genu varum alignment.  Large periarticular osteophytes visualized in all compartments. (Kellgren-Louis grade 3)    Assessment/Plan:   Diagnosis Plan   1. Primary osteoarthritis of right knee  Case Request    CBC and Differential    Comprehensive metabolic panel    Protime-INR    APTT    Hemoglobin A1c    ECG 12 Lead    Nicotine & Metabolite, Quant    Tranexamic Acid 1,000 mg in sodium chloride 0.9 % 100 mL    Tranexamic Acid 1,000 mg in sodium chloride 0.9 % 100 mL    ethyl alcohol 62 % 2 each    ceFAZolin (ANCEF) 2 g in sodium chloride 0.9 % 100 mL IVPB    acetaminophen (TYLENOL) tablet 975 mg    pregabalin (LYRICA) capsule 75 mg    Case Request    CT Lower Extremity Right Without Contrast        The patient has clinical and radiographic evidence of advanced right knee joint degeneration. Conservative measures have been tried for 3 months or longer, but have failed to adequately treat or improve the patient's symptoms. Pain is restricting the patient's daily activities as well as quality of life. The recommendation at this time is to proceed with a right total knee arthroplasty with the goal to improve patient function and pain. The risks,  benefits, potential complications, and alternatives were discussed with the patient in detail. Risks included but were not limited to bleeding, infection, anesthesia risks, damage to neurovascular structures, osteolysis, aseptic loosening, instability, dislocation, pain, continued pain, iatrogenic fracture, possible need for future surgery including the potential for amputation, blood clots, myocardial infarction, stroke, and death. Hansa-operative blood management and the potential for blood transfusion were discussed with risks and options clearly outlined. Specific details of the surgical procedure, hospitalization, recovery, rehabilitation, and long-term precautions were also presented. Pre-operative teaching was provided. Implant/prosthesis selection was outlined, and the many options available were explained; the final choice will be made at the time of the procedure to match the anatomy and condition of the bone, ligaments, tendons, and muscles. Given this instruction, the patient elected to proceed with the right total knee arthroplasty. The patient will be seen by pre-admission testing for pre-operative optimization and risk assessment and will be scheduled for surgery once this is completed.    The patient is considered standard risk for DVT based on patient risk factors and will be placed on aspirin postoperatively for DVT prophylaxis.        Rai Holland MD  03/18/25  08:48 EDT

## 2025-03-25 DIAGNOSIS — F51.01 PRIMARY INSOMNIA: ICD-10-CM

## 2025-03-25 RX ORDER — SUVOREXANT 10 MG/1
1 TABLET, FILM COATED ORAL NIGHTLY PRN
Qty: 10 TABLET | OUTPATIENT
Start: 2025-03-25

## 2025-04-24 DIAGNOSIS — F51.01 PRIMARY INSOMNIA: ICD-10-CM

## 2025-04-24 RX ORDER — SUVOREXANT 10 MG/1
1 TABLET, FILM COATED ORAL NIGHTLY PRN
Qty: 10 TABLET | OUTPATIENT
Start: 2025-04-24

## 2025-04-25 RX ORDER — SUVOREXANT 20 MG/1
TABLET, FILM COATED ORAL
Qty: 30 TABLET | Refills: 0 | Status: SHIPPED | OUTPATIENT
Start: 2025-04-25

## 2025-04-30 DIAGNOSIS — M17.11 PRIMARY OSTEOARTHRITIS OF RIGHT KNEE: Primary | ICD-10-CM

## 2025-05-14 ENCOUNTER — HOSPITAL ENCOUNTER (OUTPATIENT)
Dept: CT IMAGING | Facility: HOSPITAL | Age: 65
Discharge: HOME | End: 2025-05-14
Payer: MEDICARE

## 2025-05-14 ENCOUNTER — PRE-ADMISSION TESTING (OUTPATIENT)
Dept: PREADMISSION TESTING | Facility: HOSPITAL | Age: 65
End: 2025-05-14
Payer: MEDICARE

## 2025-05-14 VITALS — WEIGHT: 229.5 LBS | BODY MASS INDEX: 33.99 KG/M2 | HEIGHT: 69 IN

## 2025-05-14 DIAGNOSIS — M17.11 PRIMARY OSTEOARTHRITIS OF RIGHT KNEE: ICD-10-CM

## 2025-05-14 LAB
ALBUMIN SERPL-MCNC: 4.6 G/DL (ref 3.5–5.2)
ALBUMIN/GLOB SERPL: 1.5 G/DL
ALP SERPL-CCNC: 72 U/L (ref 39–117)
ALT SERPL W P-5'-P-CCNC: 19 U/L (ref 1–41)
ANION GAP SERPL CALCULATED.3IONS-SCNC: 14 MMOL/L (ref 5–15)
APTT PPP: 29 SECONDS (ref 22–39)
AST SERPL-CCNC: 20 U/L (ref 1–40)
BASOPHILS # BLD AUTO: 0.06 10*3/MM3 (ref 0–0.2)
BASOPHILS NFR BLD AUTO: 0.7 % (ref 0–1.5)
BILIRUB SERPL-MCNC: 0.5 MG/DL (ref 0–1.2)
BUN SERPL-MCNC: 14 MG/DL (ref 8–23)
BUN/CREAT SERPL: 13.5 (ref 7–25)
CALCIUM SPEC-SCNC: 9.6 MG/DL (ref 8.6–10.5)
CHLORIDE SERPL-SCNC: 100 MMOL/L (ref 98–107)
CO2 SERPL-SCNC: 27 MMOL/L (ref 22–29)
CREAT SERPL-MCNC: 1.04 MG/DL (ref 0.76–1.27)
DEPRECATED RDW RBC AUTO: 44.5 FL (ref 37–54)
EGFRCR SERPLBLD CKD-EPI 2021: 79.7 ML/MIN/1.73
EOSINOPHIL # BLD AUTO: 0.16 10*3/MM3 (ref 0–0.4)
EOSINOPHIL NFR BLD AUTO: 1.9 % (ref 0.3–6.2)
ERYTHROCYTE [DISTWIDTH] IN BLOOD BY AUTOMATED COUNT: 13.6 % (ref 12.3–15.4)
GLOBULIN UR ELPH-MCNC: 3 GM/DL
GLUCOSE SERPL-MCNC: 148 MG/DL (ref 65–99)
HBA1C MFR BLD: 7.2 % (ref 4.8–5.6)
HCT VFR BLD AUTO: 44.5 % (ref 37.5–51)
HGB BLD-MCNC: 14.7 G/DL (ref 13–17.7)
IMM GRANULOCYTES # BLD AUTO: 0.03 10*3/MM3 (ref 0–0.05)
IMM GRANULOCYTES NFR BLD AUTO: 0.4 % (ref 0–0.5)
INR PPP: 1 (ref 0.89–1.12)
LYMPHOCYTES # BLD AUTO: 1.9 10*3/MM3 (ref 0.7–3.1)
LYMPHOCYTES NFR BLD AUTO: 23 % (ref 19.6–45.3)
MCH RBC QN AUTO: 29.6 PG (ref 26.6–33)
MCHC RBC AUTO-ENTMCNC: 33 G/DL (ref 31.5–35.7)
MCV RBC AUTO: 89.7 FL (ref 79–97)
MONOCYTES # BLD AUTO: 0.49 10*3/MM3 (ref 0.1–0.9)
MONOCYTES NFR BLD AUTO: 5.9 % (ref 5–12)
NEUTROPHILS NFR BLD AUTO: 5.63 10*3/MM3 (ref 1.7–7)
NEUTROPHILS NFR BLD AUTO: 68.1 % (ref 42.7–76)
NRBC BLD AUTO-RTO: 0 /100 WBC (ref 0–0.2)
PLATELET # BLD AUTO: 267 10*3/MM3 (ref 140–450)
PMV BLD AUTO: 9.8 FL (ref 6–12)
POTASSIUM SERPL-SCNC: 3.4 MMOL/L (ref 3.5–5.2)
PROT SERPL-MCNC: 7.6 G/DL (ref 6–8.5)
PROTHROMBIN TIME: 13.8 SECONDS (ref 12.2–15.3)
QT INTERVAL: 364 MS
QTC INTERVAL: 450 MS
RBC # BLD AUTO: 4.96 10*6/MM3 (ref 4.14–5.8)
SODIUM SERPL-SCNC: 141 MMOL/L (ref 136–145)
WBC NRBC COR # BLD AUTO: 8.27 10*3/MM3 (ref 3.4–10.8)

## 2025-05-14 PROCEDURE — 85025 COMPLETE CBC W/AUTO DIFF WBC: CPT

## 2025-05-14 PROCEDURE — 93010 ELECTROCARDIOGRAM REPORT: CPT | Performed by: INTERNAL MEDICINE

## 2025-05-14 PROCEDURE — 93005 ELECTROCARDIOGRAM TRACING: CPT

## 2025-05-14 PROCEDURE — 36415 COLL VENOUS BLD VENIPUNCTURE: CPT

## 2025-05-14 PROCEDURE — 80053 COMPREHEN METABOLIC PANEL: CPT

## 2025-05-14 PROCEDURE — 85730 THROMBOPLASTIN TIME PARTIAL: CPT

## 2025-05-14 PROCEDURE — 73700 CT LOWER EXTREMITY W/O DYE: CPT

## 2025-05-14 PROCEDURE — 85610 PROTHROMBIN TIME: CPT

## 2025-05-14 PROCEDURE — G0480 DRUG TEST DEF 1-7 CLASSES: HCPCS

## 2025-05-14 PROCEDURE — 83036 HEMOGLOBIN GLYCOSYLATED A1C: CPT

## 2025-05-14 RX ORDER — OXYCODONE AND ACETAMINOPHEN 10; 325 MG/1; MG/1
1 TABLET ORAL
Status: ON HOLD | COMMUNITY
Start: 2025-05-06 | End: 2025-07-04

## 2025-05-14 RX ORDER — KETOCONAZOLE 20 MG/ML
SHAMPOO, SUSPENSION TOPICAL
Status: ON HOLD | COMMUNITY
Start: 2025-04-28

## 2025-05-14 NOTE — PAT
An arrival time for procedure was not provided during PAT visit. If patient had any questions or concerns about their arrival time, they were instructed to contact their surgeon/physician.  Additionally, if the patient referred to an arrival time that was acquired from their my chart account, patient was encouraged to verify that time with their surgeon/physician. Arrival times are NOT provided in Pre Admission Testing Department.    Patient viewed general PAT education video as instructed in their preoperative information received from their surgeon.  Patient stated the general PAT education video was viewed in its entirety and survey completed.  Copies of PAT general education handouts (Incentive Spirometry, Meds to Beds Program, Patient Belongings, Pre-op skin preparation instructions, Blood Glucose testing, Visitor policy, Surgery FAQ, Code H) distributed to patient if not printed. Education related to the PAT pass and skin preparation for surgery (if applicable) completed in PAT as a reinforcement to PAT education video. Patient instructed to return PAT pass provided today as well as completed skin preparation sheet (if applicable) on the day of procedure.     Additionally if patient had not viewed video yet but intended to view it at home or in our waiting area, then referred them to the handout with QR code/link provided during PAT visit.  Encouraged patient/family to read PAT general education handouts thoroughly and notify PAT staff with any questions or concerns. Patient verbalized understanding of all information and priority content.    Patient denies any current skin issues.     Patient instructed to drink 20 ounces of Gatorade or Gatorlyte (if diabetic) and it needs to be completed 1 hour (for Main OR patients) or 2 hours (scheduled  section & [facility] patients) before given arrival time for procedure (NO RED Gatorade and NO Gatorade Zero).    Patient verbalized understanding.    Prescription for  Chlorhexidine shower called into patient's pharmacy or [facility] pharmacy by patient's surgeon.  Reinforced with patient to  the prescription from applicable pharmacy if they haven't already.  Verbal and written instructions given regarding proper use of Chlorhexidine body wash to patient and/or famlily during PAT visit. Patient/family also instructed to complete checklist and return it to Pre-op on the day of surgery.  Patient and/or family verbalized understanding.    Patient to apply Chlorhexadine wipes  to surgical area (as instructed) the night before procedure and the AM of procedure. Wipes provided.    Clean catch urinalysis not indicated because patient denied recent urinary frequency, urinary urgency, burning/pain upon urination, or flank pain. No recent UTIs.    InfuBLOCK (by [employer]) pain pump patient informational handout given to patient.  Instructed patient to watch [employer] Patient Education Video regarding Peripheral Nerve Catheter that will be in place for upcoming surgery unless contraindicated. The video can be accessed using QR code noted on handout.  Patient agreed to watch video.  Stressed to patient to call [facility] Nursing Hotline 24/7 if patient has any questions or concerns after discharge.     EKG IN CHART

## 2025-05-14 NOTE — DISCHARGE INSTRUCTIONS
Duplicate request  We have the request routed to the provider already  Refill denied  Encounter closed   The following information and instructions were given:    Do not eat, drink, smoke or chew gum after midnight the night before surgery. This includes no mints.  Take all routine, prescribed medications including heart and blood pressure medicines with a sip of water unless otherwise instructed by your physician.   Do NOT take diabetic medication unless instructed by your physician.      DO NOT shave for two days before your procedure.  Do not wear makeup.      DO NOT wear fingernail polish (gel/regular) and/or acrylic/artificial nails on the day of surgery. If you had a recent manicure and would rather not remove polish or artificial nails, the minimum requirement is that the polish/artificial nails must be removed from the middle finger on each hand.      If you are having surgery/procedure on an upper extremity, fingernail polish/artificial fingernails must be removed for surgery.  NO EXCEPTIONS.      If you are having surgery/procedure on a lower extremity, toenail polish on both extremities must be removed for surgery.  NO EXCEPTIONS.    Remove all jewelry (advise to go to jeweler if unable to remove).  Jewelry, especially rings, can no longer be taped for surgery.    Leave anything you consider valuable at home.    Leave your suitcase in the car until after your surgery if you are staying overnight.    Bring the following with you the day of your procedure (when applicable):       -Picture ID and insurance cards       -Co-pay/deductible required by insurance       -Medications in the original bottles or a detailed list (name, dosage, frequency of medications) including all over-the-counter medications if not brought to PAT       -Copy of advance directive, living will or power of  documents if not brought to PAT       -CPAP or BIPAP mask and tubing (do not bring machine)       -Skin prep instruction(s) sheet       -PAT Pass    Educational handout or binder (joint replacements) related to procedure given  to patient.  Educational handout also includes general information related to the recovery that mentions signs and symptoms of infection and when to call the doctor.    When applicable, an ERAS handout was given to patient.    Respirex use and pneumonia prevention education provided in Pre Admission Testing general education video.    Information related to infection and hand hygiene mentioned in Pre Admission Testing general education video. Patient instructed to call their doctor if any of the following symptoms are noted during recovery:  Fever of 100.4 F or higher, incision that is warm or has increasing bleeding, redness or drainage.    DVT Prevention instructions given in general education video presentation during Pre Admission Testing appointment that stress the importance of ambulation to improve blood circulation.  Also encouraged patient to perform foot exercises when in bed and application of a sequential device may be applied to lower extremities to improve circulation.      Please apply Chlorhexidine wipes to surgical area (if instructed) the night before procedure and the AM of procedure and document date/time of applications on skin prep instruction sheet.    If you are being discharged home the same day as surgery, you must have someone to drive you home and someone must stay with you the first 24 hours your procedure.

## 2025-05-21 ENCOUNTER — TELEPHONE (OUTPATIENT)
Dept: FAMILY MEDICINE CLINIC | Facility: CLINIC | Age: 65
End: 2025-05-21
Payer: MEDICARE

## 2025-05-21 ENCOUNTER — PRE-PROCEDURE SCREENING (OUTPATIENT)
Dept: ORTHOPEDIC SURGERY | Facility: CLINIC | Age: 65
End: 2025-05-21
Payer: MEDICARE

## 2025-05-21 DIAGNOSIS — F51.04 CHRONIC INSOMNIA: Primary | ICD-10-CM

## 2025-05-21 RX ORDER — PAROXETINE HYDROCHLORIDE HEMIHYDRATE 25 MG/1
25 TABLET, FILM COATED, EXTENDED RELEASE ORAL EVERY MORNING
Qty: 90 TABLET | Refills: 3 | Status: CANCELLED | OUTPATIENT
Start: 2025-05-21

## 2025-05-21 NOTE — TELEPHONE ENCOUNTER
Caller: Zaheer Baron    Relationship: Self    Best call back number: [phone]     What medication are you requesting: SOMETHING FOR SLEEP    What are your current symptoms: SLEEP FOR ONE HOUR      Have you had these symptoms before:    [x] Yes  [] No    Have you been treated for these symptoms before:   [x] Yes  [] No    If a prescription is needed, what is your preferred pharmacy and phone number: [facility] [id number] [address] [phone] [facility] [phone] [facility]     Additional notes:PATIENT STATES THAT    Belsomra 20 MG tablet   IS NOT WORKING AT ALL NOW

## 2025-05-21 NOTE — TELEPHONE ENCOUNTER
Will work on getting patient evaluated with sleep specialist. Has tried and failed numerous sleep aids.

## 2025-05-21 NOTE — TELEPHONE ENCOUNTER
Caller: Zaheer Baron    Relationship: Self    Best call back number: [phone]     Requested Prescriptions:   Requested Prescriptions     Pending Prescriptions Disp Refills    PARoxetine CR (PAXIL-CR) 25 MG 24 hr tablet 90 tablet 3     Sig: Take 1 tablet by mouth Every Morning.        Pharmacy where request should be sent: [facility] [id number] [address] [phone] [facility] [phone]      Last office visit with prescribing clinician: 11/12/2024   Last telemedicine visit with prescribing clinician: Visit date not found   Next office visit with prescribing clinician: Visit date not found     Additional details provided by patient:     Does the patient have less than a 3 day supply:  [x] Yes  [] No    Would you like a call back once the refill request has been completed: [x] Yes [] No    If the office needs to give you a call back, can they leave a voicemail: [x] Yes [] No    Rabia Fernandez Rep   05/21/25 09:19 EDT

## 2025-05-21 NOTE — TELEPHONE ENCOUNTER
TOTAL JOINT REPLACEMENT CARE NAVIGATION INITIAL ASSESSMENT    PATIENT INFORMATION:     Patient: Zaheer Baron       YOB: 1960         Age/Gender: 64 y.o. male     Medical Record Number: [id number]     Surgery:     R TKA        Surgery Date: 5/28/25    Surgeon: Dr. Holland    Physical Therapy Location: [facility]    PT Date & Time: 5/30/25 @ 2:30pm    DVT PPX: ASA 81mg BID    DISCHARGE PLAN: Outpatient      LIVING SITUATION:     [x]Private Residence      Who lives in the home?        Wife                  []Nursing Home:                                     []Assisted Living  []Rehabilitation Facility:                          []Live Alone  []Homeless    HOUSING STYLE:     [x]Ranch Style   []Multi-level   []Split level   []Townhouse   []Apartment    Do you have steps to navigate in the home? No  Do you have steps to navigate outside the home? 1 step      ADL:     [x]Independent   []Independent with difficulty   []Partially Dependent   []Dependent    Do you require bathing/showering?   Do you require assistance with grooming (brushing hair, shaving, etc)?  Do you require assistance dressing?  Do you require assistance with walking?  Do you require assistive devices while walking (cane, walker, wheelchair)?  Do you require assistance with transfers (moving from bed to chair, wheelchair, etc)?  Do you require assistance preparing meals?  Do you require assistance when eating meals?  Do you require assistance to use the toilet?  Do you have any issues with bowel or bladder incontinence?  Do you require assistance with household chores (cleaning, laundry, etc)?  Have you had any recent falls? No    CURRENT SERVICES:    []Home Health (PT, OT, Skilled Nursing)  Agency:    County:  []Palliative Care  []Meals on Wheels  []Daily Caregiver  [x]None    CURRENT DME:    []Walker   []Cane   []Wheelchair   []Crutches   []Bedside Commode   []Shower Chair  []Hospital Bed   []Nebulizer    []Oxygen  []Other:    MEDICATIONS: Takes glipizide and Jardiance at night; Will hold Percocet morning on sx.     Are you currently on any blood thinners? No  Are you currently on any anti-inflammatory medications? No   Are you currently on any medications for rheumatoid arthritis? No  Are you currently taking any herbal supplements? No      Post-op Pharmacy Name:        [facility]         Phone Number:     Does anyone assist you filling medication boxes or remind you that medication is due?   Are you currently on a mail order prescription plan?  What pharmacy do you use to fill your short-term prescriptions? [facility]  Do you have prescription insurance coverage?   Can you afford your medications/co-pays?    CURRENT TRANSPORTATION:    Which services do you rely on? []Taxi   []Public Transportation   [x]Private Vehicle     []Ambulance   []Tack ([facility])    Do you have a way to get home when you are discharged? Yes-wife, Denise    POTENTIAL NEEDS:    []Rehabilitation   []Home Health PT   []SNF  []Meals on Wheels   []Department of    []Palliative Care  []Nursing Home   []Hospice   [x]Durable Medical Equipment-has a walker; Educated on cane, elevated toilet seat, and shower/tub transfer chair.  []Caregiver Services   []Financial Services   []Pre-op In Home PT Evaluation    CLEARANCES NEEDED FOR SURGERY: Dental clearance: received 4/22/25    [x]Dental   []Cardiology   []Pulmonology   []Medical (PCP)   []Rheumatology  []Endocrinology   []Hematology/Oncology   []Neurology   []Neurosurgery   []CT Vascular      INITIAL DISCHARGE PLAN: Plan for same day discharge. Patient's wife, Denise, will be surgery , as well as, post-op care giver. Patient already has a walker; Educated on cane, elevated toilet seat, and shower/tub transfer chair. Patient informed about [facility] program. Post-op PT scheduled at [facility] on 5/30/25 @  2:30pm.

## 2025-05-22 DIAGNOSIS — F32.1 CURRENT MODERATE EPISODE OF MAJOR DEPRESSIVE DISORDER WITHOUT PRIOR EPISODE: Primary | ICD-10-CM

## 2025-05-22 RX ORDER — PAROXETINE HYDROCHLORIDE HEMIHYDRATE 25 MG/1
25 TABLET, FILM COATED, EXTENDED RELEASE ORAL EVERY MORNING
Qty: 90 TABLET | Refills: 3 | Status: ON HOLD | OUTPATIENT
Start: 2025-05-22

## 2025-05-22 NOTE — TELEPHONE ENCOUNTER
Yes he needs the paxil, the bupropion did not help either (for mood)  He just wanted us to know Belsomra didn't help with sleep also.

## 2025-05-26 ENCOUNTER — HOSPITAL ENCOUNTER (OUTPATIENT)
Facility: HOSPITAL | Age: 65
Setting detail: OBSERVATION
Discharge: HOME | End: 2025-05-27
Attending: FAMILY MEDICINE | Admitting: INTERNAL MEDICINE
Payer: MEDICARE

## 2025-05-26 ENCOUNTER — APPOINTMENT (OUTPATIENT)
Dept: MRI IMAGING | Facility: HOSPITAL | Age: 65
End: 2025-05-26
Payer: MEDICARE

## 2025-05-26 ENCOUNTER — APPOINTMENT (OUTPATIENT)
Dept: CT IMAGING | Facility: HOSPITAL | Age: 65
End: 2025-05-26
Payer: MEDICARE

## 2025-05-26 DIAGNOSIS — R00.2 PALPITATIONS: ICD-10-CM

## 2025-05-26 PROBLEM — I48.91 ATRIAL FIBRILLATION WITH RVR: Status: ACTIVE | Noted: 2025-05-26

## 2025-05-26 PROBLEM — R40.4 EPISODE OF UNRESPONSIVENESS: Status: ACTIVE | Noted: 2025-05-26

## 2025-05-26 LAB
APTT PPP: 29.7 SECONDS (ref 60–90)
GLUCOSE BLDC GLUCOMTR-MCNC: 121 MG/DL (ref 70–130)
GLUCOSE BLDC GLUCOMTR-MCNC: 169 MG/DL (ref 70–130)
UFH PPP CHRO-ACNC: 0.1 IU/ML (ref 0.3–0.7)

## 2025-05-26 PROCEDURE — 96365 THER/PROPH/DIAG IV INF INIT: CPT

## 2025-05-26 PROCEDURE — G0378 HOSPITAL OBSERVATION PER HR: HCPCS

## 2025-05-26 PROCEDURE — 70450 CT HEAD/BRAIN W/O DYE: CPT

## 2025-05-26 PROCEDURE — 99222 1ST HOSP IP/OBS MODERATE 55: CPT | Performed by: INTERNAL MEDICINE

## 2025-05-26 PROCEDURE — 25010000002 HEPARIN (PORCINE) 25000-0.45 UT/250ML-% SOLUTION

## 2025-05-26 PROCEDURE — 70551 MRI BRAIN STEM W/O DYE: CPT

## 2025-05-26 PROCEDURE — 82948 REAGENT STRIP/BLOOD GLUCOSE: CPT

## 2025-05-26 PROCEDURE — 63710000001 INSULIN LISPRO (HUMAN) PER 5 UNITS: Performed by: INTERNAL MEDICINE

## 2025-05-26 PROCEDURE — 93005 ELECTROCARDIOGRAM TRACING: CPT | Performed by: INTERNAL MEDICINE

## 2025-05-26 PROCEDURE — 25010000002 HEPARIN (PORCINE) PER 1000 UNITS

## 2025-05-26 PROCEDURE — 85730 THROMBOPLASTIN TIME PARTIAL: CPT | Performed by: INTERNAL MEDICINE

## 2025-05-26 PROCEDURE — 93010 ELECTROCARDIOGRAM REPORT: CPT | Performed by: STUDENT IN AN ORGANIZED HEALTH CARE EDUCATION/TRAINING PROGRAM

## 2025-05-26 PROCEDURE — 96366 THER/PROPH/DIAG IV INF ADDON: CPT

## 2025-05-26 PROCEDURE — G0379 DIRECT REFER HOSPITAL OBSERV: HCPCS

## 2025-05-26 PROCEDURE — 85520 HEPARIN ASSAY: CPT | Performed by: INTERNAL MEDICINE

## 2025-05-26 RX ORDER — SODIUM CHLORIDE 0.9 % (FLUSH) 0.9 %
10 SYRINGE (ML) INJECTION AS NEEDED
Status: DISCONTINUED | OUTPATIENT
Start: 2025-05-26 | End: 2025-05-27

## 2025-05-26 RX ORDER — ACETAMINOPHEN 325 MG/1
650 TABLET ORAL EVERY 4 HOURS PRN
Status: DISCONTINUED | OUTPATIENT
Start: 2025-05-26 | End: 2025-05-27 | Stop reason: HOSPADM

## 2025-05-26 RX ORDER — IBUPROFEN 600 MG/1
1 TABLET ORAL
Status: DISCONTINUED | OUTPATIENT
Start: 2025-05-26 | End: 2025-05-27 | Stop reason: HOSPADM

## 2025-05-26 RX ORDER — ACETAMINOPHEN 160 MG/5ML
650 SOLUTION ORAL EVERY 4 HOURS PRN
Status: DISCONTINUED | OUTPATIENT
Start: 2025-05-26 | End: 2025-05-27 | Stop reason: HOSPADM

## 2025-05-26 RX ORDER — INSULIN LISPRO 100 [IU]/ML
2-7 INJECTION, SOLUTION INTRAVENOUS; SUBCUTANEOUS
Status: DISCONTINUED | OUTPATIENT
Start: 2025-05-26 | End: 2025-05-27 | Stop reason: HOSPADM

## 2025-05-26 RX ORDER — POLYETHYLENE GLYCOL 3350 17 G/17G
17 POWDER, FOR SOLUTION ORAL DAILY PRN
Status: DISCONTINUED | OUTPATIENT
Start: 2025-05-26 | End: 2025-05-27 | Stop reason: HOSPADM

## 2025-05-26 RX ORDER — AMOXICILLIN 250 MG
2 CAPSULE ORAL 2 TIMES DAILY PRN
Status: DISCONTINUED | OUTPATIENT
Start: 2025-05-26 | End: 2025-05-27 | Stop reason: HOSPADM

## 2025-05-26 RX ORDER — SODIUM CHLORIDE 0.9 % (FLUSH) 0.9 %
10 SYRINGE (ML) INJECTION EVERY 12 HOURS SCHEDULED
Status: DISCONTINUED | OUTPATIENT
Start: 2025-05-26 | End: 2025-05-27 | Stop reason: HOSPADM

## 2025-05-26 RX ORDER — HEPARIN SODIUM 10000 [USP'U]/100ML
11 INJECTION, SOLUTION INTRAVENOUS
Status: DISCONTINUED | OUTPATIENT
Start: 2025-05-26 | End: 2025-05-27

## 2025-05-26 RX ORDER — HEPARIN SODIUM 1000 [USP'U]/ML
4000 INJECTION, SOLUTION INTRAVENOUS; SUBCUTANEOUS ONCE
Status: COMPLETED | OUTPATIENT
Start: 2025-05-26 | End: 2025-05-26

## 2025-05-26 RX ORDER — ACETAMINOPHEN 650 MG/1
650 SUPPOSITORY RECTAL EVERY 4 HOURS PRN
Status: DISCONTINUED | OUTPATIENT
Start: 2025-05-26 | End: 2025-05-27 | Stop reason: HOSPADM

## 2025-05-26 RX ORDER — BISACODYL 5 MG/1
5 TABLET, DELAYED RELEASE ORAL DAILY PRN
Status: DISCONTINUED | OUTPATIENT
Start: 2025-05-26 | End: 2025-05-27 | Stop reason: HOSPADM

## 2025-05-26 RX ORDER — ONDANSETRON 2 MG/ML
4 INJECTION INTRAMUSCULAR; INTRAVENOUS EVERY 6 HOURS PRN
Status: DISCONTINUED | OUTPATIENT
Start: 2025-05-26 | End: 2025-05-27 | Stop reason: HOSPADM

## 2025-05-26 RX ORDER — HEPARIN SODIUM 10000 [USP'U]/100ML
10 INJECTION, SOLUTION INTRAVENOUS
Status: DISCONTINUED | OUTPATIENT
Start: 2025-05-26 | End: 2025-05-26

## 2025-05-26 RX ORDER — BISACODYL 10 MG
10 SUPPOSITORY, RECTAL RECTAL DAILY PRN
Status: DISCONTINUED | OUTPATIENT
Start: 2025-05-26 | End: 2025-05-27 | Stop reason: HOSPADM

## 2025-05-26 RX ORDER — PAROXETINE HYDROCHLORIDE HEMIHYDRATE 12.5 MG/1
25 TABLET, FILM COATED, EXTENDED RELEASE ORAL EVERY MORNING
Status: DISCONTINUED | OUTPATIENT
Start: 2025-05-27 | End: 2025-05-26

## 2025-05-26 RX ORDER — ATORVASTATIN CALCIUM 40 MG/1
80 TABLET, FILM COATED ORAL NIGHTLY
Status: DISCONTINUED | OUTPATIENT
Start: 2025-05-26 | End: 2025-05-27 | Stop reason: HOSPADM

## 2025-05-26 RX ORDER — DEXTROSE MONOHYDRATE 25 G/50ML
25 INJECTION, SOLUTION INTRAVENOUS
Status: DISCONTINUED | OUTPATIENT
Start: 2025-05-26 | End: 2025-05-27 | Stop reason: HOSPADM

## 2025-05-26 RX ORDER — TERAZOSIN 5 MG/1
5 CAPSULE ORAL NIGHTLY
Status: DISCONTINUED | OUTPATIENT
Start: 2025-05-26 | End: 2025-05-27 | Stop reason: HOSPADM

## 2025-05-26 RX ORDER — SODIUM CHLORIDE 0.9 % (FLUSH) 0.9 %
10 SYRINGE (ML) INJECTION AS NEEDED
Status: DISCONTINUED | OUTPATIENT
Start: 2025-05-26 | End: 2025-05-27 | Stop reason: HOSPADM

## 2025-05-26 RX ORDER — SODIUM CHLORIDE 9 MG/ML
40 INJECTION, SOLUTION INTRAVENOUS AS NEEDED
Status: DISCONTINUED | OUTPATIENT
Start: 2025-05-26 | End: 2025-05-27 | Stop reason: HOSPADM

## 2025-05-26 RX ORDER — PANTOPRAZOLE SODIUM 40 MG/1
40 TABLET, DELAYED RELEASE ORAL
Status: DISCONTINUED | OUTPATIENT
Start: 2025-05-27 | End: 2025-05-27 | Stop reason: HOSPADM

## 2025-05-26 RX ORDER — METOPROLOL TARTRATE 25 MG/1
25 TABLET, FILM COATED ORAL EVERY 12 HOURS SCHEDULED
Status: DISCONTINUED | OUTPATIENT
Start: 2025-05-26 | End: 2025-05-27 | Stop reason: HOSPADM

## 2025-05-26 RX ORDER — SODIUM CHLORIDE 0.9 % (FLUSH) 0.9 %
10 SYRINGE (ML) INJECTION EVERY 12 HOURS SCHEDULED
Status: DISCONTINUED | OUTPATIENT
Start: 2025-05-26 | End: 2025-05-27

## 2025-05-26 RX ORDER — OXYCODONE AND ACETAMINOPHEN 10; 325 MG/1; MG/1
1 TABLET ORAL EVERY 4 HOURS PRN
Refills: 0 | Status: DISCONTINUED | OUTPATIENT
Start: 2025-05-26 | End: 2025-05-27 | Stop reason: HOSPADM

## 2025-05-26 RX ORDER — NICOTINE POLACRILEX 4 MG
15 LOZENGE BUCCAL
Status: DISCONTINUED | OUTPATIENT
Start: 2025-05-26 | End: 2025-05-27 | Stop reason: HOSPADM

## 2025-05-26 RX ORDER — HEPARIN SODIUM 1000 [USP'U]/ML
4000 INJECTION, SOLUTION INTRAVENOUS; SUBCUTANEOUS ONCE
Status: DISCONTINUED | OUTPATIENT
Start: 2025-05-26 | End: 2025-05-26

## 2025-05-26 RX ADMIN — EMPAGLIFLOZIN 25 MG: 25 TABLET, FILM COATED ORAL at 21:22

## 2025-05-26 RX ADMIN — HEPARIN SODIUM 4000 UNITS: 1000 INJECTION INTRAVENOUS; SUBCUTANEOUS at 22:04

## 2025-05-26 RX ADMIN — TERAZOSIN HYDROCHLORIDE 5 MG: 5 CAPSULE ORAL at 21:22

## 2025-05-26 RX ADMIN — INSULIN LISPRO 2 UNITS: 100 INJECTION, SOLUTION INTRAVENOUS; SUBCUTANEOUS at 21:34

## 2025-05-26 RX ADMIN — ATORVASTATIN CALCIUM 80 MG: 40 TABLET, FILM COATED ORAL at 21:22

## 2025-05-26 RX ADMIN — HEPARIN SODIUM 10 UNITS/KG/HR: 10000 INJECTION, SOLUTION INTRAVENOUS at 22:05

## 2025-05-26 RX ADMIN — METOPROLOL TARTRATE 25 MG: 25 TABLET, FILM COATED ORAL at 21:22

## 2025-05-26 RX ADMIN — OXYCODONE HYDROCHLORIDE AND ACETAMINOPHEN 1 TABLET: 10; 325 TABLET ORAL at 21:47

## 2025-05-26 NOTE — H&P
[facility] Medicine Services  HISTORY AND PHYSICAL    Patient Name: Zaheer Baron  : 1960  MRN: [id number]  Primary Care Physician: Eliana Ann PA  Date of admission: 2025      Subjective   Subjective     Chief Complaint:  Episode of unresponsiveness    HPI:  Zaheer Baron is a 64 y.o. male with PMHx significant for DMII, Sjogren's syndrome, OA, CAD, A.fib not previously on anticoagulation, arthritis who presents from OSH for episode of confusion/transient alternation in awareness. Patient was reportedly eating lunch when he started feeling dizzy and lightheaded. Got up wash his face in the bathroom and apparently that is the last thing he remembers before waking up in the hospital. Wife reports during this time he was very confused, hard to reorient and not himself. On arrival to OSH he was found to be in A.fib RVR with HR in the 170s. Imaging at OSH was negative for stroke or hemorrhage. He was brought here for further neurology evaluation.      Personal History     Past Medical History:   Diagnosis Date    Acute sinusitis     Allergic rhinitis     Arthritis of back     Arthritis of neck     Arthropathy     of lumbar facet joint    Artificial lens present     position - right    Atrial fibrillation 2020    patient had episode of atrial fibrillation w/ ventricular response secondary to hypovelemia r/t salmonella infection    Backache     chronic back problems    Benign prostatic hyperplasia     Bronchitis     perisistent    Cataract     Cervical disc disorder     Chronic obstructive lung disease     Diabetes mellitus     no retinopathy    Disorder of kidney     Disorder of kidney and/or ureter - Congenital solitary right kidney       Dizziness     History of Meniere's like condition, left ear       Drug therapy     long-term    Dysfunction of eustachian tube     Dyslipidemia     Gout     Headache     History of possible migraine/cluster       Hearing loss      Hip arthrosis     Hypertension     Hypokalemia     Impacted cerumen     Infestation by Sarcoptes scabiei alta hominis     Inguinal hernia     History of, repaired       Knee pain     Knee swelling     Lumbosacral disc disease     Osteoarthritis     Periarthritis of shoulder     Pneumonia     in the past    Posterior subcapsular polar senile cataract     Sjogren's syndrome     Type 2 diabetes mellitus            Past Surgical History:   Procedure Laterality Date    BACK SURGERY      1/1/02    CARPAL TUNNEL RELEASE      (Carpal tunnel release on the left)   09/10/2010 , Carpal tunnel release on the right)   12/03/2010     CATARACT EXTRACTION, BILATERAL      COLONOSCOPY      ENDOSCOPY N/A 03/16/2017    Procedure: ESOPHAGOGASTRODUODENOSCOPY;  Surgeon: Alli Shook DO;  Location: [facility] ENDOSCOPY;  Service:     EYE SURGERY Bilateral     cataract removed    HAND SURGERY      HERNIA REPAIR      INGUINAL HERNIA REPAIR      (Left inguinal hernioplasty with mesh.)   06/07/2007     INJECTION OF MEDICATION      Injection for nerve block (Lumbar medial branch block.)   03/31/2016     INJECTION OF MEDICATION  03/19/2015    solu-medrol     KNEE SURGERY      (Lateral release, removal of loose bodies, excision of suprapatellar plica.)   04/29/1982     OTHER SURGICAL HISTORY      Lumbar surgery (Lumbosacral radio frequency thermal coagulation. Lumbosacral spondylosis.)   06/30/2016 ,Lumbar surgery (Lumbosacral radio frequency thermal coagulation.)   12/21/2015      OTHER SURGICAL HISTORY      Remove cataract, insert lens (Right eye.)   05/07/2015     OTHER SURGICAL HISTORY      Remove hip/pelvis lesion (Melanoma, right hip.)   2005     VASECTOMY         Family History: family history includes Diabetes in his paternal grandmother; Heart failure (age of onset: 35) in his father; Lung disease in his mother; Stomach cancer (age of onset: 43) in his sister.     Social History:  reports that he has never smoked. His smokeless tobacco  use includes snuff. He reports that he does not drink alcohol and does not use drugs.  Social History     Social History Narrative    Not on file       Medications:  Available home medication information reviewed.  Chlorhexidine Gluconate, Ergocalciferol, Naloxegol Oxalate, OneTouch Delica Plus Dmecns01N, PARoxetine CR, Suvorexant, albuterol sulfate HFA, bacitracin, buPROPion XL, cloNIDine, clotrimazole-betamethasone, doxazosin, empagliflozin, esomeprazole, fenofibrate, fluticasone, glipizide, glucose blood, ketoconazole, metoprolol succinate XL, naloxone, ondansetron ODT, oxyCODONE-acetaminophen, tamsulosin, triamcinolone, and ubrogepant    Allergies   Allergen Reactions    Nsaids Other (See Comments)     Solitary kidney; renal failure with NSAID use.         Objective   Objective     Vital Signs:   Temp:  [98.3 °F (36.8 °C)] 98.3 °F (36.8 °C)  Heart Rate:  [113] 113  Resp:  [18] 18  BP: (137)/(95) 137/95  Total (NIH Stroke Scale): 0    Physical Exam   Constitutional: Awake, alert  Eyes: PERRLA, sclerae anicteric, no conjunctival injection  HENT: NCAT, mucous membranes moist  Neck: Supple, no thyromegaly, no lymphadenopathy, trachea midline  Respiratory: Clear to auscultation bilaterally, nonlabored respirations   Cardiovascular: RRR, no murmurs, rubs, or gallops, palpable pedal pulses bilaterally  Gastrointestinal: Positive bowel sounds, soft, nontender, nondistended  Musculoskeletal: No bilateral ankle edema, no clubbing or cyanosis to extremities  Psychiatric: Appropriate affect, cooperative  Neurologic: Oriented x 3, strength symmetric in all extremities, Cranial Nerves grossly intact to confrontation, speech clear  Skin: No rashes      Result Review:  I have personally reviewed the results from the time of this admission to 5/26/2025 18:47 EDT and agree with these findings:  [x]  Laboratory list / accordion  [x]  Microbiology  [x]  Radiology  [x]  EKG/Telemetry   [x]  Cardiology/Vascular   [x]  Pathology  [x]   Old records  []  Other:  Most notable findings include:       LAB RESULTS:      Lab 05/26/25  1628   HEPARIN ANTI-XA 0.1*                                 Microbiology Results (last 10 days)       ** No results found for the last 240 hours. **            CT Angiogram Carotids  Result Date: 5/26/2025  Study: CT ANGIOGRAM HEAD NECK W CONTRAST STROKE W/LVO PER CONTRAST PROTOCOL REASON FOR EXAM: Transient ischemic attack (TIA) TECHNIQUE: CT angiography of the head and neck was performed following the intravenous administration of iodinated contrast material. Contiguous axial images were obtained from the aortic arch to the vertex. Coronal and sagittal reformatted images were also reviewed. One of these 3D techniques was utilized: Maximum Intensity Pixel (MIP), 3D Reconstructed Images, Volume Rendered Images, Surface Shaded Rendering. One of the following dose reduction techniques was utilized for this exam. Automated exposure control, adjustment of the mA and/or kV according to patient size, and use of iterative reconstruction. CT DI->10.2; CT DLP->163.2 COMPARISON: No previous studies are available for comparison. ___________________________________ FINDINGS: Head:  Intracranial Arteries: The intracranial arteries, including the anterior cerebral arteries, middle cerebral arteries, posterior cerebral arteries, basilar artery, and vertebral arteries, are all patent without evidence of significant stenosis, aneurysm, or dissection. There is no evidence of vascular malformations.  [place]: The [place] is intact with no anatomical variations or abnormalities noted. All segments are well-visualized and normal in appearance.  Venous System: The visualized portions of the venous system, including the dural venous sinuses, are patent with no evidence of thrombosis.  Brain Parenchyma: Chronic microvascular and age-related involutional brain changes  Bones: degenerative changes of the imaged spine.  Soft  Tissues: The visualized soft tissues of the head are unremarkable.  Neck:  Carotid Arteries:  Minimal atheromatous plaques of the aortic arch and the left carotid bulb. Mild intimal thcikening of both carotid bulbs. No significant stenosis.  The common, internal, and external carotid arteries are patent bilaterally with no evidence of significant stenosis, aneurysm, or dissection. There is no evidence of atherosclerotic plaque causing significant luminal narrowing.  Vertebral Arteries: The vertebral arteries are patent bilaterally with no evidence of significant stenosis, aneurysm, or dissection.  Thyroid Gland: A Few right lobe hypodense nodules are noted, the largest measures 17 mm.  Lymph Nodes: There is no evidence of significant lymphadenopathy in the neck.  Soft Tissues: The soft tissues of the neck are unremarkable with no evidence of masses or abnormal collections.  Additional Findings: Ectasia of the right internal jugular and subclavian veins. ___________________________________    Impression: 1. No evidence of significant vascular abnormalities, acute infarct. 2. Ectasia of the right internal jugular and subclavian veins needs Doppler correlation.    XR Chest 1 View  Result Date: 5/26/2025  Study: XR CHEST AP PORTABLE REASON FOR EXAM: ALTERED MENTAL STATUS TECHNIQUE: An X-ray image of the chest is obtained in AP projection. COMPARISON: No prior studies are available for comparison. ___________________________________ FINDINGS: Pulmonary Parenchyma: The medial aspect of the right mid and the right lower lung zone shows few dense focal opacities with lobulated margins, the largest is right hilar.  Mild elevated left hemidiaphragm is noted.  Other than that, the rest of the lung fields show no evidence of consolidation or collapse. No evidence of pleural effusion or pleural thickening. Heart and Mediastinum: The heart size can not be assessed on the anterior-posterior projection, yet it seems enlarged,  associated with an unfolded aorta.  Bony Thorax: Bony thorax appears intact without acute fractures or deformities. Soft Tissues: Soft tissues overlying the chest wall are unremarkable. ___________________________________    Impression: 1. No acute cardiopulmonary abnormalities. 2. Possible right hilar calcific nodules/ lymph nodes. 3. Cardiomegaly is associated with an unfolded aorta.    CT Head Without Contrast  Result Date: 5/26/2025  Study: CT HEAD WO CONTRAST FOR STROKE REASON FOR EXAM: Neuro deficit, acute, stroke suspected TECHNIQUE: Axial non-contrast CT scan of the brain was performed from the skull base to the high parietal region with coronal and sagittal reformats. One of the following dose reduction techniques were utilized for this exam: Automated exposure control, adjustment of the mA and/or kV according to patient size, use of iterative reconstruction. COMPARISON: None. ___________________________________ FINDINGS: Brain Parenchyma:  Bilateral periventricular and white matter hypodensities likely represent small vessels ischemic changes.  Age-related cerebral atrophic changes as evidenced by prominence of cortical sulci and gyri and mild prominent lateral ventricles.  Otherwise, normal attenuation of the cerebral hemispheres, cerebellum, and brainstem. No evidence of acute territorial infarct, hemorrhage, or mass effect. Ventricular System:  Ventricles are prominent. No evidence of hydrocephalus or ventricular enlargement. Subarachnoid Spaces:  prominent sulci and cisterns. No evidence of subarachnoid hemorrhage or extra-axial fluid collections. Cerebellum and Brainstem:  No masses, lesions, or areas of abnormal density. Orbits:  Normal appearance of the globes, optic nerves, and extraocular muscles. No evidence of orbital masses or abnormal density. Sinuses:  Clear paranasal sinuses apart from right maxillary retention cyst. No evidence of sinusitis or mucosal thickening. Mastoid Air Cells:  Clear  mastoid air cells. No evidence of mastoiditis. Skull:  Normal skull morphology. ___________________________________    Impression: 1. No evidence of acute territorial infarct or hemorrhage. 2. Small vessels ischemic changes. 3. MRI is the modality of choice to rule out acute ischemic infarction and is advised if clinically indicated.       Results for orders placed during the hospital encounter of 03/07/20    Adult Transthoracic Echo Complete With Contrast if Necessary Per Protocol    Interpretation Summary  · Left ventricular wall thickness is consistent with mild concentric hypertrophy.  · Left ventricular systolic function is normal. LV EF: 61-65%. Mild hypertrophy with pseudonormal diastolic function.  · Right ventricular function is normal. The cavity is mildly dilated.  · Pulmonic valve thickening with mild-to-moderate pulmonic valve regurgitation is present.  · Tubular ascending aorta demonstrates ectasia to 3.8 cm      Assessment & Plan   Assessment & Plan       Episode of unresponsiveness    Sjogren's syndrome    Hypertension    GERD (gastroesophageal reflux disease)    Mixed hyperlipidemia    Solitary kidney, congenital    Depression    Hypertriglyceridemia    Atrial fibrillation with RVR    Zaheer Baron is a 64 y.o. male with PMHx significant for DMII, Sjogren's syndrome, OA, CAD, brief A.fib not previously on anticoagulation, arthritis who presents from OSH for episode of unresponsiveness.    Transient Alteration in Awareness - now back to baseline  - suspect possibly related to episode of A.fib RVR, now back to baseline  - CT imaging at OSH negative for stroke or hemorrhage, CTA of carotids showing no LVO  - stat CT head pending here, will also get MRI brain  - stroke team following  - Echocardiogram  - PT/OT/SLP evaluations, passed bedside diet     A.fib RVR:  Dizziness  - reports brief prior episode in setting of infection, not on anticoagulation  - if CT imaging negative for hemorrhage, will resume  heparin gtt started at OSH  - rate controlled on diltiazem infusion, now appears to be back in NSR, will get EKG  - start metoprolol BID for rate control   - EKG from OSH reviewed, showed A.fib RVR   - Suspect he has been having transient A.fib episodes for a while based on symptom description  - Cardiology consult in AM    DMII:  - A1c 7.2  - LD SSI coverage for now    Sjogren's Syndrome:  - does not appear he is on any type of maintenance therapy    GERD:  - PPI    Arthritis  - on chronic opioid pain medication, continue  - supposed to have Knee Replacement on Wednesday with Dr. Holland, will need to delay this most likely.    HLD:  - on tricor at home  - start lipitor  - check lipid panel      VTE Prophylaxis:  Mechanical VTE prophylaxis orders are present.    CODE STATUS:    Code Status and Medical Interventions: CPR (Attempt to Resuscitate); Full Support   Ordered at: 05/26/25 1845     Code Status (Patient has no pulse and is not breathing):    CPR (Attempt to Resuscitate)     Medical Interventions (Patient has pulse or is breathing):    Full Support     Level Of Support Discussed With:    Patient       Expected Discharge   Expected discharge date/ time has not been documented.     Geraldine Godfrey,   05/26/25

## 2025-05-26 NOTE — CONSULTS
"Stroke Consult Note    Patient Name: Zaheer Baron   MRN: [id number]  Age: 64 y.o.  Sex: male  : 1960    Primary Care Physician: Eliana Ann PA  Referring Physician:  Dr. Umanzor (OS)    TIME STROKE TEAM CALLED: 1509 EST     TIME PATIENT SEEN: 1807 EST    Handedness: Right  Race:     Chief Complaint/Reason for Consultation: AMS, episode of unresponsiveness    HPI: Zaheer Baron is a 64-year-old male with past medical history of T2DM, Sjorgren's syndrome, OA, CAD, cataract, previously resolved A-fib (, not on OAC) presented to [facility] emergency department for further evaluation of 10-minute episode of unresponsiveness that began around 1300 today.  Per OS provider report patient's wife was talking to him when he suddenly stopped responding or talking to her.  He denies any focal deficits and reports NIH is 0.  Upon arrival to [facility] ER he was found to be in A-fib with RVR, he was started on Cardizem.  CT head was reported to be negative for any acute findings.  CTA head/neck were also reported to be negative for any flow-limiting stenosis or LVO.  Facility is unable to power share so I have requested a disc to be sent with patient.  He will be admitted to the hospital service for further evaluation.    Of note, it appears patient has total right knee replacement scheduled at our facility on 2025 with Dr. Holland.    On arrival to our facility via [facility], patient was noted to be on a heparin drip.  Patient tells me that he was told that he \"had a bleeder in his brain or maybe a calcium deposit\"  Heparin was stopped for now.  Images were unable to be power shared and patient did not come with a disc.  He tells me he was in the restaurant for lunch while traveling to [facility] when he became dizzy and felt like he needed to wash his face.  When he returned from the restroom his wife asked him \"are you okay\" he replied with \"I do not know\" and then the next and he " "remembers he woke up in the emergency department.  He reports his heart rate was 170 at that time.  On exam his NIH is 0.  He is at his baseline.  He denies ever having any focal deficits.  There was some confusion regarding episode of urinary incontinence, patient tells me this was because he \"missed the urinal in the emergency department\" and this was after he had returned to baseline. He does tell me that he has had a headache for approximately 1 month that he has been taking Excedrin for.  He last took Excedrin this morning and does not currently have a headache. Discussed the case with Dr. Godfrey and Dr. Rahman.  We will get a stat CT head to rule out ICH.  If CT head is negative then will defer to hospitalist service for anticoagulation for A-fib.    Last Known Normal Date/Time: 5/26/25 1300 EST     Review of Systems   Neurological:  Positive for syncope and headaches.   Psychiatric/Behavioral:  Positive for confusion.       Past Medical History:   Diagnosis Date    Acute sinusitis     Allergic rhinitis     Arthritis of back     Arthritis of neck     Arthropathy     of lumbar facet joint    Artificial lens present     position - right    Atrial fibrillation 03/07/2020    patient had episode of atrial fibrillation w/ ventricular response secondary to hypovelemia r/t salmonella infection    Backache     chronic back problems    Benign prostatic hyperplasia     Bronchitis     perisistent    Cataract     Cervical disc disorder     Chronic obstructive lung disease     Diabetes mellitus     no retinopathy    Disorder of kidney     Disorder of kidney and/or ureter - Congenital solitary right kidney       Dizziness     History of Meniere's like condition, left ear       Drug therapy     long-term    Dysfunction of eustachian tube     Dyslipidemia     Gout     Headache     History of possible migraine/cluster       Hearing loss     Hip arthrosis     Hypertension     Hypokalemia     Impacted cerumen     Infestation by " Sarcoptes scabiei alta hominis     Inguinal hernia     History of, repaired       Knee pain     Knee swelling     Lumbosacral disc disease     Osteoarthritis     Periarthritis of shoulder     Pneumonia     in the past    Posterior subcapsular polar senile cataract     Sjogren's syndrome     Type 2 diabetes mellitus      Past Surgical History:   Procedure Laterality Date    BACK SURGERY      1/1/02    CARPAL TUNNEL RELEASE      (Carpal tunnel release on the left)   09/10/2010 , Carpal tunnel release on the right)   12/03/2010     CATARACT EXTRACTION, BILATERAL      COLONOSCOPY      ENDOSCOPY N/A 03/16/2017    Procedure: ESOPHAGOGASTRODUODENOSCOPY;  Surgeon: Alli Shook DO;  Location: [facility] ENDOSCOPY;  Service:     EYE SURGERY Bilateral     cataract removed    HAND SURGERY      HERNIA REPAIR      INGUINAL HERNIA REPAIR      (Left inguinal hernioplasty with mesh.)   06/07/2007     INJECTION OF MEDICATION      Injection for nerve block (Lumbar medial branch block.)   03/31/2016     INJECTION OF MEDICATION  03/19/2015    solu-medrol     KNEE SURGERY      (Lateral release, removal of loose bodies, excision of suprapatellar plica.)   04/29/1982     OTHER SURGICAL HISTORY      Lumbar surgery (Lumbosacral radio frequency thermal coagulation. Lumbosacral spondylosis.)   06/30/2016 ,Lumbar surgery (Lumbosacral radio frequency thermal coagulation.)   12/21/2015      OTHER SURGICAL HISTORY      Remove cataract, insert lens (Right eye.)   05/07/2015     OTHER SURGICAL HISTORY      Remove hip/pelvis lesion (Melanoma, right hip.)   2005     VASECTOMY       Family History   Problem Relation Age of Onset    Lung disease Mother         autoimmune    Heart failure Father 35    Stomach cancer Sister 43        autoimmune    Diabetes Paternal Grandmother      Social History     Socioeconomic History    Marital status:    Tobacco Use    Smoking status: Never    Smokeless tobacco: Current     Types: Snuff   Vaping Use    Vaping  status: Never Used   Substance and Sexual Activity    Alcohol use: No    Drug use: No    Sexual activity: Defer     Partners: Female     Allergies   Allergen Reactions    Nsaids Other (See Comments)     Solitary kidney; renal failure with NSAID use.       Prior to Admission medications    Medication Sig Start Date End Date Taking? Authorizing Provider   albuterol sulfate  (90 Base) MCG/ACT inhaler INHALE 1 TO 2 PUFFS EVERY 4 TO 6 HOURS AS NEEDED FOR WHEEZE FOR UP TO 30 DAYS 2/8/23   ProviderSoto MD   bacitracin 500 UNIT/GM ointment Apply 1 Application topically to the appropriate area as directed 2 (Two) Times a Day. 4/30/24   Mohini Shabazz APRN   Belsomra 20 MG tablet TAKE 1 TABLET BY MOUTH DAILY AS NEEDED FOR SLEEP 4/25/25   Jayy Holden MD   buPROPion XL (Wellbutrin XL) 150 MG 24 hr tablet Take 1 tablet by mouth Daily. 7/3/24   Eliana Ann PA   Chlorhexidine Gluconate 4 % solution Shower daily with hibiclens solution as directed 5 days prior to surgery. 3/18/25   Rai Holland MD   cloNIDine (CATAPRES) 0.1 MG tablet Take 1 tablet by mouth Daily As Needed for High Blood Pressure. 5/7/24   Eliana Ann PA   cloNIDine (CATAPRES-TTS) 0.1 MG/24HR patch Place 1 patch on the skin as directed by provider 1 (One) Time Per Week.    Provider, MD Soto   clotrimazole-betamethasone (Lotrisone) 1-0.05 % cream Apply 1 application  topically to the appropriate area as directed 2 (Two) Times a Day. 12/7/23   Eliana Ann PA   doxazosin (Cardura) 4 MG tablet Take 1 tablet by mouth Every Night. 10/30/24   Eliana Ann PA   empagliflozin (Jardiance) 25 MG tablet tablet Take 1 tablet by mouth Daily. 1/8/25   Eliana Ann PA   Ergocalciferol 50 MCG (2000 UT) tablet Take 50 mcg by mouth Daily. 8/12/21   Nasir Garcia MD   esomeprazole (nexIUM) 20 MG capsule Take 1 capsule by mouth Every Morning Before Breakfast.    ProviderSoto MD   fenofibrate (Tricor) 145  MG tablet Take 1 tablet by mouth Daily. 7/11/24   Eliana Ann PA   fluticasone (FLONASE) 50 MCG/ACT nasal spray     Soto De La Rosa MD   glipizide (GLUCOTROL) 5 MG tablet  3/5/25   Soto De La Rosa MD   glucose blood test strip 1 each by Other route Daily. Use as instructed 11/21/19   Nasir Garcia MD   ketoconazole (NIZORAL) 2 % shampoo USE AS A SHAMPOO TO THE SCALP THREE TIMES A WEEK. LATHER FOR 5 MINUTES THEN RINSE. 4/28/25   Soto De La Rosa MD   Lancets (ONETOUCH DELICA PLUS [id number]) misc USE DAILY TO CHECK BLOOD SUGAR 7/15/20   Nasir Garcia MD   metoprolol succinate XL (TOPROL-XL) 100 MG 24 hr tablet  6/12/24   Soto De La Rosa MD   Movantik 25 MG tablet Take 1 tablet by mouth Daily. 5/1/24   Soto De La Rosa MD   naloxone (NARCAN) 4 MG/0.1ML nasal spray Administer 1 spray into the nostril(s) as directed by provider. 5/17/23   Soto De La Rosa MD   ondansetron ODT (ZOFRAN-ODT) 4 MG disintegrating tablet TAKE 1 TABLET BY MOUTH EVERY 6-8 HOURS AS NEEDED FOR NAUSEA 3/6/23   Soto De La Rosa MD   oxyCODONE-acetaminophen (PERCOCET)  MG per tablet Take 1 tablet by mouth. 5/6/25 7/4/25  Soto De La Rosa MD   PARoxetine CR (PAXIL-CR) 25 MG 24 hr tablet Take 1 tablet by mouth Every Morning. 5/22/25   Eliana Ann PA   tamsulosin (FLOMAX) 0.4 MG capsule 24 hr capsule  6/12/24   Soto De La Rosa MD   triamcinolone (KENALOG) 0.5 % ointment Apply 1 Application topically to the appropriate area as directed 2 (Two) Times a Day. 7/3/24   Eliana Ann PA   ubrogepant (Ubrelvy) 100 MG tablet Take 1 tablet by mouth Daily As Needed (migraine). 5/7/24   Eliana Ann PA            Neurological Exam  Mental Status  Awake, alert and oriented to person, place and time. Oriented to person, place, time and situation. Oriented to person, place, and time. Recent and remote memory are intact. Speech is normal. Language is fluent with no aphasia. Attention  and concentration are normal.    Cranial Nerves  CN II: Visual fields full to confrontation.  CN III, IV, VI: Extraocular movements intact bilaterally. Pupils equal round and reactive to light bilaterally.  CN V: Facial sensation is normal.  CN VII: Full and symmetric facial movement.  CN VIII: Hearing appears intact.  CN XII: Tongue midline without atrophy or fasciculations.    Motor  Normal muscle bulk throughout. Normal muscle tone. Strength is 5/5 throughout all four extremities.    Sensory  Sensation is intact to light touch, pinprick, vibration and proprioception in all four extremities. No right-sided hemispatial neglect. No left-sided hemispatial neglect.    Coordination  Right: Finger-to-nose normal. Heel-to-shin normal.Left: Finger-to-nose normal. Heel-to-shin normal.    Gait   Normal gait.  Not observed.      Physical Exam  Vitals and nursing note reviewed.   HENT:      Head: Normocephalic.   Eyes:      Extraocular Movements: Extraocular movements intact.      Pupils: Pupils are equal, round, and reactive to light.   Cardiovascular:      Rate and Rhythm: Normal rate and regular rhythm.      Pulses: Normal pulses.      Heart sounds: Normal heart sounds.   Pulmonary:      Effort: Pulmonary effort is normal. No respiratory distress.      Breath sounds: Normal breath sounds.   Skin:     General: Skin is warm and dry.      Capillary Refill: Capillary refill takes less than 2 seconds.   Neurological:      General: No focal deficit present.      Mental Status: He is oriented to person, place, and time.      Motor: Motor strength is normal.     Gait: Gait is intact.   Psychiatric:         Attention and Perception: Attention normal.         Mood and Affect: Mood normal.         Speech: Speech normal.         Behavior: Behavior normal.         Acute Stroke Data    Thrombolytic Inclusion / Exclusion Criteria    Time: 16:52 EDT  Person Administering Scale: WILLA Adan      YES NO INCLUSION CRITERIA CLASS I    [] [x]   Suspected diagnosis of acute ischemic stroke with measureable neurological deficit.  Low NIHSS with disabling stroke symptoms.   [] [x]   Onset of stroke symptoms < 3 hours before beginning treatment >/ 18 years old  Stroke symptom onset = time patient was last seen well or without symptoms (LKW)   [] [x]   Onset of symptoms between 3-4.5 hours: >/= 80 years old (safe Class IIa) with history of   both diabetes and prior CVA  (reasonable Class IIb) AND NIHSS </= 25  *If not eligible for IV Thrombolytic consider neuro intervention for LKW within 24 hours     YES NO EXCLUSION CRITERIA (CONTRAINDICATIONS) CLASS III EVIDENCE HARM   [] []   Blood pressure >185/110 medically refractory to IV medications   [] []   Active bleeding at a non-compressible site   [] []   Active intracranial hemorrhage (ICH)   [] []   Symptoms suggestive of subarachnoid hemorrhage (SAH)   [] []   GI bleed within 21 days   [] []   Ischemic stroke within 3 months   [] []   Severe head trauma within 3 months    [] []   Intracranial or intraspinal surgery within 3 months   [] []   Current GI malignancy   [] []   Intracranial neoplasm   [] []   Infective endocarditis   [] []   Aortic arch dissection   [] []   Active coagulopathy with  INR >1.7, platelets <100,000, PTT > 40 sec, PT > 15 sec   *For warfarin, administration can begin before blood tests resulted. Discontinue for above values.    [] []   Treatment dose* of LMWH (Lovenox) in last 24 hours  *prophylactic dosages are not a contraindication   [] []   Concurrent use of antiplatelet agents' glycoprotein inhibitors IIb/IIIa (Integrilin, etc.)   [] []   Thrombin or factor Xa inhibitors (Eliquis, Xarelto, Arixtra) taken in last 48 hours     YES NO CLASS II: AIS WITH THE FOLLOWING CONDITIONS -   TREATMENT RISKS SHOULD BE WEIGHED AGAINST POSSIBLE BENEFITS.    [] []   Major trauma in last 14 days, recent major surgery in last 14 days, intracranial arterial dissection, giant unruptured and  unsecured intracranial aneurysm, pericarditis     [] []   The risks and benefits have been discussed with the patient or family related to the administration of IV thrombolytic therapy for stroke symptoms.   [] []   I have discussed and reviewed the patient's case and imaging with the attending prior to IV thrombolytic therapy.   TIME N/A Time IV thrombolytic administered       Hospital Meds:  Scheduled-   Infusions- No current facility-administered medications for this encounter.     PRNs-     Functional Status Prior to Current Stroke/Kohler Score: 0    NIH Stroke Scale  Time: 1810 EST  Person Administering Scale: WILLA Adan    Interval: baseline  1a. Level of Consciousness: 0-->Alert, keenly responsive  1b. LOC Questions: 0-->Answers both questions correctly  1c. LOC Commands: 0-->Performs both tasks correctly  2. Best Gaze: 0-->Normal  3. Visual: 0-->No visual loss  4. Facial Palsy: 0-->Normal symmetrical movements  5a. Motor Arm, Left: 0-->No drift, limb holds 90 (or 45) degrees for full 10 secs  5b. Motor Arm, Right: 0-->No drift, limb holds 90 (or 45) degrees for full 10 secs  6a. Motor Leg, Left: 0-->No drift, leg holds 30 degree position for full 5 secs  6b. Motor Leg, Right: 0-->No drift, leg holds 30 degree position for full 5 secs  7. Limb Ataxia: 0-->Absent  8. Sensory: 0-->Normal, no sensory loss  9. Best Language: 0-->No aphasia, normal  10. Dysarthria: 0-->Normal  11. Extinction and Inattention (formerly Neglect): 0-->No abnormality    Total (NIH Stroke Scale): 0    Results Reviewed:  I have personally reviewed current lab, radiology, and data.  CT Angiogram Carotids  Result Date: 5/26/2025  1. No evidence of significant vascular abnormalities, acute infarct. 2. Ectasia of the right internal jugular and subclavian veins needs Doppler correlation.    XR Chest 1 View  Result Date: 5/26/2025  1. No acute cardiopulmonary abnormalities. 2. Possible right hilar calcific nodules/ lymph nodes. 3.  Cardiomegaly is associated with an unfolded aorta.    CT Head Without Contrast  Result Date: 5/26/2025  1. No evidence of acute territorial infarct or hemorrhage. 2. Small vessels ischemic changes. 3. MRI is the modality of choice to rule out acute ischemic infarction and is advised if clinically indicated.     Assessment/Plan:    This is a 64-year-old male with past medical history of T2DM, Sjorgren's syndrome, OA, CAD, cataract, previously resolved A-fib (2020, not on OAC) presented to [facility] emergency department for further evaluation of 10-minute episode of unresponsiveness that began around 1300 today.  He was found to be in A-fib with RVR with a rate of 170 on arrival to OSH ER. CT head was reported to be negative for any acute findings.  CTA head/neck were also reported to be negative for any flow-limiting stenosis or LVO.     Antiplatelet PTA: None  Anticoagulant PTA: None      Transient altered mental status  Suspected syncope in the setting of A-fib with RVR (heart rate 170), low suspicion for CVA/TIA.  -TIA/CVA order set wihtout thrombolytic therapy has been initiated  -NPO until bedside nursing dysphagia screen completed  -MRI brain pending  -Stat CT Head pending  -TTE pending  -A1c and lipid panel in AM  -EEG routine pending  -Meds: If CT head negative for hemorrhage then defer to hospitalist for anticoagulation overnight  -Cards consult for OAC recommendations regarding new onset Afib RVR  -Activity as tolerated, fall risk precautions  -PT/OT/SLP evaluation    2.  Essential hypertension  -Allow autoregulation of blood pressure for adequate cerebral blood flow, goal SBP <220    3.  Hyperlipidemia  -Lipid panel in AM  -Atorvastatin 80mg nightly    4.  Diabetes Mellitus type 2, goal A1c <7  -A1c in AM  -Maintain euglycemia  -Management per primary team    Plan of care was discussed with Dr. Uerna (hospitalist), Dr. Godfrey (hospitalist), patient, primary nurse and patient's daughter at  bedside.  Stroke neurology will continue to follow.  Please call with any questions or concerns.  Thank you for this consult.      Rosalina Sauceda, WILLA  May 26, 2025  19:32 EDT

## 2025-05-27 ENCOUNTER — READMISSION MANAGEMENT (OUTPATIENT)
Dept: CALL CENTER | Facility: HOSPITAL | Age: 65
End: 2025-05-27
Payer: MEDICARE

## 2025-05-27 ENCOUNTER — APPOINTMENT (OUTPATIENT)
Dept: CARDIOLOGY | Facility: HOSPITAL | Age: 65
End: 2025-05-27
Payer: MEDICARE

## 2025-05-27 ENCOUNTER — APPOINTMENT (OUTPATIENT)
Dept: NEUROLOGY | Facility: HOSPITAL | Age: 65
End: 2025-05-27
Payer: MEDICARE

## 2025-05-27 VITALS
TEMPERATURE: 98 F | BODY MASS INDEX: 33.89 KG/M2 | HEIGHT: 69 IN | DIASTOLIC BLOOD PRESSURE: 91 MMHG | HEART RATE: 66 BPM | SYSTOLIC BLOOD PRESSURE: 157 MMHG | OXYGEN SATURATION: 94 % | RESPIRATION RATE: 18 BRPM

## 2025-05-27 PROBLEM — R40.4 EPISODE OF UNRESPONSIVENESS: Status: RESOLVED | Noted: 2025-05-26 | Resolved: 2025-05-27

## 2025-05-27 PROBLEM — I48.91 ATRIAL FIBRILLATION WITH RVR: Status: RESOLVED | Noted: 2025-05-26 | Resolved: 2025-05-27

## 2025-05-27 LAB
ANION GAP SERPL CALCULATED.3IONS-SCNC: 15 MMOL/L (ref 5–15)
AORTIC DIMENSIONLESS INDEX: 0.75 (DI)
ASCENDING AORTA: 3.9 CM
AV MEAN PRESS GRAD SYS DOP V1V2: 3 MMHG
AV VMAX SYS DOP: 117.8 CM/SEC
BASOPHILS # BLD AUTO: 0.07 10*3/MM3 (ref 0–0.2)
BASOPHILS NFR BLD AUTO: 0.8 % (ref 0–1.5)
BH CV ECHO LEFT VENTRICLE GLOBAL LONGITUDINAL STRAIN: -13.9 %
BH CV ECHO MEAS - AO MAX PG: 5.6 MMHG
BH CV ECHO MEAS - AO ROOT AREA (BSA CORRECTED): 1.8 CM2
BH CV ECHO MEAS - AO ROOT DIAM: 3.9 CM
BH CV ECHO MEAS - AO V2 VTI: 25.9 CM
BH CV ECHO MEAS - AVA(I,D): 2.36 CM2
BH CV ECHO MEAS - EDV(CUBED): 68.9 ML
BH CV ECHO MEAS - EDV(MOD-SP2): 119 ML
BH CV ECHO MEAS - EDV(MOD-SP4): 177 ML
BH CV ECHO MEAS - EF(MOD-SP2): 39.7 %
BH CV ECHO MEAS - EF(MOD-SP4): 60.2 %
BH CV ECHO MEAS - ESV(CUBED): 39.3 ML
BH CV ECHO MEAS - ESV(MOD-SP2): 71.7 ML
BH CV ECHO MEAS - ESV(MOD-SP4): 70.4 ML
BH CV ECHO MEAS - FS: 17.1 %
BH CV ECHO MEAS - IVS/LVPW: 1 CM
BH CV ECHO MEAS - IVSD: 1.6 CM
BH CV ECHO MEAS - LA DIMENSION: 3.5 CM
BH CV ECHO MEAS - LAT PEAK E' VEL: 8.2 CM/SEC
BH CV ECHO MEAS - LV DIASTOLIC VOL/BSA (35-75): 80.9 CM2
BH CV ECHO MEAS - LV MASS(C)D: 266.9 GRAMS
BH CV ECHO MEAS - LV MAX PG: 3.1 MMHG
BH CV ECHO MEAS - LV MEAN PG: 2 MMHG
BH CV ECHO MEAS - LV SYSTOLIC VOL/BSA (12-30): 32.2 CM2
BH CV ECHO MEAS - LV V1 MAX: 87.8 CM/SEC
BH CV ECHO MEAS - LV V1 VTI: 19.5 CM
BH CV ECHO MEAS - LVIDD: 4.1 CM
BH CV ECHO MEAS - LVIDS: 3.4 CM
BH CV ECHO MEAS - LVOT AREA: 3.1 CM2
BH CV ECHO MEAS - LVOT DIAM: 2 CM
BH CV ECHO MEAS - LVPWD: 1.6 CM
BH CV ECHO MEAS - MED PEAK E' VEL: 5.8 CM/SEC
BH CV ECHO MEAS - MV A MAX VEL: 91.4 CM/SEC
BH CV ECHO MEAS - MV DEC SLOPE: 383 CM/SEC2
BH CV ECHO MEAS - MV DEC TIME: 0.2 SEC
BH CV ECHO MEAS - MV E MAX VEL: 107 CM/SEC
BH CV ECHO MEAS - MV E/A: 1.17
BH CV ECHO MEAS - MV MAX PG: 4.7 MMHG
BH CV ECHO MEAS - MV MEAN PG: 1.81 MMHG
BH CV ECHO MEAS - MV P1/2T: 75.7 MSEC
BH CV ECHO MEAS - MV V2 VTI: 30.7 CM
BH CV ECHO MEAS - MVA(P1/2T): 2.9 CM2
BH CV ECHO MEAS - MVA(VTI): 1.99 CM2
BH CV ECHO MEAS - RAP SYSTOLE: 3 MMHG
BH CV ECHO MEAS - RVSP: 15 MMHG
BH CV ECHO MEAS - SV(LVOT): 61.3 ML
BH CV ECHO MEAS - SV(MOD-SP2): 47.3 ML
BH CV ECHO MEAS - SV(MOD-SP4): 106.6 ML
BH CV ECHO MEAS - SVI(LVOT): 28 ML/M2
BH CV ECHO MEAS - SVI(MOD-SP2): 21.6 ML/M2
BH CV ECHO MEAS - SVI(MOD-SP4): 48.7 ML/M2
BH CV ECHO MEAS - TAPSE (>1.6): 2.24 CM
BH CV ECHO MEAS - TR MAX PG: 12.2 MMHG
BH CV ECHO MEAS - TR MAX VEL: 174.9 CM/SEC
BH CV ECHO MEASUREMENTS AVERAGE E/E' RATIO: 15.29
BH CV ECHO SHUNT ASSESSMENT PERFORMED (HIDDEN SCRIPTING): 1
BH CV XLRA - RV BASE: 3.6 CM
BH CV XLRA - RV LENGTH: 7.1 CM
BH CV XLRA - RV MID: 3.1 CM
BH CV XLRA - TDI S': 12.5 CM/SEC
BUN SERPL-MCNC: 13 MG/DL (ref 8–23)
BUN/CREAT SERPL: 11.4 (ref 7–25)
CALCIUM SPEC-SCNC: 9.2 MG/DL (ref 8.6–10.5)
CHLORIDE SERPL-SCNC: 102 MMOL/L (ref 98–107)
CHOLEST SERPL-MCNC: 199 MG/DL (ref 0–200)
CO2 SERPL-SCNC: 23 MMOL/L (ref 22–29)
COTININE SERPL-MCNC: 100.5 NG/ML
CREAT SERPL-MCNC: 1.14 MG/DL (ref 0.76–1.27)
DEPRECATED RDW RBC AUTO: 45.1 FL (ref 37–54)
EGFRCR SERPLBLD CKD-EPI 2021: 71.4 ML/MIN/1.73
EOSINOPHIL # BLD AUTO: 0.16 10*3/MM3 (ref 0–0.4)
EOSINOPHIL NFR BLD AUTO: 1.7 % (ref 0.3–6.2)
ERYTHROCYTE [DISTWIDTH] IN BLOOD BY AUTOMATED COUNT: 13.8 % (ref 12.3–15.4)
GLUCOSE BLDC GLUCOMTR-MCNC: 103 MG/DL (ref 70–130)
GLUCOSE BLDC GLUCOMTR-MCNC: 135 MG/DL (ref 70–130)
GLUCOSE BLDC GLUCOMTR-MCNC: 168 MG/DL (ref 70–130)
GLUCOSE SERPL-MCNC: 134 MG/DL (ref 65–99)
HBA1C MFR BLD: 7.2 % (ref 4.8–5.6)
HCT VFR BLD AUTO: 41 % (ref 37.5–51)
HDLC SERPL-MCNC: 34 MG/DL (ref 40–60)
HGB BLD-MCNC: 13.5 G/DL (ref 13–17.7)
IMM GRANULOCYTES # BLD AUTO: 0.03 10*3/MM3 (ref 0–0.05)
IMM GRANULOCYTES NFR BLD AUTO: 0.3 % (ref 0–0.5)
LDLC SERPL CALC-MCNC: 124 MG/DL (ref 0–100)
LDLC/HDLC SERPL: 3.49 {RATIO}
LEFT ATRIUM VOLUME INDEX: 37.4 ML/M2
LV EF 2D ECHO EST: 60 %
LYMPHOCYTES # BLD AUTO: 3.17 10*3/MM3 (ref 0.7–3.1)
LYMPHOCYTES NFR BLD AUTO: 34 % (ref 19.6–45.3)
MCH RBC QN AUTO: 29.6 PG (ref 26.6–33)
MCHC RBC AUTO-ENTMCNC: 32.9 G/DL (ref 31.5–35.7)
MCV RBC AUTO: 89.9 FL (ref 79–97)
MONOCYTES # BLD AUTO: 0.56 10*3/MM3 (ref 0.1–0.9)
MONOCYTES NFR BLD AUTO: 6 % (ref 5–12)
NEUTROPHILS NFR BLD AUTO: 5.32 10*3/MM3 (ref 1.7–7)
NEUTROPHILS NFR BLD AUTO: 57.2 % (ref 42.7–76)
NICOTINE SERPL-MCNC: 3.5 NG/ML
NRBC BLD AUTO-RTO: 0 /100 WBC (ref 0–0.2)
PLATELET # BLD AUTO: 282 10*3/MM3 (ref 140–450)
PMV BLD AUTO: 10.3 FL (ref 6–12)
POTASSIUM SERPL-SCNC: 3.6 MMOL/L (ref 3.5–5.2)
QT INTERVAL: 382 MS
QT INTERVAL: 442 MS
QTC INTERVAL: 452 MS
QTC INTERVAL: 457 MS
RBC # BLD AUTO: 4.56 10*6/MM3 (ref 4.14–5.8)
SODIUM SERPL-SCNC: 140 MMOL/L (ref 136–145)
TRIGL SERPL-MCNC: 232 MG/DL (ref 0–150)
TSH SERPL DL<=0.05 MIU/L-ACNC: 1.29 UIU/ML (ref 0.27–4.2)
UFH PPP CHRO-ACNC: 0.2 IU/ML (ref 0.3–0.7)
UFH PPP CHRO-ACNC: 0.23 IU/ML (ref 0.3–0.7)
VLDLC SERPL-MCNC: 41 MG/DL (ref 5–40)
WBC NRBC COR # BLD AUTO: 9.31 10*3/MM3 (ref 3.4–10.8)

## 2025-05-27 PROCEDURE — 95816 EEG AWAKE AND DROWSY: CPT

## 2025-05-27 PROCEDURE — 97161 PT EVAL LOW COMPLEX 20 MIN: CPT

## 2025-05-27 PROCEDURE — 96366 THER/PROPH/DIAG IV INF ADDON: CPT

## 2025-05-27 PROCEDURE — G0378 HOSPITAL OBSERVATION PER HR: HCPCS

## 2025-05-27 PROCEDURE — 93356 MYOCRD STRAIN IMG SPCKL TRCK: CPT

## 2025-05-27 PROCEDURE — 83036 HEMOGLOBIN GLYCOSYLATED A1C: CPT

## 2025-05-27 PROCEDURE — 93356 MYOCRD STRAIN IMG SPCKL TRCK: CPT | Performed by: INTERNAL MEDICINE

## 2025-05-27 PROCEDURE — 63710000001 INSULIN LISPRO (HUMAN) PER 5 UNITS: Performed by: INTERNAL MEDICINE

## 2025-05-27 PROCEDURE — 93005 ELECTROCARDIOGRAM TRACING: CPT | Performed by: INTERNAL MEDICINE

## 2025-05-27 PROCEDURE — 93306 TTE W/DOPPLER COMPLETE: CPT

## 2025-05-27 PROCEDURE — 99239 HOSP IP/OBS DSCHRG MGMT >30: CPT | Performed by: INTERNAL MEDICINE

## 2025-05-27 PROCEDURE — 85520 HEPARIN ASSAY: CPT

## 2025-05-27 PROCEDURE — 97165 OT EVAL LOW COMPLEX 30 MIN: CPT

## 2025-05-27 PROCEDURE — 80061 LIPID PANEL: CPT

## 2025-05-27 PROCEDURE — 85025 COMPLETE CBC W/AUTO DIFF WBC: CPT | Performed by: INTERNAL MEDICINE

## 2025-05-27 PROCEDURE — 82948 REAGENT STRIP/BLOOD GLUCOSE: CPT

## 2025-05-27 PROCEDURE — 84443 ASSAY THYROID STIM HORMONE: CPT | Performed by: INTERNAL MEDICINE

## 2025-05-27 PROCEDURE — 92523 SPEECH SOUND LANG COMPREHEN: CPT

## 2025-05-27 PROCEDURE — 80048 BASIC METABOLIC PNL TOTAL CA: CPT | Performed by: INTERNAL MEDICINE

## 2025-05-27 PROCEDURE — 93306 TTE W/DOPPLER COMPLETE: CPT | Performed by: INTERNAL MEDICINE

## 2025-05-27 PROCEDURE — 95816 EEG AWAKE AND DROWSY: CPT | Performed by: PSYCHIATRY & NEUROLOGY

## 2025-05-27 PROCEDURE — 99204 OFFICE O/P NEW MOD 45 MIN: CPT | Performed by: INTERNAL MEDICINE

## 2025-05-27 RX ORDER — TAMSULOSIN HYDROCHLORIDE 0.4 MG/1
0.4 CAPSULE ORAL NIGHTLY
Status: DISCONTINUED | OUTPATIENT
Start: 2025-05-27 | End: 2025-05-27 | Stop reason: HOSPADM

## 2025-05-27 RX ORDER — OXYCODONE AND ACETAMINOPHEN 10; 325 MG/1; MG/1
1 TABLET ORAL
COMMUNITY

## 2025-05-27 RX ORDER — METOPROLOL TARTRATE 25 MG/1
25 TABLET, FILM COATED ORAL EVERY 12 HOURS SCHEDULED
Qty: 60 TABLET | Refills: 2 | Status: SHIPPED | OUTPATIENT
Start: 2025-05-27

## 2025-05-27 RX ORDER — POTASSIUM CHLORIDE 1500 MG/1
40 TABLET, EXTENDED RELEASE ORAL EVERY 4 HOURS
Status: COMPLETED | OUTPATIENT
Start: 2025-05-27 | End: 2025-05-27

## 2025-05-27 RX ORDER — ATORVASTATIN CALCIUM 80 MG/1
80 TABLET, FILM COATED ORAL NIGHTLY
Qty: 90 TABLET | Refills: 1 | Status: SHIPPED | OUTPATIENT
Start: 2025-05-27

## 2025-05-27 RX ADMIN — APIXABAN 5 MG: 5 TABLET, FILM COATED ORAL at 14:00

## 2025-05-27 RX ADMIN — OXYCODONE HYDROCHLORIDE AND ACETAMINOPHEN 1 TABLET: 10; 325 TABLET ORAL at 10:02

## 2025-05-27 RX ADMIN — INSULIN LISPRO 2 UNITS: 100 INJECTION, SOLUTION INTRAVENOUS; SUBCUTANEOUS at 08:34

## 2025-05-27 RX ADMIN — OXYCODONE HYDROCHLORIDE AND ACETAMINOPHEN 1 TABLET: 10; 325 TABLET ORAL at 05:56

## 2025-05-27 RX ADMIN — METOPROLOL TARTRATE 25 MG: 25 TABLET, FILM COATED ORAL at 08:33

## 2025-05-27 RX ADMIN — TAMSULOSIN HYDROCHLORIDE 0.4 MG: 0.4 CAPSULE ORAL at 08:34

## 2025-05-27 RX ADMIN — PANTOPRAZOLE SODIUM 40 MG: 40 TABLET, DELAYED RELEASE ORAL at 05:15

## 2025-05-27 RX ADMIN — OXYCODONE HYDROCHLORIDE AND ACETAMINOPHEN 1 TABLET: 10; 325 TABLET ORAL at 02:25

## 2025-05-27 RX ADMIN — POTASSIUM CHLORIDE 40 MEQ: 1500 TABLET, EXTENDED RELEASE ORAL at 10:02

## 2025-05-27 RX ADMIN — POTASSIUM CHLORIDE 40 MEQ: 1500 TABLET, EXTENDED RELEASE ORAL at 05:58

## 2025-05-27 RX ADMIN — EMPAGLIFLOZIN 25 MG: 25 TABLET, FILM COATED ORAL at 08:34

## 2025-05-27 RX ADMIN — ACETAMINOPHEN 650 MG: 325 TABLET ORAL at 04:12

## 2025-05-27 RX ADMIN — Medication 10 ML: at 10:01

## 2025-05-27 NOTE — PROGRESS NOTES
Pharmacy to Dose Heparin Infusion Note    Zaheer Baron is a 64 y.o. male receiving heparin infusion.     Therapy for (VTE/Cardiac): Cardiac  Patient Weight: 104 kg  Initial Bolus (Y/N): Yes  Any Bolus (Y/N): Yes     Signs or Symptoms of Bleeding: No    Cardiac or Other (Not VTE)   Initial Bolus: 60 units/kg (Max 4,000 units)  Initial rate: 12 units/kg/hr (Max 1,000 units/hr)   Anti-Xa Bolus   Dose Infusion Hold   Time Infusion Rate Change (units/kg/hr) Repeat  Anti-Xa    < 0.11 50 units/kg  (4000 units Max) None Increase by  3 units/kg/hr 6 hours   0.11- 0.19 25 units/kg  (2000 units Max) None Increase by  2 units/kg/hr 6 hours   0.2 - 0.29 0 None Increase by  1 units/kg/hr 6 hours   0.3 - 0.5 0 None No Change 6 hours (after 2 consecutive levels in range check qAM)   0.51 - 0.6 0 None Decrease by  1 units/kg/hr 6 hours   0.61 - 0.8 0 30 minutes Decrease by  2 units/kg/hr 6 hours   0.81 - 1 0 60 minutes Decrease by  3 units/kg/hr 6 hours   >1 0 Hold  After Anti-Xa less than 0.5 decrease previous rate by  4 units/kg/hr  Every 2 hours until Anti-Xa  less than 0.5 then when infusion restarts in 6 hours     Results from last 7 days   Lab Units 05/27/25  0441   HEMOGLOBIN g/dL 13.5   HEMATOCRIT % 41.0   PLATELETS 10*3/mm3 282       Date   Time   Anti-Xa Current Rate (units/kg/hr) Bolus   (units) Rate Change   (units/kg/hr) New Rate (units/kg/hr) Repeat  Anti-Xa Comments /  Pump Check    5/26 1618 0.1 -- -- -- -- -- OSH   5/26 1845 -- Unknown -- -- Stopped Stat Started at OSH stopped at arrival   5/26 2042 0.1 Resume  4000 +10 10 0400 Discussed w/ nurse  No DoACs noted   5/27 0441 0.2 10 -- +1 11 1200 Discussed w/ nurse   5/27 1159 0.23 11 -- +1 12 2000 Discussed w/ RN                                                                                                                                                                                                           Linda Lopez, PharmD  5/27/2025 13:08 EDT

## 2025-05-27 NOTE — THERAPY EVALUATION
Patient Name: Zaheer Baron  : 1960    MRN: [id number]                              Today's Date: 2025       Admit Date: 2025    Visit Dx: No diagnosis found.  Patient Active Problem List   Diagnosis    Sjogren's syndrome    Posterior subcapsular polar senile cataract    Osteoarthritis    Knee pain    Inguinal hernia    Infestation by Sarcoptes scabiei alta hominis    Hypokalemia    Hypertension    Gout    Dyslipidemia    Dizziness    Disorder of kidney    Chronic obstructive lung disease    Cataract    Benign prostatic hyperplasia    Backache    Artificial lens present    Arthropathy    Diabetes type 2, controlled    CKD (chronic kidney disease) stage 3, GFR 30-59 ml/min    Lumbosacral spondylosis without myelopathy    Arthritis, multiple joint involvement    Long term (current) use of opiate analgesic    DDD (degenerative disc disease), cervical    DDD (degenerative disc disease), lumbosacral    GERD (gastroesophageal reflux disease)    Mixed hyperlipidemia    Neuropathy    History of malignant melanoma of skin    Solitary kidney, congenital    Vitamin D deficiency    Hypoglycemia    Intractable headache    Acute renal failure    Diverticulosis of colon without diverticulitis    Depression    Snoring    Hypertriglyceridemia    Arthritis of knee    Episode of unresponsiveness    Atrial fibrillation with RVR     Past Medical History:   Diagnosis Date    Acute sinusitis     Allergic rhinitis     Arthritis of back     Arthritis of neck     Arthropathy     of lumbar facet joint    Artificial lens present     position - right    Atrial fibrillation 2020    patient had episode of atrial fibrillation w/ ventricular response secondary to hypovelemia r/t salmonella infection    Backache     chronic back problems    Benign prostatic hyperplasia     Bronchitis     perisistent    Cataract     Cervical disc disorder     Chronic obstructive lung disease     Diabetes mellitus     no retinopathy     Disorder of kidney     Disorder of kidney and/or ureter - Congenital solitary right kidney       Dizziness     History of Meniere's like condition, left ear       Drug therapy     long-term    Dysfunction of eustachian tube     Dyslipidemia     Gout     Headache     History of possible migraine/cluster       Hearing loss     Hip arthrosis     Hypertension     Hypokalemia     Impacted cerumen     Infestation by Sarcoptes scabiei alta hominis     Inguinal hernia     History of, repaired       Knee pain     Knee swelling     Lumbosacral disc disease     Osteoarthritis     Periarthritis of shoulder     Pneumonia     in the past    Posterior subcapsular polar senile cataract     Sjogren's syndrome     Type 2 diabetes mellitus      Past Surgical History:   Procedure Laterality Date    BACK SURGERY      1/1/02    CARPAL TUNNEL RELEASE      (Carpal tunnel release on the left)   09/10/2010 , Carpal tunnel release on the right)   12/03/2010     CATARACT EXTRACTION, BILATERAL      COLONOSCOPY      ENDOSCOPY N/A 03/16/2017    Procedure: ESOPHAGOGASTRODUODENOSCOPY;  Surgeon: Alli Shook DO;  Location: [facility] ENDOSCOPY;  Service:     EYE SURGERY Bilateral     cataract removed    HAND SURGERY      HERNIA REPAIR      INGUINAL HERNIA REPAIR      (Left inguinal hernioplasty with mesh.)   06/07/2007     INJECTION OF MEDICATION      Injection for nerve block (Lumbar medial branch block.)   03/31/2016     INJECTION OF MEDICATION  03/19/2015    solu-medrol     KNEE SURGERY      (Lateral release, removal of loose bodies, excision of suprapatellar plica.)   04/29/1982     OTHER SURGICAL HISTORY      Lumbar surgery (Lumbosacral radio frequency thermal coagulation. Lumbosacral spondylosis.)   06/30/2016 ,Lumbar surgery (Lumbosacral radio frequency thermal coagulation.)   12/21/2015      OTHER SURGICAL HISTORY      Remove cataract, insert lens (Right eye.)   05/07/2015     OTHER SURGICAL HISTORY      Remove hip/pelvis lesion (Melanoma,  right hip.)   2005     VASECTOMY        General Information       Row Name 05/27/25 0857          Physical Therapy Time and Intention    Document Type discharge evaluation/summary  -     Mode of Treatment physical therapy  -       Row Name 05/27/25 0857          General Information    Patient Profile Reviewed yes  -SS     Prior Level of Function independent:;all household mobility;community mobility;gait;transfer;bed mobility;driving  pt reports use of AD PRN (crutches when knee locks up), acute fall related to tripping over feet while turning, community ambulator; anticipates elective TKA in very near future  -     Existing Precautions/Restrictions no known precautions/restrictions  -     Barriers to Rehab none identified  -       Row Name 05/27/25 0857          Living Environment    Current Living Arrangements home  -     People in Home spouse  able to assist  -       Row Name 05/27/25 0857          Home Main Entrance    Number of Stairs, Main Entrance one;other (see comments)  threshold  -       Row Name 05/27/25 0857          Stairs Within Home, Primary    Number of Stairs, Within Home, Primary none  -SS       Row Name 05/27/25 0857          Cognition    Orientation Status (Cognition) oriented x 4  -SS       Row Name 05/27/25 0857          Safety Issues/Impairments Affecting Functional Mobility    Safety Issues Affecting Function (Mobility) other (see comments)  none  -SS     Impairments Affecting Function (Mobility) other (see comments)  none  -SS               User Key  (r) = Recorded By, (t) = Taken By, (c) = Cosigned By      Initials Name Provider Type     Mitra Chadwick, VIKTOR Physical Therapist                   Mobility       Row Name 05/27/25 0958          Bed Mobility    Bed Mobility scooting/bridging;supine-sit  -SS     Scooting/Bridging [place] (Bed Mobility) independent  -SS     Supine-Sit [place] (Bed Mobility) independent  -SS     Comment, (Bed Mobility) pt. demonstrates  appropriate sequencing and safety awareness; asymptomatic  -SS       Row Name 05/27/25 0958          Sit-Stand Transfer    Sit-Stand [place] (Transfers) independent  -     Comment, (Sit-Stand Transfer) pt demonstrates appropriate sequencing and safety awareness; asymptomatic  -       Row Name 05/27/25 0958          Gait/Stairs (Locomotion)    [place] Level (Gait) independent  -     Assistive Device (Gait) other (see comments)  none  -SS     Patient was able to Ambulate yes  -SS     Distance in Feet (Gait) 150  -SS     Comment, (Gait/Stairs) Pt. ambulated with a step through gait pattern w/no significant deviations noted. He remained asymptomatic and no LOB or instability noted with turning head, turning 180 and dual tasking.  -               User Key  (r) = Recorded By, (t) = Taken By, (c) = Cosigned By      Initials Name Provider Type     Mitra Chadiwck, PT Physical Therapist                   Obj/Interventions       Row Name 05/27/25 1001          Range of Motion Comprehensive    General Range of Motion bilateral lower extremity ROM WFL  -[facility] Name 05/27/25 1001          Strength Comprehensive (MMT)    Comment, General Manual Muscle Testing (MMT) Assessment BLE gross 4/5, no asymmetries appreciated  -       Row Name 05/27/25 1001          Motor Skills    Motor Skills coordination  -     Coordination WFL;bilateral;lower extremity;heel to sandy  -       Row Name 05/27/25 1001          Balance    Balance Assessment sitting static balance;sitting dynamic balance;standing static balance;standing dynamic balance  -SS     Static Sitting Balance independent  -SS     Dynamic Sitting Balance independent  -SS     Position, Sitting Balance unsupported;sitting edge of bed  -SS     Static Standing Balance independent  -SS     Dynamic Standing Balance independent  -SS     Position/Device Used, Standing Balance unsupported  -SS     Balance Interventions sitting;standing;sit to  "stand;supported;static;dynamic  -       Row Name 05/27/25 1001          Sensory Assessment (Somatosensory)    Sensory Assessment (Somatosensory) LE sensation intact  -               User Key  (r) = Recorded By, (t) = Taken By, (c) = Cosigned By      Initials Name Provider Type     Mitra Chadwick, PT Physical Therapist                   Goals/Plan    No documentation.                  Clinical Impression       Row Name 05/27/25 1004          Pain    Pretreatment Pain Rating 6/10  -SS     Posttreatment Pain Rating 6/10  -     Pain Location --  \"arthritis in all joints\" - chronic in nature  -     Pain Side/Orientation generalized  -     Pain Management Interventions activity modification encouraged;complementary health approaches promoted;diversional activity provided;exercise or physical activity utilized;positioning techniques utilized;movement retraining implemented  -     Response to Pain Interventions activity participation with tolerable pain  -       Row Name 05/27/25 1004          Plan of Care Review    Plan of Care Reviewed With patient  -SS     Outcome Evaluation Pt. presents at or near baseline function w/all functional mobility. He remained asymptomatic w/all mobility. No acute deficits identified which require skilled PT intervention, therefore, pt. will be discharged from acute PT services.  -       Row Name 05/27/25 1004          Therapy Assessment/Plan (PT)    Patient/Family Therapy Goals Statement (PT) to go home  -     Criteria for Skilled Interventions Met (PT) no problems identified which require skilled intervention  -     Therapy Frequency (PT) evaluation only  -       Row Name 05/27/25 1004          Vital Signs    Pre Systolic BP Rehab 141  -SS     Pre Treatment Diastolic BP 83  -SS     Post Systolic BP Rehab 151  -SS     Post Treatment Diastolic BP 98  -SS     Pretreatment Heart Rate (beats/min) 73  -SS     Posttreatment Heart Rate (beats/min) 74  -SS     Pre SpO2 (%) 97  " -SS     O2 Delivery Pre Treatment room air  -SS     Post SpO2 (%) 97  -SS     O2 Delivery Post Treatment room air  -SS     Pre Patient Position Supine  -SS     Post Patient Position Sitting  -SS       Row Name 05/27/25 1004          Positioning and Restraints    Pre-Treatment Position in bed  -SS     Post Treatment Position chair  -SS     In Chair notified nsg;reclined;call light within reach;encouraged to call for assist;legs elevated  RN aware no alarm  -SS               User Key  (r) = Recorded By, (t) = Taken By, (c) = Cosigned By      Initials Name Provider Type    Mitra Quintanilla PT Physical Therapist                   Outcome Measures       Row Name 05/27/25 1011          How much help from another person do you currently need...    Turning from your back to your side while in flat bed without using bedrails? 4  -SS     Moving from lying on back to sitting on the side of a flat bed without bedrails? 4  -SS     Moving to and from a bed to a chair (including a wheelchair)? 4  -SS     Standing up from a chair using your arms (e.g., wheelchair, bedside chair)? 4  -SS     Climbing 3-5 steps with a railing? 4  -SS     To walk in hospital room? 4  -SS     AM-PAC 6 Clicks Score (PT) 24  -SS     Highest Level of Mobility Goal Walk 250 Feet or More - 8  -SS       Row Name 05/27/25 1011          Modified Parmer Scale    Pre-Stroke Modified Parmer Scale 6 - Unable to determine (UTD) from the medical record documentation  -SS     Modified Corazon Scale 1 - No significant disability despite symptoms.  Able to carry out all usual duties and activities.  -       Row Name 05/27/25 1011          Functional Assessment    Outcome Measure Options AM-PAC 6 Clicks Basic Mobility (PT);Modified Parmer  -SS               User Key  (r) = Recorded By, (t) = Taken By, (c) = Cosigned By      Initials Name Provider Type    Mitra Quintanilla PT Physical Therapist                                 Physical Therapy Education       Title:  PT OT SLP Therapies (In Progress)       Topic: Physical Therapy (In Progress)       Point: Mobility training (Done)       Learning Progress Summary            Patient CHARLETTE Mendez VU,DU by  at 5/27/2025 1012    Comment: Educated pt. safety/technique w/bed mobility, transfers, ambulation, PT POC                      Point: Home exercise program (Not Started)       Learner Progress:  Not documented in this visit.              Point: Body mechanics (Done)       Learning Progress Summary            Patient CHARLETTE Mendez VU,DU by  at 5/27/2025 1012    Comment: Educated pt. safety/technique w/bed mobility, transfers, ambulation, PT POC                      Point: Precautions (Done)       Learning Progress Summary            Patient CHARLETTE Mendez, VU,DU by  at 5/27/2025 1012    Comment: Educated pt. safety/technique w/bed mobility, transfers, ambulation, PT POC                                      User Key       Initials Effective Dates Name Provider Type Discipline     06/01/21 -  Mitra Chadwick, PT Physical Therapist PT                  PT Recommendation and Plan     Outcome Evaluation: Pt. presents at or near baseline function w/all functional mobility. He remained asymptomatic w/all mobility. No acute deficits identified which require skilled PT intervention, therefore, pt. will be discharged from acute PT services.     Time Calculation:   PT Evaluation Complexity  History, PT Evaluation Complexity: 3 or more personal factors and/or comorbidities  Examination of Body Systems (PT Eval Complexity): total of 4 or more elements  Clinical Presentation (PT Evaluation Complexity): stable  Clinical Decision Making (PT Evaluation Complexity): low complexity  Overall Complexity (PT Evaluation Complexity): low complexity     PT Charges       Row Name 05/27/25 1013             Time Calculation    Start Time 0836  -SS      PT Received On 05/27/25  [facility]      PT Goal Re-Cert Due Date 06/06/25  -         Untimed Charges    PT  Eval/Re-eval Minutes 49  -SS         Total Minutes    Untimed Charges Total Minutes 49  -SS       Total Minutes 49  -SS                User Key  (r) = Recorded By, (t) = Taken By, (c) = Cosigned By      Initials Name Provider Type    Mitra Quintanilla, PT Physical Therapist                  Therapy Charges for Today       Code Description Service Date Service Provider Modifiers Qty    86501618451 HC PT EVAL LOW COMPLEXITY 4 5/27/2025 Mitra Chadwick, PT GP 1            PT G-Codes  Outcome Measure Options: AM-PAC 6 Clicks Basic Mobility (PT), Modified Corazon  AM-PAC 6 Clicks Score (PT): 24  Modified Corazon Scale: 1 - No significant disability despite symptoms.  Able to carry out all usual duties and activities.  PT Discharge Summary  Anticipated Discharge Disposition (PT): home with assist    Mitra Chadwick, VIKTOR  5/27/2025

## 2025-05-27 NOTE — CASE MANAGEMENT/SOCIAL WORK
Continued Stay Note  [facility]     Patient Name: Zaheer Baron  MRN: [id number]  Today's Date: 5/27/2025    Admit Date: 5/26/2025    Plan: Home   Discharge Plan       Row Name 05/27/25 1203       Plan    Plan Home    Patient/Family in Agreement with Plan yes    Plan Comments Discussed in MDR. Pt should be medically ready for discharge today. Currently on heparin gtt. No discharge needs expressed. CM will cont to follow.    Final Discharge Disposition Code 01 - home or self-care                   Discharge Codes    No documentation.                       Mohini Zacarias RN

## 2025-05-27 NOTE — PLAN OF CARE
Goal Outcome Evaluation:  Plan of Care Reviewed With: patient           Outcome Evaluation: Pt. presents at or near baseline function w/all functional mobility. He remained asymptomatic w/all mobility. No acute deficits identified which require skilled PT intervention, therefore, pt. will be discharged from acute PT services.    Anticipated Discharge Disposition (PT): home with assist

## 2025-05-27 NOTE — THERAPY DISCHARGE NOTE
Acute Care - Occupational Therapy Discharge  [facility]    Patient Name: Zaheer Baron  : 1960    MRN: [id number]                              Today's Date: 2025       Admit Date: 2025    Visit Dx:     ICD-10-CM ICD-9-CM   1. Palpitations  R00.2 785.1     Patient Active Problem List   Diagnosis    Sjogren's syndrome    Posterior subcapsular polar senile cataract    Osteoarthritis    Knee pain    Inguinal hernia    Infestation by Sarcoptes scabiei alta hominis    Hypokalemia    Hypertension    Gout    Dyslipidemia    Dizziness    Disorder of kidney    Chronic obstructive lung disease    Cataract    Benign prostatic hyperplasia    Backache    Artificial lens present    Arthropathy    Diabetes type 2, controlled    CKD (chronic kidney disease) stage 3, GFR 30-59 ml/min    Lumbosacral spondylosis without myelopathy    Arthritis, multiple joint involvement    Long term (current) use of opiate analgesic    DDD (degenerative disc disease), cervical    DDD (degenerative disc disease), lumbosacral    GERD (gastroesophageal reflux disease)    Mixed hyperlipidemia    Neuropathy    History of malignant melanoma of skin    Solitary kidney, congenital    Vitamin D deficiency    Hypoglycemia    Intractable headache    Acute renal failure    Diverticulosis of colon without diverticulitis    Depression    Snoring    Hypertriglyceridemia    Arthritis of knee     Past Medical History:   Diagnosis Date    Acute sinusitis     Allergic rhinitis     Arthritis of back     Arthritis of neck     Arthropathy     of lumbar facet joint    Artificial lens present     position - right    Atrial fibrillation 2020    patient had episode of atrial fibrillation w/ ventricular response secondary to hypovelemia r/t salmonella infection    Backache     chronic back problems    Benign prostatic hyperplasia     Bronchitis     perisistent    Cataract     Cervical disc disorder     Chronic obstructive lung disease     Diabetes  mellitus     no retinopathy    Disorder of kidney     Disorder of kidney and/or ureter - Congenital solitary right kidney       Dizziness     History of Meniere's like condition, left ear       Drug therapy     long-term    Dysfunction of eustachian tube     Dyslipidemia     Gout     Headache     History of possible migraine/cluster       Hearing loss     Hip arthrosis     Hypertension     Hypokalemia     Impacted cerumen     Infestation by Sarcoptes scabiei alta hominis     Inguinal hernia     History of, repaired       Knee pain     Knee swelling     Lumbosacral disc disease     Osteoarthritis     Periarthritis of shoulder     Pneumonia     in the past    Posterior subcapsular polar senile cataract     Sjogren's syndrome     Type 2 diabetes mellitus      Past Surgical History:   Procedure Laterality Date    BACK SURGERY      1/1/02    CARPAL TUNNEL RELEASE      (Carpal tunnel release on the left)   09/10/2010 , Carpal tunnel release on the right)   12/03/2010     CATARACT EXTRACTION, BILATERAL      COLONOSCOPY      ENDOSCOPY N/A 03/16/2017    Procedure: ESOPHAGOGASTRODUODENOSCOPY;  Surgeon: Alli Shook DO;  Location: [facility] ENDOSCOPY;  Service:     EYE SURGERY Bilateral     cataract removed    HAND SURGERY      HERNIA REPAIR      INGUINAL HERNIA REPAIR      (Left inguinal hernioplasty with mesh.)   06/07/2007     INJECTION OF MEDICATION      Injection for nerve block (Lumbar medial branch block.)   03/31/2016     INJECTION OF MEDICATION  03/19/2015    solu-medrol     KNEE SURGERY      (Lateral release, removal of loose bodies, excision of suprapatellar plica.)   04/29/1982     OTHER SURGICAL HISTORY      Lumbar surgery (Lumbosacral radio frequency thermal coagulation. Lumbosacral spondylosis.)   06/30/2016 ,Lumbar surgery (Lumbosacral radio frequency thermal coagulation.)   12/21/2015      OTHER SURGICAL HISTORY      Remove cataract, insert lens (Right eye.)   05/07/2015     OTHER SURGICAL HISTORY      Remove  hip/pelvis lesion (Melanoma, right hip.)   2005     VASECTOMY        General Information       Row Name 05/27/25 1359          OT Time and Intention    Document Type discharge evaluation/summary  -CS     Mode of Treatment occupational therapy  -CS       Row Name 05/27/25 1350          General Information    Patient Profile Reviewed yes  -CS     Prior Level of Function independent:;all household mobility;ADL's;driving  retired, upcoming TKA (now delayed)  -CS     Existing Precautions/Restrictions no known precautions/restrictions  -CS     Barriers to Rehab none identified  -CS       Row Name 05/27/25 1359          Occupational Profile    Reason for Services/Referral (Occupational Profile) stroke eval  -CS       Row Name 05/27/25 1350          Living Environment    Current Living Arrangements home  -CS     People in Home spouse  -CS       Row Name 05/27/25 1359          Home Main Entrance    Number of Stairs, Main Entrance one  -CS       Row Name 05/27/25 1359          Stairs Within Home, Primary    Number of Stairs, Within Home, Primary none  -CS       Row Name 05/27/25 1358          Cognition    Orientation Status (Cognition) oriented x 4  -CS       Row Name 05/27/25 1353          Safety Issues/Impairments Affecting Functional Mobility    Comment, Safety Issues/Impairments (Mobility) none identified  -CS               User Key  (r) = Recorded By, (t) = Taken By, (c) = Cosigned By      Initials Name Provider Type    CS Robert Lawrence OT Occupational Therapist                   Mobility/ADL's       Row Name 05/27/25 1400          Bed Mobility    Comment, (Bed Mobility) UIC, returned to chair  -       Row Name 05/27/25 1400          Transfers    Transfers sit-stand transfer  -CS       Row Name 05/27/25 1400          Sit-Stand Transfer    Sit-Stand [place] (Transfers) independent  -       Row Name 05/27/25 1400          Functional Mobility    Functional Mobility- Comment no LOB during hallway mobility with  dynamic challenges and dual tasking  -     Patient was able to Ambulate yes  -       Row Name 05/27/25 1400          Activities of Daily Living    BADL Assessment/Intervention lower body dressing  -       Row Name 05/27/25 1400          Lower Body Dressing Assessment/Training    [place] Level (Lower Body Dressing) lower body dressing skills;other (see comments)  -CS     Comment, (Lower Body Dressing) reach to distal BLEs safe and intact, observed with sock adjustment  -               User Key  (r) = Recorded By, (t) = Taken By, (c) = Cosigned By      Initials Name Provider Type    Robert Finch, OT Occupational Therapist                   Obj/Interventions       Row Name 05/27/25 1400          Sensory Assessment (Somatosensory)    Sensory Assessment (Somatosensory) bilateral UE  -     Bilateral UE Sensory Assessment general sensation;light touch awareness;light touch localization;intact  -       Row Name 05/27/25 1400          Vision Assessment/Intervention    Visual Impairment/Limitations WFL;corrective lenses full-time  -     Vision Assessment Comment full to confrontation, tracking intact  -       Row Name 05/27/25 1400          Range of Motion Comprehensive    General Range of Motion bilateral upper extremity ROM WFL  -       Row Name 05/27/25 1400          Strength Comprehensive (MMT)    General Manual Muscle Testing (MMT) Assessment no strength deficits identified  -CS     Comment, General Manual Muscle Testing (MMT) Assessment 4+/5, symmetical  -       Row Name 05/27/25 1400          Motor Skills    Motor Skills coordination;functional endurance  -     Coordination bimanual skills;WFL  -     Functional Endurance good, stable on RA  -       Row Name 05/27/25 1400          Balance    Balance Assessment sitting static balance;sitting dynamic balance;standing static balance;standing dynamic balance  -     Static Sitting Balance independent  -     Dynamic Sitting Balance  independent  -CS     Position, Sitting Balance unsupported;sitting edge of bed  -CS     Static Standing Balance independent  -CS     Dynamic Standing Balance standby assist  -CS     Position/Device Used, Standing Balance unsupported  -CS     Balance Interventions sitting;standing;sit to stand;occupation based/functional task  -CS     Comment, Balance mild dynamic instability 2/2 knee pain (upcoming TKA, rescheduled)  -CS               User Key  (r) = Recorded By, (t) = Taken By, (c) = Cosigned By      Initials Name Provider Type    CS Robert Lawrence, OT Occupational Therapist                   Goals/Plan    No documentation.                  Clinical Impression       Row Name 05/27/25 1401          Pain Assessment    Additional Documentation Pain Scale: FACES Pre/Post-Treatment (Group)  -CS       Row Name 05/27/25 1401          Pain Scale: FACES Pre/Post-Treatment    Pain: FACES Scale, Pretreatment 2-->hurts little bit  -CS     Posttreatment Pain Rating 2-->hurts little bit  -CS     Pre/Posttreatment Pain Comment arthritic pain, chronic  -       Row Name 05/27/25 1401          Plan of Care Review    Plan of Care Reviewed With patient;spouse  -CS     Progress no change  -CS     Outcome Evaluation Pt presents at baseline for ADL completion with symmetrical BUE strength and coordination/sensation intact. OT signing off, please reconsult if needed. Rec d/c to home when medically appropriate.  -       Row Name 05/27/25 1401          Therapy Assessment/Plan (OT)    Criteria for Skilled Therapeutic Interventions Met (OT) no;does not meet criteria for skilled intervention  -CS     Therapy Frequency (OT) evaluation only  -CS       Row Name 05/27/25 1401          Therapy Plan Review/Discharge Plan (OT)    Anticipated Discharge Disposition (OT) home  -       Row Name 05/27/25 1401          Vital Signs    Pre Systolic BP Rehab --  RN cleared for eval  -CS     O2 Delivery Pre Treatment room air  -CS     O2 Delivery Intra  Treatment room air  -CS     O2 Delivery Post Treatment room air  -CS     Pre Patient Position Sitting  -CS     Intra Patient Position Standing  -CS     Post Patient Position Sitting  -CS       Row Name 05/27/25 1401          Positioning and Restraints    Pre-Treatment Position sitting in chair/recliner  -CS     Post Treatment Position chair  -CS     In Chair notified nsg;sitting;call light within reach;encouraged to call for assist;with family/caregiver  up ad peter per RN  -CS               User Key  (r) = Recorded By, (t) = Taken By, (c) = Cosigned By      Initials Name Provider Type    CS Robert Lawrence, OT Occupational Therapist                   Outcome Measures       Row Name 05/27/25 1403          How much help from another is currently needed...    Putting on and taking off regular lower body clothing? 4  -CS     Bathing (including washing, rinsing, and drying) 4  -CS     Toileting (which includes using toilet bed pan or urinal) 4  -CS     Putting on and taking off regular upper body clothing 4  -CS     Taking care of personal grooming (such as brushing teeth) 4  -CS     Eating meals 4  -CS     AM-PAC 6 Clicks Score (OT) 24  -CS       Row Name 05/27/25 1011          How much help from another person do you currently need...    Turning from your back to your side while in flat bed without using bedrails? 4  -SS     Moving from lying on back to sitting on the side of a flat bed without bedrails? 4  -SS     Moving to and from a bed to a chair (including a wheelchair)? 4  -SS     Standing up from a chair using your arms (e.g., wheelchair, bedside chair)? 4  -SS     Climbing 3-5 steps with a railing? 4  -SS     To walk in hospital room? 4  -SS     AM-PAC 6 Clicks Score (PT) 24  -SS     Highest Level of Mobility Goal Walk 250 Feet or More - 8  -SS       Row Name 05/27/25 1403 05/27/25 1011       Modified [place] Scale    Pre-Stroke Modified [place] Scale -- 6 - Unable to determine (UTD) from the medical record  documentation  -    Modified [place] Scale 0 - No Symptoms at all.  - 1 - No significant disability despite symptoms.  Able to carry out all usual duties and activities.  -      Row Name 05/27/25 1403 05/27/25 1011       Functional Assessment    Outcome Measure Options AM-PAC 6 Clicks Daily Activity (OT);Modified [place]  -CS AM-PAC 6 Clicks Basic Mobility (PT);Modified [place]  -SS              User Key  (r) = Recorded By, (t) = Taken By, (c) = Cosigned By      Initials Name Provider Type    CS Robert Lawrence, OT Occupational Therapist    SS Mitra Chadwick, PT Physical Therapist                  Occupational Therapy Education       Title: PT OT SLP Therapies (In Progress)       Topic: Occupational Therapy (Done)       Point: ADL training (Done)       Learning Progress Summary            Patient Acceptance, E,D, VU,DU by  at 5/27/2025 1403   Significant Other Acceptance, E,D, VU,DU by  at 5/27/2025 1403                      Point: Home exercise program (Done)       Learning Progress Summary            Patient Acceptance, E,D, VU,DU by  at 5/27/2025 1403   Significant Other Acceptance, E,D, VU,DU by  at 5/27/2025 1403                      Point: Precautions (Done)       Learning Progress Summary            Patient Acceptance, E,D, VU,DU by  at 5/27/2025 1403   Significant Other Acceptance, E,D, VU,DU by  at 5/27/2025 1403                      Point: Body mechanics (Done)       Learning Progress Summary            Patient Acceptance, E,D, VU,DU by  at 5/27/2025 1403   Significant Other Acceptance, E,D, VU,DU by  at 5/27/2025 1403                                      User Key       Initials Effective Dates Name Provider Type Discipline     06/16/21 -  Robert Lawrence OT Occupational Therapist OT                  OT Recommendation and Plan  Therapy Frequency (OT): evaluation only  Plan of Care Review  Plan of Care Reviewed With: patient, spouse  Progress: no change  Outcome Evaluation: Pt  presents at baseline for ADL completion with symmetrical BUE strength and coordination/sensation intact. OT signing off, please reconsult if needed. Rec d/c to home when medically appropriate.  Plan of Care Reviewed With: patient, spouse  Outcome Evaluation: Pt presents at baseline for ADL completion with symmetrical BUE strength and coordination/sensation intact. OT signing off, please reconsult if needed. Rec d/c to home when medically appropriate.     Time Calculation:   Evaluation Complexity (OT)  Review Occupational Profile/Medical/Therapy History Complexity: brief/low complexity  Assessment, Occupational Performance/Identification of Deficit Complexity: 1-3 performance deficits  Clinical Decision Making Complexity (OT): problem focused assessment/low complexity     Time Calculation- OT       Row Name 05/27/25 1404             Time Calculation- OT    OT Start Time 1300  -CS      OT Received On 05/27/25  -CS         Untimed Charges    OT Eval/Re-eval Minutes 33  -CS         Total Minutes    Untimed Charges Total Minutes 33  -CS       Total Minutes 33  -CS                User Key  (r) = Recorded By, (t) = Taken By, (c) = Cosigned By      Initials Name Provider Type    CS Robert Lawrence OT Occupational Therapist                  Therapy Charges for Today       Code Description Service Date Service Provider Modifiers Qty    [id number]  OT EVAL LOW COMPLEXITY 3 5/27/2025 Robert Lawrence OT GO 1               OT Discharge Summary  Anticipated Discharge Disposition (OT): home  Reason for Discharge: At baseline function, Independent  Discharge Destination: Home    Robert Lawrence OT  5/27/2025

## 2025-05-27 NOTE — PLAN OF CARE
Goal Outcome Evaluation:  Plan of Care Reviewed With: patient        Progress:  (eval; see note for details)       Anticipated Discharge Disposition (SLP): No further SLP services warranted    SLP Diagnosis: functional speech/language skills, functional cognitive-linguistic skills (05/27/25 0825)  SLP Diagnosis Comments: Pt presents with grossly functional speech, language, and cognition skills at this time. Pt reports his symptoms have resolved and he feels he is at baseline. No further SLP services indicated at this time. (05/27/25 0825)

## 2025-05-27 NOTE — CONSULTS
Consult for diabetes education received. Chart reviewed. Pt was seen at bedside today. Permission given for visit.       Discussed and taught patient about type 2 diabetes self-management, risk factors, and importance of blood glucose control to reduce complications. Target blood glucose readings and A1c goals per ADA were reviewed. Reviewed with patient current A1c 7.2 and discussed its significance.       Reviewed the following ADA survival skill concepts with pt:     Meal planning: Discussed pts current eating pattern and potential strategies to help improve it.  Suggested “the plate method” as a potential healthy eating plan and reviewed this with pt.     Safe medication administration: Reviewed pts current medication regimen. Encouraged pt to take medications as prescribed and contact provider or pharmacist if they are experiencing side effects.      Monitoring (timing and technique): Encouraged pt to monitor blood sugar at home 1+ times per day and to call PCP if blood sugar is trending high. Discussed benefits of SMBG/CGMS to help guide pt and provider decision making. Encouraged to keep record of blood glucose readings to take to follow up appointment with PCP.     Prevention and treatment of hypoglycemia and hyperglycemia: Signs, symptoms, and treatment of hypoglycemia and hyperglycemia discussed with pt. Reviewed prevention strategies such as consistent eating pattern and taking medications as directed.  Pt is able to teach back appropriate treatment of hypoglycemia using the rule of 15s after receiving instruction.      Sick day management: Reviewed general sick day guidelines with pt, including drinking plenty of water, keeping simple carbs handy such as jell-o or popsicles, checking blood glucose more often (every 2-4 hours or as directed by provider). Encouraged pt to make a sick-day kit at home.      Physical activity: Reviewed benefits of physical activity for diabetes management and overall health.  "Encouraged pt to begin in 10 min increments to build up to recommended 150 minute per week after speaking with doctor about which activities may be right for them.      Follow-up care: Reviewed importance of ongoing follow-up with provider. Reviewed importance of annual physical exams, annual eye exams, regular dental exams, daily foot examination, and encouraged patient to discuss immunization needs with provider. Pt encouraged to attend scheduled appointments. Provided information about outpatient diabetes education classes at [facility].      Provided patient with copy of [facility]'s \"What is Diabetes\" handout, \"Blood Glucose Goals\" handout, and \"What is A1c\" handout.       Thank you for this consult.      Total time spent reviewing chart, preparing education/materials, providing education at bedside, and coordinating care approx 30 minutes.  Not a candidate for the stroke/diabetes class as mri neg per recent reports.  "

## 2025-05-27 NOTE — THERAPY EVALUATION
Acute Care - Speech Language Pathology Initial Evaluation  [facility]  Cognitive-Communication Evaluation       Patient Name: Zaheer Baron  : 1960  MRN: [id number]  Today's Date: 2025               Admit Date: 2025     Visit Dx:  No diagnosis found.  Patient Active Problem List   Diagnosis    Sjogren's syndrome    Posterior subcapsular polar senile cataract    Osteoarthritis    Knee pain    Inguinal hernia    Infestation by Sarcoptes scabiei alta hominis    Hypokalemia    Hypertension    Gout    Dyslipidemia    Dizziness    Disorder of kidney    Chronic obstructive lung disease    Cataract    Benign prostatic hyperplasia    Backache    Artificial lens present    Arthropathy    Diabetes type 2, controlled    CKD (chronic kidney disease) stage 3, GFR 30-59 ml/min    Lumbosacral spondylosis without myelopathy    Arthritis, multiple joint involvement    Long term (current) use of opiate analgesic    DDD (degenerative disc disease), cervical    DDD (degenerative disc disease), lumbosacral    GERD (gastroesophageal reflux disease)    Mixed hyperlipidemia    Neuropathy    History of malignant melanoma of skin    Solitary kidney, congenital    Vitamin D deficiency    Hypoglycemia    Intractable headache    Acute renal failure    Diverticulosis of colon without diverticulitis    Depression    Snoring    Hypertriglyceridemia    Arthritis of knee    Episode of unresponsiveness    Atrial fibrillation with RVR     Past Medical History:   Diagnosis Date    Acute sinusitis     Allergic rhinitis     Arthritis of back     Arthritis of neck     Arthropathy     of lumbar facet joint    Artificial lens present     position - right    Atrial fibrillation 2020    patient had episode of atrial fibrillation w/ ventricular response secondary to hypovelemia r/t salmonella infection    Backache     chronic back problems    Benign prostatic hyperplasia     Bronchitis     perisistent    Cataract     Cervical disc  disorder     Chronic obstructive lung disease     Diabetes mellitus     no retinopathy    Disorder of kidney     Disorder of kidney and/or ureter - Congenital solitary right kidney       Dizziness     History of Meniere's like condition, left ear       Drug therapy     long-term    Dysfunction of eustachian tube     Dyslipidemia     Gout     Headache     History of possible migraine/cluster       Hearing loss     Hip arthrosis     Hypertension     Hypokalemia     Impacted cerumen     Infestation by Sarcoptes scabiei alta hominis     Inguinal hernia     History of, repaired       Knee pain     Knee swelling     Lumbosacral disc disease     Osteoarthritis     Periarthritis of shoulder     Pneumonia     in the past    Posterior subcapsular polar senile cataract     Sjogren's syndrome     Type 2 diabetes mellitus      Past Surgical History:   Procedure Laterality Date    BACK SURGERY      1/1/02    CARPAL TUNNEL RELEASE      (Carpal tunnel release on the left)   09/10/2010 , Carpal tunnel release on the right)   12/03/2010     CATARACT EXTRACTION, BILATERAL      COLONOSCOPY      ENDOSCOPY N/A 03/16/2017    Procedure: ESOPHAGOGASTRODUODENOSCOPY;  Surgeon: Alli Shook DO;  Location: [facility] ENDOSCOPY;  Service:     EYE SURGERY Bilateral     cataract removed    HAND SURGERY      HERNIA REPAIR      INGUINAL HERNIA REPAIR      (Left inguinal hernioplasty with mesh.)   06/07/2007     INJECTION OF MEDICATION      Injection for nerve block (Lumbar medial branch block.)   03/31/2016     INJECTION OF MEDICATION  03/19/2015    solu-medrol     KNEE SURGERY      (Lateral release, removal of loose bodies, excision of suprapatellar plica.)   04/29/1982     OTHER SURGICAL HISTORY      Lumbar surgery (Lumbosacral radio frequency thermal coagulation. Lumbosacral spondylosis.)   06/30/2016 ,Lumbar surgery (Lumbosacral radio frequency thermal coagulation.)   12/21/2015      OTHER SURGICAL HISTORY      Remove cataract, insert lens  (Right eye.)   05/07/2015     OTHER SURGICAL HISTORY      Remove hip/pelvis lesion (Melanoma, right hip.)   2005     VASECTOMY         SLP Recommendation and Plan  SLP Diagnosis: functional speech/language skills, functional cognitive-linguistic skills (05/27/25 0825)  SLP Diagnosis Comments: Pt presents with grossly functional speech, language, and cognition skills at this time. Pt reports his symptoms have resolved and he feels he is at baseline. No further SLP services indicated at this time. (05/27/25 0825)           SLC Criteria for Skilled Therapy Interventions Met: no problems identified which require skilled intervention (05/27/25 0825)  Anticipated Discharge Disposition (SLP): No further SLP services warranted (05/27/25 0825)        Therapy Frequency (SLP SLC): evaluation only (05/27/25 0825)                                Progress:  (eval; see note for details) (05/27/25 0906)      SLP EVALUATION (Last 72 Hours)       SLP SLC Evaluation       Row Name 05/27/25 0825                   Communication Assessment/Intervention    Document Type discharge evaluation/summary  -MM        Subjective Information no complaints  -MM        Patient Observations alert;cooperative  -MM        Patient Effort good  -MM        Symptoms Noted During/After Treatment none  -MM           General Information    Patient Profile Reviewed yes  -MM        Pertinent History Of Current Problem Pt is a 64 year old male who presented to the facility after an episode of transient AMS. Pt has history of DM, HLD, HTN, CAD, and Sjogrens.  -MM        Precautions/Limitations, Vision WFL;for purposes of eval  -MM        Precautions/Limitations, Hearing WFL;for purposes of eval  -MM        Prior Level of Function-Communication WFL  -MM        Plans/Goals Discussed with patient;agreed upon  -MM        Barriers to Rehab none identified  -MM        Patient's Goals for Discharge patient did not state  -MM           Pain    Pretreatment Pain Rating  0/10 - no pain  -MM        Posttreatment Pain Rating 0/10 - no pain  -MM           Comprehension Assessment/Intervention    Comprehension Assessment/Intervention Auditory Comprehension;Reading Comprehension  -MM           Auditory Comprehension Assessment/Intervention    Auditory Comprehension (Communication) WFL  -MM        Narrative Discourse WFL  -MM           Reading Comprehension Assessment/Intervention    Reading Comprehension (Communication) WFL  -MM        Paragraph Level WFL  -MM           Expression Assessment/Intervention    Expression Assessment/Intervention verbal expression;graphic expression  -MM           Verbal Expression Assessment/Intervention    Verbal Expression WFL  -MM        Conversational Discourse/Fluency WFL  -MM           Graphic Expression Assessment/Intervention    Graphic Expression WFL  -MM        Sentence Formulation WFL  -MM           Oral Motor Structure and Function    Oral Motor Structure and Function WFL  -MM        Dentition Assessment natural, present and adequate  -MM        Mucosal Quality moist, healthy  -MM           Oral Musculature and Cranial Nerve Assessment    Oral Motor General Assessment WFL  -MM           Motor Speech Assessment/Intervention    Motor Speech Function WFL  -MM        Conversational Speech (Communication) WFL  -MM        Speech intelligibility 100%;in quiet environment;in connected speech;with unfamiliar listener  -MM           Cursory Voice Assessment/Intervention    Quality and Resonance (Voice) WFL  -MM           Cognitive Assessment Intervention- SLP    Cognitive Function (Cognition) WFL  -MM        Orientation Status (Cognition) WFL  -MM        Memory (Cognitive) WFL  -MM        Attention (Cognitive) WFL  -MM        Thought Organization (Cognitive) WFL  -MM        Reasoning (Cognitive) WFL  -MM        Pragmatics (Communication) WFL  -MM           SLP Evaluation Clinical Impressions    SLP Diagnosis functional speech/language skills;functional  cognitive-linguistic skills  -MM        SLP Diagnosis Comments Pt presents with grossly functional speech, language, and cognition skills at this time. Pt reports his symptoms have resolved and he feels he is at baseline. No further SLP services indicated at this time.  -MM        SLC Criteria for Skilled Therapy Interventions Met no problems identified which require skilled intervention  -MM           Recommendations    Therapy Frequency (SLP SLC) evaluation only  -MM        Anticipated Discharge Disposition (SLP) No further SLP services warranted  -MM                  User Key  (r) = Recorded By, (t) = Taken By, (c) = Cosigned By      Initials Name Effective Dates    Tiarra Navarrete MS CCC-SLP 08/30/24 -                        EDUCATION  The patient has been educated in the following areas:     Evaluation results and recommendations .                        Time Calculation:      Time Calculation- SLP       Row Name 05/27/25 0907             Time Calculation- SLP    SLP Start Time 0825  -MM      SLP Received On 05/27/25  -MM         Untimed Charges    01903-XM Eval Speech and Production w/ Language Minutes 39  -MM         Total Minutes    Untimed Charges Total Minutes 39  -MM       Total Minutes 39  -MM                User Key  (r) = Recorded By, (t) = Taken By, (c) = Cosigned By      Initials Name Provider Type    Tiarra Navarrete MS CCC-SLP Speech and Language Pathologist                    Therapy Charges for Today       Code Description Service Date Service Provider Modifiers Qty    28315531500 HC ST EVAL SPEECH AND PROD W LANG  3 5/27/2025 Tiarra Watts MS CCC-SLP GN 1                       Tiarra Watts MS CCC-SLP  5/27/2025

## 2025-05-27 NOTE — PLAN OF CARE
Goal Outcome Evaluation:  Plan of Care Reviewed With: patient, spouse        Progress: no change  Outcome Evaluation: Pt presents at baseline for ADL completion with symmetrical BUE strength and coordination/sensation intact. OT signing off, please reconsult if needed. Rec d/c to home when medically appropriate.    Anticipated Discharge Disposition (OT): home

## 2025-05-27 NOTE — OUTREACH NOTE
Prep Survey      Flowsheet Row Responses   [facility] patient discharged from? [place]   Is LACE score < 7 ? No   Eligibility [facility]   Date of Admission 05/26/25   Date of Discharge 05/27/25   Discharge diagnosis Atrial fibrillation with RVR   Does the patient have one of the following disease processes/diagnoses(primary or secondary)? Other   Prep survey completed? Yes            Taylor VALERO - Registered Nurse

## 2025-05-27 NOTE — CONSULTS
[facility]   Consult Note    Patient Name: Zaheer Baron  : 1960  MRN: [id number]  Primary Care Physician:  Eliana Ann PA  Referring Physician: No Known Provider  Date of admission: 2025    Inpatient Cardiology Consult  Consult performed by: Kai Hurst MD  Consult ordered by: Rosalina Sauceda APRN        Subjective   Subjective     Problem List  -PAF  -TIA?  -DM  -HTN  -HLD  -Minimally elevated trop, bnp normal  -echo preserved LV function  -EKG NSR, telemetry, brief afib    Mr. Baron is a 64 year old man with above hx.  Had syncope several years ago after sepsis and found to be in afib.  No issues since then.  Had alterations in mentation and went to ER.  Found to be afib.  Converted prior to transfer.   Unable to access EKG from OSH but had some afib on monitor here.  Currently in NSR and feeling well.  Ros negative except for the above.          Review of Systems     Personal History     Past Medical History:   Diagnosis Date    Acute sinusitis     Allergic rhinitis     Arthritis of back     Arthritis of neck     Arthropathy     of lumbar facet joint    Artificial lens present     position - right    Atrial fibrillation 2020    patient had episode of atrial fibrillation w/ ventricular response secondary to hypovelemia r/t salmonella infection    Backache     chronic back problems    Benign prostatic hyperplasia     Bronchitis     perisistent    Cataract     Cervical disc disorder     Chronic obstructive lung disease     Diabetes mellitus     no retinopathy    Disorder of kidney     Disorder of kidney and/or ureter - Congenital solitary right kidney       Dizziness     History of Meniere's like condition, left ear       Drug therapy     long-term    Dysfunction of eustachian tube     Dyslipidemia     Gout     Headache     History of possible migraine/cluster       Hearing loss     Hip arthrosis     Hypertension     Hypokalemia     Impacted cerumen     Infestation by  Sarcoptes scabiei alta hominis     Inguinal hernia     History of, repaired       Knee pain     Knee swelling     Lumbosacral disc disease     Osteoarthritis     Periarthritis of shoulder     Pneumonia     in the past    Posterior subcapsular polar senile cataract     Sjogren's syndrome     Type 2 diabetes mellitus        Past Surgical History:   Procedure Laterality Date    BACK SURGERY      1/1/02    CARPAL TUNNEL RELEASE      (Carpal tunnel release on the left)   09/10/2010 , Carpal tunnel release on the right)   12/03/2010     CATARACT EXTRACTION, BILATERAL      COLONOSCOPY      ENDOSCOPY N/A 03/16/2017    Procedure: ESOPHAGOGASTRODUODENOSCOPY;  Surgeon: Alli Shook DO;  Location: [facility] ENDOSCOPY;  Service:     EYE SURGERY Bilateral     cataract removed    HAND SURGERY      HERNIA REPAIR      INGUINAL HERNIA REPAIR      (Left inguinal hernioplasty with mesh.)   06/07/2007     INJECTION OF MEDICATION      Injection for nerve block (Lumbar medial branch block.)   03/31/2016     INJECTION OF MEDICATION  03/19/2015    solu-medrol     KNEE SURGERY      (Lateral release, removal of loose bodies, excision of suprapatellar plica.)   04/29/1982     OTHER SURGICAL HISTORY      Lumbar surgery (Lumbosacral radio frequency thermal coagulation. Lumbosacral spondylosis.)   06/30/2016 ,Lumbar surgery (Lumbosacral radio frequency thermal coagulation.)   12/21/2015      OTHER SURGICAL HISTORY      Remove cataract, insert lens (Right eye.)   05/07/2015     OTHER SURGICAL HISTORY      Remove hip/pelvis lesion (Melanoma, right hip.)   2005     VASECTOMY         Family History: family history includes Diabetes in his paternal grandmother; Heart failure (age of onset: 35) in his father; Lung disease in his mother; Stomach cancer (age of onset: 43) in his sister. Otherwise pertinent FHx was reviewed and not pertinent to current issue.    Social History:  reports that he has never smoked. His smokeless tobacco use includes snuff.  He reports that he does not drink alcohol and does not use drugs.    Home Medications:   Ergocalciferol, Suvorexant, albuterol sulfate HFA, bacitracin, clotrimazole-betamethasone, doxazosin, empagliflozin, esomeprazole, fenofibrate, fluticasone, glipizide, ketoconazole, oxyCODONE-acetaminophen, tamsulosin, triamcinolone, and ubrogepant    Allergies:  Allergies   Allergen Reactions    Nsaids Other (See Comments)     Solitary kidney; renal failure with NSAID use.         Objective    Objective     Vitals:  Temp:  [98 °F (36.7 °C)-98.3 °F (36.8 °C)] 98 °F (36.7 °C)  Heart Rate:  [] 66  Resp:  [18] 18  BP: (137-157)/() 157/91    Physical Exam  HENT:      Head: Normocephalic.   Eyes:      Extraocular Movements: Extraocular movements intact.   Cardiovascular:      Rate and Rhythm: Normal rate and regular rhythm.      Heart sounds: No murmur heard.     No gallop.   Pulmonary:      Breath sounds: Normal breath sounds.   Abdominal:      Palpations: Abdomen is soft.   Musculoskeletal:      Right lower leg: No edema.      Left lower leg: No edema.   Skin:     General: Skin is warm and dry.   Neurological:      General: No focal deficit present.      Mental Status: He is alert.   Psychiatric:         Mood and Affect: Mood normal.         Result Review    Result Review:  I have personally reviewed the results from the time of this admission to 5/27/2025 12:45 EDT and agree with these findings:  []  Laboratory list / accordion  []  Microbiology  []  Radiology  []  EKG/Telemetry   []  Cardiology/Vascular   []  Pathology  []  Old records  []  Other:      Assessment & Plan   Assessment / Plan     Assessment  -PAF  -TIA?  -DM  -HTN  -HLD  -Minimally elevated trop, bnp normal  -echo preserved LV function  -EKG NSR, telemetry, brief afib    Plan:   -Start eliquis 5 mg twice daily.  30 day monitor.  6 week follow up with me.    -if getting knee done tomorrow would not start eliquis.    -Cont. BB  -Cont. statin      Kai  Carlos Hurst MD

## 2025-05-27 NOTE — DISCHARGE SUMMARY
[facility] Medicine Services  DISCHARGE SUMMARY    Patient Name: Zaheer Baron  : 1960  MRN: [id number]    Date of Admission: 2025  6:07 PM  Date of Discharge:  2025  Primary Care Physician: Eliana Ann PA    Consults       Date and Time Order Name Status Description    2025  6:43 PM Inpatient Cardiology Consult Completed             Hospital Course     Presenting Problem: Afib w RVR, strange episode of behavior    Active Hospital Problems    Diagnosis  POA    Hypertriglyceridemia [E78.1]  Yes    Sjogren's syndrome [M35.00]  Yes    Hypertension [I10]  Yes    Depression [F32.A]  Yes    Mixed hyperlipidemia [E78.2]  Yes    Solitary kidney, congenital [Q60.0]  Not Applicable    GERD (gastroesophageal reflux disease) [K21.9]  Yes      Resolved Hospital Problems    Diagnosis Date Resolved POA    **Atrial fibrillation with RVR [I48.91] 2025 Yes    Episode of unresponsiveness [R40.4] 2025 Yes          Hospital Course:  Zaheer Baron is a 64 y.o. male w DMII, Sjogren's syndrome, brief h/o Afib in setting of prior sepsis who presents after prolonged episode of confusion/behavior he does not remember. Got into car in the backseat, dropped money in parking lot etc and became conscious of behavior in ED. Found to have Afib w RVR in 170's on arrival there    Afib w RVR w rate control on metoprolol. Will start eliquis on discharge w Holter per Dr Hurst and 5 week follow-up with his service for this issue. Elective knee surgery  to be delayed - d/w patient's orthopedic surgeon Dr Holland + patient    Neurologic work-up unremarkable including EEG and MRI. Suspect episode 2/2 above but will complete OP transcranial doppler and stroke clinic follow-up for this concern    *Cardiac monitor not placed before monitor hours ended for unclear reason. D/w Dr Hurst who will arrange with clinic to have monitor mailed to patient and instructed patient to call  cardiology clinic if he doesn't receive anything by next week. OK to dc home 5/27      Discharge Follow Up Recommendations for outpatient labs/diagnostics:   PCP 1-2 weeks   Dr Hurst 6 weeks   Stroke clinic 1 month    Day of Discharge     HPI:   Feels well  Anxious to leave and see his great-grandson who was born yesterday  Completely to baseline      Vital Signs:   Temp:  [98 °F (36.7 °C)-98.3 °F (36.8 °C)] 98 °F (36.7 °C)  Heart Rate:  [] 66  Resp:  [18] 18  BP: (137-157)/() 157/91      Physical Exam:  Constitutional: No acute distress, awake, alert  HENT: NCAT, mucous membranes moist  Respiratory: Clear to auscultation bilaterally, respiratory effort normal   Cardiovascular: IRIR, no murmurs, rubs, or gallops  Gastrointestinal: Soft, nontender, nondistended  Musculoskeletal: Muscle tone within normal limits, no joint effusions appreciated  Psychiatric: Appropriate affect, cooperative  Neurologic: Alert and oriented, facial movements symmetric and spontaneous movement of all 4 extremities grossly equal bilaterally, speech clear  Skin: No rashes    Pertinent  and/or Most Recent Results     LAB RESULTS:      Lab 05/27/25  1159 05/27/25  0441 05/26/25  2115 05/26/25  1628   WBC  --  9.31  --   --    HEMOGLOBIN  --  13.5  --   --    HEMATOCRIT  --  41.0  --   --    PLATELETS  --  282  --   --    NEUTROS ABS  --  5.32  --   --    IMMATURE GRANS (ABS)  --  0.03  --   --    LYMPHS ABS  --  3.17*  --   --    MONOS ABS  --  0.56  --   --    EOS ABS  --  0.16  --   --    MCV  --  89.9  --   --    APTT  --   --  29.7*  --    HEPARIN ANTI-XA 0.23* 0.20* 0.10* 0.1*         Lab 05/27/25 0441   SODIUM 140   POTASSIUM 3.6   CHLORIDE 102   CO2 23.0   ANION GAP 15.0   BUN 13   CREATININE 1.14   EGFR 71.4   GLUCOSE 134*   CALCIUM 9.2   HEMOGLOBIN A1C 7.20*   TSH 1.290                 Lab 05/27/25 0441   CHOLESTEROL 199   LDL CHOL 124*   HDL CHOL 34*   TRIGLYCERIDES 232*             Brief Urine Lab Results       None           Microbiology Results (last 10 days)       ** No results found for the last 240 hours. **            EEG  Result Date: 5/27/2025  .Reason for referral: 64 y.o.male with episode of unresponsiveness, consideration of seizure Technical Summary:  A 19 channel digital EEG was performed using the international 10-20 placement system, including eye leads and EKG leads. Duration: 22 minutes Findings: The patient is awake.  Diffuse low amplitude theta and alpha activity is present symmetrically over both hemispheres.  EMG artifact is prominent over the frontal leads.  A clear posterior rhythm is not seen..  Drowsiness is seen with mild slowing of the tracing but stage II sleep is not seen.  Photic stimulation does not change the background.  Hyperventilation is not performed.  No focal features or epileptiform activity are seen. Video: Available Technical quality: Good Rhythm strip: Regular, 70 bpm SUMMARY: Normal EEG in the awake drowsy states No focal features or epileptiform activity seen     Normal study This report is transcribed using the Dragon dictation system.      MRI Brain Without Contrast  Result Date: 5/26/2025  MRI BRAIN WO CONTRAST Date of Exam: 5/26/2025 8:25 PM EDT Indication: Stroke, follow up stroke r/o.  Comparison: CT 5/26/2025 and prior including prior MRI 7/5/2023 Technique:  Routine multiplanar/multisequence sequence images of the brain were obtained without contrast administration. Findings: Diffusion-weighted imaging demonstrates no acute restriction abnormality. Midline structures of the brain appear grossly unremarkable in appearance. The pituitary and sella structures and craniocervical junction is grossly unremarkable in appearance. The ventricles, cisterns and sulci appear grossly stable. There is no acute intracranial hemorrhage or mass effect. Moderate patchy and confluent FLAIR and T2 signal hyperintensity with a supratentorial predominance again noted without significant change from  comparison when accounting for differences in technique. The major intracranial flow voids appear grossly patent. Thinning of the lenses noted within the globes bilaterally. Orbits otherwise grossly unremarkable in appearance. Paranasal sinuses demonstrate mild mucosal thickening. Probable mucous retention cysts. Mastoid air cells are grossly clear     Impression: 1. No acute ischemic change. 2. White matter changes most compatible sequela small vessel ischemic disease in this age group., Accounting for differences in technique no significant change from comparison Electronically Signed: Brad Cui MD  5/26/2025 9:19 PM EDT  Workstation ID: [id number]    CT Head Without Contrast Stroke Protocol  Result Date: 5/26/2025  CT HEAD WO CONTRAST STROKE PROTOCOL Date of Exam: 5/26/2025 7:50 PM EDT Indication: AMS, eval for hemorrhage. 9 true code stroke. Possible bleed on outside imaging. Comparison: 7/5/2023. Technique: Axial CT images were obtained of the head without contrast administration.  Reconstructed coronal images were also obtained. Automated exposure control and iterative construction methods were used. Findings: There is no evidence of hemorrhage. There is no mass effect or midline shift. There is no extracerebral collection. Calcification seen along the falx. Ventricles are normal in size and configuration for patient's stated age.  Posterior fossa is within normal limits. Calvarium and skull base appear intact.   Visualized sinuses show no air fluid levels. Visualized orbits are unremarkable.     Impression: No acute intracranial process. Electronically Signed: Yazmin Bonds MD  5/26/2025 8:05 PM EDT  Workstation ID: [id number]    CT Angiogram Carotids  Result Date: 5/26/2025  Study: CT ANGIOGRAM HEAD NECK W CONTRAST STROKE W/LVO PER CONTRAST PROTOCOL REASON FOR EXAM: Transient ischemic attack (TIA) TECHNIQUE: CT angiography of the head and neck was performed following the intravenous administration of  iodinated contrast material. Contiguous axial images were obtained from the aortic arch to the vertex. Coronal and sagittal reformatted images were also reviewed. One of these 3D techniques was utilized: Maximum Intensity Pixel (MIP), 3D Reconstructed Images, Volume Rendered Images, Surface Shaded Rendering. One of the following dose reduction techniques was utilized for this exam. Automated exposure control, adjustment of the mA and/or kV according to patient size, and use of iterative reconstruction. CT DI->10.2; CT DLP->163.2 COMPARISON: No previous studies are available for comparison. ___________________________________ FINDINGS: Head:  Intracranial Arteries: The intracranial arteries, including the anterior cerebral arteries, middle cerebral arteries, posterior cerebral arteries, basilar artery, and vertebral arteries, are all patent without evidence of significant stenosis, aneurysm, or dissection. There is no evidence of vascular malformations.  [place]: The [place] is intact with no anatomical variations or abnormalities noted. All segments are well-visualized and normal in appearance.  Venous System: The visualized portions of the venous system, including the dural venous sinuses, are patent with no evidence of thrombosis.  Brain Parenchyma: Chronic microvascular and age-related involutional brain changes  Bones: degenerative changes of the imaged spine.  Soft Tissues: The visualized soft tissues of the head are unremarkable.  Neck:  Carotid Arteries:  Minimal atheromatous plaques of the aortic arch and the left carotid bulb. Mild intimal thcikening of both carotid bulbs. No significant stenosis.  The common, internal, and external carotid arteries are patent bilaterally with no evidence of significant stenosis, aneurysm, or dissection. There is no evidence of atherosclerotic plaque causing significant luminal narrowing.  Vertebral Arteries: The vertebral arteries are patent bilaterally  with no evidence of significant stenosis, aneurysm, or dissection.  Thyroid Gland: A Few right lobe hypodense nodules are noted, the largest measures 17 mm.  Lymph Nodes: There is no evidence of significant lymphadenopathy in the neck.  Soft Tissues: The soft tissues of the neck are unremarkable with no evidence of masses or abnormal collections.  Additional Findings: Ectasia of the right internal jugular and subclavian veins. ___________________________________    1. No evidence of significant vascular abnormalities, acute infarct. 2. Ectasia of the right internal jugular and subclavian veins needs Doppler correlation.    XR Chest 1 View  Result Date: 5/26/2025  Study: XR CHEST AP PORTABLE REASON FOR EXAM: ALTERED MENTAL STATUS TECHNIQUE: An X-ray image of the chest is obtained in AP projection. COMPARISON: No prior studies are available for comparison. ___________________________________ FINDINGS: Pulmonary Parenchyma: The medial aspect of the right mid and the right lower lung zone shows few dense focal opacities with lobulated margins, the largest is right hilar.  Mild elevated left hemidiaphragm is noted.  Other than that, the rest of the lung fields show no evidence of consolidation or collapse. No evidence of pleural effusion or pleural thickening. Heart and Mediastinum: The heart size can not be assessed on the anterior-posterior projection, yet it seems enlarged, associated with an unfolded aorta.  Bony Thorax: Bony thorax appears intact without acute fractures or deformities. Soft Tissues: Soft tissues overlying the chest wall are unremarkable. ___________________________________    1. No acute cardiopulmonary abnormalities. 2. Possible right hilar calcific nodules/ lymph nodes. 3. Cardiomegaly is associated with an unfolded aorta.    CT Head Without Contrast  Result Date: 5/26/2025  Study: CT HEAD WO CONTRAST FOR STROKE REASON FOR EXAM: Neuro deficit, acute, stroke suspected TECHNIQUE: Axial non-contrast  CT scan of the brain was performed from the skull base to the high parietal region with coronal and sagittal reformats. One of the following dose reduction techniques were utilized for this exam: Automated exposure control, adjustment of the mA and/or kV according to patient size, use of iterative reconstruction. COMPARISON: None. ___________________________________ FINDINGS: Brain Parenchyma:  Bilateral periventricular and white matter hypodensities likely represent small vessels ischemic changes.  Age-related cerebral atrophic changes as evidenced by prominence of cortical sulci and gyri and mild prominent lateral ventricles.  Otherwise, normal attenuation of the cerebral hemispheres, cerebellum, and brainstem. No evidence of acute territorial infarct, hemorrhage, or mass effect. Ventricular System:  Ventricles are prominent. No evidence of hydrocephalus or ventricular enlargement. Subarachnoid Spaces:  prominent sulci and cisterns. No evidence of subarachnoid hemorrhage or extra-axial fluid collections. Cerebellum and Brainstem:  No masses, lesions, or areas of abnormal density. Orbits:  Normal appearance of the globes, optic nerves, and extraocular muscles. No evidence of orbital masses or abnormal density. Sinuses:  Clear paranasal sinuses apart from right maxillary retention cyst. No evidence of sinusitis or mucosal thickening. Mastoid Air Cells:  Clear mastoid air cells. No evidence of mastoiditis. Skull:  Normal skull morphology. ___________________________________    1. No evidence of acute territorial infarct or hemorrhage. 2. Small vessels ischemic changes. 3. MRI is the modality of choice to rule out acute ischemic infarction and is advised if clinically indicated.       Results for orders placed during the hospital encounter of 07/19/23    Duplex Carotid Ultrasound CAR    Interpretation Summary    Right internal carotid artery demonstrates normal flow without evidence of hemodynamically significant  stenosis.    Left internal carotid artery demonstrates normal flow without evidence of hemodynamically significant stenosis.      Results for orders placed during the hospital encounter of 07/19/23    Duplex Carotid Ultrasound CAR    Interpretation Summary    Right internal carotid artery demonstrates normal flow without evidence of hemodynamically significant stenosis.    Left internal carotid artery demonstrates normal flow without evidence of hemodynamically significant stenosis.      Results for orders placed during the hospital encounter of 05/26/25    Adult Transthoracic Echo Complete W/ Cont if Necessary Per Protocol (With Agitated Saline)    Interpretation Summary    Left ventricular systolic function is normal. Estimated left ventricular EF = 60%    Left ventricular wall thickness is consistent with hypertrophy.    The cardiac valves are anatomically and functionally normal.    Poor quality saline contrast study, however no obvious interatrial shunting noted.      Plan for Follow-up of Pending Labs/Results:     Discharge Details        Discharge Medications        New Medications        Instructions Start Date   atorvastatin 80 MG tablet  Commonly known as: LIPITOR   80 mg, Oral, Nightly      Eliquis 5 MG tablet tablet  Generic drug: apixaban   5 mg, Oral, Every 12 Hours Scheduled      metoprolol tartrate 25 MG tablet  Commonly known as: LOPRESSOR   25 mg, Oral, Every 12 Hours Scheduled             Changes to Medications        Instructions Start Date   albuterol sulfate  (90 Base) MCG/ACT inhaler  Commonly known as: PROVENTIL HFA;VENTOLIN HFA;PROAIR HFA  What changed: See the new instructions.   INHALE 1 TO 2 PUFFS EVERY 4 TO 6 HOURS AS NEEDED FOR WHEEZE FOR UP TO 30 DAYS             Continue These Medications        Instructions Start Date   bacitracin 500 UNIT/GM ointment   0.9 g, Topical, 2 Times Daily      Belsomra 20 MG tablet  Generic drug: Suvorexant   TAKE 1 TABLET BY MOUTH DAILY AS NEEDED  FOR SLEEP      clotrimazole-betamethasone 1-0.05 % cream  Commonly known as: Lotrisone   1 application , Topical, 2 Times Daily      doxazosin 4 MG tablet  Commonly known as: Cardura   4 mg, Oral, Nightly      empagliflozin 25 MG tablet tablet  Commonly known as: Jardiance   25 mg, Oral, Daily      Ergocalciferol 50 MCG (2000 UT) tablet   50 mcg, Oral, Daily      esomeprazole 20 MG capsule  Commonly known as: nexIUM   20 mg, Every Morning Before Breakfast      fenofibrate 145 MG tablet  Commonly known as: Tricor   145 mg, Oral, Daily      fluticasone 50 MCG/ACT nasal spray  Commonly known as: FLONASE   Administer 1 spray into the nostril(s) as directed by provider Daily.      glipizide 5 MG tablet  Commonly known as: GLUCOTROL   Take 1 tablet by mouth Daily.      ketoconazole 2 % shampoo  Commonly known as: NIZORAL   USE AS A SHAMPOO TO THE SCALP THREE TIMES A WEEK. LATHER FOR 5 MINUTES THEN RINSE.      oxyCODONE-acetaminophen  MG per tablet  Commonly known as: PERCOCET   1 tablet, Oral, 5 Times Daily PRN      tamsulosin 0.4 MG capsule 24 hr capsule  Commonly known as: FLOMAX   Take 1 capsule by mouth Every Night.      triamcinolone 0.5 % ointment  Commonly known as: KENALOG   1 Application, Topical, 2 Times Daily      ubrogepant 100 MG tablet  Commonly known as: Ubrelvy   100 mg, Oral, Daily PRN               Allergies   Allergen Reactions    Nsaids Other (See Comments)     Solitary kidney; renal failure with NSAID use.           Discharge Disposition:  Home or Self Care    Diet:  Hospital:  Diet Order   Procedures    Diet: Cardiac, Diabetic; Healthy Heart (2-3 Na+); Consistent Carbohydrate; Fluid Consistency: Thin (IDDSI 0)            Activity:      Restrictions or Other Recommendations:         CODE STATUS:    Code Status and Medical Interventions: CPR (Attempt to Resuscitate); Full Support   Ordered at: 05/26/25 0962     Code Status (Patient has no pulse and is not breathing):    CPR (Attempt to  Resuscitate)     Medical Interventions (Patient has pulse or is breathing):    Full Support     Level Of Support Discussed With:    Patient       Future Appointments   Date Time Provider Department Center   6/3/2025 12:00 PM Eliana Ann PA [facility]   7/1/2025  8:30 AM [facility]   7/10/2025 11:15 AM Kai Hurst MD [facility]       Additional Instructions for the Follow-ups that You Need to Schedule       Discharge Follow-up with PCP   As directed       Currently Documented PCP:    Eliana Ann PA    PCP Phone Number:    [phone]     Follow Up Details: 1 week        Discharge Follow-up with Specified Provider: 1 month stroke clinic follow-up, OP completion of  TCD   As directed      To: 1 month stroke clinic follow-up, OP completion of  TCD        Discharge Follow-up with Specified Provider: 6 week Dr Hurst   As directed      To: 6 week Dr Aris Alvarez MD  05/27/25      Time Spent on Discharge:  I spent  35  minutes on this discharge activity which included: face-to-face encounter with the patient, reviewing the data in the system, coordination of the care with the nursing staff as well as consultants, documentation, and entering orders.

## 2025-05-27 NOTE — PROGRESS NOTES
Stroke Progress Note       Chief Complaint: Transient episode of being less responsive    Subjective    Subjective     Subjective:  The patient is lying down in the bed in NAD.  No family were at the bedside. The patient stated that he is doing better this morning and denies having any new stroke or strokelike symptoms.  I discussed with the patient the imaging findings what could possibly explain his symptoms.  We also discussed management plan moving forward.  All patient's questions and concerns were answered.    No other acute complains at this time    Review of Systems   Constitutional: No fatigue        Objective      Temp:  [98 °F (36.7 °C)-98.3 °F (36.8 °C)] 98 °F (36.7 °C)  Heart Rate:  [] 66  Resp:  [18] 18  BP: (137-157)/() 157/91    Objective    GEN: lying in bed; in NAD  HENT: normocephalic, non-erythematous oropharynx  CV: no LE edema    NEURO:  Mental Status: A&O x 3, interactive, able to follow commands  Speech: Intact Articulation  CN 2-12:  II - PERRLA  III, IV, VI - EOMI  V - Facial sensation intact  VII -no gross facial asymmetry  VIII - Auditory acuity intact  XII - Tongue protrudes midline    Motor: The patient can move all 4 extremities against gravity  Sensory: intact light touch throughout  Reflexes:  negative Means's sign BL  Coordination: no ataxia with finger-to-nose testing  Gait/Station: deferred     Results Review:    I reviewed the patient's new clinical results.    WBC   Date Value Ref Range Status   05/27/2025 9.31 3.40 - 10.80 10*3/mm3 Final     RBC   Date Value Ref Range Status   05/27/2025 4.56 4.14 - 5.80 10*6/mm3 Final     Hemoglobin   Date Value Ref Range Status   05/27/2025 13.5 13.0 - 17.7 g/dL Final     Hematocrit   Date Value Ref Range Status   05/27/2025 41.0 37.5 - 51.0 % Final     MCV   Date Value Ref Range Status   05/27/2025 89.9 79.0 - 97.0 fL Final     MCH   Date Value Ref Range Status   05/27/2025 29.6 26.6 - 33.0 pg Final     MCHC   Date Value Ref  Range Status   05/27/2025 32.9 31.5 - 35.7 g/dL Final     RDW   Date Value Ref Range Status   05/27/2025 13.8 12.3 - 15.4 % Final     RDW-SD   Date Value Ref Range Status   05/27/2025 45.1 37.0 - 54.0 fl Final     MPV   Date Value Ref Range Status   05/27/2025 10.3 6.0 - 12.0 fL Final     Platelets   Date Value Ref Range Status   05/27/2025 282 140 - 450 10*3/mm3 Final     Neutrophil %   Date Value Ref Range Status   05/27/2025 57.2 42.7 - 76.0 % Final     Lymphocyte %   Date Value Ref Range Status   05/27/2025 34.0 19.6 - 45.3 % Final     Monocyte %   Date Value Ref Range Status   05/27/2025 6.0 5.0 - 12.0 % Final     Eosinophil %   Date Value Ref Range Status   05/27/2025 1.7 0.3 - 6.2 % Final     Basophil %   Date Value Ref Range Status   05/27/2025 0.8 0.0 - 1.5 % Final     Immature Grans %   Date Value Ref Range Status   05/27/2025 0.3 0.0 - 0.5 % Final     Neutrophils, Absolute   Date Value Ref Range Status   05/27/2025 5.32 1.70 - 7.00 10*3/mm3 Final     Lymphocytes, Absolute   Date Value Ref Range Status   05/27/2025 3.17 (H) 0.70 - 3.10 10*3/mm3 Final     Monocytes, Absolute   Date Value Ref Range Status   05/27/2025 0.56 0.10 - 0.90 10*3/mm3 Final     Eosinophils, Absolute   Date Value Ref Range Status   05/27/2025 0.16 0.00 - 0.40 10*3/mm3 Final     Basophils, Absolute   Date Value Ref Range Status   05/27/2025 0.07 0.00 - 0.20 10*3/mm3 Final     Immature Grans, Absolute   Date Value Ref Range Status   05/27/2025 0.03 0.00 - 0.05 10*3/mm3 Final     nRBC   Date Value Ref Range Status   05/27/2025 0.0 0.0 - 0.2 /100 WBC Final       Lab Results   Component Value Date    GLUCOSE 134 (H) 05/27/2025    BUN 13 05/27/2025    CREATININE 1.14 05/27/2025     05/27/2025    K 3.6 05/27/2025     05/27/2025    CALCIUM 9.2 05/27/2025    PROTEINTOT 7.6 05/14/2025    ALBUMIN 4.6 05/14/2025    ALT 19 05/14/2025    AST 20 05/14/2025    ALKPHOS 72 05/14/2025    BILITOT 0.5 05/14/2025    GLOB 3.0 05/14/2025     AGRATIO 1.5 05/14/2025    BCR 11.4 05/27/2025    ANIONGAP 15.0 05/27/2025    EGFR 71.4 05/27/2025     EEG  Result Date: 5/27/2025  Normal study This report is transcribed using the Dragon dictation system.      MRI Brain Without Contrast  Result Date: 5/26/2025  Impression: 1. No acute ischemic change. 2. White matter changes most compatible sequela small vessel ischemic disease in this age group., Accounting for differences in technique no significant change from comparison Electronically Signed: Brad Cui MD  5/26/2025 9:19 PM EDT  Workstation ID: [id number]    CT Head Without Contrast Stroke Protocol  Result Date: 5/26/2025  Impression: No acute intracranial process. Electronically Signed: Yazmin Bonds MD  5/26/2025 8:05 PM EDT  Workstation ID: [id number]    CT Angiogram Carotids  Result Date: 5/26/2025  1. No evidence of significant vascular abnormalities, acute infarct. 2. Ectasia of the right internal jugular and subclavian veins needs Doppler correlation.    XR Chest 1 View  Result Date: 5/26/2025  1. No acute cardiopulmonary abnormalities. 2. Possible right hilar calcific nodules/ lymph nodes. 3. Cardiomegaly is associated with an unfolded aorta.    CT Head Without Contrast  Result Date: 5/26/2025  1. No evidence of acute territorial infarct or hemorrhage. 2. Small vessels ischemic changes. 3. MRI is the modality of choice to rule out acute ischemic infarction and is advised if clinically indicated.     Results for orders placed during the hospital encounter of 05/26/25    Adult Transthoracic Echo Complete W/ Cont if Necessary Per Protocol (With Agitated Saline)    Interpretation Summary    Left ventricular systolic function is normal. Estimated left ventricular EF = 60%    Left ventricular wall thickness is consistent with hypertrophy.    The cardiac valves are anatomically and functionally normal.    Poor quality saline contrast study, however no obvious interatrial shunting noted.      -CTA of the head  and neck report from 5/26/2025 were personally reviewed and showed no flow limiting stenosis or LVO  -MRI brain images from 5/26/2025 were personally reviewed and showed no ischemic or hemorrhagic stroke  -Transthoracic echocardiogram from 5/27/2025 report was personally reviewed and showed left ventricular ejection fraction of 60%, mild increase in left atrial volume.  Saline study was inconclusive  -A1c from 5/27/2025 was 7.2%  -LDL from 5/27/2025 was 124      Assessment/Plan   This is a 64-year-old male with past medical history of T2DM, Sjorgren's syndrome, OA, CAD, cataract, previously resolved A-fib (2020, not on OAC) presented to [facility] emergency department for further evaluation of 10-minute episode of unresponsiveness that began around 1300 today.  He was found to be in A-fib with RVR with a rate of 170 on arrival to OSH ER. CT head was reported to be negative for any acute findings.  CTA head/neck were also reported to be negative for any flow-limiting stenosis or LVO.      Antiplatelet PTA: None  Anticoagulant PTA: None            #Transient altered mental status  -Suspected altered mentation in the setting of A-fib with RVR (heart rate 170).  -CTA of the head and neck report from 5/26/2025 were personally reviewed and showed no flow limiting stenosis or LVO  -MRI brain images from 5/26/2025 were personally reviewed and showed no ischemic or hemorrhagic stroke  -Transthoracic echocardiogram from 5/27/2025 report was personally reviewed and showed left ventricular ejection fraction of 60%, mild increase in left atrial volume.  Saline study was inconclusive  -A1c from 5/27/2025 was 7.2%  -LDL from 5/27/2025 was 124  -Routine EEG from 5/27/2025 was unremarkable    Recommendations  -Agree with cardiology recommendations for anticoagulation for the new onset A-fib  -The patient will need to be on antiplatelet, etiology aspirin 81 mg daily for primary stroke prevention in the setting of  hypertension, hyperlipidemia and diabetes  -Continue atorvastatin 80 nightly for primary stroke prevention.  Target LDL level of less than 70.  Patient reported statin intolerance in the past but is willing to try atorvastatin at this time.  If patient develops any side effect consider switching to Zetia or Repatha as an outpatient  -Will order TCD to evaluate for cardiac shunt as the TTE was inconclusive  -Target blood pressure goals of normotension  -Activity as tolerated, fall risk precautions  -PT/OT/SLP evaluation     #Essential hypertension  -Target blood pressure goals of normotension     #Hyperlipidemia  -Atorvastatin 80mg nightly     #Diabetes Mellitus type 2, goal A1c <7  -Maintain euglycemia  -Management per primary team      # Atrial fibrillation  - Agree with anticoagulation at this time    Stroke will continue to follow. Please call for any further questions or concerns  =================================  Martín Hawthorne MD, Msc, PhD  Vascular Neurologist  [facility]

## 2025-05-28 ENCOUNTER — TRANSITIONAL CARE MANAGEMENT TELEPHONE ENCOUNTER (OUTPATIENT)
Dept: CALL CENTER | Facility: HOSPITAL | Age: 65
End: 2025-05-28
Payer: MEDICARE

## 2025-05-28 DIAGNOSIS — R00.2 PALPITATIONS: Primary | ICD-10-CM

## 2025-05-28 DIAGNOSIS — R42 DIZZINESS: ICD-10-CM

## 2025-05-28 DIAGNOSIS — I48.91 ATRIAL FIBRILLATION, UNSPECIFIED TYPE: ICD-10-CM

## 2025-05-28 NOTE — OUTREACH NOTE
Call Center TCM Note      Flowsheet Row Responses   [facility] patient discharged from? [facility]   Does the patient have one of the following disease processes/diagnoses(primary or secondary)? Other   TCM attempt successful? Yes   Call start time 1351   Call end time 1404   Discharge diagnosis Atrial fibrillation with RVR  [episode of confusion]   Person spoke with today (if not patient) and relationship Patient   Meds reviewed with patient/caregiver? Yes   Does the patient have all medications ordered at discharge? Yes   Is the patient taking all medications as directed (includes completed medication regime)? Yes   Medication comments Patient reports that he has issues with sleep and has been on Belsomra, but it is not working well for him.  He reports that PCP was supposed to have ordered new sleep med to [facility], but they do not have new script. Message routed to PCP office.   Comments PCP Eliana RODRIGUEZ. Hospital follow up appt in place for 6/3  12pm.   Does the patient have an appointment with their PCP within 7-14 days of discharge? Yes   Has home health visited the patient within 72 hours of discharge? N/A   Psychosocial issues? No   Comments Patient with complaint regarding not getting cardiac monitor placed prior to discharge. He reports waited for several hours and was then told not available and it would have to be mailed to him.   Did the patient receive a copy of their discharge instructions? Yes   Nursing interventions Reviewed instructions with patient   What is the patient's perception of their health status since discharge? Improving   Is the patient/caregiver able to teach back signs and symptoms related to disease process for when to call PCP? Yes   Is the patient/caregiver able to teach back signs and symptoms related to disease process for when to call 911? Yes   Is the patient/caregiver able to teach back the hierarchy of who to call/visit for symptoms/problems? PCP, Specialist, Home  health nurse, Urgent Care, ED, 911 Yes   TCM call completed? Yes   Call end time 1404   Would this patient benefit from a Referral to [facility] Social Work? No   Is the patient interested in additional calls from an ambulatory ? No            RO PATEL - Registered Nurse    5/28/2025, 14:08 EDT

## 2025-06-03 ENCOUNTER — OFFICE VISIT (OUTPATIENT)
Dept: FAMILY MEDICINE CLINIC | Facility: CLINIC | Age: 65
End: 2025-06-03
Payer: MEDICARE

## 2025-06-03 ENCOUNTER — READMISSION MANAGEMENT (OUTPATIENT)
Dept: CALL CENTER | Facility: HOSPITAL | Age: 65
End: 2025-06-03
Payer: MEDICARE

## 2025-06-03 VITALS
HEIGHT: 69 IN | WEIGHT: 232 LBS | SYSTOLIC BLOOD PRESSURE: 148 MMHG | DIASTOLIC BLOOD PRESSURE: 98 MMHG | TEMPERATURE: 97.7 F | BODY MASS INDEX: 34.36 KG/M2 | HEART RATE: 63 BPM | OXYGEN SATURATION: 97 %

## 2025-06-03 DIAGNOSIS — I48.91 ATRIAL FIBRILLATION, UNSPECIFIED TYPE: ICD-10-CM

## 2025-06-03 DIAGNOSIS — E11.65 TYPE 2 DIABETES MELLITUS WITH HYPERGLYCEMIA, WITHOUT LONG-TERM CURRENT USE OF INSULIN: ICD-10-CM

## 2025-06-03 DIAGNOSIS — B35.6 JOCK ITCH: ICD-10-CM

## 2025-06-03 DIAGNOSIS — F32.1 CURRENT MODERATE EPISODE OF MAJOR DEPRESSIVE DISORDER WITHOUT PRIOR EPISODE: ICD-10-CM

## 2025-06-03 DIAGNOSIS — G43.909 ACUTE MIGRAINE: ICD-10-CM

## 2025-06-03 DIAGNOSIS — F51.01 PRIMARY INSOMNIA: Primary | ICD-10-CM

## 2025-06-03 DIAGNOSIS — Z09 HOSPITAL DISCHARGE FOLLOW-UP: Primary | ICD-10-CM

## 2025-06-03 LAB
EXPIRATION DATE: ABNORMAL
Lab: ABNORMAL
POC ALBUMIN, URINE: 150 MG/L
POC CREATININE, URINE: 100 MG/DL
POC URINE ALB/CREA RATIO: ABNORMAL

## 2025-06-03 RX ORDER — PAROXETINE HYDROCHLORIDE HEMIHYDRATE 25 MG/1
25 TABLET, FILM COATED, EXTENDED RELEASE ORAL EVERY MORNING
Qty: 90 TABLET | Refills: 1 | Status: SHIPPED | OUTPATIENT
Start: 2025-06-03

## 2025-06-03 RX ORDER — CLOTRIMAZOLE AND BETAMETHASONE DIPROPIONATE 10; .64 MG/G; MG/G
1 CREAM TOPICAL 2 TIMES DAILY
Qty: 45 G | Refills: 2 | Status: SHIPPED | OUTPATIENT
Start: 2025-06-03

## 2025-06-03 RX ORDER — RAMELTEON 8 MG/1
8 TABLET ORAL NIGHTLY
Qty: 30 TABLET | Refills: 1 | Status: SHIPPED | OUTPATIENT
Start: 2025-06-03

## 2025-06-03 NOTE — PROGRESS NOTES
Subjective   Zaheer Baron is a 64 y.o. male.     History of Present Illness   History of Present Illness  The patient is a 64-year-old male who presents for a hospital discharge follow-up. He was admitted to  from 05/26/2025 to 05/27/2025 for atrial fibrillation and experienced a prolonged episode of confusion. Atrial fibrillation was rate controlled with metoprolol, and he was also started on Eliquis at discharge. He is currently wearing a Holter monitor and will follow up with Dr. Hurst on 07/10/2025. He is also following up with the neurostroke clinic from a previous ER visit earlier that same day in [place] for suspected TIA.    He reports that his blood pressure has been relatively stable at home, with no changes in his blood pressure or blood sugar medications. He was prescribed Eliquis and a cholesterol medication during his hospital stay. He recalls an incident where he felt unwell after consuming a burger, leading to a visit to the restroom where he experienced dizziness and confusion, although he did not lose consciousness. He was assisted by his wife to the hospital across the street, where a CT scan revealed a stroke and brain bleed. However, subsequent imaging at [facility] contradicted this diagnosis, instead suggesting calcium buildup in the frontal brain. He underwent extensive testing, including carotid artery evaluation, and was prescribed blood thinners and a heart monitor for 30 days. He was advised to activate the record button on the monitor whenever he felt uneasy.    He is scheduled for a knee replacement surgery, which has been postponed due to his current health status. He is uncertain if recent events have contributed to his condition. He was informed that his atrial fibrillation episodes are brief, lasting only about 10 seconds before normalizing. He has been advised to avoid caffeine and other stimulants that could trigger irregularities. He typically consumes one cup of  coffee in the morning and three to four soft drinks throughout the day. He does not consume energy drinks. He has a history of atrial fibrillation, which occurred during a previous hospitalization for E. coli infection, resulting in syncope at work and ambulance transport to the hospital.    He reports experiencing headaches three to four times daily, which are not severe but occur frequently. He also reports lightheadedness associated with his headaches. He had a headache on the morning of his confusion episode, which he attributed to skipping breakfast. He reports no gait disturbances associated with his headaches.    He has tried various sleep aids without success and has discontinued Belsomra due to ineffectiveness. He reports that two melatonin gummies provide better relief than Belsomra. His sleep pattern is disrupted, with periods of wakefulness lasting 2 to 3 hours after initial sleep onset, followed by another 1 to 2 hours of sleep before remaining awake for the rest of the night. He reports feeling fatigued during the day. He does not consume alcohol.    He reports that bupropion has not been effective for him, causing him to feel emotional and prone to crying. He reports no suicidal ideation or homicidal thoughts but admits to frequent thoughts of death. He did not experience these symptoms while on Paxil.    He reports that his knee pain is manageable, but he is experiencing discomfort in his left knee due to compensatory overuse as he tries to minimize strain on his right knee. He hopes that the left knee pain will resolve once the right knee is treated.    He last checked his blood pressure at home last night, which was 150/90. His blood pressure has been fluctuating recently.    SOCIAL HISTORY  He does not drink alcohol.       The following portions of the patient's history were reviewed and updated as appropriate: allergies, current medications, past family history, past medical history, past social  history, past surgical history, and problem list.    Review of Systems    Objective   Physical Exam    Results  Labs   - A1c: 7.2   - Thyroid function test: Normal   - Blood count: Normal   - Kidney function test: Normal    Imaging   - CT scan: Small ischemic vessel changes    Assessment & Plan   Assessment & Plan  1. Atrial Fibrillation.  - The atrial fibrillation was managed with metoprolol for rate control and Eliquis as an anticoagulant.  - The patient is currently wearing a Holter monitor and will follow up with Dr. Hurst on 07/10/2025.  - The potential for transitioning to an antiplatelet medication will be evaluated based on Holter monitor results.  - The patient was advised to avoid caffeine and other stimulants that could trigger irregularities. A urine sample will be collected today for kidney function assessment.    2. Transient Ischemic Attack (TIA).  - The patient experienced a prolonged episode of confusion and was initially suspected to have had a TIA.  - Imaging from [facility] did not confirm a stroke or brain bleed but showed calcium buildup in the front of the brain.  - The patient will follow up with the neurostroke clinic in July 2025.  - The patient was advised to monitor for any further symptoms and report them.    3. Insomnia.  - The patient has tried various sleep aids without success.  - A new sleep aid that enhances serotonin and melatonin production has been prescribed.  - A referral to sleep medicine has been made to investigate potential neurological contributors to insomnia.  - The patient was advised to consider the cost of the new sleep aid if it is not covered by insurance.    4. Depression.  - The patient reported that bupropion was ineffective and caused emotional distress.  - Paroxetine 25 mg has been re-prescribed, and dose adjustments will be considered if necessary.  - The patient was counseled on the potential side effects and the importance of adherence to the  medication.  - The patient was advised to monitor mood and report any significant changes.    5. Migraines.  - The patient experiences frequent headaches and occasional migraines.  - Ubrelvy has been prescribed for migraine management.  - The patient was advised to avoid triptans due to the risk of stroke or TIA.  - The patient was instructed to use the Holter monitor to record any headache episodes.    6. Knee Pain.  - The patient reported tolerable knee pain but noted that the left knee is starting to hurt due to compensating for the right knee.  - Knee replacement surgery has been postponed due to the recent health events.  - The patient was advised to manage pain with conservative measures until surgery can be safely performed.  - The patient was instructed to follow up with the orthopedic surgeon as needed.    7. Blood Pressure Management.  - The patient's blood pressure has been fluctuating, with recent readings as high as 150/90.  - The patient was advised to monitor blood pressure regularly and maintain a log for the cardiologist.  - The patient was counseled on the importance of blood pressure control and potential adjustments to medication.  - The patient was instructed to follow up with the cardiologist for further management.    Follow-up  The patient will follow up in 4 months or sooner if necessary.     Diagnoses and all orders for this visit:    1. Hospital discharge follow-up (Primary)    2. Jock itch  -     clotrimazole-betamethasone (Lotrisone) 1-0.05 % cream; Apply 1 Application topically to the appropriate area as directed 2 (Two) Times a Day.  Dispense: 45 g; Refill: 2    3. Type 2 diabetes mellitus with hyperglycemia, without long-term current use of insulin  -     POC Albumin/Creatinine Ratio Urine    4. Current moderate episode of major depressive disorder without prior episode  -     PARoxetine CR (Paxil CR) 25 MG 24 hr tablet; Take 1 tablet by mouth Every Morning.  Dispense: 90 tablet;  Refill: 1    5. Acute migraine  -     ubrogepant (Ubrelvy) 100 MG tablet; Take 1 tablet by mouth Daily As Needed (migraine).  Dispense: 16 tablet; Refill: 11    6. Atrial fibrillation, unspecified type               Patient or patient representative verbalized consent for the use of Ambient Listening during the visit with  JENNIFER Valdez for chart documentation. 6/3/2025  17:37 EDT

## 2025-06-03 NOTE — OUTREACH NOTE
Medical Week 2 Survey      Flowsheet Row Responses   [facility] patient discharged from? [facility]   Does the patient have one of the following disease processes/diagnoses(primary or secondary)? Other   Week 2 attempt successful? No   Unsuccessful attempts Attempt 1   Revoke Benjie HARPER - Registered Nurse

## 2025-06-04 ENCOUNTER — TELEPHONE (OUTPATIENT)
Dept: FAMILY MEDICINE CLINIC | Facility: CLINIC | Age: 65
End: 2025-06-04
Payer: MEDICARE

## 2025-06-04 NOTE — TELEPHONE ENCOUNTER
PA for Ubrelvy  Key: [id number]  Approved today by [facility] 2017  PA Case: [id number], Status: Approved, Coverage Starts on: 1/1/2025 12:00:00 AM, Coverage Ends on: 12/31/2025 12:00:00 AM. Questions? Contact [phone].      PA for Ramabdi Key: [id number]  PA Case: [id number], Status: Approved, Coverage Starts on: 1/1/2025 12:00:00 AM, Coverage Ends on: 12/31/2025 12:00:00 AM. Questions? Contact [phone].      Pt informed   Health Maintenance Due   Topic Date Due   • DTaP/Tdap/Td Vaccine (1 - Tdap) Never done   • Shingles Vaccine (1 of 2) Never done   • Colorectal Cancer Screen-  09/06/2016       Patient is due for topics as listed above but is not proceeding with Immunization(s) Dtap/Tdap/Td and Shingles and Cervical cancer screening at this time.

## 2025-06-30 LAB
CV ZIO BASELINE AVG BPM: 64
CV ZIO BASELINE BPM HIGH: 123
CV ZIO BASELINE BPM LOW: 48

## 2025-07-01 ENCOUNTER — OFFICE VISIT (OUTPATIENT)
Dept: NEUROLOGY | Facility: CLINIC | Age: 65
End: 2025-07-01
Payer: MEDICARE

## 2025-07-01 VITALS
SYSTOLIC BLOOD PRESSURE: 128 MMHG | HEIGHT: 69 IN | HEART RATE: 74 BPM | DIASTOLIC BLOOD PRESSURE: 78 MMHG | WEIGHT: 229 LBS | OXYGEN SATURATION: 98 % | BODY MASS INDEX: 33.92 KG/M2 | TEMPERATURE: 98.4 F

## 2025-07-01 DIAGNOSIS — I10 PRIMARY HYPERTENSION: ICD-10-CM

## 2025-07-01 DIAGNOSIS — I48.91 ATRIAL FIBRILLATION, UNSPECIFIED TYPE: ICD-10-CM

## 2025-07-01 DIAGNOSIS — E11.8 CONTROLLED TYPE 2 DIABETES MELLITUS WITH COMPLICATION, WITHOUT LONG-TERM CURRENT USE OF INSULIN: ICD-10-CM

## 2025-07-01 DIAGNOSIS — E78.2 MIXED HYPERLIPIDEMIA: ICD-10-CM

## 2025-07-01 DIAGNOSIS — Z86.73 HISTORY OF TIA (TRANSIENT ISCHEMIC ATTACK): Primary | ICD-10-CM

## 2025-07-01 RX ORDER — EZETIMIBE 10 MG/1
10 TABLET ORAL DAILY
Qty: 90 TABLET | Refills: 3 | Status: SHIPPED | OUTPATIENT
Start: 2025-07-01 | End: 2026-07-01

## 2025-07-03 ENCOUNTER — PATIENT ROUNDING (BHMG ONLY) (OUTPATIENT)
Dept: NEUROLOGY | Facility: CLINIC | Age: 65
End: 2025-07-03
Payer: MEDICARE

## 2025-07-03 NOTE — PROGRESS NOTES
July 3, 2025    Hello, may I speak with Zaheer Baron?    My name is Izabella      I am  with [facility]  [address]  [facility] [id number] [phone].    Before we get started may I verify your date of birth? 1960    I am calling to officially welcome you to our practice and ask about your recent visit. Is this a good time to talk? yes    Tell me about your visit with us. What things went well?  Patient stated that Brad was very patient and kind and answered all questions.        We're always looking for ways to make our patients' experiences even better. Do you have recommendations on ways we may improve?  no    Overall were you satisfied with your first visit to our practice? yes       I appreciate you taking the time to speak with me today. Is there anything else I can do for you? no      Thank you, and have a great day.

## 2025-07-10 ENCOUNTER — OFFICE VISIT (OUTPATIENT)
Dept: CARDIOLOGY | Facility: CLINIC | Age: 65
End: 2025-07-10
Payer: MEDICARE

## 2025-07-10 ENCOUNTER — LAB (OUTPATIENT)
Facility: HOSPITAL | Age: 65
End: 2025-07-10
Payer: MEDICARE

## 2025-07-10 VITALS
BODY MASS INDEX: 33.65 KG/M2 | SYSTOLIC BLOOD PRESSURE: 128 MMHG | HEART RATE: 73 BPM | WEIGHT: 227.2 LBS | DIASTOLIC BLOOD PRESSURE: 78 MMHG | HEIGHT: 69 IN | OXYGEN SATURATION: 96 %

## 2025-07-10 DIAGNOSIS — E78.5 HYPERLIPIDEMIA, UNSPECIFIED HYPERLIPIDEMIA TYPE: ICD-10-CM

## 2025-07-10 DIAGNOSIS — E78.5 HYPERLIPIDEMIA, UNSPECIFIED HYPERLIPIDEMIA TYPE: Primary | ICD-10-CM

## 2025-07-10 PROCEDURE — 82550 ASSAY OF CK (CPK): CPT

## 2025-07-10 PROCEDURE — 36415 COLL VENOUS BLD VENIPUNCTURE: CPT

## 2025-07-10 PROCEDURE — 80061 LIPID PANEL: CPT

## 2025-07-10 PROCEDURE — 80053 COMPREHEN METABOLIC PANEL: CPT

## 2025-07-10 PROCEDURE — 85025 COMPLETE CBC W/AUTO DIFF WBC: CPT

## 2025-07-10 PROCEDURE — 86141 C-REACTIVE PROTEIN HS: CPT

## 2025-07-10 NOTE — PROGRESS NOTES
"Chief Complaint  Follow-up (6 week hospital follow up) and Establish Care      Subjective   History of Present Illness    Problem List  -PAF  -TIA?  -DM  -HTN  -HLD  -Minimally elevated trop, bnp normal  -echo preserved LV function  -EKG NSR, telemetry, brief afib.  Holter negative     Mr. Baron is a 64 year old man with above hx.  Had syncope several years ago after sepsis and found to be in afib.  No issues since then.  Had alterations in mentation and went to ER.  Found to be afib.  Converted prior to transfer.   Unable to access EKG from OSH but had some afib on monitor here.  Currently in NSR and feeling well.  Now being seen in follow up.  No complaints.  Statin intolerant.  Ros negative except for the above.      Objective   Vital Signs:  Vitals:    07/10/25 1143   BP: 128/78   Pulse: 73   SpO2: 96%     Estimated body mass index is 33.54 kg/m² as calculated from the following:    Height as of this encounter: 175.3 cm (69.02\").    Weight as of this encounter: 103 kg (227 lb 3.2 oz).       Physical Exam  HENT:      Head: Normocephalic.   Eyes:      Extraocular Movements: Extraocular movements intact.   Cardiovascular:      Rate and Rhythm: Normal rate and regular rhythm.      Heart sounds: No murmur heard.     No gallop.   Pulmonary:      Breath sounds: Normal breath sounds.   Abdominal:      Palpations: Abdomen is soft.   Musculoskeletal:      Right lower leg: No edema.      Left lower leg: No edema.   Skin:     General: Skin is warm and dry.   Neurological:      General: No focal deficit present.      Mental Status: He is alert.   Psychiatric:         Mood and Affect: Mood normal.               Assessment   -PAF  -TIA?  -DM  -HTN  -HLD  -Minimally elevated trop, bnp normal  -echo preserved LV function  -EKG NSR, telemetry, brief afib.  Holter negative    Plan   -Cont. Eliquis  -cont zetia.  Statin intolerant.  Blood work today.  -cont. BB        Return in about 6 months (around 1/10/2026).  Kai [facility] " MD [place]

## 2025-07-11 LAB
ALBUMIN SERPL-MCNC: 4.6 G/DL (ref 3.5–5.2)
ALBUMIN/GLOB SERPL: 1.8 G/DL
ALP SERPL-CCNC: 62 U/L (ref 39–117)
ALT SERPL W P-5'-P-CCNC: 11 U/L (ref 1–41)
ANION GAP SERPL CALCULATED.3IONS-SCNC: 13 MMOL/L (ref 5–15)
AST SERPL-CCNC: 21 U/L (ref 1–40)
BASOPHILS # BLD AUTO: 0.06 10*3/MM3 (ref 0–0.2)
BASOPHILS NFR BLD AUTO: 0.6 % (ref 0–1.5)
BILIRUB SERPL-MCNC: 0.5 MG/DL (ref 0–1.2)
BUN SERPL-MCNC: 15 MG/DL (ref 8–23)
BUN/CREAT SERPL: 12.9 (ref 7–25)
CALCIUM SPEC-SCNC: 9.7 MG/DL (ref 8.6–10.5)
CHLORIDE SERPL-SCNC: 101 MMOL/L (ref 98–107)
CHOLEST SERPL-MCNC: 136 MG/DL (ref 0–200)
CK SERPL-CCNC: 68 U/L (ref 20–200)
CO2 SERPL-SCNC: 23 MMOL/L (ref 22–29)
CREAT SERPL-MCNC: 1.16 MG/DL (ref 0.76–1.27)
CRP SERPL-MCNC: 0.41 MG/DL (ref 0.01–0.5)
DEPRECATED RDW RBC AUTO: 46.4 FL (ref 37–54)
EGFRCR SERPLBLD CKD-EPI 2021: 69.9 ML/MIN/1.73
EOSINOPHIL # BLD AUTO: 0.18 10*3/MM3 (ref 0–0.4)
EOSINOPHIL NFR BLD AUTO: 1.8 % (ref 0.3–6.2)
ERYTHROCYTE [DISTWIDTH] IN BLOOD BY AUTOMATED COUNT: 14.1 % (ref 12.3–15.4)
GLOBULIN UR ELPH-MCNC: 2.5 GM/DL
GLUCOSE SERPL-MCNC: 169 MG/DL (ref 65–99)
HCT VFR BLD AUTO: 42.8 % (ref 37.5–51)
HDLC SERPL-MCNC: 34 MG/DL (ref 40–60)
HGB BLD-MCNC: 14.2 G/DL (ref 13–17.7)
IMM GRANULOCYTES # BLD AUTO: 0.04 10*3/MM3 (ref 0–0.05)
IMM GRANULOCYTES NFR BLD AUTO: 0.4 % (ref 0–0.5)
LDLC SERPL CALC-MCNC: 73 MG/DL (ref 0–100)
LDLC/HDLC SERPL: 1.99 {RATIO}
LYMPHOCYTES # BLD AUTO: 2.41 10*3/MM3 (ref 0.7–3.1)
LYMPHOCYTES NFR BLD AUTO: 24.6 % (ref 19.6–45.3)
MCH RBC QN AUTO: 30.6 PG (ref 26.6–33)
MCHC RBC AUTO-ENTMCNC: 33.2 G/DL (ref 31.5–35.7)
MCV RBC AUTO: 92.2 FL (ref 79–97)
MONOCYTES # BLD AUTO: 0.5 10*3/MM3 (ref 0.1–0.9)
MONOCYTES NFR BLD AUTO: 5.1 % (ref 5–12)
NEUTROPHILS NFR BLD AUTO: 6.59 10*3/MM3 (ref 1.7–7)
NEUTROPHILS NFR BLD AUTO: 67.5 % (ref 42.7–76)
NRBC BLD AUTO-RTO: 0 /100 WBC (ref 0–0.2)
PLATELET # BLD AUTO: 283 10*3/MM3 (ref 140–450)
PMV BLD AUTO: 10.6 FL (ref 6–12)
POTASSIUM SERPL-SCNC: 4.2 MMOL/L (ref 3.5–5.2)
PROT SERPL-MCNC: 7.1 G/DL (ref 6–8.5)
RBC # BLD AUTO: 4.64 10*6/MM3 (ref 4.14–5.8)
SODIUM SERPL-SCNC: 137 MMOL/L (ref 136–145)
TRIGL SERPL-MCNC: 171 MG/DL (ref 0–150)
VLDLC SERPL-MCNC: 29 MG/DL (ref 5–40)
WBC NRBC COR # BLD AUTO: 9.78 10*3/MM3 (ref 3.4–10.8)

## 2025-07-13 NOTE — PROGRESS NOTES
New Patient Office Visit      Encounter Date: 2025   Patient Name: Zaheer Baron  : 1960   MRN: [id number]   PCP: Eliana Ann PA    Referring Provider: No ref. provider found     Chief Complaint:    Chief Complaint   Patient presents with    Establish Care       History of Present Illness: Zaheer Baron is a 64 y.o. male with known medical diagnoses of T2DM, Sjorgren's syndrome, OA, CAD, cataract, previously resolved A-fib (, not on OAC) presented to [facility] emergency department on 2025 for further evaluation of 10-minute episode of unresponsiveness. He was found to be in A-fib with RVR with a rate of 170 on arrival to OSH ER. CT head was reported to be negative for any acute findings. CTA head/neck were also reported to be negative for any flow-limiting stenosis or LVO. MRI brain images from 2025 showed no ischemic or hemorrhagic stroke. Transthoracic echocardiogram  showed left ventricular ejection fraction of 60%, mild increase in left atrial volume.  Saline study was inconclusive. He was discharged home on Eliquis 5 mg BID and Aspirin 81 mg daily.     Clinic Visit 2025: Patient presents to stroke clinic for hospital follow-up. He denies any new stroke like symptoms and has had no new episodes of unresponsiveness. NIH 0. No focal weakness or numbness. No vision or language disturbances. No dizziness or headaches. He reports compliance with Eliquis with no missed doses. No rosa bleeding reported. Patient reports statin intolerance in the past and has been having some worsening myalgias. He requests a statin alternative and we will discontinue Atorvastatin today and start Zetia with continued monitoring of cholesterol. Patient has a cardiology appointment on July 10 and I encourage him to keep this appointment. Stroke secondary risk reduction strategies reviewed. Sign of stroke reviewed and patient will call 911 immediately for new symptoms. No additional  concerns or complaints today.     Stroke Risk Factors: atrial fibrillation, diabetes mellitus, hyperlipidemia, and hypertension      Subjective      Past Medical History:   Past Medical History:   Diagnosis Date    Acute sinusitis     Allergic     Nsaids    Allergic rhinitis     Arthritis of back     Arthritis of neck     Arthropathy     of lumbar facet joint    Artificial lens present     position - right    Atrial fibrillation 03/07/2020    patient had episode of atrial fibrillation w/ ventricular response secondary to hypovelemia r/t salmonella infection    Backache     chronic back problems    Benign prostatic hyperplasia     Bronchitis     perisistent    Cataract     Cervical disc disorder     Chronic obstructive lung disease     Diabetes mellitus     no retinopathy    Difficulty walking     Disorder of kidney     Disorder of kidney and/or ureter - Congenital solitary right kidney       Dizziness     History of Meniere's like condition, left ear       Drug therapy     long-term    Dysfunction of eustachian tube     Dyslipidemia     Gout     Headache     History of possible migraine/cluster       Headache, tension-type 2010    Hearing loss     Hip arthrosis     Hypertension     Hypokalemia     Impacted cerumen     Infestation by Sarcoptes scabiei alta hominis     Inguinal hernia     History of, repaired       Knee pain     Knee swelling     Low back pain 30 years ago    Lumbosacral disc disease     Migraine 2007    Osteoarthritis     Periarthritis of shoulder     Pneumonia     in the past    Posterior subcapsular polar senile cataract     Renal insufficiency 20 years ago    Sjogren's syndrome     Sleep apnea     Type 2 diabetes mellitus        Past Surgical History:   Past Surgical History:   Procedure Laterality Date    BACK SURGERY      1/1/02    CARPAL TUNNEL RELEASE      (Carpal tunnel release on the left)   09/10/2010 , Carpal tunnel release on the right)   12/03/2010     CATARACT EXTRACTION, BILATERAL       COLONOSCOPY      ENDOSCOPY N/A 03/16/2017    Procedure: ESOPHAGOGASTRODUODENOSCOPY;  Surgeon: Alli Shook DO;  Location: [facility] ENDOSCOPY;  Service:     EYE SURGERY Bilateral     cataract removed    HAND SURGERY      HERNIA REPAIR      INGUINAL HERNIA REPAIR      (Left inguinal hernioplasty with mesh.)   06/07/2007     INJECTION OF MEDICATION      Injection for nerve block (Lumbar medial branch block.)   03/31/2016     INJECTION OF MEDICATION  03/19/2015    solu-medrol     KNEE SURGERY      (Lateral release, removal of loose bodies, excision of suprapatellar plica.)   04/29/1982     OTHER SURGICAL HISTORY      Lumbar surgery (Lumbosacral radio frequency thermal coagulation. Lumbosacral spondylosis.)   06/30/2016 ,Lumbar surgery (Lumbosacral radio frequency thermal coagulation.)   12/21/2015      OTHER SURGICAL HISTORY      Remove cataract, insert lens (Right eye.)   05/07/2015     OTHER SURGICAL HISTORY      Remove hip/pelvis lesion (Melanoma, right hip.)   2005     VASECTOMY         Family History:   Family History   Problem Relation Age of Onset    Lung disease Mother         autoimmune    Heart failure Father 35    Alcohol abuse Father     Heart disease Father     Stomach cancer Sister 43        autoimmune    Cancer Sister     Diabetes Paternal Grandmother        Social History:   Social History     Socioeconomic History    Marital status:    Tobacco Use    Smoking status: Never     Passive exposure: Never    Smokeless tobacco: Current     Types: Snuff   Vaping Use    Vaping status: Never Used   Substance and Sexual Activity    Alcohol use: Yes     Comment: SOCIAL    Drug use: No    Sexual activity: Not Currently     Partners: Female     Birth control/protection: Vasectomy       Medications:     Current Outpatient Medications:     albuterol sulfate  (90 Base) MCG/ACT inhaler, INHALE 1 TO 2 PUFFS EVERY 4 TO 6 HOURS AS NEEDED FOR WHEEZE FOR UP TO 30 DAYS (Patient taking differently: Inhale 2  puffs Take As Directed.), Disp: , Rfl:     apixaban (ELIQUIS) 5 MG tablet tablet, Take 1 tablet by mouth Every 12 (Twelve) Hours. Indications: Atrial Fibrillation, Disp: 60 tablet, Rfl: 2    bacitracin 500 UNIT/GM ointment, Apply 1 Application topically to the appropriate area as directed 2 (Two) Times a Day., Disp: 28 g, Rfl: 0    clotrimazole-betamethasone (Lotrisone) 1-0.05 % cream, Apply 1 Application topically to the appropriate area as directed 2 (Two) Times a Day., Disp: 45 g, Rfl: 2    doxazosin (Cardura) 4 MG tablet, Take 1 tablet by mouth Every Night., Disp: 90 tablet, Rfl: 3    empagliflozin (Jardiance) 25 MG tablet tablet, Take 1 tablet by mouth Daily., Disp: 90 tablet, Rfl: 1    Ergocalciferol 50 MCG (2000 UT) tablet, Take 50 mcg by mouth Daily., Disp: 90 tablet, Rfl: 2    esomeprazole (nexIUM) 20 MG capsule, Take 1 capsule by mouth Every Morning Before Breakfast., Disp: , Rfl:     fenofibrate (Tricor) 145 MG tablet, Take 1 tablet by mouth Daily., Disp: 90 tablet, Rfl: 1    fluticasone (FLONASE) 50 MCG/ACT nasal spray, Administer 1 spray into the nostril(s) as directed by provider Daily., Disp: , Rfl:     glipizide (GLUCOTROL) 5 MG tablet, Take 1 tablet by mouth Daily., Disp: , Rfl:     ketoconazole (NIZORAL) 2 % shampoo, USE AS A SHAMPOO TO THE SCALP THREE TIMES A WEEK. LATHER FOR 5 MINUTES THEN RINSE., Disp: , Rfl:     metoprolol tartrate (LOPRESSOR) 25 MG tablet, Take 1 tablet by mouth Every 12 (Twelve) Hours., Disp: 60 tablet, Rfl: 2    oxyCODONE-acetaminophen (PERCOCET)  MG per tablet, Take 1 tablet by mouth 5 (Five) Times a Day As Needed for Moderate Pain., Disp: , Rfl:     PARoxetine CR (Paxil CR) 25 MG 24 hr tablet, Take 1 tablet by mouth Every Morning., Disp: 90 tablet, Rfl: 1    ramelteon (ROZEREM) 8 MG tablet, Take 1 tablet by mouth Every Night., Disp: 30 tablet, Rfl: 1    tamsulosin (FLOMAX) 0.4 MG capsule 24 hr capsule, Take 1 capsule by mouth Every Night., Disp: , Rfl:      "triamcinolone (KENALOG) 0.5 % ointment, Apply 1 Application topically to the appropriate area as directed 2 (Two) Times a Day., Disp: 15 g, Rfl: 0    ubrogepant (Ubrelvy) 100 MG tablet, Take 1 tablet by mouth Daily As Needed (migraine)., Disp: 16 tablet, Rfl: 11    ezetimibe (Zetia) 10 MG tablet, Take 1 tablet by mouth Daily., Disp: 90 tablet, Rfl: 3    Allergies:   Allergies   Allergen Reactions    Nsaids Other (See Comments)     Solitary kidney; renal failure with NSAID use.      Other Other (See Comments)       Objective     Physical Exam:  Vital Signs:   Vitals:    07/01/25 0802   BP: 128/78   Pulse: 74   Temp: 98.4 °F (36.9 °C)   SpO2: 98%   Weight: 104 kg (229 lb)   Height: 175.3 cm (69.02\")     Body mass index is 33.8 kg/m².     Physical Exam  Constitutional:       General: He is not in acute distress.     Appearance: He is not ill-appearing.   HENT:      Head: Normocephalic and atraumatic.   Eyes:      General: Lids are normal.      Extraocular Movements: Extraocular movements intact.      Pupils: Pupils are equal, round, and reactive to light.   Cardiovascular:      Rate and Rhythm: Normal rate.   Pulmonary:      Effort: Pulmonary effort is normal. No respiratory distress.   Abdominal:      General: There is no distension.      Tenderness: There is no guarding.   Musculoskeletal:         General: Normal range of motion.      Right lower leg: No edema.      Left lower leg: No edema.   Skin:     General: Skin is warm and dry.      Findings: No lesion.   Neurological:      General: No focal deficit present.      Mental Status: He is oriented to person, place, and time. Mental status is at baseline.      Motor: Motor strength is normal.  Psychiatric:         Mood and Affect: Mood normal.         Speech: Speech normal.       Neurological Exam  Mental Status  Awake, alert and oriented to person, place and time. Oriented to person, place, time and situation. Oriented to person, place, and time. Speech is normal. " Language is fluent with no aphasia. Attention and concentration are normal. Fund of knowledge is appropriate for level of education.    Cranial Nerves  CN II: Visual fields full to confrontation.  CN III, IV, VI: Extraocular movements intact bilaterally. Normal lids and orbits bilaterally. Pupils equal round and reactive to light bilaterally.  CN V: Facial sensation is normal.  CN VII: Full and symmetric facial movement.  CN XI: Shoulder shrug strength is normal.    Motor  Normal muscle bulk throughout. No fasciculations present. Normal muscle tone. Strength is 5/5 throughout all four extremities.    Sensory  Light touch is normal in upper and lower extremities.     Coordination  Right: Finger-to-nose normal. Heel-to-shin normal.Left: Finger-to-nose normal. Heel-to-shin normal.    Gait  Casual gait is normal including stance, stride, and arm swing.       NIH Stroke Scale    1a  Level of consciousness: 0=alert; keenly responsive   1b. LOC questions:  0=Answers both questions correctly    1c. LOC commands: 0=Performs both tasks correctly   2.  Best Gaze: 0=normal   3. Visual: 0=No visual loss   4. Facial Palsy: 0=Normal symmetric movement   5a. Motor left arm: 0=No drift, limb holds 90 (or 45) degrees for full 10 seconds   5b.  Motor right arm: 0=No drift, limb holds 90 (or 45) degrees for full 10 seconds   6a. Motor left le=No drift, limb holds 90 (or 45) degrees for full 10 seconds   6b  Motor right le=No drift, limb holds 90 (or 45) degrees for full 10 seconds   7. Limb Ataxia: 0=Absent   8.  Sensory: 0=Normal; no sensory loss   9. Best Language:  0=No aphasia, normal   10. Dysarthria: 0=Normal   11. Extinction and Inattention: 0=No abnormality    Total:   0         Modified [place] Score: 0        0  No Symptoms    1 No significant disability. Able to carry out all usual activities, despite some symptoms.    2 Slight disability. Able to look after own affairs without assistance, but unable to carry out all  previous activities.    3 Moderate disability. Requires some help, but able to walk unassisted.    4 Moderately severe disability. Unable to attend to own bodily needs without assistance, and unable to walk unassisted.    5 Severe disability. Requires constant nursing care and attention, bedridden, incontinent.    6 Dead        PHQ-9 Depression Screening  Little interest or pleasure in doing things? Not at all   Feeling down, depressed, or hopeless? Not at all   PHQ-2 Total Score 0   Trouble falling or staying asleep, or sleeping too much?     Feeling tired or having little energy?     Poor appetite or overeating?     Feeling bad about yourself - or that you are a failure or have let yourself or your family down?     Trouble concentrating on things, such as reading the newspaper or watching television?     Moving or speaking so slowly that other people could have noticed? Or the opposite - being so fidgety or restless that you have been moving around a lot more than usual?     Thoughts that you would be better off dead, or of hurting yourself in some way?     PHQ-9 Total Score     If you checked off any problems, how difficult have these problems made it for you to do your work, take care of things at home, or get along with other people?       STOP-Bang Score  Have you been diagnosed with Sleep Apnea?: yes (pt does not wear a CPAP)    Imaging Reviewed:   EEG  Result Date: 5/27/2025  Normal study This report is transcribed using the Dragon dictation system.      MRI Brain Without Contrast  Result Date: 5/26/2025  Impression: 1. No acute ischemic change. 2. White matter changes most compatible sequela small vessel ischemic disease in this age group., Accounting for differences in technique no significant change from comparison Electronically Signed: Brad Cui MD  5/26/2025 9:19 PM EDT  Workstation ID: [id number]    CT Head Without Contrast Stroke Protocol  Result Date: 5/26/2025  Impression: No acute  intracranial process. Electronically Signed: Yazmin Bonds MD  5/26/2025 8:05 PM EDT  Workstation ID: [id number]    CT Angiogram Carotids  Result Date: 5/26/2025  1. No evidence of significant vascular abnormalities, acute infarct. 2. Ectasia of the right internal jugular and subclavian veins needs Doppler correlation.    XR Chest 1 View  Result Date: 5/26/2025  1. No acute cardiopulmonary abnormalities. 2. Possible right hilar calcific nodules/ lymph nodes. 3. Cardiomegaly is associated with an unfolded aorta.    CT Head Without Contrast  Result Date: 5/26/2025  1. No evidence of acute territorial infarct or hemorrhage. 2. Small vessels ischemic changes. 3. MRI is the modality of choice to rule out acute ischemic infarction and is advised if clinically indicated.     CT Lower Extremity Right Without Contrast  Result Date: 5/16/2025  Impression: 1.Tricompartmental osteoarthritis of the right knee with advanced joint space narrowing. 2.Small knee effusion. 3.Calcific atherosclerosis. Electronically Signed: Rai Bobby  5/16/2025 1:11 PM EDT  Workstation ID: [id number]       Laboratory Results:   CBC w/diff          5/14/2025    09:51 5/27/2025    04:41 7/10/2025    12:08   CBC w/Diff   WBC 8.27  9.31  9.78    RBC 4.96  4.56  4.64    Hemoglobin 14.7  13.5  14.2    Hematocrit 44.5  41.0  42.8    MCV 89.7  89.9  92.2    MCH 29.6  29.6  30.6    MCHC 33.0  32.9  33.2    RDW 13.6  13.8  14.1    Platelets 267  282  283    Neutrophil Rel % 68.1  57.2  67.5    Immature Granulocyte Rel % 0.4  0.3  0.4    Lymphocyte Rel % 23.0  34.0  24.6    Monocyte Rel % 5.9  6.0  5.1    Eosinophil Rel % 1.9  1.7  1.8    Basophil Rel % 0.7  0.8  0.6       Lab Results   Component Value Date    GLUCOSE 169 (H) 07/10/2025    BUN 15.0 07/10/2025    CREATININE 1.16 07/10/2025     07/10/2025    K 4.2 07/10/2025     07/10/2025    CALCIUM 9.7 07/10/2025    PROTEINTOT 7.1 07/10/2025    ALBUMIN 4.6 07/10/2025    ALT 11 07/10/2025    AST 21  07/10/2025    ALKPHOS 62 07/10/2025    BILITOT 0.5 07/10/2025    GLOB 2.5 07/10/2025    AGRATIO 1.8 07/10/2025    BCR 12.9 07/10/2025    ANIONGAP 13.0 07/10/2025    EGFR 69.9 07/10/2025      Most Recent A1C          5/27/2025    04:41   HGBA1C Most Recent   Hemoglobin A1C 7.20       Lipid Panel          5/27/2025    04:41 7/10/2025    12:08   Lipid Panel   Total Cholesterol 199  136    Triglycerides 232  171    HDL Cholesterol 34  34    VLDL Cholesterol 41  29    LDL Cholesterol  124  73    LDL/HDL Ratio 3.49  1.99           Assessment / Plan      Assessment/Plan:     # History of Transient altered mental status   # AFIB  - Continue Eliquis 5 mg BID  - Continue with Cardiology follow-up as scheduled  - Continue Aspirin 81 mg daily  - Recommend [place] diet for secondary stroke prevention  - Increase activity as tolorated.   - Call 911 for any new or worsening stroke symptoms  - Follow up in stroke clinic in 3 months     # Hypertension  - BP goals less than 130/80  - BP today = 128/78  - Recommend BP logbook and avoid hypotension  - Follow with PCP for monitoring     # Hyperlipidemia  - LDL on 05/27/2025 = 124  - Goal LDL less than 70  - Discontinue Atorvastatin 2/2 intolorance  - Start Zetia 10 mg daily   - Follow with PCP for routine monitoring    # T2DM  - A1C on 05/27/25 = 7.20  - Goal A1C less than 7.0  - Continue home DM medications Jardiance and Glipizide  - Continue home Blood sugar monitoring  - Follow with PCP for routine monitoring     Discussed the importance of medication compliance and lifestyle modifications (adequate blood pressure control, adequate control of hyperlipidemia, adequate glycemic control, increase physical activity, and healthy diet) to help reduce the risk of future cerebrovascular events.  Also discussed the signs symptoms that would warrant the patient return back to the emergency department including unilateral weakness, unilateral numbness, visual disturbances, loss of balance,  speech difficulties, and/or a sudden severe headache.  Patient verbalized understanding    Follow Up:   Return in about 3 months (around 10/1/2025).    Brad Avelar PA-C  [facility] [place] [facility] Stroke

## 2025-08-02 DIAGNOSIS — F51.01 PRIMARY INSOMNIA: ICD-10-CM

## 2025-08-04 RX ORDER — RAMELTEON 8 MG/1
8 TABLET ORAL NIGHTLY
Qty: 30 TABLET | Refills: 1 | Status: SHIPPED | OUTPATIENT
Start: 2025-08-04

## 2025-08-15 ENCOUNTER — TELEPHONE (OUTPATIENT)
Dept: CARDIOLOGY | Facility: CLINIC | Age: 65
End: 2025-08-15
Payer: MEDICARE

## 2025-08-25 ENCOUNTER — TELEPHONE (OUTPATIENT)
Dept: CARDIOLOGY | Facility: CLINIC | Age: 65
End: 2025-08-25
Payer: MEDICARE

## 2025-08-25 RX ORDER — METOPROLOL TARTRATE 25 MG/1
25 TABLET, FILM COATED ORAL EVERY 12 HOURS SCHEDULED
Qty: 60 TABLET | Refills: 5 | Status: SHIPPED | OUTPATIENT
Start: 2025-08-25

## 2025-08-28 ENCOUNTER — OFFICE VISIT (OUTPATIENT)
Dept: ORTHOPEDIC SURGERY | Facility: CLINIC | Age: 65
End: 2025-08-28
Payer: MEDICARE

## 2025-08-28 VITALS
SYSTOLIC BLOOD PRESSURE: 124 MMHG | DIASTOLIC BLOOD PRESSURE: 74 MMHG | HEIGHT: 69 IN | WEIGHT: 229 LBS | BODY MASS INDEX: 33.92 KG/M2

## 2025-08-28 DIAGNOSIS — M17.11 PRIMARY OSTEOARTHRITIS OF RIGHT KNEE: Primary | ICD-10-CM

## 2025-08-28 PROCEDURE — 3074F SYST BP LT 130 MM HG: CPT | Performed by: ORTHOPAEDIC SURGERY

## 2025-08-28 PROCEDURE — 3078F DIAST BP <80 MM HG: CPT | Performed by: ORTHOPAEDIC SURGERY

## 2025-08-28 PROCEDURE — 1160F RVW MEDS BY RX/DR IN RCRD: CPT | Performed by: ORTHOPAEDIC SURGERY

## 2025-08-28 PROCEDURE — 1159F MED LIST DOCD IN RCRD: CPT | Performed by: ORTHOPAEDIC SURGERY

## 2025-08-28 PROCEDURE — 99214 OFFICE O/P EST MOD 30 MIN: CPT | Performed by: ORTHOPAEDIC SURGERY

## 2025-08-28 RX ORDER — PREGABALIN 25 MG/1
75 CAPSULE ORAL ONCE
OUTPATIENT
Start: 2025-08-28 | End: 2025-08-28

## 2025-08-28 RX ORDER — MELOXICAM 7.5 MG/1
15 TABLET ORAL ONCE
OUTPATIENT
Start: 2025-08-28 | End: 2025-08-28

## 2025-08-28 RX ORDER — CHLORHEXIDINE GLUCONATE 40 MG/ML
SOLUTION TOPICAL
Qty: 236 ML | Refills: 0 | Status: SHIPPED | OUTPATIENT
Start: 2025-08-28

## 2025-08-28 RX ORDER — ACETAMINOPHEN 500 MG
1000 TABLET ORAL ONCE
OUTPATIENT
Start: 2025-08-28 | End: 2025-08-28

## (undated) DEVICE — CANN SMPL SOFTECH BIFLO ETCO2 A/M 7FT

## (undated) DEVICE — SINGLE-USE BIOPSY FORCEPS: Brand: RADIAL JAW 4

## (undated) DEVICE — BITEBLOCK ENDO W/STRAP 60F A/ LF DISP